# Patient Record
Sex: FEMALE | Race: OTHER | NOT HISPANIC OR LATINO | URBAN - METROPOLITAN AREA
[De-identification: names, ages, dates, MRNs, and addresses within clinical notes are randomized per-mention and may not be internally consistent; named-entity substitution may affect disease eponyms.]

---

## 2017-09-29 ENCOUNTER — OUTPATIENT (OUTPATIENT)
Dept: OUTPATIENT SERVICES | Facility: HOSPITAL | Age: 82
LOS: 1 days | End: 2017-09-29

## 2017-09-29 ENCOUNTER — APPOINTMENT (OUTPATIENT)
Dept: CV DIAGNOSTICS | Facility: HOSPITAL | Age: 82
End: 2017-09-29

## 2017-09-29 DIAGNOSIS — R06.00 DYSPNEA, UNSPECIFIED: ICD-10-CM

## 2018-02-25 ENCOUNTER — EMERGENCY (EMERGENCY)
Facility: HOSPITAL | Age: 83
LOS: 1 days | Discharge: HOME | End: 2018-02-25
Attending: EMERGENCY MEDICINE | Admitting: EMERGENCY MEDICINE
Payer: MEDICARE

## 2018-02-25 VITALS
HEART RATE: 92 BPM | RESPIRATION RATE: 18 BRPM | DIASTOLIC BLOOD PRESSURE: 91 MMHG | SYSTOLIC BLOOD PRESSURE: 194 MMHG | TEMPERATURE: 98 F | OXYGEN SATURATION: 100 %

## 2018-02-25 PROCEDURE — 99285 EMERGENCY DEPT VISIT HI MDM: CPT | Mod: 25,GC

## 2018-02-26 VITALS
RESPIRATION RATE: 16 BRPM | HEART RATE: 80 BPM | SYSTOLIC BLOOD PRESSURE: 170 MMHG | OXYGEN SATURATION: 100 % | DIASTOLIC BLOOD PRESSURE: 81 MMHG | TEMPERATURE: 98 F

## 2018-02-26 LAB
ALBUMIN SERPL ELPH-MCNC: 4.1 G/DL — SIGNIFICANT CHANGE UP (ref 3.3–5)
ALP SERPL-CCNC: 44 U/L — SIGNIFICANT CHANGE UP (ref 40–120)
ALT FLD-CCNC: 17 U/L — SIGNIFICANT CHANGE UP (ref 4–33)
AST SERPL-CCNC: 17 U/L — SIGNIFICANT CHANGE UP (ref 4–32)
BASOPHILS # BLD AUTO: 0.05 K/UL — SIGNIFICANT CHANGE UP (ref 0–0.2)
BASOPHILS NFR BLD AUTO: 0.6 % — SIGNIFICANT CHANGE UP (ref 0–2)
BILIRUB SERPL-MCNC: 0.3 MG/DL — SIGNIFICANT CHANGE UP (ref 0.2–1.2)
BUN SERPL-MCNC: 10 MG/DL — SIGNIFICANT CHANGE UP (ref 7–23)
CALCIUM SERPL-MCNC: 9.7 MG/DL — SIGNIFICANT CHANGE UP (ref 8.4–10.5)
CHLORIDE SERPL-SCNC: 96 MMOL/L — LOW (ref 98–107)
CK MB BLD-MCNC: 2.59 NG/ML — SIGNIFICANT CHANGE UP (ref 1–4.7)
CK MB BLD-MCNC: SIGNIFICANT CHANGE UP (ref 0–2.5)
CK SERPL-CCNC: 54 U/L — SIGNIFICANT CHANGE UP (ref 25–170)
CO2 SERPL-SCNC: 23 MMOL/L — SIGNIFICANT CHANGE UP (ref 22–31)
CREAT SERPL-MCNC: 0.83 MG/DL — SIGNIFICANT CHANGE UP (ref 0.5–1.3)
EOSINOPHIL # BLD AUTO: 0.05 K/UL — SIGNIFICANT CHANGE UP (ref 0–0.5)
EOSINOPHIL NFR BLD AUTO: 0.6 % — SIGNIFICANT CHANGE UP (ref 0–6)
GLUCOSE SERPL-MCNC: 114 MG/DL — HIGH (ref 70–99)
HCT VFR BLD CALC: 37.8 % — SIGNIFICANT CHANGE UP (ref 34.5–45)
HGB BLD-MCNC: 12 G/DL — SIGNIFICANT CHANGE UP (ref 11.5–15.5)
IMM GRANULOCYTES # BLD AUTO: 0.02 # — SIGNIFICANT CHANGE UP
IMM GRANULOCYTES NFR BLD AUTO: 0.2 % — SIGNIFICANT CHANGE UP (ref 0–1.5)
LYMPHOCYTES # BLD AUTO: 2.25 K/UL — SIGNIFICANT CHANGE UP (ref 1–3.3)
LYMPHOCYTES # BLD AUTO: 25.7 % — SIGNIFICANT CHANGE UP (ref 13–44)
MCHC RBC-ENTMCNC: 28.7 PG — SIGNIFICANT CHANGE UP (ref 27–34)
MCHC RBC-ENTMCNC: 31.7 % — LOW (ref 32–36)
MCV RBC AUTO: 90.4 FL — SIGNIFICANT CHANGE UP (ref 80–100)
MONOCYTES # BLD AUTO: 0.58 K/UL — SIGNIFICANT CHANGE UP (ref 0–0.9)
MONOCYTES NFR BLD AUTO: 6.6 % — SIGNIFICANT CHANGE UP (ref 2–14)
NEUTROPHILS # BLD AUTO: 5.81 K/UL — SIGNIFICANT CHANGE UP (ref 1.8–7.4)
NEUTROPHILS NFR BLD AUTO: 66.3 % — SIGNIFICANT CHANGE UP (ref 43–77)
NRBC # FLD: 0 — SIGNIFICANT CHANGE UP
PLATELET # BLD AUTO: 238 K/UL — SIGNIFICANT CHANGE UP (ref 150–400)
PMV BLD: 10.6 FL — SIGNIFICANT CHANGE UP (ref 7–13)
POTASSIUM SERPL-MCNC: 4.3 MMOL/L — SIGNIFICANT CHANGE UP (ref 3.5–5.3)
POTASSIUM SERPL-SCNC: 4.3 MMOL/L — SIGNIFICANT CHANGE UP (ref 3.5–5.3)
PROT SERPL-MCNC: 7.7 G/DL — SIGNIFICANT CHANGE UP (ref 6–8.3)
RBC # BLD: 4.18 M/UL — SIGNIFICANT CHANGE UP (ref 3.8–5.2)
RBC # FLD: 12.4 % — SIGNIFICANT CHANGE UP (ref 10.3–14.5)
SODIUM SERPL-SCNC: 137 MMOL/L — SIGNIFICANT CHANGE UP (ref 135–145)
TROPONIN T SERPL-MCNC: < 0.06 NG/ML — SIGNIFICANT CHANGE UP (ref 0–0.06)
WBC # BLD: 8.76 K/UL — SIGNIFICANT CHANGE UP (ref 3.8–10.5)
WBC # FLD AUTO: 8.76 K/UL — SIGNIFICANT CHANGE UP (ref 3.8–10.5)

## 2018-02-26 NOTE — ED PROVIDER NOTE - PROGRESS NOTE DETAILS
Remains entirely asymptomatic, no event on tele  monitoring.  Wants to go home.  Will DC home. PMD f/u.

## 2018-02-26 NOTE — ED PROVIDER NOTE - NS ED MD DISPO DISCHARGE CCDA
Pt given more ice chips. She is also given a lemon icee. Patient/Caregiver provided printed discharge information.

## 2018-02-26 NOTE — ED ADULT NURSE NOTE - OBJECTIVE STATEMENT
rec'd pt [id number] in room 3 c/o an episode of palpitations at 0530pm tonight, lasted a few seconds and self-resolved, not accompanied by SOB or diaphoresis, pt. presently denies CP/SOB, states she did not take her afternoon BP meds, pt. hypertensive as per family upon EMS arrival, systolically in the 200's, reports mild neck pain.  2 baby aspirins administered by EMS as per family.  Vital signs stable. 22G IV saline lock placed in the L hand, labs drawn and sent as ordered, respiration even and unlabored.

## 2018-02-26 NOTE — ED PROVIDER NOTE - PMH
CAD (coronary artery disease)    DM (diabetes mellitus)    HLD (hyperlipidemia)    HTN (hypertension)    MI (myocardial infarction) CAD (coronary artery disease)    DM (diabetes mellitus)    HLD (hyperlipidemia)    HTN (hypertension)    Hypothyroid    MI (myocardial infarction)

## 2018-02-26 NOTE — ED PROVIDER NOTE - MEDICAL DECISION MAKING DETAILS
Palpitations, no associated sx, short lasting, has no recollection of event, mental status at basline per family.  ECG unchanged from baseline LBBB.  Will send labs, incl TSH, and reassess. Tele monitor.  Unclear etiology. Palpitations, no associated sx, short lasting, has no recollection of event, mental status at baseline per family.  ECG unchanged from baseline LBBB.  Will send labs, incl TSH, and reassess. Tele monitor.  Unclear etiology but does not appear to be hemodynamically significant.

## 2018-02-26 NOTE — ED PROVIDER NOTE - OBJECTIVE STATEMENT
82 yr old F c h/o DM, HTN, HLD, CAD/stents, who p/w feeling of heart racing/palpitations at approx 9pm.  No assoc CP, dizziness, diaphoresis, SOB or other Sx.  Pt now has no recollection of events. Per family, lasted approx 5-10 min.  Family states pt has no cognitive disabilities, and on eval she is alert and oriented x 3, but appears slow to answer questions, states she cannot read.  no h/o CVA.  Otherwise has been in [facility].  No recent febrile illness, normal PO intake, no falls or dizziness, mental status at baseline, per family. 82 yr old F c h/o DM, HTN, hypothyroidism, HLD, CAD/stents, who p/w feeling of heart racing/palpitations at approx 9pm.  No assoc CP, dizziness, diaphoresis, SOB or other Sx.  Pt now has no recollection of events. Per family, lasted approx 5-10 min.  Family states pt has no cognitive disabilities, and on eval she is alert and oriented x 3, but appears slow to answer questions, states she cannot read.  no h/o CVA.  Otherwise has been in [facility].  No recent febrile illness, normal PO intake, no falls or dizziness, mental status at baseline, per family.

## 2018-09-07 ENCOUNTER — EMERGENCY (EMERGENCY)
Facility: HOSPITAL | Age: 83
LOS: 1 days | Discharge: HOME | End: 2018-09-07
Attending: EMERGENCY MEDICINE | Admitting: EMERGENCY MEDICINE
Payer: MEDICARE

## 2018-09-07 VITALS
DIASTOLIC BLOOD PRESSURE: 130 MMHG | TEMPERATURE: 98 F | SYSTOLIC BLOOD PRESSURE: 200 MMHG | OXYGEN SATURATION: 100 % | HEART RATE: 76 BPM | RESPIRATION RATE: 18 BRPM

## 2018-09-07 VITALS
HEART RATE: 70 BPM | OXYGEN SATURATION: 100 % | RESPIRATION RATE: 16 BRPM | SYSTOLIC BLOOD PRESSURE: 189 MMHG | TEMPERATURE: 98 F | DIASTOLIC BLOOD PRESSURE: 69 MMHG

## 2018-09-07 PROBLEM — E03.9 HYPOTHYROIDISM, UNSPECIFIED: Chronic | Status: ACTIVE | Noted: 2018-02-26

## 2018-09-07 LAB
ALBUMIN SERPL ELPH-MCNC: 4.4 G/DL — SIGNIFICANT CHANGE UP (ref 3.3–5)
ALP SERPL-CCNC: 42 U/L — SIGNIFICANT CHANGE UP (ref 40–120)
ALT FLD-CCNC: 14 U/L — SIGNIFICANT CHANGE UP (ref 4–33)
APPEARANCE UR: SIGNIFICANT CHANGE UP
AST SERPL-CCNC: 19 U/L — SIGNIFICANT CHANGE UP (ref 4–32)
BACTERIA # UR AUTO: HIGH
BASE EXCESS BLDV CALC-SCNC: 2.7 MMOL/L — SIGNIFICANT CHANGE UP
BASOPHILS # BLD AUTO: 0.05 K/UL — SIGNIFICANT CHANGE UP (ref 0–0.2)
BASOPHILS NFR BLD AUTO: 0.6 % — SIGNIFICANT CHANGE UP (ref 0–2)
BILIRUB SERPL-MCNC: 0.4 MG/DL — SIGNIFICANT CHANGE UP (ref 0.2–1.2)
BILIRUB UR-MCNC: NEGATIVE — SIGNIFICANT CHANGE UP
BLOOD GAS VENOUS - CREATININE: 0.79 MG/DL — SIGNIFICANT CHANGE UP (ref 0.5–1.3)
BLOOD UR QL VISUAL: NEGATIVE — SIGNIFICANT CHANGE UP
BUN SERPL-MCNC: 13 MG/DL — SIGNIFICANT CHANGE UP (ref 7–23)
CALCIUM SERPL-MCNC: 10.2 MG/DL — SIGNIFICANT CHANGE UP (ref 8.4–10.5)
CHLORIDE BLDV-SCNC: 95 MMOL/L — LOW (ref 96–108)
CHLORIDE SERPL-SCNC: 91 MMOL/L — LOW (ref 98–107)
CO2 SERPL-SCNC: 26 MMOL/L — SIGNIFICANT CHANGE UP (ref 22–31)
COLOR SPEC: COLORLESS — SIGNIFICANT CHANGE UP
CREAT SERPL-MCNC: 1.04 MG/DL — SIGNIFICANT CHANGE UP (ref 0.5–1.3)
EOSINOPHIL # BLD AUTO: 0.04 K/UL — SIGNIFICANT CHANGE UP (ref 0–0.5)
EOSINOPHIL NFR BLD AUTO: 0.5 % — SIGNIFICANT CHANGE UP (ref 0–6)
GAS PNL BLDV: 126 MMOL/L — LOW (ref 136–146)
GLUCOSE BLDV-MCNC: 145 — HIGH (ref 70–99)
GLUCOSE SERPL-MCNC: 143 MG/DL — HIGH (ref 70–99)
GLUCOSE UR-MCNC: NEGATIVE — SIGNIFICANT CHANGE UP
HCO3 BLDV-SCNC: 25 MMOL/L — SIGNIFICANT CHANGE UP (ref 20–27)
HCT VFR BLD CALC: 39 % — SIGNIFICANT CHANGE UP (ref 34.5–45)
HCT VFR BLDV CALC: 42.2 % — SIGNIFICANT CHANGE UP (ref 34.5–45)
HGB BLD-MCNC: 13 G/DL — SIGNIFICANT CHANGE UP (ref 11.5–15.5)
HGB BLDV-MCNC: 13.7 G/DL — SIGNIFICANT CHANGE UP (ref 11.5–15.5)
HYALINE CASTS # UR AUTO: NEGATIVE — SIGNIFICANT CHANGE UP
IMM GRANULOCYTES # BLD AUTO: 0.02 # — SIGNIFICANT CHANGE UP
IMM GRANULOCYTES NFR BLD AUTO: 0.2 % — SIGNIFICANT CHANGE UP (ref 0–1.5)
KETONES UR-MCNC: NEGATIVE — SIGNIFICANT CHANGE UP
LACTATE BLDV-MCNC: 3.9 MMOL/L — HIGH (ref 0.5–2)
LEUKOCYTE ESTERASE UR-ACNC: SIGNIFICANT CHANGE UP
LYMPHOCYTES # BLD AUTO: 2.18 K/UL — SIGNIFICANT CHANGE UP (ref 1–3.3)
LYMPHOCYTES # BLD AUTO: 26.2 % — SIGNIFICANT CHANGE UP (ref 13–44)
MAGNESIUM SERPL-MCNC: 1.3 MG/DL — LOW (ref 1.6–2.6)
MCHC RBC-ENTMCNC: 30.1 PG — SIGNIFICANT CHANGE UP (ref 27–34)
MCHC RBC-ENTMCNC: 33.3 % — SIGNIFICANT CHANGE UP (ref 32–36)
MCV RBC AUTO: 90.3 FL — SIGNIFICANT CHANGE UP (ref 80–100)
MONOCYTES # BLD AUTO: 0.41 K/UL — SIGNIFICANT CHANGE UP (ref 0–0.9)
MONOCYTES NFR BLD AUTO: 4.9 % — SIGNIFICANT CHANGE UP (ref 2–14)
NEUTROPHILS # BLD AUTO: 5.63 K/UL — SIGNIFICANT CHANGE UP (ref 1.8–7.4)
NEUTROPHILS NFR BLD AUTO: 67.6 % — SIGNIFICANT CHANGE UP (ref 43–77)
NITRITE UR-MCNC: NEGATIVE — SIGNIFICANT CHANGE UP
NRBC # FLD: 0 — SIGNIFICANT CHANGE UP
PCO2 BLDV: 51 MMHG — SIGNIFICANT CHANGE UP (ref 41–51)
PH BLDV: 7.35 PH — SIGNIFICANT CHANGE UP (ref 7.32–7.43)
PH UR: 7.5 — SIGNIFICANT CHANGE UP (ref 5–8)
PHOSPHATE SERPL-MCNC: 2.9 MG/DL — SIGNIFICANT CHANGE UP (ref 2.5–4.5)
PLATELET # BLD AUTO: 294 K/UL — SIGNIFICANT CHANGE UP (ref 150–400)
PMV BLD: 10.4 FL — SIGNIFICANT CHANGE UP (ref 7–13)
PO2 BLDV: 26 MMHG — LOW (ref 35–40)
POTASSIUM BLDV-SCNC: 4.5 MMOL/L — SIGNIFICANT CHANGE UP (ref 3.4–4.5)
POTASSIUM SERPL-MCNC: 5 MMOL/L — SIGNIFICANT CHANGE UP (ref 3.5–5.3)
POTASSIUM SERPL-SCNC: 5 MMOL/L — SIGNIFICANT CHANGE UP (ref 3.5–5.3)
PREALB SERPL-MCNC: 26 MG/DL — SIGNIFICANT CHANGE UP (ref 20–40)
PROT SERPL-MCNC: 8.1 G/DL — SIGNIFICANT CHANGE UP (ref 6–8.3)
PROT UR-MCNC: 100 — HIGH
RBC # BLD: 4.32 M/UL — SIGNIFICANT CHANGE UP (ref 3.8–5.2)
RBC # FLD: 12.1 % — SIGNIFICANT CHANGE UP (ref 10.3–14.5)
RBC CASTS # UR COMP ASSIST: SIGNIFICANT CHANGE UP (ref 0–?)
SAO2 % BLDV: 41.5 % — LOW (ref 60–85)
SODIUM SERPL-SCNC: 130 MMOL/L — LOW (ref 135–145)
SP GR SPEC: 1.01 — SIGNIFICANT CHANGE UP (ref 1–1.04)
SQUAMOUS # UR AUTO: SIGNIFICANT CHANGE UP
TROPONIN T, HIGH SENSITIVITY: 37 NG/L — SIGNIFICANT CHANGE UP (ref ?–14)
TSH SERPL-MCNC: 2.77 UIU/ML — SIGNIFICANT CHANGE UP (ref 0.27–4.2)
UROBILINOGEN FLD QL: NORMAL — SIGNIFICANT CHANGE UP
WBC # BLD: 8.33 K/UL — SIGNIFICANT CHANGE UP (ref 3.8–10.5)
WBC # FLD AUTO: 8.33 K/UL — SIGNIFICANT CHANGE UP (ref 3.8–10.5)
WBC UR QL: HIGH (ref 0–?)

## 2018-09-07 PROCEDURE — 93010 ELECTROCARDIOGRAM REPORT: CPT

## 2018-09-07 PROCEDURE — 74177 CT ABD & PELVIS W/CONTRAST: CPT | Mod: 26

## 2018-09-07 PROCEDURE — 71045 X-RAY EXAM CHEST 1 VIEW: CPT | Mod: 26

## 2018-09-07 PROCEDURE — 99284 EMERGENCY DEPT VISIT MOD MDM: CPT | Mod: 25,GC

## 2018-09-07 RX ORDER — SODIUM CHLORIDE 9 MG/ML
1000 INJECTION INTRAMUSCULAR; INTRAVENOUS; SUBCUTANEOUS ONCE
Qty: 0 | Refills: 0 | Status: COMPLETED | OUTPATIENT
Start: 2018-09-07 | End: 2018-09-07

## 2018-09-07 RX ORDER — CEPHALEXIN 500 MG
500 CAPSULE ORAL ONCE
Qty: 0 | Refills: 0 | Status: COMPLETED | OUTPATIENT
Start: 2018-09-07 | End: 2018-09-07

## 2018-09-07 RX ORDER — CEPHALEXIN 500 MG
1 CAPSULE ORAL
Qty: 20 | Refills: 0
Start: 2018-09-07 | End: 2018-09-16

## 2018-09-07 RX ADMIN — SODIUM CHLORIDE 1000 MILLILITER(S): 9 INJECTION INTRAMUSCULAR; INTRAVENOUS; SUBCUTANEOUS at 16:14

## 2018-09-07 RX ADMIN — Medication 500 MILLIGRAM(S): at 17:42

## 2018-09-07 NOTE — ED ADULT NURSE NOTE - OBJECTIVE STATEMENT
pt alert & oriented to person and place brought in from home by family for abdominal pain and increased weakness for past two weeks abdomen soft and non tender denies cp sob fever chills no respiratory distress noted, will monitor, English

## 2018-09-07 NOTE — ED PROVIDER NOTE - OBJECTIVE STATEMENT
Pt is an 82 Y F with hx of DM, HTN, CAD with stents Pt is an 82 Y F with hx of DM, HTN, CAD with stents who per family has been having decreasing PO intake and weakness for 2 weeks and acute onset abdominal pain today that caused them to bring her to the ED. Family sates that pt has been acting normally, no fever or confusion at home but just has been eating less and becoming increasingly weak. Pt admits to increasing urination, weakness, vomiting x 1 and some nausea. The pain that brought them to the ED has now resolved. She denies dysuria, LOC, head injury.

## 2018-09-07 NOTE — ED ADULT TRIAGE NOTE - CHIEF COMPLAINT QUOTE
Patient brought to ER from home by EMS for c/o abdominal pain radiating to chest. that started last night. LLBB noted and has hx of stents.

## 2018-09-07 NOTE — ED PROVIDER NOTE - PROGRESS NOTE DETAILS
Resident: Horacio Lucas – Pt was re-evaluated at bedside, VSS, feeling better overall. Results were discussed with patient as well as return precautions and follow up plan with PCP and/or specialist. Time was taken to answer any questions that the patient had before providing them with discharge paperwork.

## 2018-09-07 NOTE — ED PROVIDER NOTE - PMH
CAD (coronary artery disease)    DM (diabetes mellitus)    HLD (hyperlipidemia)    HTN (hypertension)    Hypothyroid    MI (myocardial infarction)

## 2018-09-08 LAB — SPECIMEN SOURCE: SIGNIFICANT CHANGE UP

## 2018-09-10 LAB
-  AMIKACIN: SIGNIFICANT CHANGE UP
-  AMPICILLIN/SULBACTAM: SIGNIFICANT CHANGE UP
-  AMPICILLIN: SIGNIFICANT CHANGE UP
-  AZTREONAM: SIGNIFICANT CHANGE UP
-  CEFAZOLIN: SIGNIFICANT CHANGE UP
-  CEFEPIME: SIGNIFICANT CHANGE UP
-  CEFOXITIN: SIGNIFICANT CHANGE UP
-  CEFTAZIDIME: SIGNIFICANT CHANGE UP
-  CEFTRIAXONE: SIGNIFICANT CHANGE UP
-  CIPROFLOXACIN: SIGNIFICANT CHANGE UP
-  ERTAPENEM: SIGNIFICANT CHANGE UP
-  GENTAMICIN: SIGNIFICANT CHANGE UP
-  IMIPENEM: SIGNIFICANT CHANGE UP
-  LEVOFLOXACIN: SIGNIFICANT CHANGE UP
-  MEROPENEM: SIGNIFICANT CHANGE UP
-  NITROFURANTOIN: SIGNIFICANT CHANGE UP
-  PIPERACILLIN/TAZOBACTAM: SIGNIFICANT CHANGE UP
-  TIGECYCLINE: SIGNIFICANT CHANGE UP
-  TOBRAMYCIN: SIGNIFICANT CHANGE UP
-  TRIMETHOPRIM/SULFAMETHOXAZOLE: SIGNIFICANT CHANGE UP
BACTERIA UR CULT: SIGNIFICANT CHANGE UP
METHOD TYPE: SIGNIFICANT CHANGE UP
ORGANISM # SPEC MICROSCOPIC CNT: SIGNIFICANT CHANGE UP
ORGANISM # SPEC MICROSCOPIC CNT: SIGNIFICANT CHANGE UP

## 2019-07-31 ENCOUNTER — INPATIENT (INPATIENT)
Facility: HOSPITAL | Age: 84
LOS: 8 days | Discharge: HOME HEALTH | End: 2019-08-09
Attending: INTERNAL MEDICINE | Admitting: INTERNAL MEDICINE
Payer: MEDICARE

## 2019-07-31 VITALS
TEMPERATURE: 98 F | RESPIRATION RATE: 18 BRPM | DIASTOLIC BLOOD PRESSURE: 80 MMHG | HEART RATE: 79 BPM | SYSTOLIC BLOOD PRESSURE: 171 MMHG | OXYGEN SATURATION: 100 %
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
## 2019-07-31 LAB
ALBUMIN SERPL ELPH-MCNC: 4.5 G/DL — SIGNIFICANT CHANGE UP (ref 3.3–5)
ALP SERPL-CCNC: 42 U/L — SIGNIFICANT CHANGE UP (ref 40–120)
ALT FLD-CCNC: 12 U/L — SIGNIFICANT CHANGE UP (ref 4–33)
ANION GAP SERPL CALC-SCNC: 12 MMO/L — SIGNIFICANT CHANGE UP (ref 7–14)
APPEARANCE UR: SIGNIFICANT CHANGE UP
AST SERPL-CCNC: 15 U/L — SIGNIFICANT CHANGE UP (ref 4–32)
BACTERIA # UR AUTO: SIGNIFICANT CHANGE UP
BASOPHILS # BLD AUTO: 0.05 K/UL — SIGNIFICANT CHANGE UP (ref 0–0.2)
BASOPHILS NFR BLD AUTO: 0.6 % — SIGNIFICANT CHANGE UP (ref 0–2)
BILIRUB SERPL-MCNC: 0.2 MG/DL — SIGNIFICANT CHANGE UP (ref 0.2–1.2)
BILIRUB UR-MCNC: NEGATIVE — SIGNIFICANT CHANGE UP
BLOOD UR QL VISUAL: NEGATIVE — SIGNIFICANT CHANGE UP
BUN SERPL-MCNC: 23 MG/DL — SIGNIFICANT CHANGE UP (ref 7–23)
CALCIUM SERPL-MCNC: 10.8 MG/DL — HIGH (ref 8.4–10.5)
CHLORIDE SERPL-SCNC: 94 MMOL/L — LOW (ref 98–107)
CO2 SERPL-SCNC: 29 MMOL/L — SIGNIFICANT CHANGE UP (ref 22–31)
COLOR SPEC: SIGNIFICANT CHANGE UP
CREAT SERPL-MCNC: 1.51 MG/DL — HIGH (ref 0.5–1.3)
EOSINOPHIL # BLD AUTO: 0.04 K/UL — SIGNIFICANT CHANGE UP (ref 0–0.5)
EOSINOPHIL NFR BLD AUTO: 0.5 % — SIGNIFICANT CHANGE UP (ref 0–6)
GLUCOSE SERPL-MCNC: 121 MG/DL — HIGH (ref 70–99)
GLUCOSE UR-MCNC: NEGATIVE — SIGNIFICANT CHANGE UP
HCT VFR BLD CALC: 34.5 % — SIGNIFICANT CHANGE UP (ref 34.5–45)
HGB BLD-MCNC: 11 G/DL — LOW (ref 11.5–15.5)
HYALINE CASTS # UR AUTO: NEGATIVE — SIGNIFICANT CHANGE UP
IMM GRANULOCYTES NFR BLD AUTO: 0.1 % — SIGNIFICANT CHANGE UP (ref 0–1.5)
KETONES UR-MCNC: NEGATIVE — SIGNIFICANT CHANGE UP
LEUKOCYTE ESTERASE UR-ACNC: SIGNIFICANT CHANGE UP
LYMPHOCYTES # BLD AUTO: 2.36 K/UL — SIGNIFICANT CHANGE UP (ref 1–3.3)
LYMPHOCYTES # BLD AUTO: 29.8 % — SIGNIFICANT CHANGE UP (ref 13–44)
MCHC RBC-ENTMCNC: 30.6 PG — SIGNIFICANT CHANGE UP (ref 27–34)
MCHC RBC-ENTMCNC: 31.9 % — LOW (ref 32–36)
MCV RBC AUTO: 95.8 FL — SIGNIFICANT CHANGE UP (ref 80–100)
MONOCYTES # BLD AUTO: 0.48 K/UL — SIGNIFICANT CHANGE UP (ref 0–0.9)
MONOCYTES NFR BLD AUTO: 6.1 % — SIGNIFICANT CHANGE UP (ref 2–14)
NEUTROPHILS # BLD AUTO: 4.98 K/UL — SIGNIFICANT CHANGE UP (ref 1.8–7.4)
NEUTROPHILS NFR BLD AUTO: 62.9 % — SIGNIFICANT CHANGE UP (ref 43–77)
NITRITE UR-MCNC: POSITIVE — HIGH
NRBC # FLD: 0 K/UL — SIGNIFICANT CHANGE UP (ref 0–0)
PH UR: 7 — SIGNIFICANT CHANGE UP (ref 5–8)
PLATELET # BLD AUTO: 284 K/UL — SIGNIFICANT CHANGE UP (ref 150–400)
PMV BLD: 11 FL — SIGNIFICANT CHANGE UP (ref 7–13)
POTASSIUM SERPL-MCNC: 4.6 MMOL/L — SIGNIFICANT CHANGE UP (ref 3.5–5.3)
POTASSIUM SERPL-SCNC: 4.6 MMOL/L — SIGNIFICANT CHANGE UP (ref 3.5–5.3)
PROT SERPL-MCNC: 8.5 G/DL — HIGH (ref 6–8.3)
PROT UR-MCNC: 30 — SIGNIFICANT CHANGE UP
RBC # BLD: 3.6 M/UL — LOW (ref 3.8–5.2)
RBC # FLD: 11.5 % — SIGNIFICANT CHANGE UP (ref 10.3–14.5)
RBC CASTS # UR COMP ASSIST: SIGNIFICANT CHANGE UP (ref 0–?)
SODIUM SERPL-SCNC: 135 MMOL/L — SIGNIFICANT CHANGE UP (ref 135–145)
SP GR SPEC: 1.01 — SIGNIFICANT CHANGE UP (ref 1–1.04)
SQUAMOUS # UR AUTO: SIGNIFICANT CHANGE UP
UROBILINOGEN FLD QL: NORMAL — SIGNIFICANT CHANGE UP
WBC # BLD: 7.92 K/UL — SIGNIFICANT CHANGE UP (ref 3.8–10.5)
WBC # FLD AUTO: 7.92 K/UL — SIGNIFICANT CHANGE UP (ref 3.8–10.5)
WBC UR QL: HIGH (ref 0–?)

## 2019-07-31 PROCEDURE — 72131 CT LUMBAR SPINE W/O DYE: CPT | Mod: 26

## 2019-07-31 PROCEDURE — 71045 X-RAY EXAM CHEST 1 VIEW: CPT | Mod: 26

## 2019-07-31 PROCEDURE — 72128 CT CHEST SPINE W/O DYE: CPT | Mod: 26

## 2019-07-31 PROCEDURE — 70486 CT MAXILLOFACIAL W/O DYE: CPT | Mod: 26

## 2019-07-31 PROCEDURE — 70450 CT HEAD/BRAIN W/O DYE: CPT | Mod: 26

## 2019-07-31 PROCEDURE — 72125 CT NECK SPINE W/O DYE: CPT | Mod: 26

## 2019-07-31 PROCEDURE — 93010 ELECTROCARDIOGRAM REPORT: CPT

## 2019-07-31 PROCEDURE — 72170 X-RAY EXAM OF PELVIS: CPT | Mod: 26

## 2019-07-31 RX ORDER — CEFTRIAXONE 500 MG/1
1000 INJECTION, POWDER, FOR SOLUTION INTRAMUSCULAR; INTRAVENOUS EVERY 24 HOURS
Refills: 0 | Status: COMPLETED | OUTPATIENT
Start: 2019-07-31 | End: 2019-08-03

## 2019-07-31 RX ORDER — HEPARIN SODIUM 5000 [USP'U]/ML
5000 INJECTION INTRAVENOUS; SUBCUTANEOUS EVERY 12 HOURS
Refills: 0 | Status: DISCONTINUED | OUTPATIENT
Start: 2019-07-31 | End: 2019-08-09

## 2019-07-31 RX ORDER — ASPIRIN/CALCIUM CARB/MAGNESIUM 324 MG
81 TABLET ORAL DAILY
Refills: 0 | Status: DISCONTINUED | OUTPATIENT
Start: 2019-07-31 | End: 2019-08-09

## 2019-07-31 RX ORDER — SODIUM CHLORIDE 9 MG/ML
1000 INJECTION INTRAMUSCULAR; INTRAVENOUS; SUBCUTANEOUS ONCE
Refills: 0 | Status: COMPLETED | OUTPATIENT
Start: 2019-07-31 | End: 2019-07-31

## 2019-07-31 RX ORDER — LABETALOL HCL 100 MG
5 TABLET ORAL EVERY 6 HOURS
Refills: 0 | Status: DISCONTINUED | OUTPATIENT
Start: 2019-07-31 | End: 2019-08-01

## 2019-07-31 RX ADMIN — SODIUM CHLORIDE 1000 MILLILITER(S): 9 INJECTION INTRAMUSCULAR; INTRAVENOUS; SUBCUTANEOUS at 21:50

## 2019-07-31 NOTE — ED ADULT NURSE NOTE - INTEGUMENTARY WDL
Ecchymosis to right orbital region, Color consistent with ethnicity/race, warm, dry intact, resilient.

## 2019-07-31 NOTE — H&P ADULT - PROBLEM SELECTOR PLAN 2
unclear, multiple unwitnessed falls increasing in frequency, trauma evaluation in ED with pan CT unremarkable overall aside from marked osteoarthritis  -per history probably related to UTI/weakness, however need to consider ?cardiogenic syncope (has known LBBB and CAD)  -CTH with no bleed, no seizure history per family. Pt unable to give any history and forgets she falls. No chest pain reported  -EKG appears at baseline. Checking trops and repeat echo. monitor on telemetry for now  -treating UTI as above  -PT/OT evaluation

## 2019-07-31 NOTE — H&P ADULT - PROBLEM SELECTOR PLAN 6
probably from poor PO intake, however will need to consider MM given protein/albumin gap, high calcium, anemia. Will hydrate with saline

## 2019-07-31 NOTE — ED ADULT NURSE NOTE - OBJECTIVE STATEMENT
84 Y/O F S/P fall 3 days ago with urinary frequency and increased confusion for the past 2 weeks. PMH is HLD, Hypothyroid, CAD with stents, dementia. Pt lives with family, was walking late at night and fell. Family was asleep at the time but found pt on the fall. Pt has been having urinary frequency for 2 weeks and was diagnosed with a UTI and given macrobid but symptoms persisted. Pt admits to generalized pain worse in R periorbital area, denies loss or change of vision. Pt is on ASA, family states they believe she no longer takes plavix. Pt admits to pain in head, neck, back and abdomen, also reports intermittent dizziness, denies chest pain, SOB, fever, chills. Family at bedside.

## 2019-07-31 NOTE — ED PROVIDER NOTE - OBJECTIVE STATEMENT
82 Y/O F S/P fall 3 days ago with urinary frequency and increased confusion for the past 2 weeks. PMH is HLD Hypothyroid, 82 Y/O F S/P fall 3 days ago with urinary frequency and increased confusion for the past 2 weeks. PMH is HLD, Hypothyroid, CAD with stents. Pt was w 84 Y/O F S/P fall 3 days ago with urinary frequency and increased confusion for the past 2 weeks. PMH is HLD, Hypothyroid, CAD with stents, dementia. Pt lives with family, was walking late at night and fell. Family was asleep at the time but found pt on the fall. Pt has been having urinary frequency for 2 weeks and was diagnosed with a UTI and given macrobid but symptoms persisted. Pt admits to generalized pain worse in R periorbital area, denies loss or change of vision. Pt is on ASA, family states they believe she no longer takes plavix. Pt admits to pain in head, neck and back. Pt denies any other symptoms or complaints.

## 2019-07-31 NOTE — H&P ADULT - NSHPREVIEWOFSYSTEMS_GEN_ALL_CORE
REVIEW OF SYSTEMS:    CONSTITUTIONAL: No weakness, fevers or chills  EYES/ENT: No visual changes;  No dysphagia +bruising around R eye  NECK: No pain or stiffness  RESPIRATORY: No cough, wheezing, hemoptysis; No shortness of breath  CARDIOVASCULAR: No chest pain or palpitations; No lower extremity edema  GASTROINTESTINAL: +mild lower abdominal discomfort. No nausea, vomiting, or hematemesis; No diarrhea or constipation. No melena or hematochezia.  GENITOURINARY: +urinary frequency  NEUROLOGICAL: No new numbness or weakness  SKIN: No itching, burning, rashes, or lesions   All other review of systems is negative unless indicated above.

## 2019-07-31 NOTE — ED ADULT TRIAGE NOTE - CHIEF COMPLAINT QUOTE
Pt brought to ED from PCP office for Generalized weakness multiple falls and facial trauma. Pt has hematoma to right darling- orbital area. Pt states she remember getting dizzy prior to fall 3 days ago, unsure of what struck face on. Family says fall was unwitnessed PT called for help. PMH: CHF, CAD, HTN, HLD, DM, Dementia. Pt on Plavix.  A&Ox2 at this time: Family states that is baseline. PT unable to give HPI, answering questions slowly. DAVID CABALLERO called for Eval: no code stroke called.

## 2019-07-31 NOTE — ED PROVIDER NOTE - ATTENDING CONTRIBUTION TO CARE
Dr Bloch, ATTENDING MD-  I performed a face to face bedside interview with patient regarding history of present illness, review of symptoms and past medical history. I completed an independent physical exam.  I have discussed patient's plan of care with PA.   Documentation as above in the note.  Patient examined, well appearing NAD HEENT  hematoma right periorbital, , Full EOM, neck supple, nontender, FROM without pain, heart sounds nml lungs clear, abd soft, pelvis stable, extrem no edema motor 5/5, sensation intact oriented to person and place.

## 2019-07-31 NOTE — H&P ADULT - PROBLEM SELECTOR PLAN 1
-pt with 2 weeks of urinary frequency, unclear how long took macrobid for  -UA with nitrites, leuks and WBCs. No CVA tenderness  -No white count or fever. Will start ceftriaxone  -f/u urine cultures

## 2019-07-31 NOTE — H&P ADULT - NSHPLABSRESULTS_GEN_ALL_CORE
135  |  94<L>  |  23  ----------------------------<  121<H>  4.6   |  29  |  1.51<H>    Ca    10.8<H>      2019 20:50    TPro  8.5<H>  /  Alb  4.5  /  TBili  0.2  /  DBili  x   /  AST  15  /  ALT  12  /  AlkPhos  42                              11.0   7.92  )-----------( 284      ( 2019 20:50 )             34.5             LIVER FUNCTIONS - ( 2019 20:50 )  Alb: 4.5 g/dL / Pro: 8.5 g/dL / ALK PHOS: 42 u/L / ALT: 12 u/L / AST: 15 u/L / GGT: x                 Urinalysis Basic - ( 2019 22:00 )    Color: LIGHT YELLOW / Appearance: Lt TURBID / S.012 / pH: 7.0  Gluc: NEGATIVE / Ketone: NEGATIVE  / Bili: NEGATIVE / Urobili: NORMAL   Blood: NEGATIVE / Protein: 30 / Nitrite: POSITIVE   Leuk Esterase: MODERATE / RBC: 3-5 / WBC 26-50   Sq Epi: OCC / Non Sq Epi: x / Bacteria: FEW    thoracic spine CT: Lumbar spine:  No acute fracture or traumatic malalignment.    Multilevel degenerative osteoarthritis and degenerative disc disease are   present. Findings include marginal osteophytes, uncovertebral spurring,   loss of normal disc space height and endplate sclerosis, and facet joint   space compartment narrowing with subchondral sclerosis and hypertrophic   osteophytes at multiple levels. Canal contents are suboptimally evaluated   inherent to CT technique.    IMPRESSION:  No acute fracture or traumatic malalignment.    Lumbar Spine CT: Lumbar spine:  No acute fracture or traumatic malalignment.    Multilevel degenerative osteoarthritis and degenerative disc disease are   present. Findings include marginal osteophytes, uncovertebral spurring,   loss of normal disc space height and endplate sclerosis, and facet joint   space compartment narrowing with subchondral sclerosis and hypertrophic   osteophytes at multiple levels. Canal contents are suboptimally evaluated   inherent to CT technique.    IMPRESSION:  No acute fracture or traumatic malalignment.    CTH, facial bones: IMPRESSION:  CT brain: No acute findings.    CT cervical spine: No acute findings.    CT maxillofacial bones: No acute findings.    CXR - clear    EKG: sinus rhythm, LBBB, nonspecific twave changes lateral leads, unchanged from prior ekgs

## 2019-07-31 NOTE — H&P ADULT - NSICDXPASTMEDICALHX_GEN_ALL_CORE_FT
PAST MEDICAL HISTORY:  CAD (coronary artery disease)     DM (diabetes mellitus)     HLD (hyperlipidemia)     HTN (hypertension)     Hypothyroid     MI (myocardial infarction)

## 2019-07-31 NOTE — H&P ADULT - PROBLEM SELECTOR PLAN 5
reportedly had stents in the past and currently on aspirin  -will continue aspirin for now. will start low dose BB if HR tolerates and start statin until home meds are clarified  -monitor on tele as above. EKG with LBBB known from before. Trops pending

## 2019-07-31 NOTE — H&P ADULT - ASSESSMENT
82 y/o female with history of CAD s/p stents 2013, hypothyroidism, HLD, HTN, HLD, dementia (baseline AOx1) but will follow commands presents to the ED via family members for worsening urinary frequency as well as more frequent falls over the last 2 weeks. Found in the ED to have a positive UA, acute kidney injury, mild hypercalcemia to be admitted for further work up

## 2019-07-31 NOTE — ED PROVIDER NOTE - CARE PLAN
Principal Discharge DX:	Fall  Secondary Diagnosis:	Weakness  Secondary Diagnosis:	UTI (urinary tract infection)

## 2019-07-31 NOTE — H&P ADULT - NSHPPHYSICALEXAM_GEN_ALL_CORE
PHYSICAL EXAM:  GENERAL: NAD, cachectic appearing  HEAD:  Atraumatic, Normocephalic  EYES: EOMI, +R eye area ecchymotic, PERRLA  NECK: Supple, No JVD  CHEST/LUNG: Clear to auscultation bilaterally; No wheezes, rales or rhonchi  HEART: Regular rate and rhythm; No murmurs, rubs, or gallops, (+)S1, S2  ABDOMEN: Soft, Nontender, Nondistended; Normal Bowel sounds, no cva tenderness   EXTREMITIES:  2+ Peripheral Pulses, No clubbing, cyanosis, or edema  PSYCH: flat mood and affect  NEUROLOGY: AAOx1, moves all extremities, CN intact, strength 5/5 b/l UE, 3-4/5 b/l LE strength, can move all extremities  SKIN: No rashes or lesions

## 2019-07-31 NOTE — H&P ADULT - PROBLEM SELECTOR PLAN 3
as above, possibly metabolic/infectious related to UTI. Treating as above. Will check TSH and confirm what synthroid dosing is  -pt also losing more weight recently, presenting with GILBERTO, new normocytic anemia, elevated calcium and protein/Albumin gap. Will check UPEP/SPEP to work up multiple myeloma  -PT/OT eval as above

## 2019-07-31 NOTE — ED PROVIDER NOTE - PROGRESS NOTE DETAILS
Katherine CABALLERO: I was asked to evaluate this patient for possible CVA. Patient sent by pmd for evaluation of multiple falls, last fall 3 days ago, currently on treatment for UTI. Patient needs assistance to ambulate at baseline and is very weak per daughter. Patient has dementia, unable to provide a history. Last fall 3 days ago. It is questionable whether she is on aspirin or plavix. Pt has noted hematoma to right periorbital area. She is able to follow commands, smile symmetrically, wiggle toes, bend both knees, raise both arms. Symptoms of weakness not typical of acute CVA. Given falls and age, CT Head, CT C-spine, CT Max face, CXR, pelvis XR ordered. Patient to have these followed up in main ED with comprehensive evaluation. Orders placed to expedite care. Patient's level of weakness and demeanor not atypical per family. No code stroke called.

## 2019-07-31 NOTE — ED PROVIDER NOTE - CLINICAL SUMMARY MEDICAL DECISION MAKING FREE TEXT BOX
84 Y/O F S/P fall 3 days ago with urinary frequency and increased confusion for the past 2 weeks. PMH is HLD, Hypothyroid, CAD with stents, dementia. Plan is Ct scan to R/O acute FX or abnormality, will check urine, if UTI will begin tx likely will require admission.

## 2019-07-31 NOTE — ED PROVIDER NOTE - CRANIAL NERVE AND PUPILLARY EXAM
cranial nerves 2-12 intact/5/5 strength in upper and lower extremity intact sensation to light touch bilaterally

## 2019-08-01 DIAGNOSIS — I25.10 ATHEROSCLEROTIC HEART DISEASE OF NATIVE CORONARY ARTERY WITHOUT ANGINA PECTORIS: ICD-10-CM

## 2019-08-01 DIAGNOSIS — I10 ESSENTIAL (PRIMARY) HYPERTENSION: ICD-10-CM

## 2019-08-01 DIAGNOSIS — N17.9 ACUTE KIDNEY FAILURE, UNSPECIFIED: ICD-10-CM

## 2019-08-01 DIAGNOSIS — R74.8 ABNORMAL LEVELS OF OTHER SERUM ENZYMES: ICD-10-CM

## 2019-08-01 DIAGNOSIS — R29.6 REPEATED FALLS: ICD-10-CM

## 2019-08-01 DIAGNOSIS — R53.1 WEAKNESS: ICD-10-CM

## 2019-08-01 DIAGNOSIS — N30.00 ACUTE CYSTITIS WITHOUT HEMATURIA: ICD-10-CM

## 2019-08-01 LAB
ALBUMIN SERPL ELPH-MCNC: 4.1 G/DL — SIGNIFICANT CHANGE UP (ref 3.3–5)
ALP SERPL-CCNC: 41 U/L — SIGNIFICANT CHANGE UP (ref 40–120)
ALT FLD-CCNC: 9 U/L — SIGNIFICANT CHANGE UP (ref 4–33)
ANION GAP SERPL CALC-SCNC: 14 MMO/L — SIGNIFICANT CHANGE UP (ref 7–14)
AST SERPL-CCNC: 15 U/L — SIGNIFICANT CHANGE UP (ref 4–32)
BASOPHILS # BLD AUTO: 0.06 K/UL — SIGNIFICANT CHANGE UP (ref 0–0.2)
BASOPHILS NFR BLD AUTO: 0.7 % — SIGNIFICANT CHANGE UP (ref 0–2)
BILIRUB DIRECT SERPL-MCNC: < 0.2 MG/DL — SIGNIFICANT CHANGE UP (ref 0.1–0.2)
BILIRUB SERPL-MCNC: 0.3 MG/DL — SIGNIFICANT CHANGE UP (ref 0.2–1.2)
BUN SERPL-MCNC: 17 MG/DL — SIGNIFICANT CHANGE UP (ref 7–23)
CALCIUM SERPL-MCNC: 10.1 MG/DL — SIGNIFICANT CHANGE UP (ref 8.4–10.5)
CHLORIDE SERPL-SCNC: 98 MMOL/L — SIGNIFICANT CHANGE UP (ref 98–107)
CK MB BLD-MCNC: 3.07 NG/ML — SIGNIFICANT CHANGE UP (ref 1–4.7)
CK MB BLD-MCNC: SIGNIFICANT CHANGE UP (ref 0–2.5)
CK SERPL-CCNC: 65 U/L — SIGNIFICANT CHANGE UP (ref 25–170)
CO2 SERPL-SCNC: 25 MMOL/L — SIGNIFICANT CHANGE UP (ref 22–31)
CREAT SERPL-MCNC: 1.29 MG/DL — SIGNIFICANT CHANGE UP (ref 0.5–1.3)
EOSINOPHIL # BLD AUTO: 0.03 K/UL — SIGNIFICANT CHANGE UP (ref 0–0.5)
EOSINOPHIL NFR BLD AUTO: 0.4 % — SIGNIFICANT CHANGE UP (ref 0–6)
GLUCOSE BLDC GLUCOMTR-MCNC: 163 MG/DL — HIGH (ref 70–99)
GLUCOSE BLDC GLUCOMTR-MCNC: 172 MG/DL — HIGH (ref 70–99)
GLUCOSE BLDC GLUCOMTR-MCNC: 180 MG/DL — HIGH (ref 70–99)
GLUCOSE BLDC GLUCOMTR-MCNC: 215 MG/DL — HIGH (ref 70–99)
GLUCOSE SERPL-MCNC: 173 MG/DL — HIGH (ref 70–99)
HBA1C BLD-MCNC: 6.6 % — HIGH (ref 4–5.6)
HCT VFR BLD CALC: 34.6 % — SIGNIFICANT CHANGE UP (ref 34.5–45)
HGB BLD-MCNC: 11.1 G/DL — LOW (ref 11.5–15.5)
IMM GRANULOCYTES NFR BLD AUTO: 0.2 % — SIGNIFICANT CHANGE UP (ref 0–1.5)
INR BLD: 1.14 — SIGNIFICANT CHANGE UP (ref 0.88–1.17)
IRON SATN MFR SERPL: 284 UG/DL — SIGNIFICANT CHANGE UP (ref 140–530)
IRON SATN MFR SERPL: 47 UG/DL — SIGNIFICANT CHANGE UP (ref 30–160)
KAPPA FREE LIGHT CHAINS, SERUM: 5.3 MG/DL — HIGH (ref 0.33–1.94)
LAMBDA FREE LIGHT CHAINS, SERUM: 3.05 MG/DL — HIGH (ref 0.57–2.63)
LYMPHOCYTES # BLD AUTO: 1.58 K/UL — SIGNIFICANT CHANGE UP (ref 1–3.3)
LYMPHOCYTES # BLD AUTO: 19.6 % — SIGNIFICANT CHANGE UP (ref 13–44)
MAGNESIUM SERPL-MCNC: 1.5 MG/DL — LOW (ref 1.6–2.6)
MCHC RBC-ENTMCNC: 30.1 PG — SIGNIFICANT CHANGE UP (ref 27–34)
MCHC RBC-ENTMCNC: 32.1 % — SIGNIFICANT CHANGE UP (ref 32–36)
MCV RBC AUTO: 93.8 FL — SIGNIFICANT CHANGE UP (ref 80–100)
MONOCYTES # BLD AUTO: 0.53 K/UL — SIGNIFICANT CHANGE UP (ref 0–0.9)
MONOCYTES NFR BLD AUTO: 6.6 % — SIGNIFICANT CHANGE UP (ref 2–14)
NEUTROPHILS # BLD AUTO: 5.86 K/UL — SIGNIFICANT CHANGE UP (ref 1.8–7.4)
NEUTROPHILS NFR BLD AUTO: 72.5 % — SIGNIFICANT CHANGE UP (ref 43–77)
NRBC # FLD: 0 K/UL — SIGNIFICANT CHANGE UP (ref 0–0)
PLATELET # BLD AUTO: 294 K/UL — SIGNIFICANT CHANGE UP (ref 150–400)
PMV BLD: 10.6 FL — SIGNIFICANT CHANGE UP (ref 7–13)
POTASSIUM SERPL-MCNC: 4 MMOL/L — SIGNIFICANT CHANGE UP (ref 3.5–5.3)
POTASSIUM SERPL-SCNC: 4 MMOL/L — SIGNIFICANT CHANGE UP (ref 3.5–5.3)
PROT SERPL-MCNC: 7.7 G/DL — SIGNIFICANT CHANGE UP (ref 6–8.3)
PROT SERPL-MCNC: 7.9 G/DL — SIGNIFICANT CHANGE UP (ref 6–8.3)
PROTHROM AB SERPL-ACNC: 13 SEC — SIGNIFICANT CHANGE UP (ref 9.8–13.1)
RBC # BLD: 3.69 M/UL — LOW (ref 3.8–5.2)
RBC # FLD: 11.7 % — SIGNIFICANT CHANGE UP (ref 10.3–14.5)
RETICS #: 31 K/UL — SIGNIFICANT CHANGE UP (ref 25–125)
RETICS/RBC NFR: 0.8 % — SIGNIFICANT CHANGE UP (ref 0.5–2.5)
SODIUM SERPL-SCNC: 137 MMOL/L — SIGNIFICANT CHANGE UP (ref 135–145)
TROPONIN T, HIGH SENSITIVITY: 52 NG/L — HIGH (ref ?–14)
TSH SERPL-MCNC: 0.37 UIU/ML — SIGNIFICANT CHANGE UP (ref 0.27–4.2)
UIBC SERPL-MCNC: 237.3 UG/DL — SIGNIFICANT CHANGE UP (ref 110–370)
WBC # BLD: 8.08 K/UL — SIGNIFICANT CHANGE UP (ref 3.8–10.5)
WBC # FLD AUTO: 8.08 K/UL — SIGNIFICANT CHANGE UP (ref 3.8–10.5)

## 2019-08-01 PROCEDURE — 99223 1ST HOSP IP/OBS HIGH 75: CPT

## 2019-08-01 PROCEDURE — 86334 IMMUNOFIX E-PHORESIS SERUM: CPT | Mod: 26

## 2019-08-01 PROCEDURE — 84165 PROTEIN E-PHORESIS SERUM: CPT | Mod: 26

## 2019-08-01 PROCEDURE — 99222 1ST HOSP IP/OBS MODERATE 55: CPT | Mod: GC

## 2019-08-01 PROCEDURE — 93306 TTE W/DOPPLER COMPLETE: CPT | Mod: 26

## 2019-08-01 RX ORDER — SODIUM CHLORIDE 9 MG/ML
1000 INJECTION, SOLUTION INTRAVENOUS
Refills: 0 | Status: DISCONTINUED | OUTPATIENT
Start: 2019-08-01 | End: 2019-08-09

## 2019-08-01 RX ORDER — DEXTROSE 50 % IN WATER 50 %
12.5 SYRINGE (ML) INTRAVENOUS ONCE
Refills: 0 | Status: DISCONTINUED | OUTPATIENT
Start: 2019-08-01 | End: 2019-08-09

## 2019-08-01 RX ORDER — LABETALOL HCL 100 MG
5 TABLET ORAL EVERY 6 HOURS
Refills: 0 | Status: DISCONTINUED | OUTPATIENT
Start: 2019-08-01 | End: 2019-08-01

## 2019-08-01 RX ORDER — GLUCAGON INJECTION, SOLUTION 0.5 MG/.1ML
1 INJECTION, SOLUTION SUBCUTANEOUS ONCE
Refills: 0 | Status: DISCONTINUED | OUTPATIENT
Start: 2019-08-01 | End: 2019-08-09

## 2019-08-01 RX ORDER — FUROSEMIDE 40 MG
20 TABLET ORAL DAILY
Refills: 0 | Status: DISCONTINUED | OUTPATIENT
Start: 2019-08-01 | End: 2019-08-09

## 2019-08-01 RX ORDER — OXYBUTYNIN CHLORIDE 5 MG
5 TABLET ORAL DAILY
Refills: 0 | Status: DISCONTINUED | OUTPATIENT
Start: 2019-08-01 | End: 2019-08-06

## 2019-08-01 RX ORDER — METOPROLOL TARTRATE 50 MG
50 TABLET ORAL DAILY
Refills: 0 | Status: DISCONTINUED | OUTPATIENT
Start: 2019-08-01 | End: 2019-08-05

## 2019-08-01 RX ORDER — FOLIC ACID 0.8 MG
1 TABLET ORAL DAILY
Refills: 0 | Status: DISCONTINUED | OUTPATIENT
Start: 2019-08-01 | End: 2019-08-09

## 2019-08-01 RX ORDER — HYDRALAZINE HCL 50 MG
10 TABLET ORAL EVERY 6 HOURS
Refills: 0 | Status: DISCONTINUED | OUTPATIENT
Start: 2019-08-01 | End: 2019-08-05

## 2019-08-01 RX ORDER — MAGNESIUM SULFATE 500 MG/ML
2 VIAL (ML) INJECTION ONCE
Refills: 0 | Status: COMPLETED | OUTPATIENT
Start: 2019-08-01 | End: 2019-08-01

## 2019-08-01 RX ORDER — DEXTROSE 50 % IN WATER 50 %
25 SYRINGE (ML) INTRAVENOUS ONCE
Refills: 0 | Status: DISCONTINUED | OUTPATIENT
Start: 2019-08-01 | End: 2019-08-09

## 2019-08-01 RX ORDER — DONEPEZIL HYDROCHLORIDE 10 MG/1
10 TABLET, FILM COATED ORAL AT BEDTIME
Refills: 0 | Status: DISCONTINUED | OUTPATIENT
Start: 2019-08-01 | End: 2019-08-09

## 2019-08-01 RX ORDER — INSULIN LISPRO 100/ML
VIAL (ML) SUBCUTANEOUS
Refills: 0 | Status: DISCONTINUED | OUTPATIENT
Start: 2019-08-01 | End: 2019-08-08

## 2019-08-01 RX ORDER — LEVOTHYROXINE SODIUM 125 MCG
100 TABLET ORAL DAILY
Refills: 0 | Status: DISCONTINUED | OUTPATIENT
Start: 2019-08-01 | End: 2019-08-08

## 2019-08-01 RX ORDER — FENOFIBRATE,MICRONIZED 130 MG
1 CAPSULE ORAL
Qty: 0 | Refills: 0 | DISCHARGE

## 2019-08-01 RX ORDER — ATORVASTATIN CALCIUM 80 MG/1
40 TABLET, FILM COATED ORAL AT BEDTIME
Refills: 0 | Status: DISCONTINUED | OUTPATIENT
Start: 2019-08-01 | End: 2019-08-01

## 2019-08-01 RX ORDER — METOPROLOL TARTRATE 50 MG
12.5 TABLET ORAL EVERY 6 HOURS
Refills: 0 | Status: DISCONTINUED | OUTPATIENT
Start: 2019-08-01 | End: 2019-08-01

## 2019-08-01 RX ORDER — DEXTROSE 50 % IN WATER 50 %
15 SYRINGE (ML) INTRAVENOUS ONCE
Refills: 0 | Status: DISCONTINUED | OUTPATIENT
Start: 2019-08-01 | End: 2019-08-09

## 2019-08-01 RX ORDER — DIGOXIN 250 MCG
0.12 TABLET ORAL DAILY
Refills: 0 | Status: DISCONTINUED | OUTPATIENT
Start: 2019-08-01 | End: 2019-08-09

## 2019-08-01 RX ORDER — SODIUM CHLORIDE 9 MG/ML
1000 INJECTION INTRAMUSCULAR; INTRAVENOUS; SUBCUTANEOUS
Refills: 0 | Status: DISCONTINUED | OUTPATIENT
Start: 2019-08-01 | End: 2019-08-02

## 2019-08-01 RX ORDER — MEMANTINE HYDROCHLORIDE 10 MG/1
5 TABLET ORAL AT BEDTIME
Refills: 0 | Status: DISCONTINUED | OUTPATIENT
Start: 2019-08-01 | End: 2019-08-09

## 2019-08-01 RX ORDER — ATORVASTATIN CALCIUM 80 MG/1
10 TABLET, FILM COATED ORAL AT BEDTIME
Refills: 0 | Status: DISCONTINUED | OUTPATIENT
Start: 2019-08-01 | End: 2019-08-09

## 2019-08-01 RX ADMIN — HEPARIN SODIUM 5000 UNIT(S): 5000 INJECTION INTRAVENOUS; SUBCUTANEOUS at 06:02

## 2019-08-01 RX ADMIN — Medication 12.5 MILLIGRAM(S): at 06:02

## 2019-08-01 RX ADMIN — Medication 81 MILLIGRAM(S): at 13:25

## 2019-08-01 RX ADMIN — Medication 10 MILLIGRAM(S): at 18:06

## 2019-08-01 RX ADMIN — Medication 50 GRAM(S): at 13:26

## 2019-08-01 RX ADMIN — Medication 50 MILLIGRAM(S): at 11:16

## 2019-08-01 RX ADMIN — HEPARIN SODIUM 5000 UNIT(S): 5000 INJECTION INTRAVENOUS; SUBCUTANEOUS at 17:58

## 2019-08-01 RX ADMIN — Medication 5 MILLIGRAM(S): at 13:25

## 2019-08-01 RX ADMIN — Medication 0.12 MILLIGRAM(S): at 17:57

## 2019-08-01 RX ADMIN — Medication 20 MILLIGRAM(S): at 17:57

## 2019-08-01 RX ADMIN — Medication 2: at 13:55

## 2019-08-01 RX ADMIN — CEFTRIAXONE 100 MILLIGRAM(S): 500 INJECTION, POWDER, FOR SOLUTION INTRAMUSCULAR; INTRAVENOUS at 01:13

## 2019-08-01 RX ADMIN — Medication 10 MILLIGRAM(S): at 06:01

## 2019-08-01 RX ADMIN — Medication 5 MILLIGRAM(S): at 01:13

## 2019-08-01 RX ADMIN — Medication 1: at 18:07

## 2019-08-01 RX ADMIN — SODIUM CHLORIDE 75 MILLILITER(S): 9 INJECTION INTRAMUSCULAR; INTRAVENOUS; SUBCUTANEOUS at 17:58

## 2019-08-01 RX ADMIN — Medication 1 MILLIGRAM(S): at 13:25

## 2019-08-01 NOTE — CHART NOTE - NSCHARTNOTEFT_GEN_A_CORE
Approx 2AM pt complained of chest pain, however forgot she had chest pain by the time of evaluation due to dementia. Trop that was added on earlier in the night came back at 59. Pt on the hypertensive side to 180s systolic. Repeat EKG revealed same sinus rhythm with LBBB but with slightly higher ST segment in lead V2. No chest pain on repeat interview of patient. Cardiology called, possibly demand related to elevated blood pressure. Pt to receive labetalol. Spoke with lab, troponin was successfully added on to labs collected on 7/31/19 at 20:50 (not showing up in [facility] currently). Result was 59. Repeat trop at 01:41 is down to 52. Trend is 59 -> 52. Will continue to monitor. Cards recs appreciated    Stephen Bee MD Approx 2AM pt complained of chest pain, however forgot she had chest pain by the time of evaluation due to dementia. Trop that was added on earlier in the night came back at 59 in the setting of GILBERTO. Pt on the hypertensive side to 180s systolic. Repeat EKG revealed same sinus rhythm with known LBBB but with slightly higher ST segment in lead V2. No chest pain on repeat interview of patient. Cardiology called, possibly demand related to elevated blood pressure. Pt to receive labetalol. Spoke with lab, troponin was successfully added on to labs collected on 7/31/19 at 20:50 (not showing up in [facility] currently). Result was 59. Repeat trop at 01:41 is down to 52. Trend is 59 -> 52. Will continue to monitor. Cards recs appreciated    Stephen Bee MD

## 2019-08-01 NOTE — PROVIDER CONTACT NOTE (CRITICAL VALUE NOTIFICATION) - ASSESSMENT
Patient presents new onset of hypoglycemia, responsive to voice. Able to get intake PO. Vital signs stable

## 2019-08-01 NOTE — CONSULT NOTE ADULT - ASSESSMENT
82 y/o female with history of CAD s/p stents 2013, hypothyroidism, HLD, HTN, HLD, DM, dementia (baseline AOx1) presents to the ED for weakness/fall and increased urinary frequency, found to have positive UA in the ED, admitted for IV abx. 84 y/o female with history of CAD s/p stents 2013, hypothyroidism, HLD, HTN, HLD, DM, dementia (baseline AOx1) presents to the ED for weakness/fall and increased urinary frequency, found to have positive UA in the ED, admitted for IV abx.   - UTI refractory to Macrobid poor penetration of Macrobid for lower  infection with GFR < 60.  - c/w CTX x 3 days  - f/u urine cultures

## 2019-08-01 NOTE — PROVIDER CONTACT NOTE (CRITICAL VALUE NOTIFICATION) - RECOMMENDATIONS
2 doses of apple juice given, FS re assessment after 15 minutes. FS improved. Went up to 77 and then  to 110.

## 2019-08-01 NOTE — CONSULT NOTE ADULT - ASSESSMENT
84 y/o female with history of CAD s/p stents 2013, hypothyroidism, HLD, HTN, HLD, dementia (baseline AOx1) but will follow commands presents with UTI and CP. Unlikely ACS, EKG lbbb unhanged from previous. Elevated Hs Trop in the setting of GILBERTO and hypertension.

## 2019-08-01 NOTE — CONSULT NOTE ADULT - SUBJECTIVE AND OBJECTIVE BOX
Patient is a 83y old  Female who presents with a chief complaint of     HPI:  82 y/o female with history of CAD s/p stents 2013, hypothyroidism, HLD, HTN, HLD, dementia (baseline AOx1) but will follow commands presents to the ED via family members for worsening urinary frequency as well as more frequent falls over the last 2 weeks. Pt at baseline is intermittently confused and is completely dependent on ADLs from family. History obtained mostly from family. She is essentially nonambulatory now for a few months and requires assistance with walking to the bathroom. She has been complaining of urinary frequency for 2 weeks, was put on macrobid by her PCP but symptoms have not improved. No fevers, chills reported, but she does have suprapubic pain. Per family she has been more lethargic since these symptoms, however over the course of a few months now she has also been losing weight with poor appetite. She has been falling intermittently while unsupervised as she gets confused and forgets that she has difficulty walking. In the past two weeks she has fallen multiple times all unwitnessed The last episode happened two nights ago when she was found in the bathroom on the floor. She reportedly was awake, did not remember falling, but denied having chest pain, shortness of breath, headache or bowel incontinence. She was noted to have ecchymosis around her eye and so was brought to her PCP today who referred her to ED. In the ED she was pan scanned for trauma eval, found to have UTI on UA and electrolyte abn to be admitted. (2019 23:55)    ID consulted for UTI management.    Per son at bedside, patient started having urinary frequency as well as foul smelling two weeks ago. Took 5 days of Macrobid without symptom improvement. Denies recent hospitalization. No prior h/o UTI or  procedures. UA positive in the ED, currently on CTX.    ROS negative for fever, chill, CP, SOB, n/v/c/d, back pain, or skin rashes. Patient at the time of my examination has no concerns.     PAST MEDICAL & SURGICAL HISTORY:  Hypothyroid  HLD (hyperlipidemia)  HTN (hypertension)  DM (diabetes mellitus)  CAD (coronary artery disease)  MI (myocardial infarction)  Stented coronary artery      Allergies  No Known Allergies    ANTIMICROBIALS:  cefTRIAXone   IVPB 1000 every 24 hours    OTHER MEDS: MEDICATIONS  (STANDING):  aspirin  chewable 81 daily  atorvastatin 40 at bedtime  heparin  Injectable 5000 every 12 hours  hydrALAZINE Injectable 10 every 6 hours PRN  labetalol Injectable 5 every 6 hours PRN  metoprolol tartrate 12.5 every 6 hours    SOCIAL HISTORY: Lives at home with son and daughter in law -> denies toxic habits    FAMILY HISTORY:  No pertinent family history in first degree relatives      REVIEW OF SYSTEMS  [  ] ROS unobtainable because:    [X] All other systems negative except as noted below:	    Vital Signs Last 24 Hrs  T(F): 98.1 (19 @ 05:56), Max: 98.3 (19 @ 17:44)    Vital Signs Last 24 Hrs  HR: 73 (19 @ 05:56) (65 - 79)  BP: 193/73 (19 @ 05:56) (171/80 - 193/73)  RR: 16 (19 @ 05:56)  SpO2: 100% (19 @ 05:56) (100% - 100%)  Wt(kg): --    PHYSICAL EXAM:  General: non-toxic  HEAD/EYES: anicteric, PERRL  ENT:  supple  Cardiovascular:   S1, S2  Respiratory:  clear bilaterally  GI:  soft, mild tenderness to palpation of bilateral suprapubic region, without rebound/guarding, normal bowel sounds  :  no CVA tenderness   Musculoskeletal:  no synovitis  Neurologic:  grossly non-focal  Skin:  no rash  Lymph: no lymphadenopathy  Psychiatric:  appropriate affect  Vascular:  no phlebitis                            11.1   8.08  )-----------( 294      ( 01 Aug 2019 07:15 )             34.6           137  |  98  |  17  ----------------------------<  173<H>  4.0   |  25  |  1.29    Ca    10.1      01 Aug 2019 07:15  Mg     1.5         TPro  7.9  /  Alb  4.1  /  TBili  0.3  /  DBili  < 0.2  /  AST  15  /  ALT  9   /  AlkPhos  41        Urinalysis Basic - ( 2019 22:00 )    Color: LIGHT YELLOW / Appearance: Lt TURBID / S.012 / pH: 7.0  Gluc: NEGATIVE / Ketone: NEGATIVE  / Bili: NEGATIVE / Urobili: NORMAL   Blood: NEGATIVE / Protein: 30 / Nitrite: POSITIVE   Leuk Esterase: MODERATE / RBC: 3-5 / WBC 26-50   Sq Epi: OCC / Non Sq Epi: x / Bacteria: FEW        MICROBIOLOGY:    v      RADIOLOGY:  imaging below personally reviewed    < from: CT Maxillofacial No Cont (19 @ 19:59) >  IMPRESSION:  CT brain: No acute findings.    CT cervical spine: No acute findings.    CT maxillofacial bones: No acute findings.      < end of copied text > Patient is a 83y old  Female who presents with a chief complaint of     HPI:  82 y/o female with history of CAD s/p stents 2013, hypothyroidism, HLD, HTN, HLD, dementia (baseline AOx1) but will follow commands presents to the ED via family members for worsening urinary frequency as well as more frequent falls over the last 2 weeks. Pt at baseline is intermittently confused and is completely dependent on ADLs from family. History obtained mostly from family. She is essentially nonambulatory now for a few months and requires assistance with walking to the bathroom. She has been complaining of urinary frequency for 2 weeks, was put on macrobid by her PCP but symptoms have not improved. No fevers, chills reported, but she does have suprapubic pain. Per family she has been more lethargic since these symptoms, however over the course of a few months now she has also been losing weight with poor appetite. She has been falling intermittently while unsupervised as she gets confused and forgets that she has difficulty walking. In the past two weeks she has fallen multiple times all unwitnessed The last episode happened two nights ago when she was found in the bathroom on the floor. She reportedly was awake, did not remember falling, but denied having chest pain, shortness of breath, headache or bowel incontinence. She was noted to have ecchymosis around her eye and so was brought to her PCP today who referred her to ED. In the ED she was pan scanned for trauma eval, found to have UTI on UA and electrolyte abn to be admitted. (2019 23:55)    ID consulted for UTI management.    Per son at bedside, patient started having urinary frequency as well as foul smelling two weeks ago. Took 5 days of Macrobid without symptom improvement. Denies recent hospitalization. No prior h/o UTI or  procedures.     Prior UA in 2018, with E-coli, pansensitive, except for bactrim.    UA positive in the ED, currently on CTX.    ROS negative for fever, chill, CP, SOB, n/v/c/d, back pain, or skin rashes. Patient at the time of my examination has no concerns.     PAST MEDICAL & SURGICAL HISTORY:  Hypothyroid  HLD (hyperlipidemia)  HTN (hypertension)  DM (diabetes mellitus)  CAD (coronary artery disease)  MI (myocardial infarction)  Stented coronary artery      Allergies  No Known Allergies    ANTIMICROBIALS:  cefTRIAXone   IVPB 1000 every 24 hours    OTHER MEDS: MEDICATIONS  (STANDING):  aspirin  chewable 81 daily  atorvastatin 40 at bedtime  heparin  Injectable 5000 every 12 hours  hydrALAZINE Injectable 10 every 6 hours PRN  labetalol Injectable 5 every 6 hours PRN  metoprolol tartrate 12.5 every 6 hours    SOCIAL HISTORY: Lives at home with son and daughter in law -> denies toxic habits    FAMILY HISTORY:  No pertinent family history in first degree relatives      REVIEW OF SYSTEMS  [  ] ROS unobtainable because:    [X] All other systems negative except as noted below:	    Vital Signs Last 24 Hrs  T(F): 98.1 (19 @ 05:56), Max: 98.3 (19 @ 17:44)    Vital Signs Last 24 Hrs  HR: 73 (19 @ 05:56) (65 - 79)  BP: 193/73 (19 @ 05:56) (171/80 - 193/73)  RR: 16 (19 @ 05:56)  SpO2: 100% (19 @ 05:56) (100% - 100%)  Wt(kg): --    PHYSICAL EXAM:  General: non-toxic  HEAD/EYES: anicteric, PERRL  ENT:  supple  Cardiovascular:   S1, S2  Respiratory:  clear bilaterally  GI:  soft, mild tenderness to palpation of bilateral suprapubic region, without rebound/guarding, normal bowel sounds  :  no CVA tenderness   Musculoskeletal:  no synovitis  Neurologic:  grossly non-focal  Skin:  no rash  Lymph: no lymphadenopathy  Psychiatric:  appropriate affect  Vascular:  no phlebitis                            11.1   8.08  )-----------( 294      ( 01 Aug 2019 07:15 )             34.6           137  |  98  |  17  ----------------------------<  173<H>  4.0   |  25  |  1.29    Ca    10.1      01 Aug 2019 07:15  Mg     1.5         TPro  7.9  /  Alb  4.1  /  TBili  0.3  /  DBili  < 0.2  /  AST  15  /  ALT  9   /  AlkPhos  41        Urinalysis Basic - ( 2019 22:00 )    Color: LIGHT YELLOW / Appearance: Lt TURBID / S.012 / pH: 7.0  Gluc: NEGATIVE / Ketone: NEGATIVE  / Bili: NEGATIVE / Urobili: NORMAL   Blood: NEGATIVE / Protein: 30 / Nitrite: POSITIVE   Leuk Esterase: MODERATE / RBC: 3-5 / WBC 26-50   Sq Epi: OCC / Non Sq Epi: x / Bacteria: FEW        MICROBIOLOGY:    v      RADIOLOGY:  imaging below personally reviewed    < from: CT Maxillofacial No Cont (19 @ 19:59) >  IMPRESSION:  CT brain: No acute findings.    CT cervical spine: No acute findings.    CT maxillofacial bones: No acute findings.      < end of copied text > Patient is a 83y old  Female who presents with a chief complaint of     HPI:  83F with history of CAD s/p stents 2013, hypothyroidism, HLD, HTN, HLD, dementia (baseline AOx1) but will follow commands presents to the ED via family members for worsening urinary frequency as well as more frequent falls over the last 2 weeks. Pt at baseline is intermittently confused and is completely dependent on ADLs from family. History obtained mostly from family. She is essentially nonambulatory now for a few months and requires assistance with walking to the bathroom. She has been complaining of urinary frequency for 2 weeks, was put on macrobid by her PCP but symptoms have not improved. No fevers, chills reported, but she does have suprapubic pain. Per family she has been more lethargic since these symptoms, however over the course of a few months now she has also been losing weight with poor appetite. She has been falling intermittently while unsupervised as she gets confused and forgets that she has difficulty walking. In the past two weeks she has fallen multiple times all unwitnessed The last episode happened two nights ago when she was found in the bathroom on the floor. She reportedly was awake, did not remember falling, but denied having chest pain, shortness of breath, headache or bowel incontinence. She was noted to have ecchymosis around her eye and so was brought to her PCP today who referred her to ED. In the ED she was pan scanned for trauma eval, found to have UTI on UA and electrolyte abn to be admitted. (2019 23:55)    ID consulted for UTI management.    Per son at bedside, patient started having urinary frequency as well as foul smelling two weeks ago. Took 5 days of Macrobid without symptom improvement. Denies recent hospitalization. No prior h/o UTI or  procedures.     Prior UA in 2018, with E-coli, pansensitive, except for bactrim.    UA positive in the ED, currently on CTX.    ROS negative for fever, chill, CP, SOB, n/v/c/d, back pain, or skin rashes. Patient at the time of my examination has no concerns.     PAST MEDICAL & SURGICAL HISTORY:  Hypothyroid  HLD (hyperlipidemia)  HTN (hypertension)  DM (diabetes mellitus)  CAD (coronary artery disease)  MI (myocardial infarction)  Stented coronary artery    Allergies  No Known Allergies    ANTIMICROBIALS:    cefTRIAXone   IVPB 1000 every 24 hours (-)    OTHER MEDS: MEDICATIONS  (STANDING):  aspirin  chewable 81 daily  atorvastatin 40 at bedtime  heparin  Injectable 5000 every 12 hours  hydrALAZINE Injectable 10 every 6 hours PRN  labetalol Injectable 5 every 6 hours PRN  metoprolol tartrate 12.5 every 6 hours    SOCIAL HISTORY: Lives at home with son and daughter in law -> denies toxic habits    FAMILY HISTORY:  No pertinent family history in first degree relatives    REVIEW OF SYSTEMS  [  ] ROS unobtainable because:    [X] All other systems negative except as noted below:	    Vital Signs Last 24 Hrs  T(F): 98.1 (19 @ 05:56), Max: 98.3 (19 @ 17:44)    Vital Signs Last 24 Hrs  HR: 73 (19 @ 05:56) (65 - 79)  BP: 193/73 (19 @ 05:56) (171/80 - 193/73)  RR: 16 (19 @ 05:56)  SpO2: 100% (19 @ 05:56) (100% - 100%)  Wt(kg): --    PHYSICAL EXAM:  General: non-toxic  HEAD/EYES: anicteric  ENT:  supple  Cardiovascular:   S1, S2  Respiratory:  clear bilaterally  GI:  soft, mild tenderness to palpation of bilateral suprapubic region, without rebound/guarding, normal bowel sounds  :  no CVA tenderness   Musculoskeletal:  no synovitis  Neurologic:  grossly non-focal  Skin:  no rash  Psychiatric:  appropriate affect  Vascular:  no phlebitis                        11.1   8.08  )-----------( 294      ( 01 Aug 2019 07:15 )             34.6     137  |  98  |  17  ----------------------------<  173<H>  4.0   |  25  |  1.29    Ca    10.1      01 Aug 2019 07:15  Mg     1.5         TPro  7.9  /  Alb  4.1  /  TBili  0.3  /  DBili  < 0.2  /  AST  15  /  ALT  9   /  AlkPhos  41      Urinalysis Basic - ( 2019 22:00 )  Color: LIGHT YELLOW / Appearance: Lt TURBID / S.012 / pH: 7.0  Gluc: NEGATIVE / Ketone: NEGATIVE  / Bili: NEGATIVE / Urobili: NORMAL   Blood: NEGATIVE / Protein: 30 / Nitrite: POSITIVE   Leuk Esterase: MODERATE / RBC: 3-5 / WBC 26-50   Sq Epi: OCC / Non Sq Epi: x / Bacteria: FEW    MICROBIOLOGY:  UC pending    RADIOLOGY:  imaging below personally reviewed    CT Maxillofacial No Cont (19 @ 19:59) >  IMPRESSION:  CT brain: No acute findings.  CT cervical spine: No acute findings.  CT maxillofacial bones: No acute findings.

## 2019-08-01 NOTE — PROGRESS NOTE ADULT - ASSESSMENT
82 y/o female with history of CAD s/p stents 2013, hypothyroidism, HLD, HTN, HLD, dementia (baseline AOx1) but will follow commands presents to the ED via family members for worsening urinary frequency as well as more frequent falls over the last 2 weeks. Found in the ED to have a positive UA, acute kidney injury,  hypercalcemia       ·  Problem: Acute cystitis without hematuria.  Plan: -pt with 2 weeks of urinary frequency,  was on macrobid ?  id eval      ·  Problem: Frequent falls.  CT unremarkable   cards eval noted  -CTH with no bleed, no seizure history   -EKG appears at baseline  -PT/OT evaluation.       ·  Problem: Weakness.  Plan: as above, possibly metabolic/infectious related to UTI.       ·  Problem: Hypertension, unspecified type.  f/u on meds      ·  Problem: Coronary artery disease involving native heart, angina presence unspecified, unspecified vessel or lesion type.    Plan: reportedly had stents in the past and currently on aspirin  -cards eval    Problem: Acute kidney injury. Plan: probably from poor PO intake,  iv fluids

## 2019-08-01 NOTE — CONSULT NOTE ADULT - SUBJECTIVE AND OBJECTIVE BOX
HISTORY OF PRESENT ILLNESS:  Patient is a 83y old  Female who presents with a chief complaint of   HPI:  84 y/o female with history of CAD s/p stents 2013, hypothyroidism, HLD, HTN, HLD, dementia (baseline AOx1) but will follow commands presents to the ED via family members for worsening urinary frequency as well as more frequent falls over the last 2 weeks. Pt at baseline is intermittently confused and is completely dependent on ADLs from family. History obtained mostly from family. She is essentially nonambulatory now for a few months and requires assistance with walking to the bathroom. She has been complaining of urinary frequency for 2 weeks, was put on macrobid by her PCP but symptoms have not improved. No fevers, chills reported, but she does have suprapubic pain. Per family she has been more lethargic since these symptoms, however over the course of a few months now she has also been losing weight with poor appetite. She has been falling intermittently while unsupervised as she gets confused and forgets that she has difficulty walking. In the past two weeks she has fallen multiple times all unwitnessed The last episode happened two nights ago when she was found in the bathroom on the floor. She reportedly was awake, did not remember falling, but denied having chest pain, shortness of breath, headache or bowel incontinence. She was noted to have ecchymosis around her eye and so was brought to her PCP today who referred her to ED. In the ED she was pan scanned for trauma eval, found to have UTI on UA and electrolyte abn to be admitted. (2019 23:55)    Cardiology called for pt complaint of chest pain and elevated Hs Trop, 59 repeat troponin 52 in the setting of GILBERTO. CK and ckbm wnl ( 65/3.07).  Pt also noted to be hypertensive,180s systolic. Repeat EKG with known LBBB unchanged from previous. Pt currently CP free.        Allergies    No Known Allergies    Intolerances    	    MEDICATIONS:  aspirin  chewable 81 milliGRAM(s) Oral daily  heparin  Injectable 5000 Unit(s) SubCutaneous every 12 hours  hydrALAZINE Injectable 10 milliGRAM(s) IV Push every 6 hours PRN  labetalol Injectable 5 milliGRAM(s) IV Push every 6 hours PRN  metoprolol tartrate 12.5 milliGRAM(s) Oral every 6 hours  cefTRIAXone   IVPB 1000 milliGRAM(s) IV Intermittent every 24 hours  atorvastatin 40 milliGRAM(s) Oral at bedtime  sodium chloride 0.9%. 1000 milliLiter(s) IV Continuous <Continuous>      PAST MEDICAL & SURGICAL HISTORY:  Hypothyroid  HLD (hyperlipidemia)  HTN (hypertension)  DM (diabetes mellitus)  CAD (coronary artery disease)  MI (myocardial infarction)  Stented coronary artery      FAMILY HISTORY:  No pertinent family history in first degree relatives      SOCIAL HISTORY:      Smoker: [ ] Active [x ] never  [ ] previous  Alcohol:  [ ] social [ ] daily [ x] never      REVIEW OF SYSTEMS:  CONSTITUTIONAL: No weakness, fevers or chills  EYES/ENT: No visual changes;  No vertigo or throat pain   NECK: No pain or stiffness  RESPIRATORY: No cough, wheezing, hemoptysis; No shortness of breath  CARDIOVASCULAR: No chest pain or palpitations  GASTROINTESTINAL: No abdominal or epigastric pain. No nausea, vomiting, or hematemesis; No diarrhea or constipation. No melena or hematochezia.  GENITOURINARY: No dysuria, frequency or hematuria  NEUROLOGICAL: No numbness or weakness  SKIN: No itching, burning, rashes, or lesions   All other review of systems is negative unless indicated above.    PHYSICAL EXAM:  T(C): 36.7 (19 @ 00:45), Max: 36.8 (19 @ 17:44)  HR: 65 (19 @ 00:45) (65 - 79)  BP: 182/57 (19 @ 00:45) (171/80 - 187/67)  RR: 16 (19 @ 00:45) (16 - 18)  SpO2: 100% (19 @ 00:45) (100% - 100%)  Wt(kg): --    Appearance: Normal	  HEENT:   Normal oral mucosa, PERRL, EOMI	  Lymphatic: No lymphadenopathy  Cardiovascular: Normal S1 S2, No JVD, No murmurs, No edema  Respiratory: Lungs clear to auscultation	  Psychiatry: A & O x 3, Mood & affect appropriate  Gastrointestinal:  Soft, Non-tender, + BS	  Skin: No rashes, No ecchymoses, No cyanosis	  Neurologic: Non-focal, A&Ox3, nonfocal, NOLAN x 4  Extremities: Normal range of motion, No clubbing, cyanosis or edema  Vascular: Peripheral pulses palpable 2+ bilaterally    I&O's Summary    	 	  LABS:	 	                          11.0   7.92  )-----------( 284      ( 2019 20:50 )             34.5         135  |  94<L>  |  23  ----------------------------<  121<H>  4.6   |  29  |  1.51<H>    Ca    10.8<H>      2019 20:50  TPro  8.5<H>  /  Alb  4.5  /  TBili  0.2  /  DBili  x   /  AST  15  /  ALT  12  /  AlkPhos  42    LIVER FUNCTIONS - ( 2019 20:50 )  Alb: 4.5 g/dL / Pro: 8.5 g/dL / ALK PHOS: 42 u/L / ALT: 12 u/L / AST: 15 u/L / GGT: x         proBNP:   Lipid Profile:   HgA1c:   TSH:   CARDIAC MARKERS:  Creatine Kinase, Serum: 65 u/L ( @ 01:41)  CKMB: 3.07 ng/mL ( @ 01:41)  CKMB Relative Index: Test not performed ( @ 01:41)  CAPILLARY BLOOD GLUCOSE  POCT Blood Glucose.: 132 mg/dL (2019 17:47)  Urinalysis Basic - ( 2019 22:00 )  Color: LIGHT YELLOW / Appearance: Lt TURBID / S.012 / pH: 7.0  Gluc: NEGATIVE / Ketone: NEGATIVE  / Bili: NEGATIVE / Urobili: NORMAL   Blood: NEGATIVE / Protein: 30 / Nitrite: POSITIVE   Leuk Esterase: MODERATE / RBC: 3-5 / WBC 26-50   Sq Epi: OCC / Non Sq Epi: x / Bacteria: FEW        PREVIOUS DIAGNOSTIC TESTING:    [ ] Echocardiogram:< from: Transthoracic Echocardiogram (10.23.13 @ 07:16) >  EjectionFraction: 68 %  ------------------------------------------------------------------------  OBSERVATIONS:  Mitral Valve: Mitral annular calcification, otherwise  normal mitral valve.  Aortic Root: Normal aortic root.  Aortic Valve: Calcified trileafletaortic valve with normal  opening.  Left Atrium: Normal left atrium  Left Ventricle: Normal left ventricular internal dimensions  and wall thicknesses.  Endocardium not well visualized; grossly normal left  ventricular systolic function.  Mild diastolic dysfunction (Stage I).  Right Heart: Normal right atrium.  Normal right ventricular size and function.  Normal tricuspid valve.  Normal pulmonic valve. Minimal pulmonic regurgitation.  Pericardium/PleuraSmall pericardial effusion.    < end of copied text >

## 2019-08-01 NOTE — CONSULT NOTE ADULT - PROBLEM SELECTOR RECOMMENDATION 9
Patient found to have elevated high sensitivity troponin 59 repeat troponin 52 in the setting of GILBERTO and hypertension. CK and ckbm wnl . Low suspicion of ACS given clinical history. EKG showed known lbbb unchanged from previous.   Plan   - No need to treat for ACS unless clinical story changes or patient develops chest pain, ekg changes.   - Please call the team to update us in that scenario  - Serial EKG, PRN chest pain  -labs include serial cardiac enzymes, risk factor profile to include TSH, HGBA1c, Lipid profile, CMP, Mag, Phos, CBC, pBNP  -Echo to evaluate LV function and wall motion abnormality      Please call on call cardiology team at [phone] with any further questions.

## 2019-08-01 NOTE — CONSULT NOTE ADULT - ATTENDING COMMENTS
Mildly elevated hs-troponin in the setting of GFR ~40. CK and MB are wnl. Pt is poor historian, though does report intermittent headache moreso than chest pain. BP is suboptimal. As per son, pt is on metoprolol 50mg daily but unclear what other antihypertensives. ECG not significantly changed.    Not ACS, but with underlying CAD and hypertension.  -optimize BP control. given her hx would recommend carvedilol 12.5mg BID if not able to obtain outpt meds. otherwise f/u with other outpt meds and resume as appropriate  -given her dementia and poor functional status, there is unclear utility in pursuing an ischemic evaluation at this time. Should she continue to experience chest pain we may pursue for risk stratification.  -asa 81mg daily  -hold acei/arb in setting of UTI/recent GILBERTO  -atorvastatin 40mg  -TTE

## 2019-08-02 LAB
ANION GAP SERPL CALC-SCNC: 14 MMO/L — SIGNIFICANT CHANGE UP (ref 7–14)
BUN SERPL-MCNC: 13 MG/DL — SIGNIFICANT CHANGE UP (ref 7–23)
CALCIUM SERPL-MCNC: 10.6 MG/DL — HIGH (ref 8.4–10.5)
CHLORIDE SERPL-SCNC: 99 MMOL/L — SIGNIFICANT CHANGE UP (ref 98–107)
CO2 SERPL-SCNC: 23 MMOL/L — SIGNIFICANT CHANGE UP (ref 22–31)
CREAT SERPL-MCNC: 1.11 MG/DL — SIGNIFICANT CHANGE UP (ref 0.5–1.3)
GLUCOSE BLDC GLUCOMTR-MCNC: 160 MG/DL — HIGH (ref 70–99)
GLUCOSE BLDC GLUCOMTR-MCNC: 199 MG/DL — HIGH (ref 70–99)
GLUCOSE BLDC GLUCOMTR-MCNC: 216 MG/DL — HIGH (ref 70–99)
GLUCOSE BLDC GLUCOMTR-MCNC: 224 MG/DL — HIGH (ref 70–99)
GLUCOSE SERPL-MCNC: 152 MG/DL — HIGH (ref 70–99)
HCT VFR BLD CALC: 37.8 % — SIGNIFICANT CHANGE UP (ref 34.5–45)
HGB BLD-MCNC: 12 G/DL — SIGNIFICANT CHANGE UP (ref 11.5–15.5)
MAGNESIUM SERPL-MCNC: 2 MG/DL — SIGNIFICANT CHANGE UP (ref 1.6–2.6)
MCHC RBC-ENTMCNC: 30 PG — SIGNIFICANT CHANGE UP (ref 27–34)
MCHC RBC-ENTMCNC: 31.7 % — LOW (ref 32–36)
MCV RBC AUTO: 94.5 FL — SIGNIFICANT CHANGE UP (ref 80–100)
NRBC # FLD: 0 K/UL — SIGNIFICANT CHANGE UP (ref 0–0)
PLATELET # BLD AUTO: 260 K/UL — SIGNIFICANT CHANGE UP (ref 150–400)
PMV BLD: 11.8 FL — SIGNIFICANT CHANGE UP (ref 7–13)
POTASSIUM SERPL-MCNC: 4.6 MMOL/L — SIGNIFICANT CHANGE UP (ref 3.5–5.3)
POTASSIUM SERPL-SCNC: 4.6 MMOL/L — SIGNIFICANT CHANGE UP (ref 3.5–5.3)
RBC # BLD: 4 M/UL — SIGNIFICANT CHANGE UP (ref 3.8–5.2)
RBC # FLD: 11.8 % — SIGNIFICANT CHANGE UP (ref 10.3–14.5)
SODIUM SERPL-SCNC: 136 MMOL/L — SIGNIFICANT CHANGE UP (ref 135–145)
SPECIMEN SOURCE: SIGNIFICANT CHANGE UP
WBC # BLD: 7.23 K/UL — SIGNIFICANT CHANGE UP (ref 3.8–10.5)
WBC # FLD AUTO: 7.23 K/UL — SIGNIFICANT CHANGE UP (ref 3.8–10.5)

## 2019-08-02 PROCEDURE — 99232 SBSQ HOSP IP/OBS MODERATE 35: CPT

## 2019-08-02 RX ORDER — LOSARTAN POTASSIUM 100 MG/1
25 TABLET, FILM COATED ORAL DAILY
Refills: 0 | Status: DISCONTINUED | OUTPATIENT
Start: 2019-08-02 | End: 2019-08-03

## 2019-08-02 RX ADMIN — Medication 5 MILLIGRAM(S): at 13:21

## 2019-08-02 RX ADMIN — Medication 1: at 08:48

## 2019-08-02 RX ADMIN — Medication 2: at 13:22

## 2019-08-02 RX ADMIN — ATORVASTATIN CALCIUM 10 MILLIGRAM(S): 80 TABLET, FILM COATED ORAL at 21:56

## 2019-08-02 RX ADMIN — Medication 0.12 MILLIGRAM(S): at 06:25

## 2019-08-02 RX ADMIN — Medication 1 MILLIGRAM(S): at 13:21

## 2019-08-02 RX ADMIN — LOSARTAN POTASSIUM 25 MILLIGRAM(S): 100 TABLET, FILM COATED ORAL at 13:21

## 2019-08-02 RX ADMIN — Medication 50 MILLIGRAM(S): at 06:26

## 2019-08-02 RX ADMIN — MEMANTINE HYDROCHLORIDE 5 MILLIGRAM(S): 10 TABLET ORAL at 21:56

## 2019-08-02 RX ADMIN — Medication 100 MICROGRAM(S): at 06:26

## 2019-08-02 RX ADMIN — HEPARIN SODIUM 5000 UNIT(S): 5000 INJECTION INTRAVENOUS; SUBCUTANEOUS at 17:52

## 2019-08-02 RX ADMIN — MEMANTINE HYDROCHLORIDE 5 MILLIGRAM(S): 10 TABLET ORAL at 00:11

## 2019-08-02 RX ADMIN — DONEPEZIL HYDROCHLORIDE 10 MILLIGRAM(S): 10 TABLET, FILM COATED ORAL at 00:12

## 2019-08-02 RX ADMIN — SODIUM CHLORIDE 75 MILLILITER(S): 9 INJECTION INTRAMUSCULAR; INTRAVENOUS; SUBCUTANEOUS at 06:26

## 2019-08-02 RX ADMIN — ATORVASTATIN CALCIUM 10 MILLIGRAM(S): 80 TABLET, FILM COATED ORAL at 00:11

## 2019-08-02 RX ADMIN — CEFTRIAXONE 100 MILLIGRAM(S): 500 INJECTION, POWDER, FOR SOLUTION INTRAMUSCULAR; INTRAVENOUS at 01:46

## 2019-08-02 RX ADMIN — Medication 20 MILLIGRAM(S): at 06:25

## 2019-08-02 RX ADMIN — DONEPEZIL HYDROCHLORIDE 10 MILLIGRAM(S): 10 TABLET, FILM COATED ORAL at 21:56

## 2019-08-02 RX ADMIN — HEPARIN SODIUM 5000 UNIT(S): 5000 INJECTION INTRAVENOUS; SUBCUTANEOUS at 06:25

## 2019-08-02 RX ADMIN — Medication 2: at 17:48

## 2019-08-02 RX ADMIN — Medication 81 MILLIGRAM(S): at 13:21

## 2019-08-02 NOTE — PROGRESS NOTE ADULT - ASSESSMENT
82 y/o female with history of CAD s/p stents 2013, hypothyroidism, HLD, HTN, HLD, dementia (baseline AOx1) with UTI and CP. mildly elevated Hs Trop in the setting of GILBERTO and hypertension. Reduced LVEF on TTE.      BP remains significantly elevated  appears home medications include GDMT, which may imply that this cardiomyopathy may not necessarily be new.  -cont bblocker, ARB  -would try to obtain collateral re: pt's prior TTE, however given her functional status there is unclear utility to performing an ischemic evaluation. would opt to medically manage at this time unless she continues to have episodes of chest pain.  -cont asa 81mg daily  -optimize BP control

## 2019-08-02 NOTE — PROGRESS NOTE ADULT - ASSESSMENT
82 y/o female with history of CAD s/p stents 2013, hypothyroidism, HLD, HTN, HLD, dementia (baseline AOx1) but will follow commands presents to the ED via family members for worsening urinary frequency as well as more frequent falls over the last 2 weeks. Found in the ED to have a positive UA, acute kidney injury,  hypercalcemia       ·  Problem: Acute cystitis without hematuria.    was on macrobid   id eval noted  c/w iv abs as per id       ·  Problem: Frequent falls.  CT unremarkable   cards eval noted  -CTH with no bleed, no seizure history   -PT/OT       ·  Problem: Weakness.    possibly metabolic/infectious related to UTI.       ·  Problem: Hypertension, unspecified type.  c/w meds       ·  Problem: Coronary artery disease involving native heart, angina presence unspecified, unspecified vessel or lesion type.    Plan: reportedly had stents in the past and currently on aspirin  -cards eval noted     Problem: Acute kidney injury. Plan: probably from poor PO intake,  resolved  d/c fluid

## 2019-08-03 LAB
-  AMIKACIN: SIGNIFICANT CHANGE UP
-  AMPICILLIN/SULBACTAM: SIGNIFICANT CHANGE UP
-  AMPICILLIN: SIGNIFICANT CHANGE UP
-  AZTREONAM: SIGNIFICANT CHANGE UP
-  CEFAZOLIN: SIGNIFICANT CHANGE UP
-  CEFEPIME: SIGNIFICANT CHANGE UP
-  CEFOXITIN: SIGNIFICANT CHANGE UP
-  CEFTAZIDIME: SIGNIFICANT CHANGE UP
-  CEFTRIAXONE: SIGNIFICANT CHANGE UP
-  CIPROFLOXACIN: SIGNIFICANT CHANGE UP
-  ERTAPENEM: SIGNIFICANT CHANGE UP
-  GENTAMICIN: SIGNIFICANT CHANGE UP
-  IMIPENEM: SIGNIFICANT CHANGE UP
-  LEVOFLOXACIN: SIGNIFICANT CHANGE UP
-  MEROPENEM: SIGNIFICANT CHANGE UP
-  NITROFURANTOIN: SIGNIFICANT CHANGE UP
-  PIPERACILLIN/TAZOBACTAM: SIGNIFICANT CHANGE UP
-  TIGECYCLINE: SIGNIFICANT CHANGE UP
-  TOBRAMYCIN: SIGNIFICANT CHANGE UP
-  TRIMETHOPRIM/SULFAMETHOXAZOLE: SIGNIFICANT CHANGE UP
ANION GAP SERPL CALC-SCNC: 12 MMO/L — SIGNIFICANT CHANGE UP (ref 7–14)
BACTERIA UR CULT: SIGNIFICANT CHANGE UP
BUN SERPL-MCNC: 16 MG/DL — SIGNIFICANT CHANGE UP (ref 7–23)
CALCIUM SERPL-MCNC: 10.4 MG/DL — SIGNIFICANT CHANGE UP (ref 8.4–10.5)
CHLORIDE SERPL-SCNC: 100 MMOL/L — SIGNIFICANT CHANGE UP (ref 98–107)
CO2 SERPL-SCNC: 25 MMOL/L — SIGNIFICANT CHANGE UP (ref 22–31)
CREAT SERPL-MCNC: 1.33 MG/DL — HIGH (ref 0.5–1.3)
DIGOXIN SERPL-MCNC: 1.2 NG/ML — SIGNIFICANT CHANGE UP (ref 0.8–2)
GLUCOSE BLDC GLUCOMTR-MCNC: 168 MG/DL — HIGH (ref 70–99)
GLUCOSE BLDC GLUCOMTR-MCNC: 191 MG/DL — HIGH (ref 70–99)
GLUCOSE BLDC GLUCOMTR-MCNC: 198 MG/DL — HIGH (ref 70–99)
GLUCOSE BLDC GLUCOMTR-MCNC: 241 MG/DL — HIGH (ref 70–99)
GLUCOSE SERPL-MCNC: 227 MG/DL — HIGH (ref 70–99)
HCT VFR BLD CALC: 32.6 % — LOW (ref 34.5–45)
HGB BLD-MCNC: 10.3 G/DL — LOW (ref 11.5–15.5)
MAGNESIUM SERPL-MCNC: 1.8 MG/DL — SIGNIFICANT CHANGE UP (ref 1.6–2.6)
MCHC RBC-ENTMCNC: 29.9 PG — SIGNIFICANT CHANGE UP (ref 27–34)
MCHC RBC-ENTMCNC: 31.6 % — LOW (ref 32–36)
MCV RBC AUTO: 94.8 FL — SIGNIFICANT CHANGE UP (ref 80–100)
METHOD TYPE: SIGNIFICANT CHANGE UP
NRBC # FLD: 0 K/UL — SIGNIFICANT CHANGE UP (ref 0–0)
ORGANISM # SPEC MICROSCOPIC CNT: SIGNIFICANT CHANGE UP
ORGANISM # SPEC MICROSCOPIC CNT: SIGNIFICANT CHANGE UP
PLATELET # BLD AUTO: 265 K/UL — SIGNIFICANT CHANGE UP (ref 150–400)
PMV BLD: 10.9 FL — SIGNIFICANT CHANGE UP (ref 7–13)
POTASSIUM SERPL-MCNC: 4.3 MMOL/L — SIGNIFICANT CHANGE UP (ref 3.5–5.3)
POTASSIUM SERPL-SCNC: 4.3 MMOL/L — SIGNIFICANT CHANGE UP (ref 3.5–5.3)
RBC # BLD: 3.44 M/UL — LOW (ref 3.8–5.2)
RBC # FLD: 11.9 % — SIGNIFICANT CHANGE UP (ref 10.3–14.5)
SODIUM SERPL-SCNC: 137 MMOL/L — SIGNIFICANT CHANGE UP (ref 135–145)
WBC # BLD: 5.46 K/UL — SIGNIFICANT CHANGE UP (ref 3.8–10.5)
WBC # FLD AUTO: 5.46 K/UL — SIGNIFICANT CHANGE UP (ref 3.8–10.5)

## 2019-08-03 RX ORDER — LOSARTAN POTASSIUM 100 MG/1
50 TABLET, FILM COATED ORAL DAILY
Refills: 0 | Status: DISCONTINUED | OUTPATIENT
Start: 2019-08-03 | End: 2019-08-04

## 2019-08-03 RX ADMIN — Medication 50 MILLIGRAM(S): at 05:37

## 2019-08-03 RX ADMIN — HEPARIN SODIUM 5000 UNIT(S): 5000 INJECTION INTRAVENOUS; SUBCUTANEOUS at 05:37

## 2019-08-03 RX ADMIN — ATORVASTATIN CALCIUM 10 MILLIGRAM(S): 80 TABLET, FILM COATED ORAL at 22:42

## 2019-08-03 RX ADMIN — LOSARTAN POTASSIUM 50 MILLIGRAM(S): 100 TABLET, FILM COATED ORAL at 12:20

## 2019-08-03 RX ADMIN — Medication 20 MILLIGRAM(S): at 05:37

## 2019-08-03 RX ADMIN — Medication 10 MILLIGRAM(S): at 22:42

## 2019-08-03 RX ADMIN — Medication 0.12 MILLIGRAM(S): at 05:37

## 2019-08-03 RX ADMIN — HEPARIN SODIUM 5000 UNIT(S): 5000 INJECTION INTRAVENOUS; SUBCUTANEOUS at 18:01

## 2019-08-03 RX ADMIN — DONEPEZIL HYDROCHLORIDE 10 MILLIGRAM(S): 10 TABLET, FILM COATED ORAL at 22:42

## 2019-08-03 RX ADMIN — Medication 5 MILLIGRAM(S): at 12:20

## 2019-08-03 RX ADMIN — Medication 100 MICROGRAM(S): at 05:37

## 2019-08-03 RX ADMIN — Medication 1 MILLIGRAM(S): at 12:20

## 2019-08-03 RX ADMIN — Medication 1: at 08:54

## 2019-08-03 RX ADMIN — Medication 1: at 13:00

## 2019-08-03 RX ADMIN — MEMANTINE HYDROCHLORIDE 5 MILLIGRAM(S): 10 TABLET ORAL at 22:42

## 2019-08-03 RX ADMIN — Medication 1: at 18:01

## 2019-08-03 RX ADMIN — Medication 81 MILLIGRAM(S): at 12:20

## 2019-08-03 RX ADMIN — CEFTRIAXONE 100 MILLIGRAM(S): 500 INJECTION, POWDER, FOR SOLUTION INTRAMUSCULAR; INTRAVENOUS at 01:08

## 2019-08-03 RX ADMIN — LOSARTAN POTASSIUM 25 MILLIGRAM(S): 100 TABLET, FILM COATED ORAL at 05:37

## 2019-08-03 NOTE — PROGRESS NOTE ADULT - ASSESSMENT
84 y/o female with history of CAD s/p stents 2013, hypothyroidism, HLD, HTN, HLD, dementia (baseline AOx1) but will follow commands presents to the ED via family members for worsening urinary frequency as well as more frequent falls over the last 2 weeks. Found in the ED to have a positive UA, acute kidney injury,  hypercalcemia       ·  Problem: Acute cystitis without hematuria.    was on macrobid   id eval noted  s/p iv abs   last dose today       ·  Problem: Frequent falls.  CT unremarkable   cards eval noted  -CTH with no bleed, no seizure history   -PT/OT       ·  Problem: Weakness.    possibly metabolic/infectious related to UTI.       ·  Problem: Hypertension, uncontrolled  improving  ing arb  c/w meds       ·  Problem: Coronary artery disease involving native heart, angina presence unspecified, unspecified vessel or lesion type.   hx stents in the past and currently on aspirin  -cards eval noted     Problem: Acute kidney injury.  creat slightly higher   observe

## 2019-08-04 LAB
ANION GAP SERPL CALC-SCNC: 15 MMO/L — HIGH (ref 7–14)
ANION GAP SERPL CALC-SCNC: 15 MMO/L — HIGH (ref 7–14)
BUN SERPL-MCNC: 21 MG/DL — SIGNIFICANT CHANGE UP (ref 7–23)
BUN SERPL-MCNC: 21 MG/DL — SIGNIFICANT CHANGE UP (ref 7–23)
CALCIUM SERPL-MCNC: 10.4 MG/DL — SIGNIFICANT CHANGE UP (ref 8.4–10.5)
CALCIUM SERPL-MCNC: 10.4 MG/DL — SIGNIFICANT CHANGE UP (ref 8.4–10.5)
CHLORIDE SERPL-SCNC: 98 MMOL/L — SIGNIFICANT CHANGE UP (ref 98–107)
CHLORIDE SERPL-SCNC: 98 MMOL/L — SIGNIFICANT CHANGE UP (ref 98–107)
CO2 SERPL-SCNC: 23 MMOL/L — SIGNIFICANT CHANGE UP (ref 22–31)
CO2 SERPL-SCNC: 23 MMOL/L — SIGNIFICANT CHANGE UP (ref 22–31)
CREAT SERPL-MCNC: 1.44 MG/DL — HIGH (ref 0.5–1.3)
CREAT SERPL-MCNC: 1.44 MG/DL — HIGH (ref 0.5–1.3)
GLUCOSE BLDC GLUCOMTR-MCNC: 176 MG/DL — HIGH (ref 70–99)
GLUCOSE BLDC GLUCOMTR-MCNC: 179 MG/DL — HIGH (ref 70–99)
GLUCOSE BLDC GLUCOMTR-MCNC: 200 MG/DL — HIGH (ref 70–99)
GLUCOSE BLDC GLUCOMTR-MCNC: 288 MG/DL — HIGH (ref 70–99)
GLUCOSE SERPL-MCNC: 180 MG/DL — HIGH (ref 70–99)
GLUCOSE SERPL-MCNC: 180 MG/DL — HIGH (ref 70–99)
HCT VFR BLD CALC: 33.9 % — LOW (ref 34.5–45)
HGB BLD-MCNC: 10.8 G/DL — LOW (ref 11.5–15.5)
MAGNESIUM SERPL-MCNC: 1.9 MG/DL — SIGNIFICANT CHANGE UP (ref 1.6–2.6)
MCHC RBC-ENTMCNC: 30 PG — SIGNIFICANT CHANGE UP (ref 27–34)
MCHC RBC-ENTMCNC: 31.9 % — LOW (ref 32–36)
MCV RBC AUTO: 94.2 FL — SIGNIFICANT CHANGE UP (ref 80–100)
NRBC # FLD: 0 K/UL — SIGNIFICANT CHANGE UP (ref 0–0)
PLATELET # BLD AUTO: 195 K/UL — SIGNIFICANT CHANGE UP (ref 150–400)
PMV BLD: 12.4 FL — SIGNIFICANT CHANGE UP (ref 7–13)
POTASSIUM SERPL-MCNC: 4 MMOL/L — SIGNIFICANT CHANGE UP (ref 3.5–5.3)
POTASSIUM SERPL-MCNC: 4 MMOL/L — SIGNIFICANT CHANGE UP (ref 3.5–5.3)
POTASSIUM SERPL-SCNC: 4 MMOL/L — SIGNIFICANT CHANGE UP (ref 3.5–5.3)
POTASSIUM SERPL-SCNC: 4 MMOL/L — SIGNIFICANT CHANGE UP (ref 3.5–5.3)
RBC # BLD: 3.6 M/UL — LOW (ref 3.8–5.2)
RBC # FLD: 11.8 % — SIGNIFICANT CHANGE UP (ref 10.3–14.5)
SODIUM SERPL-SCNC: 136 MMOL/L — SIGNIFICANT CHANGE UP (ref 135–145)
SODIUM SERPL-SCNC: 136 MMOL/L — SIGNIFICANT CHANGE UP (ref 135–145)
WBC # BLD: 5.39 K/UL — SIGNIFICANT CHANGE UP (ref 3.8–10.5)
WBC # FLD AUTO: 5.39 K/UL — SIGNIFICANT CHANGE UP (ref 3.8–10.5)

## 2019-08-04 RX ORDER — LANOLIN ALCOHOL/MO/W.PET/CERES
3 CREAM (GRAM) TOPICAL ONCE
Refills: 0 | Status: COMPLETED | OUTPATIENT
Start: 2019-08-04 | End: 2019-08-04

## 2019-08-04 RX ORDER — LOSARTAN POTASSIUM 100 MG/1
100 TABLET, FILM COATED ORAL DAILY
Refills: 0 | Status: DISCONTINUED | OUTPATIENT
Start: 2019-08-04 | End: 2019-08-06

## 2019-08-04 RX ADMIN — Medication 0.12 MILLIGRAM(S): at 05:37

## 2019-08-04 RX ADMIN — Medication 1 MILLIGRAM(S): at 13:08

## 2019-08-04 RX ADMIN — HEPARIN SODIUM 5000 UNIT(S): 5000 INJECTION INTRAVENOUS; SUBCUTANEOUS at 17:21

## 2019-08-04 RX ADMIN — Medication 1: at 09:17

## 2019-08-04 RX ADMIN — LOSARTAN POTASSIUM 50 MILLIGRAM(S): 100 TABLET, FILM COATED ORAL at 05:37

## 2019-08-04 RX ADMIN — Medication 10 MILLIGRAM(S): at 13:08

## 2019-08-04 RX ADMIN — DONEPEZIL HYDROCHLORIDE 10 MILLIGRAM(S): 10 TABLET, FILM COATED ORAL at 22:11

## 2019-08-04 RX ADMIN — ATORVASTATIN CALCIUM 10 MILLIGRAM(S): 80 TABLET, FILM COATED ORAL at 22:11

## 2019-08-04 RX ADMIN — Medication 1: at 18:26

## 2019-08-04 RX ADMIN — Medication 20 MILLIGRAM(S): at 05:37

## 2019-08-04 RX ADMIN — Medication 100 MICROGRAM(S): at 05:37

## 2019-08-04 RX ADMIN — Medication 5 MILLIGRAM(S): at 13:08

## 2019-08-04 RX ADMIN — Medication 1: at 12:56

## 2019-08-04 RX ADMIN — HEPARIN SODIUM 5000 UNIT(S): 5000 INJECTION INTRAVENOUS; SUBCUTANEOUS at 05:38

## 2019-08-04 RX ADMIN — Medication 50 MILLIGRAM(S): at 05:37

## 2019-08-04 RX ADMIN — Medication 81 MILLIGRAM(S): at 13:08

## 2019-08-04 RX ADMIN — MEMANTINE HYDROCHLORIDE 5 MILLIGRAM(S): 10 TABLET ORAL at 22:11

## 2019-08-04 RX ADMIN — Medication 3 MILLIGRAM(S): at 22:11

## 2019-08-04 NOTE — PROGRESS NOTE ADULT - ASSESSMENT
82 y/o female with history of CAD s/p stents 2013, hypothyroidism, HLD, HTN, HLD, dementia (baseline AOx1) but will follow commands presents to the ED via family members for worsening urinary frequency as well as more frequent falls over the last 2 weeks. Found in the ED to have a positive UA, acute kidney injury,  hypercalcemia       ·  Problem: Acute cystitis without hematuria.    was on macrobid   id eval noted  s/p iv abs         ·  Problem: Frequent falls.  CT unremarkable   cards eval noted  -CTH with no bleed, no seizure history   -PT/OT       ·  Problem: Weakness.    possibly metabolic/infectious related to UTI.       ·  Problem: Hypertension, uncontrolled  inc losartan  supp meds given last pm   s      ·  Problem: Coronary artery disease involving native heart, angina presence unspecified, unspecified vessel or lesion type.   hx stents in the past and currently on aspirin  -cards eval noted     Problem: Acute kidney injury.  creat stable  observe

## 2019-08-04 NOTE — CHART NOTE - NSCHARTNOTEFT_GEN_A_CORE
Notified by Francine CALDERÓN pt's BP was high 182/61 mmHg & pt was asymptomatic.   Patient on Metoprolol ER 50 mg PO qd & Losartan 50 mg PO qd for BP control & Hydralazine 10 mg IVP prn for SBP > 170 mmHg  Patient was given Hydralazine IVP as ordered - repeat BP improved 157/56 mmHg pt remains asymp   pt hemodynamically stable will continue to monitor    Vital Signs Last 24 Hrs  T(C): 37 (03 Aug 2019 22:32), Max: 37 (03 Aug 2019 22:32)  T(F): 98.6 (03 Aug 2019 22:32), Max: 98.6 (03 Aug 2019 22:32)  HR: 72 (03 Aug 2019 23:45) (58 - 82)  BP: 157/56 (03 Aug 2019 23:45) (151/49 - 182/61)  BP(mean): 81 (03 Aug 2019 12:17) (81 - 81)  RR: 18 (03 Aug 2019 23:45) (17 - 18)  SpO2: 98% (03 Aug 2019 23:45) (98% - 100%)    MEDICATIONS  (STANDING):  aspirin  chewable 81 milliGRAM(s) Oral daily  atorvastatin 10 milliGRAM(s) Oral at bedtime  dextrose 5%. 1000 milliLiter(s) (50 mL/Hr) IV Continuous <Continuous>  dextrose 50% Injectable 12.5 Gram(s) IV Push once  dextrose 50% Injectable 25 Gram(s) IV Push once  dextrose 50% Injectable 25 Gram(s) IV Push once  digoxin     Tablet 0.125 milliGRAM(s) Oral daily  donepezil 10 milliGRAM(s) Oral at bedtime  folic acid 1 milliGRAM(s) Oral daily  furosemide    Tablet 20 milliGRAM(s) Oral daily  heparin  Injectable 5000 Unit(s) SubCutaneous every 12 hours  insulin lispro (HumaLOG) corrective regimen sliding scale   SubCutaneous three times a day before meals  levothyroxine 100 MICROGram(s) Oral daily  losartan 50 milliGRAM(s) Oral daily  memantine 5 milliGRAM(s) Oral at bedtime  metoprolol succinate ER 50 milliGRAM(s) Oral daily  oxybutynin 5 milliGRAM(s) Oral daily    MEDICATIONS  (PRN):  dextrose 40% Gel 15 Gram(s) Oral once PRN Blood Glucose LESS THAN 70 milliGRAM(s)/deciliter  glucagon  Injectable 1 milliGRAM(s) IntraMuscular once PRN Glucose LESS THAN 70 milligrams/deciliter  hydrALAZINE Injectable 10 milliGRAM(s) IV Push every 6 hours PRN systolic bp>170

## 2019-08-05 ENCOUNTER — TRANSCRIPTION ENCOUNTER (OUTPATIENT)
Age: 84
End: 2019-08-05

## 2019-08-05 LAB
ANION GAP SERPL CALC-SCNC: 13 MMO/L — SIGNIFICANT CHANGE UP (ref 7–14)
ANION GAP SERPL CALC-SCNC: 13 MMO/L — SIGNIFICANT CHANGE UP (ref 7–14)
BUN SERPL-MCNC: 21 MG/DL — SIGNIFICANT CHANGE UP (ref 7–23)
BUN SERPL-MCNC: 21 MG/DL — SIGNIFICANT CHANGE UP (ref 7–23)
CALCIUM SERPL-MCNC: 9.7 MG/DL — SIGNIFICANT CHANGE UP (ref 8.4–10.5)
CALCIUM SERPL-MCNC: 9.7 MG/DL — SIGNIFICANT CHANGE UP (ref 8.4–10.5)
CHLORIDE SERPL-SCNC: 98 MMOL/L — SIGNIFICANT CHANGE UP (ref 98–107)
CHLORIDE SERPL-SCNC: 98 MMOL/L — SIGNIFICANT CHANGE UP (ref 98–107)
CO2 SERPL-SCNC: 22 MMOL/L — SIGNIFICANT CHANGE UP (ref 22–31)
CO2 SERPL-SCNC: 22 MMOL/L — SIGNIFICANT CHANGE UP (ref 22–31)
CREAT SERPL-MCNC: 1.23 MG/DL — SIGNIFICANT CHANGE UP (ref 0.5–1.3)
CREAT SERPL-MCNC: 1.23 MG/DL — SIGNIFICANT CHANGE UP (ref 0.5–1.3)
GAS PNL BLDMV: SIGNIFICANT CHANGE UP
GLUCOSE BLDC GLUCOMTR-MCNC: 171 MG/DL — HIGH (ref 70–99)
GLUCOSE BLDC GLUCOMTR-MCNC: 197 MG/DL — HIGH (ref 70–99)
GLUCOSE BLDC GLUCOMTR-MCNC: 216 MG/DL — HIGH (ref 70–99)
GLUCOSE BLDC GLUCOMTR-MCNC: 221 MG/DL — HIGH (ref 70–99)
GLUCOSE SERPL-MCNC: 258 MG/DL — HIGH (ref 70–99)
GLUCOSE SERPL-MCNC: 258 MG/DL — HIGH (ref 70–99)
HCT VFR BLD CALC: 35 % — SIGNIFICANT CHANGE UP (ref 34.5–45)
HGB BLD-MCNC: 11.1 G/DL — LOW (ref 11.5–15.5)
MAGNESIUM SERPL-MCNC: 1.9 MG/DL — SIGNIFICANT CHANGE UP (ref 1.6–2.6)
MCHC RBC-ENTMCNC: 30.6 PG — SIGNIFICANT CHANGE UP (ref 27–34)
MCHC RBC-ENTMCNC: 31.7 % — LOW (ref 32–36)
MCV RBC AUTO: 96.4 FL — SIGNIFICANT CHANGE UP (ref 80–100)
NRBC # FLD: 0 K/UL — SIGNIFICANT CHANGE UP (ref 0–0)
PLATELET # BLD AUTO: 231 K/UL — SIGNIFICANT CHANGE UP (ref 150–400)
PMV BLD: 11 FL — SIGNIFICANT CHANGE UP (ref 7–13)
POTASSIUM SERPL-MCNC: 4 MMOL/L — SIGNIFICANT CHANGE UP (ref 3.5–5.3)
POTASSIUM SERPL-MCNC: 4 MMOL/L — SIGNIFICANT CHANGE UP (ref 3.5–5.3)
POTASSIUM SERPL-SCNC: 4 MMOL/L — SIGNIFICANT CHANGE UP (ref 3.5–5.3)
POTASSIUM SERPL-SCNC: 4 MMOL/L — SIGNIFICANT CHANGE UP (ref 3.5–5.3)
RBC # BLD: 3.63 M/UL — LOW (ref 3.8–5.2)
RBC # FLD: 12 % — SIGNIFICANT CHANGE UP (ref 10.3–14.5)
SODIUM SERPL-SCNC: 133 MMOL/L — LOW (ref 135–145)
SODIUM SERPL-SCNC: 133 MMOL/L — LOW (ref 135–145)
WBC # BLD: 6.24 K/UL — SIGNIFICANT CHANGE UP (ref 3.8–10.5)
WBC # FLD AUTO: 6.24 K/UL — SIGNIFICANT CHANGE UP (ref 3.8–10.5)

## 2019-08-05 PROCEDURE — 99232 SBSQ HOSP IP/OBS MODERATE 35: CPT

## 2019-08-05 RX ORDER — LANOLIN ALCOHOL/MO/W.PET/CERES
3 CREAM (GRAM) TOPICAL AT BEDTIME
Refills: 0 | Status: DISCONTINUED | OUTPATIENT
Start: 2019-08-05 | End: 2019-08-09

## 2019-08-05 RX ORDER — METOPROLOL TARTRATE 50 MG
50 TABLET ORAL ONCE
Refills: 0 | Status: COMPLETED | OUTPATIENT
Start: 2019-08-05 | End: 2019-08-05

## 2019-08-05 RX ORDER — INSULIN GLARGINE 100 [IU]/ML
8 INJECTION, SOLUTION SUBCUTANEOUS AT BEDTIME
Refills: 0 | Status: DISCONTINUED | OUTPATIENT
Start: 2019-08-05 | End: 2019-08-06

## 2019-08-05 RX ORDER — METOPROLOL TARTRATE 50 MG
100 TABLET ORAL DAILY
Refills: 0 | Status: DISCONTINUED | OUTPATIENT
Start: 2019-08-06 | End: 2019-08-09

## 2019-08-05 RX ORDER — HYDRALAZINE HCL 50 MG
25 TABLET ORAL THREE TIMES A DAY
Refills: 0 | Status: DISCONTINUED | OUTPATIENT
Start: 2019-08-05 | End: 2019-08-06

## 2019-08-05 RX ADMIN — Medication 1: at 17:41

## 2019-08-05 RX ADMIN — INSULIN GLARGINE 8 UNIT(S): 100 INJECTION, SOLUTION SUBCUTANEOUS at 22:19

## 2019-08-05 RX ADMIN — Medication 2: at 12:54

## 2019-08-05 RX ADMIN — LOSARTAN POTASSIUM 100 MILLIGRAM(S): 100 TABLET, FILM COATED ORAL at 06:17

## 2019-08-05 RX ADMIN — Medication 25 MILLIGRAM(S): at 13:05

## 2019-08-05 RX ADMIN — Medication 25 MILLIGRAM(S): at 22:19

## 2019-08-05 RX ADMIN — Medication 2: at 08:46

## 2019-08-05 RX ADMIN — Medication 50 MILLIGRAM(S): at 15:06

## 2019-08-05 RX ADMIN — Medication 100 MICROGRAM(S): at 06:17

## 2019-08-05 RX ADMIN — HEPARIN SODIUM 5000 UNIT(S): 5000 INJECTION INTRAVENOUS; SUBCUTANEOUS at 06:17

## 2019-08-05 RX ADMIN — Medication 5 MILLIGRAM(S): at 13:02

## 2019-08-05 RX ADMIN — Medication 81 MILLIGRAM(S): at 13:02

## 2019-08-05 RX ADMIN — HEPARIN SODIUM 5000 UNIT(S): 5000 INJECTION INTRAVENOUS; SUBCUTANEOUS at 17:41

## 2019-08-05 RX ADMIN — Medication 0.12 MILLIGRAM(S): at 06:17

## 2019-08-05 RX ADMIN — Medication 1 MILLIGRAM(S): at 13:02

## 2019-08-05 RX ADMIN — DONEPEZIL HYDROCHLORIDE 10 MILLIGRAM(S): 10 TABLET, FILM COATED ORAL at 22:19

## 2019-08-05 RX ADMIN — Medication 3 MILLIGRAM(S): at 22:19

## 2019-08-05 RX ADMIN — ATORVASTATIN CALCIUM 10 MILLIGRAM(S): 80 TABLET, FILM COATED ORAL at 22:19

## 2019-08-05 RX ADMIN — Medication 20 MILLIGRAM(S): at 06:17

## 2019-08-05 RX ADMIN — Medication 50 MILLIGRAM(S): at 06:16

## 2019-08-05 RX ADMIN — MEMANTINE HYDROCHLORIDE 5 MILLIGRAM(S): 10 TABLET ORAL at 22:19

## 2019-08-05 NOTE — PROGRESS NOTE ADULT - ASSESSMENT
82 y/o female with history of CAD s/p stents 2013, hypothyroidism, HLD, HTN, HLD, DM, dementia (baseline AOx1) presents to the ED for weakness/fall and increased urinary frequency, found to have positive UA in the ED, admitted for IV abx.  UTI refractory to Macrobid poor penetration of Macrobid for lower  infection with GFR < 60.  Completed antibiotics for cystitis.  Now seems like she has a palpable bladder    Suggest:  - check PVR  - tele PA paged ([phone])

## 2019-08-05 NOTE — DISCHARGE NOTE PROVIDER - INSTRUCTIONS
low salt  low fat [address] ADA diet Dysphagia 3 Soft-Honey Consistency Fluid  Consistent Carbohydrate (no snacks)

## 2019-08-05 NOTE — DISCHARGE NOTE PROVIDER - CARE PROVIDER_API CALL
Jorge Talbert)  [facility]  [address], [address] [place] [address]  Phone: [phone]  Fax: [phone]  Follow Up Time: Jorge Talbert)  Medicine  [address] [place] [address]  Phone: [phone]  Fax: [phone]  Follow Up Time:     Tommy Paez)  Urology  [address], [address]  [place] [address]  Phone: [phone]  Fax: [phone]  Follow Up Time:     Karel Nichols)  Internal Medicine; Nephrology  [address], [address]  [place] [address]  Phone: [phone]  Fax: [phone]  Follow Up Time:     Maggie Woody)  Infectious Disease; Internal Medicine  [address] [place] [address]  Phone: [phone]  Fax: [phone]  Follow Up Time:     Tomy Smyth)  Internal Medicine  [address] [place] [address]  Phone: [phone]  Fax: [phone]  Follow Up Time: Jorge Talbert)  Medicine  [address] [place] [address]  Phone: [phone]  Fax: [phone]  Follow Up Time:     Tommy Paez)  Urology  [address], [address]  [place] [address]  Phone: [phone]  Fax: [phone]  Follow Up Time:     Karel Nichols)  Internal Medicine; Nephrology  [address], [address]  [place] [address]  Phone: [phone]  Fax: [phone]  Follow Up Time:     Maggie Woody)  Infectious Disease; Internal Medicine  [address] [place] [address]  Phone: [phone]  Fax: [phone]  Follow Up Time:     Tomy Smyth)  Internal Medicine  [address] [place] [address]  Phone: [phone]  Fax: [phone]  Follow Up Time:     Lars Weller)  EndocrinologyMetabDiabetes; Internal Medicine  [address], [address]  [place] [address]  Phone: [phone]  Fax: [phone]  Follow Up Time:

## 2019-08-05 NOTE — DISCHARGE NOTE PROVIDER - NSDCCPCAREPLAN_GEN_ALL_CORE_FT
PRINCIPAL DISCHARGE DIAGNOSIS  Diagnosis: Fall  Assessment and Plan of Treatment:       SECONDARY DISCHARGE DIAGNOSES  Diagnosis: UTI (urinary tract infection)  Assessment and Plan of Treatment:     Diagnosis: Weakness  Assessment and Plan of Treatment: PRINCIPAL DISCHARGE DIAGNOSIS  Diagnosis: Fall  Assessment and Plan of Treatment: Follow with your PMD.      SECONDARY DISCHARGE DIAGNOSES  Diagnosis: UTI (urinary tract infection)  Assessment and Plan of Treatment: Always wipe from front to back after using the bathroom. Never wipe twice with the same tissue. Take showers and avoid prolonged baths. Try to empty the bladder at least every 4 hours during the day while awake, even if the need or urge to void is absent.  Do not try to hold your urine until a more convenient time or place.  Drink plenty of water.      Diagnosis: CKD (chronic kidney disease) stage 3, GFR 30-59 ml/min  Assessment and Plan of Treatment: Follow up with your PMD and nephrologist, Take medications as ordered    Diagnosis: CAD (coronary artery disease)  Assessment and Plan of Treatment: Continue aspirin and Plavix, do not stop unless instructed by your physician.  Continue low salt, fat, cholesterol and carbohydrate diet. Follow up with cardiologist and primary care physician's routine appointment.      Diagnosis: Elevated troponin  Assessment and Plan of Treatment: Monitor with your PMD.    Diagnosis: DM (diabetes mellitus)  Assessment and Plan of Treatment: Monitor finger sticks pre-meal and bedtime, low salt, fat and carbohydrate diet, minimize glucose intake.  Exercise daily for at least 30 minutes and weight loss.  Follow up with primary care physician and endocrinologist for routine Hemoglobin A1C checks and management.  Follow up with your ophthalmologist for routine yearly vision exams.      Diagnosis: Hypertension, unspecified type  Assessment and Plan of Treatment: Low sodium and fat diet, continue anti-hypertensive medications, and follow up with primary care physician.      Diagnosis: Weakness  Assessment and Plan of Treatment: PRINCIPAL DISCHARGE DIAGNOSIS  Diagnosis: Fall  Assessment and Plan of Treatment: Follow with your PMD.      SECONDARY DISCHARGE DIAGNOSES  Diagnosis: Dementia  Assessment and Plan of Treatment: Continue home medications. Follow up with your primary care doctor.    Diagnosis: Hyperlipidemia  Assessment and Plan of Treatment: To maintain normal cholesterol levels to prevent stroke, coronary artery disease, peripheral vascular disease and heart attacks.   Low fat diet, exercise daily and continue current medications. Follow up with primary care physician and cardiologist for management.    Diagnosis: DM (diabetes mellitus)  Assessment and Plan of Treatment: To maintain a normal blood glucose level and HgA1C level < 5.7 and to prevent diabetic complications such as uncontrolled diabetes, diabetic coma, blindness, renal failure, and amputations.   Monitor finger sticks pre-meal and bedtime, low salt, fat and carbohydrate diet, minimize glucose intake.  Exercise daily for at least 30 minutes and weight loss.  Follow up with primary care physician and endocrinologist for routine Hemoglobin A1C checks and management.  Follow up with your ophthalmologist for routine yearly vision exams.    Diagnosis: Hypertension, unspecified type  Assessment and Plan of Treatment: To maintain a normal blood pressure to prevent heart attack, stroke and renal failure.   Low sodium and fat diet, continue anti-hypertensive medications, and follow up with primary care physician.    Diagnosis: CAD (coronary artery disease)  Assessment and Plan of Treatment: To be asymptomatic, to reduce risks factors such as hypertension, diabetes and hyperlipidemia to lower the risk of blood clots formation; and to prevent complications of coronary artery disease such as worsening chest pain, heart attack and death.   Continue aspirin and Plavix, do not stop unless instructed by your physician.  Continue low salt, fat, cholesterol and carbohydrate diet. Follow up with cardiologist and primary care physician's routine appointment.    Diagnosis: CKD (chronic kidney disease) stage 3, GFR 30-59 ml/min  Assessment and Plan of Treatment: To prevent shortness of breath, fluid overload, and electrolytes imbalance, and to slow down worsening kidney disease.   Continue blood pressure, cholesterol and diabetic medications. Goal of hemoglobin A1C (HgbA1C) < 7%.  Avoid nephrotoxic drugs such as nonsteroidal anti-inflammatory agents (NSAIDs).   Please follow up with your nephrologist to monitor your kidney function, continue with low protein and potassium diet.    Diagnosis: Hypothyroid  Assessment and Plan of Treatment: Continue the synthroid as prescribed. Follow up with your Endocrinologist. Monitor TSH levels.    Diagnosis: UTI (urinary tract infection)  Assessment and Plan of Treatment: Always wipe from front to back after using the bathroom. Never wipe twice with the same tissue. Take showers and avoid prolonged baths. Try to empty the bladder at least every 4 hours during the day while awake, even if the need or urge to void is absent.  Do not try to hold your urine until a more convenient time or place.  Drink plenty of water.      Diagnosis: Weakness  Assessment and Plan of Treatment: Will be going home with 24/7 assistance

## 2019-08-05 NOTE — DISCHARGE NOTE PROVIDER - CARE PROVIDERS DIRECT ADDRESSES
,DirectAddress_Unknown ,DirectAddress_Unknown,[email or web address],DirectAddress_Unknown,[email or web address],[email or web address] ,DirectAddress_Unknown,[email or web address],DirectAddress_Unknown,[email or web address],DirectAddress_Unknown,DirectAddress_Unknown

## 2019-08-05 NOTE — DISCHARGE NOTE PROVIDER - NSDCCAREPROVSEEN_GEN_ALL_CORE_FT
Base, Jorge GOLDSMITH Nitish, Jorge Nichols, Karel Smyth, Tomy Woody, Maggie Paez, [facility]

## 2019-08-05 NOTE — DISCHARGE NOTE PROVIDER - PROVIDER TOKENS
PROVIDER:[TOKEN:[[id number]]] PROVIDER:[TOKEN:[[id number]]],PROVIDER:[TOKEN:[[id number]]],PROVIDER:[TOKEN:[[id number]]],PROVIDER:[TOKEN:[[id number]]],PROVIDER:[TOKEN:[[id number]]] PROVIDER:[TOKEN:[[id number]]],PROVIDER:[TOKEN:[[id number]]],PROVIDER:[TOKEN:[[id number]]],PROVIDER:[TOKEN:[[id number]]],PROVIDER:[TOKEN:[[id number]]],PROVIDER:[TOKEN:[[id number]]]

## 2019-08-05 NOTE — DISCHARGE NOTE NURSING/CASE MANAGEMENT/SOCIAL WORK - NSDCDPATPORTLINK_GEN_ALL_CORE
You can access the [facility], offered by [facility], by registering with the following website: [email or web address]

## 2019-08-05 NOTE — PROGRESS NOTE ADULT - ASSESSMENT
84 y/o female with history of CAD s/p stents 2013, hypothyroidism, HLD, HTN, HLD, dementia (baseline AOx1) but will follow commands presents to the ED via family members for worsening urinary frequency as well as more frequent falls over the last 2 weeks. Found in the ED to have a positive UA, acute kidney injury,  hypercalcemia       ·  Problem: Acute cystitis without hematuria.    was on macrobid   id eval noted  s/p iv abs         ·  Problem: Frequent falls.  CT unremarkable   cards eval noted  -CTH with no bleed, no seizure history   -PT/OT       ·  Problem: Weakness.    possibly metabolic/infectious related to UTI.       ·  Problem: Hypertension, uncontrolled  add hydralazine for inc bp  need to clarify outpt meds w/ family   supp meds given last pm   s      ·  Problem: Coronary artery disease involving native heart, angina presence unspecified, unspecified vessel or lesion type.   hx stents in the past and currently on aspirin  -cards eval noted     Problem: Acute kidney injury.  creat stable  observe    dm  will add lantus at bedtime as fsg consistently increased

## 2019-08-06 LAB
ANION GAP SERPL CALC-SCNC: 12 MMO/L — SIGNIFICANT CHANGE UP (ref 7–14)
APPEARANCE UR: CLEAR — SIGNIFICANT CHANGE UP
BILIRUB UR-MCNC: NEGATIVE — SIGNIFICANT CHANGE UP
BLOOD UR QL VISUAL: NEGATIVE — SIGNIFICANT CHANGE UP
BUN SERPL-MCNC: 22 MG/DL — SIGNIFICANT CHANGE UP (ref 7–23)
CALCIUM SERPL-MCNC: 10.2 MG/DL — SIGNIFICANT CHANGE UP (ref 8.4–10.5)
CHLORIDE SERPL-SCNC: 100 MMOL/L — SIGNIFICANT CHANGE UP (ref 98–107)
CO2 SERPL-SCNC: 23 MMOL/L — SIGNIFICANT CHANGE UP (ref 22–31)
COLOR SPEC: SIGNIFICANT CHANGE UP
CREAT SERPL-MCNC: 1.36 MG/DL — HIGH (ref 0.5–1.3)
GLUCOSE BLDC GLUCOMTR-MCNC: 150 MG/DL — HIGH (ref 70–99)
GLUCOSE BLDC GLUCOMTR-MCNC: 191 MG/DL — HIGH (ref 70–99)
GLUCOSE BLDC GLUCOMTR-MCNC: 198 MG/DL — HIGH (ref 70–99)
GLUCOSE BLDC GLUCOMTR-MCNC: 207 MG/DL — HIGH (ref 70–99)
GLUCOSE BLDC GLUCOMTR-MCNC: 267 MG/DL — HIGH (ref 70–99)
GLUCOSE SERPL-MCNC: 162 MG/DL — HIGH (ref 70–99)
GLUCOSE UR-MCNC: NEGATIVE — SIGNIFICANT CHANGE UP
HCT VFR BLD CALC: 35.1 % — SIGNIFICANT CHANGE UP (ref 34.5–45)
HGB BLD-MCNC: 11 G/DL — LOW (ref 11.5–15.5)
KAPPA/LAMBDA FREE LIGHT CHAIN RATIO, SERUM: SIGNIFICANT CHANGE UP
KETONES UR-MCNC: NEGATIVE — SIGNIFICANT CHANGE UP
LEUKOCYTE ESTERASE UR-ACNC: NEGATIVE — SIGNIFICANT CHANGE UP
MAGNESIUM SERPL-MCNC: 1.9 MG/DL — SIGNIFICANT CHANGE UP (ref 1.6–2.6)
MCHC RBC-ENTMCNC: 30.1 PG — SIGNIFICANT CHANGE UP (ref 27–34)
MCHC RBC-ENTMCNC: 31.3 % — LOW (ref 32–36)
MCV RBC AUTO: 96.2 FL — SIGNIFICANT CHANGE UP (ref 80–100)
NITRITE UR-MCNC: NEGATIVE — SIGNIFICANT CHANGE UP
NRBC # FLD: 0 K/UL — SIGNIFICANT CHANGE UP (ref 0–0)
PH UR: 6.5 — SIGNIFICANT CHANGE UP (ref 5–8)
PLATELET # BLD AUTO: 258 K/UL — SIGNIFICANT CHANGE UP (ref 150–400)
PMV BLD: 11 FL — SIGNIFICANT CHANGE UP (ref 7–13)
POTASSIUM SERPL-MCNC: 4.4 MMOL/L — SIGNIFICANT CHANGE UP (ref 3.5–5.3)
POTASSIUM SERPL-SCNC: 4.4 MMOL/L — SIGNIFICANT CHANGE UP (ref 3.5–5.3)
PROT UR-MCNC: 10 — SIGNIFICANT CHANGE UP
RBC # BLD: 3.65 M/UL — LOW (ref 3.8–5.2)
RBC # FLD: 11.9 % — SIGNIFICANT CHANGE UP (ref 10.3–14.5)
SODIUM SERPL-SCNC: 135 MMOL/L — SIGNIFICANT CHANGE UP (ref 135–145)
SP GR SPEC: 1.01 — SIGNIFICANT CHANGE UP (ref 1–1.04)
UROBILINOGEN FLD QL: NORMAL — SIGNIFICANT CHANGE UP
WBC # BLD: 6.99 K/UL — SIGNIFICANT CHANGE UP (ref 3.8–10.5)
WBC # FLD AUTO: 6.99 K/UL — SIGNIFICANT CHANGE UP (ref 3.8–10.5)

## 2019-08-06 PROCEDURE — 76770 US EXAM ABDO BACK WALL COMP: CPT | Mod: 26

## 2019-08-06 RX ORDER — AMLODIPINE BESYLATE 2.5 MG/1
5 TABLET ORAL AT BEDTIME
Refills: 0 | Status: DISCONTINUED | OUTPATIENT
Start: 2019-08-06 | End: 2019-08-09

## 2019-08-06 RX ORDER — VALSARTAN 80 MG/1
320 TABLET ORAL DAILY
Refills: 0 | Status: DISCONTINUED | OUTPATIENT
Start: 2019-08-06 | End: 2019-08-09

## 2019-08-06 RX ORDER — ACETAMINOPHEN 500 MG
650 TABLET ORAL ONCE
Refills: 0 | Status: COMPLETED | OUTPATIENT
Start: 2019-08-06 | End: 2019-08-06

## 2019-08-06 RX ORDER — INSULIN GLARGINE 100 [IU]/ML
10 INJECTION, SOLUTION SUBCUTANEOUS AT BEDTIME
Refills: 0 | Status: DISCONTINUED | OUTPATIENT
Start: 2019-08-06 | End: 2019-08-07

## 2019-08-06 RX ORDER — HYDRALAZINE HCL 50 MG
50 TABLET ORAL THREE TIMES A DAY
Refills: 0 | Status: DISCONTINUED | OUTPATIENT
Start: 2019-08-06 | End: 2019-08-09

## 2019-08-06 RX ADMIN — ATORVASTATIN CALCIUM 10 MILLIGRAM(S): 80 TABLET, FILM COATED ORAL at 21:12

## 2019-08-06 RX ADMIN — Medication 650 MILLIGRAM(S): at 22:00

## 2019-08-06 RX ADMIN — Medication 100 MILLIGRAM(S): at 06:07

## 2019-08-06 RX ADMIN — Medication 50 MILLIGRAM(S): at 13:08

## 2019-08-06 RX ADMIN — Medication 25 MILLIGRAM(S): at 06:02

## 2019-08-06 RX ADMIN — AMLODIPINE BESYLATE 5 MILLIGRAM(S): 2.5 TABLET ORAL at 22:02

## 2019-08-06 RX ADMIN — Medication 1 MILLIGRAM(S): at 13:08

## 2019-08-06 RX ADMIN — Medication 81 MILLIGRAM(S): at 13:08

## 2019-08-06 RX ADMIN — INSULIN GLARGINE 10 UNIT(S): 100 INJECTION, SOLUTION SUBCUTANEOUS at 21:23

## 2019-08-06 RX ADMIN — Medication 0.12 MILLIGRAM(S): at 06:03

## 2019-08-06 RX ADMIN — MEMANTINE HYDROCHLORIDE 5 MILLIGRAM(S): 10 TABLET ORAL at 21:12

## 2019-08-06 RX ADMIN — Medication 650 MILLIGRAM(S): at 21:12

## 2019-08-06 RX ADMIN — Medication 1: at 17:33

## 2019-08-06 RX ADMIN — Medication 3 MILLIGRAM(S): at 21:11

## 2019-08-06 RX ADMIN — Medication 100 MICROGRAM(S): at 06:02

## 2019-08-06 RX ADMIN — Medication 1: at 09:02

## 2019-08-06 RX ADMIN — HEPARIN SODIUM 5000 UNIT(S): 5000 INJECTION INTRAVENOUS; SUBCUTANEOUS at 17:33

## 2019-08-06 RX ADMIN — LOSARTAN POTASSIUM 100 MILLIGRAM(S): 100 TABLET, FILM COATED ORAL at 06:03

## 2019-08-06 RX ADMIN — DONEPEZIL HYDROCHLORIDE 10 MILLIGRAM(S): 10 TABLET, FILM COATED ORAL at 21:12

## 2019-08-06 RX ADMIN — HEPARIN SODIUM 5000 UNIT(S): 5000 INJECTION INTRAVENOUS; SUBCUTANEOUS at 06:02

## 2019-08-06 RX ADMIN — Medication 50 MILLIGRAM(S): at 21:11

## 2019-08-06 RX ADMIN — Medication 20 MILLIGRAM(S): at 06:03

## 2019-08-06 NOTE — PROGRESS NOTE ADULT - ASSESSMENT
82 y/o female with history of CAD s/p stents 2013, hypothyroidism, HLD, HTN, HLD, dementia (baseline AOx1) but will follow commands presents to the ED via family members for worsening urinary frequency as well as more frequent falls over the last 2 weeks. Found in the ED to have a positive UA, acute kidney injury,  hypercalcemia       ·  Problem: Acute cystitis without hematuria.    was on macrobid   id eval noted  s/p iv abs     urinary retention  uro eval  straight cath for now         ·  Problem: Frequent falls.  CT unremarkable   cards eval noted  -CTH with no bleed, no seizure history   -PT/OT       ·  Problem: Weakness.    possibly metabolic/infectious related to UTI.       ·  Problem: Hypertension, uncontrolled  on mult meds  will have renal eval           ·  Problem: Coronary artery disease involving native heart, angina presence unspecified, unspecified vessel or lesion type.   hx stents in the past and currently on aspirin  -cards eval noted     Problem: Acute kidney injury.  creat stable  observe    dm  c/w meds   fsg riss

## 2019-08-06 NOTE — CONSULT NOTE ADULT - ASSESSMENT
84 y/o female with history of CAD s/p stents 2013, hypothyroidism, HLD, HTN, HLD, dementia (baseline AOx1) but will follow commands presents to the ED via family members for worsening urinary frequency as well as more frequent falls over the last 2 weeks.    CKD stage 3  Failure to thrive and hypercalcemia in admission  New CHF  Hypertensive urgency       1 CVS- Goal BP for her is 150mmHg(we do NOT need tight control in her.  Will change Cozaar to Diovan which is a more potent ARB then the former with respect to BP.  Will also start Norvasc 5mg po qd at night and then assess response in am   2 Renal -Bladder mass is to be eval as outpt.  Bladder scan per protocol;  Trying not to add HCTZ given the hypercalcemia  3 Neuro -Cont Memantine

## 2019-08-06 NOTE — CONSULT NOTE ADULT - SUBJECTIVE AND OBJECTIVE BOX
HPI    83 year old female with history of CAD s/p stents 2013, hypothyroidism, HLD, HTN, HLD, dementia (baseline oriented to self) who presented on 7/31 with urinary frequency. Found to have E coli UTI, ?refractory to course of macrobid outpatient. S/p short course of ceftriaxone per ID, finished 8/3. Yesterday evening patient could not urinate despite urge. Was complaining of suprapubic pain, straight cath for 1300cc. Per nursing was catheterized again this morning for over 1L, although not recorded. Patient is a limited historian but denies urologic history, hematuria, smoking history.    PAST MEDICAL & SURGICAL HISTORY:  Hypothyroid  HLD (hyperlipidemia)  HTN (hypertension)  DM (diabetes mellitus)  CAD (coronary artery disease)  MI (myocardial infarction)  Stented coronary artery      MEDICATIONS  (STANDING):  aspirin  chewable 81 milliGRAM(s) Oral daily  atorvastatin 10 milliGRAM(s) Oral at bedtime  dextrose 5%. 1000 milliLiter(s) (50 mL/Hr) IV Continuous <Continuous>  dextrose 50% Injectable 12.5 Gram(s) IV Push once  dextrose 50% Injectable 25 Gram(s) IV Push once  dextrose 50% Injectable 25 Gram(s) IV Push once  digoxin     Tablet 0.125 milliGRAM(s) Oral daily  donepezil 10 milliGRAM(s) Oral at bedtime  folic acid 1 milliGRAM(s) Oral daily  furosemide    Tablet 20 milliGRAM(s) Oral daily  heparin  Injectable 5000 Unit(s) SubCutaneous every 12 hours  hydrALAZINE 50 milliGRAM(s) Oral three times a day  insulin glargine Injectable (LANTUS) 10 Unit(s) SubCutaneous at bedtime  insulin lispro (HumaLOG) corrective regimen sliding scale   SubCutaneous three times a day before meals  levothyroxine 100 MICROGram(s) Oral daily  losartan 100 milliGRAM(s) Oral daily  melatonin 3 milliGRAM(s) Oral at bedtime  memantine 5 milliGRAM(s) Oral at bedtime  metoprolol succinate  milliGRAM(s) Oral daily    MEDICATIONS  (PRN):  dextrose 40% Gel 15 Gram(s) Oral once PRN Blood Glucose LESS THAN 70 milliGRAM(s)/deciliter  glucagon  Injectable 1 milliGRAM(s) IntraMuscular once PRN Glucose LESS THAN 70 milligrams/deciliter      FAMILY HISTORY:  Denies      Allergies    No Known Allergies    Intolerances        SOCIAL HISTORY:    REVIEW OF SYSTEMS: Otherwise negative as stated in HPI    Physical Exam  Vital signs  T(C): 36.7 (08-06-19 @ 13:05), Max: 36.7 (08-05-19 @ 15:05)  HR: 58 (08-06-19 @ 13:05)  BP: 181/58 (08-06-19 @ 13:05)  SpO2: 99% (08-06-19 @ 13:05)  Wt(kg): --    Output    OUT:    Voided: 1400 mL  Total OUT: 1400 mL    Total NET: -1400 mL      OUT:    Voided: 1300 mL  Total OUT: 1300 mL    Total NET: -1300 mL          Gen:  NAD    Pulm:  No respiratory distress  	  CV:  RRR    GI:  S/ND/NT    :  bladder not palpable  no CVAT bilaterally      MSK:      LABS:      08-06 @ 06:50    WBC 6.99  / Hct 35.1  / SCr 1.36     08-05 @ 09:30    WBC 6.24  / Hct 35.0  / SCr 1.23     08-06    135  |  100  |  22  ----------------------------<  162<H>  4.4   |  23  |  1.36<H>    Ca    10.2      06 Aug 2019 06:50  Mg     1.9     08-06            Urine Cx:  Culture - Urine (07.31.19 @ 22:41)    -  Amikacin: S <=8 TIM    -  Ampicillin: R >16 TIM    -  Ampicillin/Sulbactam: R >16/8 TIM    -  Aztreonam: S <=4 TIM    -  Cefazolin: S <=16 TIM    -  Cefepime: S <=2 TIM    -  Cefoxitin: S <=4 TIM    -  Ceftazidime: S <=1 TIM    -  Ceftriaxone: S <=1 TIM    -  Ciprofloxacin: R >2 TIM    -  Ertapenem: S <=0.5 TIM    -  Gentamicin: S <=1 TIM    -  Imipenem: S <=1 TIM    -  Levofloxacin: R >4 ITM    -  Meropenem: S <=1 TIM    -  Nitrofurantoin: S <=32 TIM    -  Piperacillin/Tazobactam: S <=8 TIM    -  Tigecycline: S <=1 TIM    -  Tobramycin: S <=2 TIM    -  Trimethoprim/Sulfamethoxazole: R >2/38 TIM    Culture - Urine:   COLONY COUNT: > = 100,000 CFU/ML    Specimen Source: URINE MIDSTREAM    Organism Identification: Escherichia coli    Organism: Escherichia coli    Method Type: NEGATIVE TIM 43      RADIOLOGY:  < from: US Kidney and Bladder (08.06.19 @ 11:37) >  EXAM:  US KIDNEYS AND BLADDER        PROCEDURE DATE:  Aug  6 2019         INTERPRETATION:  CLINICAL INFORMATION: Urinary retention.    COMPARISON: None available.    TECHNIQUE: Sonography of the kidneys and bladder.     FINDINGS:    Right kidney:  9.7 x 4.6 x 4.4 cm. No renal mass, hydronephrosis or   calculi.    Left kidney:  8.2 x 3.6 x 3.8 cm. No renal mass, hydronephrosis or   calculi.    Urinary bladder: Polypoid mass along the anterior (nondependent) aspect   of the urinary bladder, measuring 2.4 x 1.4 x 3.0 cm.    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 Consider further evaluation with urine   cytology/cystoscopy.    < end of copied text >

## 2019-08-06 NOTE — CONSULT NOTE ADULT - SUBJECTIVE AND OBJECTIVE BOX
NEPHROLOGY - NSN    Patient seen and examined.    HPI:  82 y/o female with history of CAD s/p stents 2013, hypothyroidism, HLD, HTN, HLD, dementia (baseline AOx1) but will follow commands presents to the ED via family members for worsening urinary frequency as well as more frequent falls over the last 2 weeks. Pt at baseline is intermittently confused and is completely dependent on ADLs from family. History obtained mostly from family. She is essentially nonambulatory now for a few months and requires assistance with walking to the bathroom. She has been complaining of urinary frequency for 2 weeks, was put on macrobid by her PCP but symptoms have not improved. No fevers, chills reported, but she does have suprapubic pain. Per family she has been more lethargic since these symptoms, however over the course of a few months now she has also been losing weight with poor appetite. She has been falling intermittently while unsupervised as she gets confused and forgets that she has difficulty walking. In the past two weeks she has fallen multiple times all unwitnessed The last episode happened two nights ago when she was found in the bathroom on the floor. She reportedly was awake, did not remember falling, but denied having chest pain, shortness of breath, headache or bowel incontinence. She was noted to have ecchymosis around her eye and so was brought to her PCP today who referred her to ED. In the ED she was pan scanned for trauma eval, found to have UTI on UA and electrolyte abn to be admitted.    Pt was in retention and did have > 1 liter in the bladder sp straight cath.  Over the last few months has lost wt and really does not eat much.   Cognitively has been declining as well.  She has no known issues with the kidney function.  There is no hematuria or bubbles in the urine.  No history of NSAIDS or nephrolithisis.  The patient urinates once or twice in the night and there is no incontinence.  No family hx or renal disease or back pain.    No recent abx use.  No alleviating or aggravating factors with respect to the kidneys.  She was found to have a bladder mass and  already has been called.  On admission also had hypercalcemia but SPEP failed to reveal immunoglubulin spike   Pt is pleasantly demented and the serum creatinine went up today.    Renal eval for HTN management   Pt EF has also declined but no cath is planned        PAST MEDICAL & SURGICAL HISTORY:  Hypothyroid  HLD (hyperlipidemia)  HTN (hypertension)  DM (diabetes mellitus)  CAD (coronary artery disease)  MI (myocardial infarction)  Stented coronary artery      MEDICATIONS  (STANDING):  aspirin  chewable 81 milliGRAM(s) Oral daily  atorvastatin 10 milliGRAM(s) Oral at bedtime  dextrose 5%. 1000 milliLiter(s) (50 mL/Hr) IV Continuous <Continuous>  dextrose 50% Injectable 12.5 Gram(s) IV Push once  dextrose 50% Injectable 25 Gram(s) IV Push once  dextrose 50% Injectable 25 Gram(s) IV Push once  digoxin     Tablet 0.125 milliGRAM(s) Oral daily  donepezil 10 milliGRAM(s) Oral at bedtime  folic acid 1 milliGRAM(s) Oral daily  furosemide    Tablet 20 milliGRAM(s) Oral daily  heparin  Injectable 5000 Unit(s) SubCutaneous every 12 hours  hydrALAZINE 50 milliGRAM(s) Oral three times a day  insulin glargine Injectable (LANTUS) 10 Unit(s) SubCutaneous at bedtime  insulin lispro (HumaLOG) corrective regimen sliding scale   SubCutaneous three times a day before meals  levothyroxine 100 MICROGram(s) Oral daily  losartan 100 milliGRAM(s) Oral daily  melatonin 3 milliGRAM(s) Oral at bedtime  memantine 5 milliGRAM(s) Oral at bedtime  metoprolol succinate  milliGRAM(s) Oral daily      Allergies    No Known Allergies    Intolerances        SOCIAL HISTORY:  Denies alcohol abuse, drug abuse or tobacco usage.     FAMILY HISTORY:  No pertinent family history in first degree relatives      VITALS:  T(C): 36.7 (08-06-19 @ 13:05), Max: 36.7 (08-05-19 @ 17:39)  HR: 58 (08-06-19 @ 13:05) (52 - 73)  BP: 181/58 (08-06-19 @ 13:05) (119/50 - 199/72)  RR: 16 (08-06-19 @ 13:05) (16 - 17)  SpO2: 99% (08-06-19 @ 13:05) (99% - 100%)    REVIEW OF SYSTEMS:  Denies any nausea, vomiting, diarrhea, fever or chills.  All other pertinent systems are reviewed and are negative.    PHYSICAL EXAM:  Constitutional: NAD  HEENT: EOMI  Neck:  No JVD, supple   Respiratory: CTA B/L  Cardiovascular: S1 and S2, RRR  Gastrointestinal: + BS, soft, NT, ND  Extremities: No peripheral edema, + peripheral pulses  Neurological: A/O x 3, CN2-12 intact  Psychiatric: Normal mood, normal affect  : No Naik  Skin: No rashes, C/D/I  Access: Not applicable    I and O's:    08-04 @ 07:01  -  08-05 @ 07:00  --------------------------------------------------------  IN: 200 mL / OUT: 200 mL / NET: 0 mL    08-05 @ 07:01  -  08-06 @ 07:00  --------------------------------------------------------  IN: 400 mL / OUT: 1400 mL / NET: -1000 mL    08-06 @ 07:01  -  08-06 @ 15:32  --------------------------------------------------------  IN: 0 mL / OUT: 1300 mL / NET: -1300 mL          LABS:                        11.0   6.99  )-----------( 258      ( 06 Aug 2019 06:50 )             35.1     08-06    135  |  100  |  22  ----------------------------<  162<H>  4.4   |  23  |  1.36<H>    Ca    10.2      06 Aug 2019 06:50  Mg     1.9     08-06       < from: US Kidney and Bladder (08.06.19 @ 11:37) >    EXAM:  US KIDNEYS AND BLADDER        PROCEDURE DATE:  Aug  6 2019         INTERPRETATION:  CLINICAL INFORMATION: Urinary retention.    COMPARISON: None available.    TECHNIQUE: Sonography of the kidneys and bladder.     FINDINGS:    Right kidney:  9.7 x 4.6 x 4.4 cm. No renal mass, hydronephrosis or   calculi.    Left kidney:  8.2 x 3.6 x 3.8 cm. No renal mass, hydronephrosis or   calculi.    Urinary bladder: Polypoid mass along the anterior (nondependent) aspect   of the urinary bladder, measuring 2.4 x 1.4 x 3.0 cm.    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 Consider further evaluation with urine   cytology/cystoscopy.                      ANTONIO CHIN M.D., ATTENDING RADIOLOGIST  This document has been electronically signed. Aug  6 2019 12:51PM                  < end of copied text >

## 2019-08-06 NOTE — CONSULT NOTE ADULT - ASSESSMENT
83 year old female with acute urinary retention with recent UTI and an incidentally discovered bladder mass.    - Continue to hold oxybutynin  - Repeat UA/Urine culture  - May attempt another trial of void but patient should be out of bed on either toilet or bedside commode. Would be ideal if family were present to help orient  - If patient fails 2 more trials of void, place a coelho catheter that she may be discharged with  - No further workup for the bladder mass at this time. Should follow up in office with family present to discuss risks and benefits of the workup and treatment of bladder cancer  - Follow up in office after discharge with Dr Paez. [phone].    Case discussed with Dr Paez    Urology  [id number]

## 2019-08-07 DIAGNOSIS — N18.3 CHRONIC KIDNEY DISEASE, STAGE 3 (MODERATE): ICD-10-CM

## 2019-08-07 DIAGNOSIS — I10 ESSENTIAL (PRIMARY) HYPERTENSION: ICD-10-CM

## 2019-08-07 DIAGNOSIS — E11.9 TYPE 2 DIABETES MELLITUS WITHOUT COMPLICATIONS: ICD-10-CM

## 2019-08-07 DIAGNOSIS — E03.9 HYPOTHYROIDISM, UNSPECIFIED: ICD-10-CM

## 2019-08-07 DIAGNOSIS — I25.10 ATHEROSCLEROTIC HEART DISEASE OF NATIVE CORONARY ARTERY WITHOUT ANGINA PECTORIS: ICD-10-CM

## 2019-08-07 LAB
ANION GAP SERPL CALC-SCNC: 16 MMO/L — HIGH (ref 7–14)
BUN SERPL-MCNC: 21 MG/DL — SIGNIFICANT CHANGE UP (ref 7–23)
CALCIUM SERPL-MCNC: 9.9 MG/DL — SIGNIFICANT CHANGE UP (ref 8.4–10.5)
CHLORIDE SERPL-SCNC: 98 MMOL/L — SIGNIFICANT CHANGE UP (ref 98–107)
CO2 SERPL-SCNC: 21 MMOL/L — LOW (ref 22–31)
CREAT SERPL-MCNC: 1.08 MG/DL — SIGNIFICANT CHANGE UP (ref 0.5–1.3)
GAS PNL BLDMV: SIGNIFICANT CHANGE UP
GLUCOSE BLDC GLUCOMTR-MCNC: 161 MG/DL — HIGH (ref 70–99)
GLUCOSE BLDC GLUCOMTR-MCNC: 174 MG/DL — HIGH (ref 70–99)
GLUCOSE BLDC GLUCOMTR-MCNC: 210 MG/DL — HIGH (ref 70–99)
GLUCOSE BLDC GLUCOMTR-MCNC: 250 MG/DL — HIGH (ref 70–99)
GLUCOSE SERPL-MCNC: 200 MG/DL — HIGH (ref 70–99)
MAGNESIUM SERPL-MCNC: 1.9 MG/DL — SIGNIFICANT CHANGE UP (ref 1.6–2.6)
POTASSIUM SERPL-MCNC: 4.2 MMOL/L — SIGNIFICANT CHANGE UP (ref 3.5–5.3)
POTASSIUM SERPL-SCNC: 4.2 MMOL/L — SIGNIFICANT CHANGE UP (ref 3.5–5.3)
SODIUM SERPL-SCNC: 135 MMOL/L — SIGNIFICANT CHANGE UP (ref 135–145)
SPECIMEN SOURCE: SIGNIFICANT CHANGE UP

## 2019-08-07 RX ORDER — SODIUM CHLORIDE 0.65 %
1 AEROSOL, SPRAY (ML) NASAL
Refills: 0 | Status: DISCONTINUED | OUTPATIENT
Start: 2019-08-07 | End: 2019-08-09

## 2019-08-07 RX ORDER — VALSARTAN 80 MG/1
1 TABLET ORAL
Qty: 0 | Refills: 0 | DISCHARGE
Start: 2019-08-07

## 2019-08-07 RX ORDER — AMLODIPINE BESYLATE 2.5 MG/1
1 TABLET ORAL
Qty: 0 | Refills: 0 | DISCHARGE
Start: 2019-08-07

## 2019-08-07 RX ORDER — METOPROLOL TARTRATE 50 MG
1 TABLET ORAL
Qty: 0 | Refills: 0 | DISCHARGE
Start: 2019-08-07

## 2019-08-07 RX ORDER — INSULIN LISPRO 100/ML
3 VIAL (ML) SUBCUTANEOUS
Refills: 0 | Status: DISCONTINUED | OUTPATIENT
Start: 2019-08-07 | End: 2019-08-09

## 2019-08-07 RX ORDER — HYDRALAZINE HCL 50 MG
1 TABLET ORAL
Qty: 0 | Refills: 0 | DISCHARGE
Start: 2019-08-07

## 2019-08-07 RX ORDER — INSULIN GLARGINE 100 [IU]/ML
12 INJECTION, SOLUTION SUBCUTANEOUS AT BEDTIME
Refills: 0 | Status: DISCONTINUED | OUTPATIENT
Start: 2019-08-07 | End: 2019-08-09

## 2019-08-07 RX ORDER — METOPROLOL TARTRATE 50 MG
1 TABLET ORAL
Qty: 0 | Refills: 0 | DISCHARGE

## 2019-08-07 RX ADMIN — HEPARIN SODIUM 5000 UNIT(S): 5000 INJECTION INTRAVENOUS; SUBCUTANEOUS at 17:39

## 2019-08-07 RX ADMIN — Medication 3 MILLIGRAM(S): at 22:05

## 2019-08-07 RX ADMIN — MEMANTINE HYDROCHLORIDE 5 MILLIGRAM(S): 10 TABLET ORAL at 22:05

## 2019-08-07 RX ADMIN — Medication 100 MICROGRAM(S): at 06:02

## 2019-08-07 RX ADMIN — Medication 0.12 MILLIGRAM(S): at 06:01

## 2019-08-07 RX ADMIN — Medication 2: at 13:01

## 2019-08-07 RX ADMIN — INSULIN GLARGINE 12 UNIT(S): 100 INJECTION, SOLUTION SUBCUTANEOUS at 22:05

## 2019-08-07 RX ADMIN — Medication 2: at 08:44

## 2019-08-07 RX ADMIN — Medication 20 MILLIGRAM(S): at 06:02

## 2019-08-07 RX ADMIN — Medication 1 MILLIGRAM(S): at 13:00

## 2019-08-07 RX ADMIN — ATORVASTATIN CALCIUM 10 MILLIGRAM(S): 80 TABLET, FILM COATED ORAL at 22:04

## 2019-08-07 RX ADMIN — HEPARIN SODIUM 5000 UNIT(S): 5000 INJECTION INTRAVENOUS; SUBCUTANEOUS at 06:02

## 2019-08-07 RX ADMIN — Medication 50 MILLIGRAM(S): at 06:03

## 2019-08-07 RX ADMIN — Medication 50 MILLIGRAM(S): at 13:00

## 2019-08-07 RX ADMIN — VALSARTAN 320 MILLIGRAM(S): 80 TABLET ORAL at 06:03

## 2019-08-07 RX ADMIN — Medication 100 MILLIGRAM(S): at 06:02

## 2019-08-07 RX ADMIN — Medication 1 SPRAY(S): at 16:14

## 2019-08-07 RX ADMIN — DONEPEZIL HYDROCHLORIDE 10 MILLIGRAM(S): 10 TABLET, FILM COATED ORAL at 22:04

## 2019-08-07 RX ADMIN — AMLODIPINE BESYLATE 5 MILLIGRAM(S): 2.5 TABLET ORAL at 22:04

## 2019-08-07 RX ADMIN — Medication 3 UNIT(S): at 17:39

## 2019-08-07 RX ADMIN — Medication 1: at 17:39

## 2019-08-07 RX ADMIN — Medication 81 MILLIGRAM(S): at 13:00

## 2019-08-07 RX ADMIN — Medication 50 MILLIGRAM(S): at 22:04

## 2019-08-07 NOTE — PROGRESS NOTE ADULT - ASSESSMENT
84 y/o female with history of CAD s/p stents 2013, hypothyroidism, HLD, HTN, HLD, dementia (baseline AOx1) but will follow commands presents to the ED via family members for worsening urinary frequency as well as more frequent falls over the last 2 weeks.    CKD stage 3  Failure to thrive and hypercalcemia in admission  New CHF  SP Hypertensive urgency       1 CVS- Goal BP for her is 150mmHg(we do NOT need tight control in her.)  BP is about 20mmHg lower then yesterday.  Cont  Diovan/Norvasc combination.  No additional Bp adjustment today    2 Renal -Bladder mass is to be eval as outpt.  Bladder scan per protocol;  Trying not to add HCTZ given the hypercalcemia  3 Neuro -Cont Memantine

## 2019-08-07 NOTE — PROGRESS NOTE ADULT - ASSESSMENT
Assessment  DMT2: 83y Female with DM T2 with hyperglycemia on insulin, blood sugars running high, had no hypoglycemic episode,  eating meals,    On lantu 10 and SSI coverage  CAD: On medications, stable, monitored.  Hypothyroidism:  TSH : 0.37 On synthroid 100  mcg po daily, asymptomatic.  HTN: Controlled, On med.  HLD; on statin  CKD: stage 3 Monitor labs/BMP,         Plan:     DMT2: .  Change Lantus to  12  units qhs, Start  Humalog  3   units before each meal in addition to correction scale coverage, monitor FS will FU  Patient counseled for compliance with consistent low carb diet  CAD: Continue treatment, monitoring FU by medical team  Hypothyroidism: Reduce  synthroid 88 mcg po daily,   HTN: Continue treatment, monitoring, Primary team FU  HLD; continue statin  CKD: Continue monitoring labs, renal FU  Thank you for consult  Case discussed with CATHERINE Weller MD  [phone]

## 2019-08-07 NOTE — PROGRESS NOTE ADULT - PROBLEM SELECTOR PLAN 1
Lantus to  12  units qhs, Start  Humalog  3   units before each meal in addition to correction scale coverage, monitor FS will FU  Patient counseled for compliance with consistent low carb diet

## 2019-08-07 NOTE — PROGRESS NOTE ADULT - ASSESSMENT
82 y/o female with history of CAD s/p stents 2013, hypothyroidism, HLD, HTN, HLD, dementia (baseline AOx1) but will follow commands presents to the ED via family members for worsening urinary frequency as well as more frequent falls over the last 2 weeks. Found in the ED to have a positive UA, acute kidney injury,  hypercalcemia       ·  Problem: Acute cystitis without hematuria.    was on macrobid   id eval noted  s/p iv abs     urinary retention  uro eval noted  failed tov  likely will need coelho as outpt    bladder mass  urology aware and state outpt f/u        ·  Problem: Frequent falls.  CT unremarkable   cards eval noted  -CTH with no bleed, no seizure history   -PT/OT       ·  Problem: Weakness.    possibly metabolic/infectious related to UTI.       ·  Problem: Hypertension,improved today   on mult meds  renal eval noted          ·  Problem: Coronary artery disease involving native heart,  hx stents in the past and currently on aspirin  -cards eval noted     Problem: Acute kidney injury.  creat improved      dm  c/w meds   fsg riss    discussed w/ family at bedside  d/c planning

## 2019-08-08 LAB
GLUCOSE BLDC GLUCOMTR-MCNC: 158 MG/DL — HIGH (ref 70–99)
GLUCOSE BLDC GLUCOMTR-MCNC: 199 MG/DL — HIGH (ref 70–99)
GLUCOSE BLDC GLUCOMTR-MCNC: 254 MG/DL — HIGH (ref 70–99)
GLUCOSE BLDC GLUCOMTR-MCNC: 256 MG/DL — HIGH (ref 70–99)

## 2019-08-08 RX ORDER — INSULIN LISPRO 100/ML
VIAL (ML) SUBCUTANEOUS
Refills: 0 | Status: DISCONTINUED | OUTPATIENT
Start: 2019-08-08 | End: 2019-08-09

## 2019-08-08 RX ORDER — INSULIN LISPRO 100/ML
VIAL (ML) SUBCUTANEOUS AT BEDTIME
Refills: 0 | Status: DISCONTINUED | OUTPATIENT
Start: 2019-08-08 | End: 2019-08-09

## 2019-08-08 RX ORDER — SODIUM CHLORIDE 9 MG/ML
3 INJECTION INTRAMUSCULAR; INTRAVENOUS; SUBCUTANEOUS EVERY 8 HOURS
Refills: 0 | Status: DISCONTINUED | OUTPATIENT
Start: 2019-08-08 | End: 2019-08-09

## 2019-08-08 RX ORDER — LEVOTHYROXINE SODIUM 125 MCG
88 TABLET ORAL DAILY
Refills: 0 | Status: DISCONTINUED | OUTPATIENT
Start: 2019-08-08 | End: 2019-08-09

## 2019-08-08 RX ADMIN — Medication 50 MILLIGRAM(S): at 05:54

## 2019-08-08 RX ADMIN — Medication 3 UNIT(S): at 09:03

## 2019-08-08 RX ADMIN — Medication 100 MILLIGRAM(S): at 05:54

## 2019-08-08 RX ADMIN — Medication 1 MILLIGRAM(S): at 12:41

## 2019-08-08 RX ADMIN — Medication 100 MICROGRAM(S): at 05:54

## 2019-08-08 RX ADMIN — Medication 81 MILLIGRAM(S): at 12:42

## 2019-08-08 RX ADMIN — SODIUM CHLORIDE 3 MILLILITER(S): 9 INJECTION INTRAMUSCULAR; INTRAVENOUS; SUBCUTANEOUS at 21:20

## 2019-08-08 RX ADMIN — SODIUM CHLORIDE 3 MILLILITER(S): 9 INJECTION INTRAMUSCULAR; INTRAVENOUS; SUBCUTANEOUS at 08:53

## 2019-08-08 RX ADMIN — INSULIN GLARGINE 12 UNIT(S): 100 INJECTION, SOLUTION SUBCUTANEOUS at 21:18

## 2019-08-08 RX ADMIN — Medication 3 UNIT(S): at 17:38

## 2019-08-08 RX ADMIN — Medication 50 MILLIGRAM(S): at 21:17

## 2019-08-08 RX ADMIN — Medication 1: at 12:43

## 2019-08-08 RX ADMIN — MEMANTINE HYDROCHLORIDE 5 MILLIGRAM(S): 10 TABLET ORAL at 21:17

## 2019-08-08 RX ADMIN — HEPARIN SODIUM 5000 UNIT(S): 5000 INJECTION INTRAVENOUS; SUBCUTANEOUS at 17:29

## 2019-08-08 RX ADMIN — Medication 20 MILLIGRAM(S): at 05:54

## 2019-08-08 RX ADMIN — Medication 3 UNIT(S): at 12:43

## 2019-08-08 RX ADMIN — ATORVASTATIN CALCIUM 10 MILLIGRAM(S): 80 TABLET, FILM COATED ORAL at 21:17

## 2019-08-08 RX ADMIN — Medication 1: at 09:03

## 2019-08-08 RX ADMIN — Medication 3 MILLIGRAM(S): at 22:18

## 2019-08-08 RX ADMIN — AMLODIPINE BESYLATE 5 MILLIGRAM(S): 2.5 TABLET ORAL at 21:17

## 2019-08-08 RX ADMIN — Medication 3: at 17:39

## 2019-08-08 RX ADMIN — Medication 2: at 21:25

## 2019-08-08 RX ADMIN — Medication 0.12 MILLIGRAM(S): at 05:54

## 2019-08-08 RX ADMIN — Medication 88 MICROGRAM(S): at 12:42

## 2019-08-08 RX ADMIN — HEPARIN SODIUM 5000 UNIT(S): 5000 INJECTION INTRAVENOUS; SUBCUTANEOUS at 05:55

## 2019-08-08 RX ADMIN — Medication 50 MILLIGRAM(S): at 13:12

## 2019-08-08 RX ADMIN — VALSARTAN 320 MILLIGRAM(S): 80 TABLET ORAL at 05:54

## 2019-08-08 RX ADMIN — SODIUM CHLORIDE 3 MILLILITER(S): 9 INJECTION INTRAMUSCULAR; INTRAVENOUS; SUBCUTANEOUS at 13:13

## 2019-08-08 RX ADMIN — DONEPEZIL HYDROCHLORIDE 10 MILLIGRAM(S): 10 TABLET, FILM COATED ORAL at 21:18

## 2019-08-08 NOTE — PROGRESS NOTE ADULT - ASSESSMENT
Assessment  DMT2: 83y Female with DM T2 with hyperglycemia on insulin, blood sugars running high, had no hypoglycemic episode,  eating meals,    -199 ntoday  CAD: On medications, stable, monitored.  Hypothyroidism:  TSH : 0.37 On synthroid 100  mcg po daily, asymptomatic.  HTN: Controlled, On med.  HLD; on statin  CKD: stage 3 Monitor labs/BMP,         Plan:     DMT2: .  Change Lantus to  12  units qhs, Start  Humalog  3   units before each meal in addition to correction scale coverage, monitor FS will FU  Patient counseled for compliance with consistent low carb diet  CAD: Continue treatment, monitoring FU by medical team  Hypothyroidism: Reduce  synthroid 88 mcg po daily,   HTN: Continue treatment, monitoring, Primary team FU  HLD; continue statin  CKD: Continue monitoring labs, renal FU  Thank you for consult  Case discussed with CATHERINE Weller MD  [phone]

## 2019-08-08 NOTE — PROGRESS NOTE ADULT - PROBLEM SELECTOR PLAN 1
Lantus to  15  units qhs, Start  Humalog  4   units before each meal in addition to correction scale coverage, monitor FS will FU  Patient counseled for compliance with consistent low carb diet

## 2019-08-08 NOTE — PROGRESS NOTE ADULT - ASSESSMENT
84 y/o female with history of CAD s/p stents 2013, hypothyroidism, HLD, HTN, HLD, dementia (baseline AOx1) but will follow commands presents to the ED via family members for worsening urinary frequency as well as more frequent falls over the last 2 weeks. Found in the ED to have a positive UA, acute kidney injury,  hypercalcemia       ·  Problem: Acute cystitis without hematuria.    was on macrobid   id eval noted  s/p iv abs     urinary retention  uro eval noted  failed tov  likely will need coelho as outpt    bladder mass  urology aware and state outpt f/u        ·  Problem: Frequent falls.  CT unremarkable   cards eval noted  -CTH with no bleed, no seizure history   -PT/OT       ·  Problem: Weakness.    possibly metabolic/infectious related to UTI.       ·  Problem: Hypertension,improved overall  on mult meds  renal eval noted          ·  Problem: Coronary artery disease involving native heart,  hx stents in the past and currently on aspirin  -cards eval noted     Problem: Acute kidney injury.  creat improved      dm  c/w meds   fsg riss/LANTUS/HUMALOG  endo eval noted    discussed w/ family at bedside  d/c planning /w services

## 2019-08-08 NOTE — PROGRESS NOTE ADULT - ATTENDING COMMENTS
Renal attd    Goal BP is 130-150.  If there are any other low readings will reduce the Hydralazine to25mg po tid

## 2019-08-09 VITALS
RESPIRATION RATE: 18 BRPM | DIASTOLIC BLOOD PRESSURE: 65 MMHG | OXYGEN SATURATION: 100 % | SYSTOLIC BLOOD PRESSURE: 137 MMHG | TEMPERATURE: 98 F | HEART RATE: 84 BPM

## 2019-08-09 LAB
ANION GAP SERPL CALC-SCNC: 13 MMO/L — SIGNIFICANT CHANGE UP (ref 7–14)
BUN SERPL-MCNC: 34 MG/DL — HIGH (ref 7–23)
CALCIUM SERPL-MCNC: 10.6 MG/DL — HIGH (ref 8.4–10.5)
CHLORIDE SERPL-SCNC: 96 MMOL/L — LOW (ref 98–107)
CO2 SERPL-SCNC: 24 MMOL/L — SIGNIFICANT CHANGE UP (ref 22–31)
CREAT SERPL-MCNC: 1.51 MG/DL — HIGH (ref 0.5–1.3)
GLUCOSE BLDC GLUCOMTR-MCNC: 121 MG/DL — HIGH (ref 70–99)
GLUCOSE BLDC GLUCOMTR-MCNC: 159 MG/DL — HIGH (ref 70–99)
GLUCOSE SERPL-MCNC: 198 MG/DL — HIGH (ref 70–99)
HCT VFR BLD CALC: 34.4 % — LOW (ref 34.5–45)
HGB BLD-MCNC: 11.3 G/DL — LOW (ref 11.5–15.5)
MCHC RBC-ENTMCNC: 30.9 PG — SIGNIFICANT CHANGE UP (ref 27–34)
MCHC RBC-ENTMCNC: 32.8 % — SIGNIFICANT CHANGE UP (ref 32–36)
MCV RBC AUTO: 94 FL — SIGNIFICANT CHANGE UP (ref 80–100)
NRBC # FLD: 0 K/UL — SIGNIFICANT CHANGE UP (ref 0–0)
PLATELET # BLD AUTO: 211 K/UL — SIGNIFICANT CHANGE UP (ref 150–400)
PMV BLD: SIGNIFICANT CHANGE UP FL (ref 7–13)
POTASSIUM SERPL-MCNC: 4.5 MMOL/L — SIGNIFICANT CHANGE UP (ref 3.5–5.3)
POTASSIUM SERPL-SCNC: 4.5 MMOL/L — SIGNIFICANT CHANGE UP (ref 3.5–5.3)
RBC # BLD: 3.66 M/UL — LOW (ref 3.8–5.2)
RBC # FLD: 11.8 % — SIGNIFICANT CHANGE UP (ref 10.3–14.5)
SODIUM SERPL-SCNC: 133 MMOL/L — LOW (ref 135–145)
WBC # BLD: 7.38 K/UL — SIGNIFICANT CHANGE UP (ref 3.8–10.5)
WBC # FLD AUTO: 7.38 K/UL — SIGNIFICANT CHANGE UP (ref 3.8–10.5)

## 2019-08-09 RX ORDER — DONEPEZIL HYDROCHLORIDE 10 MG/1
1 TABLET, FILM COATED ORAL
Qty: 30 | Refills: 0
Start: 2019-08-09 | End: 2019-09-07

## 2019-08-09 RX ORDER — OXYBUTYNIN CHLORIDE 5 MG
1 TABLET ORAL
Qty: 0 | Refills: 0 | DISCHARGE

## 2019-08-09 RX ORDER — FUROSEMIDE 40 MG
1 TABLET ORAL
Qty: 30 | Refills: 0
Start: 2019-08-09 | End: 2019-09-07

## 2019-08-09 RX ORDER — DIGOXIN 250 MCG
1 TABLET ORAL
Qty: 30 | Refills: 0
Start: 2019-08-09 | End: 2019-09-07

## 2019-08-09 RX ORDER — REPAGLINIDE 1 MG/1
1 TABLET ORAL
Qty: 90 | Refills: 0
Start: 2019-08-09 | End: 2019-09-07

## 2019-08-09 RX ORDER — ATORVASTATIN CALCIUM 80 MG/1
1 TABLET, FILM COATED ORAL
Qty: 0 | Refills: 0 | DISCHARGE

## 2019-08-09 RX ORDER — SITAGLIPTIN AND METFORMIN HYDROCHLORIDE 500; 50 MG/1; MG/1
1 TABLET, FILM COATED ORAL
Qty: 0 | Refills: 0 | DISCHARGE

## 2019-08-09 RX ORDER — MEMANTINE HYDROCHLORIDE 10 MG/1
1 TABLET ORAL
Qty: 30 | Refills: 0
Start: 2019-08-09 | End: 2019-09-07

## 2019-08-09 RX ORDER — ASPIRIN/CALCIUM CARB/MAGNESIUM 324 MG
1 TABLET ORAL
Qty: 0 | Refills: 0 | DISCHARGE

## 2019-08-09 RX ORDER — ERGOCALCIFEROL 1.25 MG/1
1 CAPSULE ORAL
Qty: 0 | Refills: 0 | DISCHARGE

## 2019-08-09 RX ORDER — ASPIRIN/CALCIUM CARB/MAGNESIUM 324 MG
1 TABLET ORAL
Qty: 30 | Refills: 0
Start: 2019-08-09 | End: 2019-09-07

## 2019-08-09 RX ORDER — HYDRALAZINE HCL 50 MG
1 TABLET ORAL
Qty: 90 | Refills: 0
Start: 2019-08-09 | End: 2019-09-07

## 2019-08-09 RX ORDER — AMLODIPINE BESYLATE 2.5 MG/1
1 TABLET ORAL
Qty: 30 | Refills: 0
Start: 2019-08-09 | End: 2019-09-07

## 2019-08-09 RX ORDER — FOLIC ACID 0.8 MG
1 TABLET ORAL
Qty: 30 | Refills: 0
Start: 2019-08-09 | End: 2019-09-07

## 2019-08-09 RX ORDER — DONEPEZIL HYDROCHLORIDE 10 MG/1
1 TABLET, FILM COATED ORAL
Qty: 0 | Refills: 0 | DISCHARGE

## 2019-08-09 RX ORDER — FOLIC ACID 0.8 MG
1 TABLET ORAL
Qty: 0 | Refills: 0 | DISCHARGE

## 2019-08-09 RX ORDER — LEVOTHYROXINE SODIUM 125 MCG
1 TABLET ORAL
Qty: 0 | Refills: 0 | DISCHARGE
Start: 2019-08-09

## 2019-08-09 RX ORDER — METOPROLOL TARTRATE 50 MG
1 TABLET ORAL
Qty: 30 | Refills: 0
Start: 2019-08-09 | End: 2019-09-07

## 2019-08-09 RX ORDER — ATORVASTATIN CALCIUM 80 MG/1
1 TABLET, FILM COATED ORAL
Qty: 30 | Refills: 0
Start: 2019-08-09 | End: 2019-09-07

## 2019-08-09 RX ORDER — MEMANTINE HYDROCHLORIDE 10 MG/1
1 TABLET ORAL
Qty: 0 | Refills: 0 | DISCHARGE

## 2019-08-09 RX ORDER — LEVOTHYROXINE SODIUM 125 MCG
1 TABLET ORAL
Qty: 30 | Refills: 0
Start: 2019-08-09 | End: 2019-09-07

## 2019-08-09 RX ORDER — ERGOCALCIFEROL 1.25 MG/1
1 CAPSULE ORAL
Qty: 4 | Refills: 0
Start: 2019-08-09 | End: 2019-09-07

## 2019-08-09 RX ORDER — DIGOXIN 250 MCG
1 TABLET ORAL
Qty: 0 | Refills: 0 | DISCHARGE

## 2019-08-09 RX ORDER — INSULIN GLARGINE 100 [IU]/ML
15 INJECTION, SOLUTION SUBCUTANEOUS
Qty: 1 | Refills: 0
Start: 2019-08-09 | End: 2019-09-07

## 2019-08-09 RX ORDER — FENOFIBRATE,MICRONIZED 130 MG
1 CAPSULE ORAL
Qty: 0 | Refills: 0 | DISCHARGE

## 2019-08-09 RX ORDER — FUROSEMIDE 40 MG
1 TABLET ORAL
Qty: 0 | Refills: 0 | DISCHARGE

## 2019-08-09 RX ORDER — REPAGLINIDE 1 MG/1
1 TABLET ORAL
Qty: 0 | Refills: 0 | DISCHARGE
Start: 2019-08-09 | End: 2019-09-07

## 2019-08-09 RX ORDER — REPAGLINIDE 1 MG/1
1 TABLET ORAL
Qty: 0 | Refills: 0 | DISCHARGE

## 2019-08-09 RX ORDER — VALSARTAN 80 MG/1
1 TABLET ORAL
Qty: 30 | Refills: 0
Start: 2019-08-09 | End: 2019-09-07

## 2019-08-09 RX ORDER — INSULIN GLARGINE 100 [IU]/ML
15 INJECTION, SOLUTION SUBCUTANEOUS
Qty: 0 | Refills: 0 | DISCHARGE
Start: 2019-08-09

## 2019-08-09 RX ORDER — LEVOTHYROXINE SODIUM 125 MCG
1 TABLET ORAL
Qty: 0 | Refills: 0 | DISCHARGE

## 2019-08-09 RX ADMIN — Medication 50 MILLIGRAM(S): at 05:13

## 2019-08-09 RX ADMIN — Medication 0.12 MILLIGRAM(S): at 05:12

## 2019-08-09 RX ADMIN — Medication 100 MILLIGRAM(S): at 05:13

## 2019-08-09 RX ADMIN — Medication 88 MICROGRAM(S): at 05:22

## 2019-08-09 RX ADMIN — VALSARTAN 320 MILLIGRAM(S): 80 TABLET ORAL at 05:12

## 2019-08-09 RX ADMIN — Medication 20 MILLIGRAM(S): at 05:12

## 2019-08-09 RX ADMIN — Medication 1 MILLIGRAM(S): at 12:57

## 2019-08-09 RX ADMIN — Medication 3 UNIT(S): at 12:56

## 2019-08-09 RX ADMIN — HEPARIN SODIUM 5000 UNIT(S): 5000 INJECTION INTRAVENOUS; SUBCUTANEOUS at 05:12

## 2019-08-09 RX ADMIN — SODIUM CHLORIDE 3 MILLILITER(S): 9 INJECTION INTRAMUSCULAR; INTRAVENOUS; SUBCUTANEOUS at 12:59

## 2019-08-09 RX ADMIN — Medication 3 UNIT(S): at 08:58

## 2019-08-09 RX ADMIN — Medication 50 MILLIGRAM(S): at 13:00

## 2019-08-09 RX ADMIN — Medication 2: at 08:58

## 2019-08-09 RX ADMIN — SODIUM CHLORIDE 3 MILLILITER(S): 9 INJECTION INTRAMUSCULAR; INTRAVENOUS; SUBCUTANEOUS at 05:16

## 2019-08-09 RX ADMIN — Medication 81 MILLIGRAM(S): at 12:56

## 2019-08-09 NOTE — PROGRESS NOTE ADULT - ASSESSMENT
Assessment  DMT2: 83y Female with DM T2 with hyperglycemia on insulin, blood sugars running high, had no hypoglycemic episode,  eating meals,     ntoday  CAD: On medications, stable, monitored.  Hypothyroidism:   now On synthroid 88  mcg po daily, asymptomatic.  HTN: Controlled, On med.  HLD; on statin  CKD: stage 3 Monitor labs/BMP,         Plan:     DMT2: .  Change Lantus to  18  units qhs, and   Humalog  7   units before each meal in addition to correction scale coverage, monitor FS will FU  Patient counseled for compliance with consistent low carb diet  CAD: Continue treatment, monitoring FU by medical team  Hypothyroidism: Reduce  synthroid 88 mcg po daily,   HTN: Continue treatment, monitoring, Primary team FU  HLD; continue statin  CKD: Continue monitoring labs, renal FU  Thank you for consult  Case discussed with CATHERINE Weller MD  [phone] Assessment  DMT2: 83y Female with DM T2 with hyperglycemia on insulin, blood sugars running high, had no hypoglycemic episode,  eating meals,     ntoday  CAD: On medications, stable, monitored.  Hypothyroidism:   now On synthroid 88  mcg po daily, asymptomatic.  HTN: Controlled, On med.  HLD; on statin  CKD: stage 3 Monitor labs/BMP,         Plan:     DMT2: .  Change Lantus to  18  units qhs, and   Humalog  7   units before each meal in addition to correction scale coverage, monitor FS will FU  Patient counseled for compliance with consistent low carb diet  Plan for D/C : Lantus 15 unit Qhs, and Prandin 1 mg po pre-each meal  CAD: Continue treatment, monitoring FU by medical team  Hypothyroidism: Reduce  synthroid 88 mcg po daily,   HTN: Continue treatment, monitoring, Primary team FU  HLD; continue statin  CKD: Continue monitoring labs, renal FU  Thank you for consult  Case discussed with CATHERINE Weller MD  [phone]

## 2019-08-09 NOTE — DIETITIAN INITIAL EVALUATION ADULT. - PERTINENT LABORATORY DATA
08-09 Na133 mmol/L<L> Glu 198 mg/dL<H> K+ 4.5 mmol/L Cr  1.51 mg/dL<H> BUN 34 mg/dL<H> 08-01 [id number] 6.6 %<H>

## 2019-08-09 NOTE — DIETITIAN INITIAL EVALUATION ADULT. - OTHER INFO
Patient is a 83 year old female with a past medical history inclusive of CAD s/p stents 2013, hypothyroidism, HLD, HTN, dementia admitted with UTI, acute kidney injury,  and hypercalcemia. No reports of nausea/vomiting/diarrhea/constipation. Pt is on a Dysphagia 3, Soft, Honey Consistency diet per speech and swallow evaluation 8/7.  Per son at bedside, patient had been with poor PO intake for the past several months, which has worsened since her admission to the hospital. He is not able to state her usual wt, but suspects weight loss and states that she looks thinner. Conflicting admission weights noted  48.1kgs vs 51.1kgs. Pt followed by endocrine 2/2 uncontrolled DM, HbA1c noted to be 6.6%, patient was on metformin prior to admission.  Diet education is not feasible 2/2 cognition, RDN spoke with son and provided diet education regarding recommended diets ( dysphagia and Consistent Carbohydrate.)  Honey thickener provided as patient with plans to be d/c'ed home today.  Glucerna Shakes also recommended ( thickened to Honey Consistency)

## 2019-08-09 NOTE — DIETITIAN INITIAL EVALUATION ADULT. - PHYSICAL APPEARANCE
other (specify) Nutrition focused physical exam conducted - found signs of malnutrition [ ]absent [ ]present   Subcutaneous fat loss: [Severe ] Orbital fat pads region, [ Severe ]Buccal fat region, [ ]Triceps region,  [Severe ]Ribs region      Muscle wasting: [Severe  ]Temples region, [Severe  ]Clavicle region, [ Severe ]Shoulder region, [Severe] Scapula region

## 2019-08-09 NOTE — DIETITIAN INITIAL EVALUATION ADULT. - PERTINENT MEDS FT
MEDICATIONS  (STANDING):  amLODIPine   Tablet 5 milliGRAM(s) Oral at bedtime  aspirin  chewable 81 milliGRAM(s) Oral daily  atorvastatin 10 milliGRAM(s) Oral at bedtime  dextrose 5%. 1000 milliLiter(s) (50 mL/Hr) IV Continuous <Continuous>  dextrose 50% Injectable 12.5 Gram(s) IV Push once  dextrose 50% Injectable 25 Gram(s) IV Push once  dextrose 50% Injectable 25 Gram(s) IV Push once  digoxin     Tablet 0.125 milliGRAM(s) Oral daily  donepezil 10 milliGRAM(s) Oral at bedtime  folic acid 1 milliGRAM(s) Oral daily  furosemide    Tablet 20 milliGRAM(s) Oral daily  heparin  Injectable 5000 Unit(s) SubCutaneous every 12 hours  hydrALAZINE 50 milliGRAM(s) Oral three times a day  insulin glargine Injectable (LANTUS) 12 Unit(s) SubCutaneous at bedtime  insulin lispro (HumaLOG) corrective regimen sliding scale   SubCutaneous three times a day before meals  insulin lispro (HumaLOG) corrective regimen sliding scale   SubCutaneous at bedtime  insulin lispro Injectable (HumaLOG) 3 Unit(s) SubCutaneous three times a day before meals  levothyroxine 88 MICROGram(s) Oral daily  melatonin 3 milliGRAM(s) Oral at bedtime  memantine 5 milliGRAM(s) Oral at bedtime  metoprolol succinate  milliGRAM(s) Oral daily  sodium chloride 0.9% lock flush 3 milliLiter(s) IV Push every 8 hours  valsartan 320 milliGRAM(s) Oral daily

## 2019-08-09 NOTE — PROGRESS NOTE ADULT - SUBJECTIVE AND OBJECTIVE BOX
CHIEF COMPLAINT:Patient is a 83y old  Female who presents with a chief complaint of FTT (08 Aug 2019 14:21)    	        PAST MEDICAL & SURGICAL HISTORY:  Hypothyroid  HLD (hyperlipidemia)  HTN (hypertension)  DM (diabetes mellitus)  CAD (coronary artery disease)  MI (myocardial infarction)  Stented coronary artery          REVIEW OF SYSTEMS:  CONSTITUTIONAL: feels better overall  EYES: No eye pain, visual disturbances, or discharge  NECK: No pain or stiffness  RESPIRATORY: No cough, wheezing, chills or hemoptysis; No Shortness of Breath  CARDIOVASCULAR: No chest pain, palpitations, passing out, dizziness,   GASTROINTESTINAL: No abdominal or epigastric pain. No nausea, vomiting, or hematemesis; No diarrhea or constipation. No melena or hematochezia.  GENITOURINARYfoley in place for retention  NEUROLOGICAL: No headaches,    Medications:  MEDICATIONS  (STANDING):  amLODIPine   Tablet 5 milliGRAM(s) Oral at bedtime  aspirin  chewable 81 milliGRAM(s) Oral daily  atorvastatin 10 milliGRAM(s) Oral at bedtime  dextrose 5%. 1000 milliLiter(s) (50 mL/Hr) IV Continuous <Continuous>  dextrose 50% Injectable 12.5 Gram(s) IV Push once  dextrose 50% Injectable 25 Gram(s) IV Push once  dextrose 50% Injectable 25 Gram(s) IV Push once  digoxin     Tablet 0.125 milliGRAM(s) Oral daily  donepezil 10 milliGRAM(s) Oral at bedtime  folic acid 1 milliGRAM(s) Oral daily  furosemide    Tablet 20 milliGRAM(s) Oral daily  heparin  Injectable 5000 Unit(s) SubCutaneous every 12 hours  hydrALAZINE 50 milliGRAM(s) Oral three times a day  insulin glargine Injectable (LANTUS) 12 Unit(s) SubCutaneous at bedtime  insulin lispro (HumaLOG) corrective regimen sliding scale   SubCutaneous three times a day before meals  insulin lispro (HumaLOG) corrective regimen sliding scale   SubCutaneous at bedtime  insulin lispro Injectable (HumaLOG) 3 Unit(s) SubCutaneous three times a day before meals  levothyroxine 88 MICROGram(s) Oral daily  melatonin 3 milliGRAM(s) Oral at bedtime  memantine 5 milliGRAM(s) Oral at bedtime  metoprolol succinate  milliGRAM(s) Oral daily  sodium chloride 0.9% lock flush 3 milliLiter(s) IV Push every 8 hours  valsartan 320 milliGRAM(s) Oral daily    MEDICATIONS  (PRN):  dextrose 40% Gel 15 Gram(s) Oral once PRN Blood Glucose LESS THAN 70 milliGRAM(s)/deciliter  glucagon  Injectable 1 milliGRAM(s) IntraMuscular once PRN Glucose LESS THAN 70 milligrams/deciliter  sodium chloride 0.65% Nasal 1 Spray(s) Both Nostrils four times a day PRN Nasal Congestion    	    PHYSICAL EXAM:  T(C): 36.5 (08-09-19 @ 04:50), Max: 36.9 (08-08-19 @ 20:59)  HR: 72 (08-09-19 @ 04:50) (60 - 81)  BP: 161/59 (08-09-19 @ 04:50) (125/51 - 161/59)  RR: 20 (08-09-19 @ 04:50) (16 - 20)  SpO2: 100% (08-09-19 @ 04:50) (100% - 100%)  Wt(kg): --  I&O's Summary    08 Aug 2019 07:01  -  09 Aug 2019 07:00  --------------------------------------------------------  IN: 700 mL / OUT: 900 mL / NET: -200 mL        Appearance: Normal	  HEENT:   Normal oral mucosa, PERRL, EOMI	  Lymphatic: No lymphadenopathy  Cardiovascular: Normal S1 S2, No JVD,   Respiratory: Lungs clear to auscultation	  Gastrointestinal:  Soft, Non-tender, + BS	  Skin: No rashes, No ecchymoses, No cyanosis	  Neurologic: Non-focal  Extremities: dec rom    TELEMETRY: 	    ECG:  	  RADIOLOGY:  OTHER: 	  	  LABS:	 	    CARDIAC MARKERS:                                11.3   7.38  )-----------( 211      ( 09 Aug 2019 07:52 )             34.4     08-09    133<L>  |  96<L>  |  34<H>  ----------------------------<  198<H>  4.5   |  24  |  1.51<H>    Ca    10.6<H>      09 Aug 2019 07:52      proBNP:   Lipid Profile:   HgA1c:   TSH:
CHIEF COMPLAINT:Patient is a 83y old  Female who presents with a chief complaint of chest pain (02 Aug 2019 13:39)    	        PAST MEDICAL & SURGICAL HISTORY:  Hypothyroid  HLD (hyperlipidemia)  HTN (hypertension)  DM (diabetes mellitus)  CAD (coronary artery disease)  MI (myocardial infarction)  Stented coronary artery          REVIEW OF SYSTEMS:  pt comfortable   no cp or sob  no vomiting  no abd pain   no chills     Medications:  MEDICATIONS  (STANDING):  aspirin  chewable 81 milliGRAM(s) Oral daily  atorvastatin 10 milliGRAM(s) Oral at bedtime  dextrose 5%. 1000 milliLiter(s) (50 mL/Hr) IV Continuous <Continuous>  dextrose 50% Injectable 12.5 Gram(s) IV Push once  dextrose 50% Injectable 25 Gram(s) IV Push once  dextrose 50% Injectable 25 Gram(s) IV Push once  digoxin     Tablet 0.125 milliGRAM(s) Oral daily  donepezil 10 milliGRAM(s) Oral at bedtime  folic acid 1 milliGRAM(s) Oral daily  furosemide    Tablet 20 milliGRAM(s) Oral daily  heparin  Injectable 5000 Unit(s) SubCutaneous every 12 hours  insulin lispro (HumaLOG) corrective regimen sliding scale   SubCutaneous three times a day before meals  levothyroxine 100 MICROGram(s) Oral daily  losartan 50 milliGRAM(s) Oral daily  memantine 5 milliGRAM(s) Oral at bedtime  metoprolol succinate ER 50 milliGRAM(s) Oral daily  oxybutynin 5 milliGRAM(s) Oral daily    MEDICATIONS  (PRN):  dextrose 40% Gel 15 Gram(s) Oral once PRN Blood Glucose LESS THAN 70 milliGRAM(s)/deciliter  glucagon  Injectable 1 milliGRAM(s) IntraMuscular once PRN Glucose LESS THAN 70 milligrams/deciliter  hydrALAZINE Injectable 10 milliGRAM(s) IV Push every 6 hours PRN systolic bp>170    	    PHYSICAL EXAM:  T(C): 36.7 (08-03-19 @ 05:32), Max: 36.8 (08-02-19 @ 13:15)  HR: 82 (08-03-19 @ 05:32) (58 - 82)  BP: 175/90 (08-03-19 @ 05:32) (150/78 - 186/83)  RR: 17 (08-03-19 @ 05:32) (16 - 17)  SpO2: 100% (08-03-19 @ 05:32) (100% - 100%)  Wt(kg): --  I&O's Summary      Appearance: Normal	  HEENT:   Normal oral mucosa, 	  Lymphatic: No lymphadenopathy  Cardiovascular: Normal S1 S2, No JVD,   Respiratory: Lungs clear to auscultation	  Gastrointestinal:  Soft, Non-tender, + BS	  Skin: No rashes, No ecchymoses, No cyanosis	  Neurologic: Non-focal  Extremities: Normal range of motion, No clubbing, cyanosis  Vascular: Peripheral pulses palpable 2+ bilaterally    TELEMETRY: 	    ECG:  	  RADIOLOGY:  OTHER: 	  	  LABS:	 	    CARDIAC MARKERS:                                10.3   5.46  )-----------( 265      ( 03 Aug 2019 08:09 )             32.6     08-03    137  |  100  |  16  ----------------------------<  227<H>  4.3   |  25  |  1.33<H>    Ca    10.4      03 Aug 2019 08:09  Mg     1.8     08-03      proBNP:   Lipid Profile:   HgA1c:   TSH:
CHIEF COMPLAINT:Patient is a 83y old  Female who presents with a chief complaint of uti (05 Aug 2019 11:27)    	        PAST MEDICAL & SURGICAL HISTORY:  Hypothyroid  HLD (hyperlipidemia)  HTN (hypertension)  DM (diabetes mellitus)  CAD (coronary artery disease)  MI (myocardial infarction)  Stented coronary artery          REVIEW OF SYSTEMS:  CONSTITUTIONAL: No fever, weight loss, or fatigue  EYES: No eye pain, visual disturbances, or discharge  NECK: No pain or stiffness  RESPIRATORY: No cough, wheezing, chills or hemoptysis; No Shortness of Breath  CARDIOVASCULAR: No chest pain, palpitations, passing out, dizziness, or leg swelling  GASTROINTESTINAL: No abdominal or epigastric pain. No nausea, vomiting, or hematemesis; No diarrhea or constipation. No melena or hematochezia.  GENITOURINARY: urinary retention    NEUROLOGICAL: No headaches,     Medications:  MEDICATIONS  (STANDING):  aspirin  chewable 81 milliGRAM(s) Oral daily  atorvastatin 10 milliGRAM(s) Oral at bedtime  dextrose 5%. 1000 milliLiter(s) (50 mL/Hr) IV Continuous <Continuous>  dextrose 50% Injectable 12.5 Gram(s) IV Push once  dextrose 50% Injectable 25 Gram(s) IV Push once  dextrose 50% Injectable 25 Gram(s) IV Push once  digoxin     Tablet 0.125 milliGRAM(s) Oral daily  donepezil 10 milliGRAM(s) Oral at bedtime  folic acid 1 milliGRAM(s) Oral daily  furosemide    Tablet 20 milliGRAM(s) Oral daily  heparin  Injectable 5000 Unit(s) SubCutaneous every 12 hours  hydrALAZINE 50 milliGRAM(s) Oral three times a day  insulin glargine Injectable (LANTUS) 8 Unit(s) SubCutaneous at bedtime  insulin lispro (HumaLOG) corrective regimen sliding scale   SubCutaneous three times a day before meals  levothyroxine 100 MICROGram(s) Oral daily  losartan 100 milliGRAM(s) Oral daily  melatonin 3 milliGRAM(s) Oral at bedtime  memantine 5 milliGRAM(s) Oral at bedtime  metoprolol succinate  milliGRAM(s) Oral daily    MEDICATIONS  (PRN):  dextrose 40% Gel 15 Gram(s) Oral once PRN Blood Glucose LESS THAN 70 milliGRAM(s)/deciliter  glucagon  Injectable 1 milliGRAM(s) IntraMuscular once PRN Glucose LESS THAN 70 milligrams/deciliter    	    PHYSICAL EXAM:  T(C): 36.7 (08-06-19 @ 09:25), Max: 36.7 (08-05-19 @ 15:05)  HR: 64 (08-06-19 @ 09:25) (52 - 80)  BP: 180/65 (08-06-19 @ 09:25) (119/50 - 199/72)  RR: 16 (08-06-19 @ 09:25) (16 - 17)  SpO2: 100% (08-06-19 @ 09:25) (99% - 100%)  Wt(kg): --  I&O's Summary    05 Aug 2019 07:01  -  06 Aug 2019 07:00  --------------------------------------------------------  IN: 400 mL / OUT: 1400 mL / NET: -1000 mL    06 Aug 2019 07:01  -  06 Aug 2019 11:09  --------------------------------------------------------  IN: 0 mL / OUT: 1300 mL / NET: -1300 mL        Appearance: Normal	  HEENT:   Normal oral mucosa, PERRL, EOMI	  Lymphatic: No lymphadenopathy  Cardiovascular: Normal S1 S2, No JVD,  Respiratory: Lungs clear to auscultation	    Gastrointestinal:  Soft, Non-tender, + BS	  Skin: No rashes, No ecchymoses, No cyanosis	  Neurologic: Non-focal  Extremities: dec rom  Vascular: Peripheral pulses palpable 2+ bilaterally    TELEMETRY: 	    ECG:  	  RADIOLOGY:  OTHER: 	  	  LABS:	 	    CARDIAC MARKERS:                                11.0   6.99  )-----------( 258      ( 06 Aug 2019 06:50 )             35.1     08-06    135  |  100  |  22  ----------------------------<  162<H>  4.4   |  23  |  1.36<H>    Ca    10.2      06 Aug 2019 06:50  Mg     1.9     08-06      proBNP:   Lipid Profile:   HgA1c:   TSH:
Patient is a 83y old  Female who presents with a chief complaint of fall and dysuria    Interval History/ROS:  no fever.  off antibiotics.  external urine catheter.  some abdominal discomfort.  no dysuria.  son at bedside.  Remainder of ROS otherwise negative.    PAST MEDICAL & SURGICAL HISTORY:  Hypothyroid  HLD (hyperlipidemia)  HTN (hypertension)  DM (diabetes mellitus)  CAD (coronary artery disease)  MI (myocardial infarction)  Stented coronary artery    Allergies  No Known Allergies    ANTIMICROBIALS:  cefTRIAXone   IVPB (-3)    MEDICATIONS  (STANDING):  aspirin  chewable 81 daily  atorvastatin 10 at bedtime  digoxin     Tablet 0.125 daily  donepezil 10 at bedtime  furosemide    Tablet 20 daily  heparin  Injectable 5000 every 12 hours  hydrALAZINE 25 three times a day  insulin glargine Injectable (LANTUS) 8 at bedtime  insulin lispro (HumaLOG) corrective regimen sliding scale  three times a day before meals  levothyroxine 100 daily  losartan 100 daily  memantine 5 at bedtime  metoprolol succinate ER 50 daily  oxybutynin 5 daily  PRN  dextrose 40% Gel 15 Gram(s) Oral once PRN  glucagon  Injectable 1 milliGRAM(s) IntraMuscular once PRN    Vital Signs Last 24 Hrs  T(F): 98.4 (19 @ 06:20), Max: 98.4 (19 @ 06:20)  HR: 78 (19 @ 06:20)  BP: 151/61 (19 @ 06:20)  RR: 17 (19 @ 06:20)  SpO2: 98% (19 @ 06:20) (98% - 100%)    PHYSICAL EXAM:  General: non-toxic  HEAD/EYES: anicteric  ENT:  supple  Cardiovascular:   S1, S2  Respiratory:  clear bilaterally  GI:  soft, palpable bladder noted on exam  : external urinary device with dark but clear urine  Musculoskeletal:  no synovitis  Neurologic:  grossly non-focal  Skin:  no rash  Psychiatric:  appropriate affect  Vascular:  no phlebitis                                 11.1   6.24  )-----------( 231      ( 05 Aug 2019 09:30 )             35.0     133  |  98  |  21  ----------------------------<  258  4.0   |  22  |  1.23  Ca    9.7      05 Aug 2019 09:30Mg     1.9     08-05    Urinalysis Basic - ( 2019 22:00 )  Color: LIGHT YELLOW / Appearance: Lt TURBID / S.012 / pH: 7.0  Gluc: NEGATIVE / Ketone: NEGATIVE  / Bili: NEGATIVE / Urobili: NORMAL   Blood: NEGATIVE / Protein: 30 / Nitrite: POSITIVE   Leuk Esterase: MODERATE / RBC: 3-5 / WBC 26-50   Sq Epi: OCC / Non Sq Epi: x / Bacteria: FEW    MICROBIOLOGY:  Culture - Urine (19 @ 22:41)    -  Amikacin: S <=8 TIM    -  Ampicillin: R >16 TIM    -  Ampicillin/Sulbactam: R >16/8 TIM    -  Aztreonam: S <=4 TIM    -  Cefazolin: S <=16 TIM    -  Cefepime: S <=2 TIM    -  Cefoxitin: S <=4 TIM    -  Ceftazidime: S <=1 TIM    -  Ceftriaxone: S <=1 TIM    -  Ciprofloxacin: R >2 TIM    -  Ertapenem: S <=0.5 TIM    -  Gentamicin: S <=1 TIM    -  Imipenem: S <=1 TIM    -  Levofloxacin: R >4 TIM    -  Meropenem: S <=1 TIM    -  Piperacillin/Tazobactam: S <=8 TIM    -  Tigecycline: S <=1 TIM    -  Nitrofurantoin: S <=32 TIM    -  Tobramycin: S <=2 TIM    -  Trimethoprim/Sulfamethoxazole: R >2/38 TIM    Culture - Urine:   COLONY COUNT: > = 100,000 CFU/ML    Specimen Source: URINE MIDSTREAM    Organism Identification: Escherichia coli    RADIOLOGY:  imaging below personally reviewed    CT Maxillofacial No Cont (19 @ 19:59) >  IMPRESSION:  CT brain: No acute findings.  CT cervical spine: No acute findings.  CT maxillofacial bones: No acute findings.
CHIEF COMPLAINT:Patient is a 83y old  Female who presents with a chief complaint of Failure to thrive (06 Aug 2019 15:31)    	        PAST MEDICAL & SURGICAL HISTORY:  Hypothyroid  HLD (hyperlipidemia)  HTN (hypertension)  DM (diabetes mellitus)  CAD (coronary artery disease)  MI (myocardial infarction)  Stented coronary artery          REVIEW OF SYSTEMS:  CONSTITUTIONAL: weak   EYES: No eye pain, visual disturbances, or discharge  NECK: No pain or stiffness  RESPIRATORY: No cough, wheezing, chills or hemoptysis; No Shortness of Breath  CARDIOVASCULAR: No chest pain, palpitations, passing out, dizziness, or leg swelling  GASTROINTESTINAL: No abdominal or epigastric pain. No nausea, vomiting, or hematemesis; No diarrhea or constipation. No melena or hematochezia.  GENITOURINARY: coelho in place  NEUROLOGICAL: No headaches,     Medications:  MEDICATIONS  (STANDING):  amLODIPine   Tablet 5 milliGRAM(s) Oral at bedtime  aspirin  chewable 81 milliGRAM(s) Oral daily  atorvastatin 10 milliGRAM(s) Oral at bedtime  dextrose 5%. 1000 milliLiter(s) (50 mL/Hr) IV Continuous <Continuous>  dextrose 50% Injectable 12.5 Gram(s) IV Push once  dextrose 50% Injectable 25 Gram(s) IV Push once  dextrose 50% Injectable 25 Gram(s) IV Push once  digoxin     Tablet 0.125 milliGRAM(s) Oral daily  donepezil 10 milliGRAM(s) Oral at bedtime  folic acid 1 milliGRAM(s) Oral daily  furosemide    Tablet 20 milliGRAM(s) Oral daily  heparin  Injectable 5000 Unit(s) SubCutaneous every 12 hours  hydrALAZINE 50 milliGRAM(s) Oral three times a day  insulin glargine Injectable (LANTUS) 10 Unit(s) SubCutaneous at bedtime  insulin lispro (HumaLOG) corrective regimen sliding scale   SubCutaneous three times a day before meals  levothyroxine 100 MICROGram(s) Oral daily  melatonin 3 milliGRAM(s) Oral at bedtime  memantine 5 milliGRAM(s) Oral at bedtime  metoprolol succinate  milliGRAM(s) Oral daily  valsartan 320 milliGRAM(s) Oral daily    MEDICATIONS  (PRN):  dextrose 40% Gel 15 Gram(s) Oral once PRN Blood Glucose LESS THAN 70 milliGRAM(s)/deciliter  glucagon  Injectable 1 milliGRAM(s) IntraMuscular once PRN Glucose LESS THAN 70 milligrams/deciliter    	    PHYSICAL EXAM:  T(C): 36.6 (08-07-19 @ 08:40), Max: 36.9 (08-07-19 @ 03:25)  HR: 67 (08-07-19 @ 08:40) (58 - 78)  BP: 163/74 (08-07-19 @ 08:40) (148/69 - 181/58)  RR: 15 (08-07-19 @ 08:40) (15 - 18)  SpO2: 100% (08-07-19 @ 08:40) (98% - 100%)  Wt(kg): --  I&O's Summary    06 Aug 2019 07:01  -  07 Aug 2019 07:00  --------------------------------------------------------  IN: 200 mL / OUT: 2150 mL / NET: -1950 mL        Appearance: Normal	  HEENT:   Normal oral mucosa, PERRL, EOMI	  Lymphatic: No lymphadenopathy  Cardiovascular: Normal S1 S2, No JVD, No murmurs, No edema  Respiratory: Lungs clear to auscultation	  Psychiatry: A & O x 3, Mood & affect appropriate  Gastrointestinal:  Soft, Non-tender, + BS	  Skin: No rashes, No ecchymoses, No cyanosis	  Neurologic: Non-focal  Extremities: Normal range of motion, No clubbing, cyanosis or edema  Vascular: Peripheral pulses palpable 2+ bilaterally    TELEMETRY: 	    ECG:  	  RADIOLOGY:  OTHER: 	  	  LABS:	 	    CARDIAC MARKERS:                                11.0   6.99  )-----------( 258      ( 06 Aug 2019 06:50 )             35.1     08-07    135  |  98  |  21  ----------------------------<  200<H>  4.2   |  21<L>  |  1.08    Ca    9.9      07 Aug 2019 07:55  Mg     1.9     08-07      proBNP:   Lipid Profile:   HgA1c:   TSH:
CHIEF COMPLAINT:Patient is a 83y old  Female who presents with a chief complaint of Fall/UTI (01 Aug 2019 09:49)    	        PAST MEDICAL & SURGICAL HISTORY:  Hypothyroid  HLD (hyperlipidemia)  HTN (hypertension)  DM (diabetes mellitus)  CAD (coronary artery disease)  MI (myocardial infarction)  Stented coronary artery          REVIEW OF SYSTEMS    awake  alert  no cp or sob  mental status at baseline  discussed w/ family member at bedside       Medications:  MEDICATIONS  (STANDING):  aspirin  chewable 81 milliGRAM(s) Oral daily  atorvastatin 10 milliGRAM(s) Oral at bedtime  cefTRIAXone   IVPB 1000 milliGRAM(s) IV Intermittent every 24 hours  dextrose 5%. 1000 milliLiter(s) (50 mL/Hr) IV Continuous <Continuous>  dextrose 50% Injectable 12.5 Gram(s) IV Push once  dextrose 50% Injectable 25 Gram(s) IV Push once  dextrose 50% Injectable 25 Gram(s) IV Push once  digoxin     Tablet 0.125 milliGRAM(s) Oral daily  donepezil 10 milliGRAM(s) Oral at bedtime  folic acid 1 milliGRAM(s) Oral daily  furosemide    Tablet 20 milliGRAM(s) Oral daily  heparin  Injectable 5000 Unit(s) SubCutaneous every 12 hours  insulin lispro (HumaLOG) corrective regimen sliding scale   SubCutaneous three times a day before meals  levothyroxine 100 MICROGram(s) Oral daily  memantine 5 milliGRAM(s) Oral at bedtime  metoprolol succinate ER 50 milliGRAM(s) Oral daily  oxybutynin 5 milliGRAM(s) Oral daily    MEDICATIONS  (PRN):  dextrose 40% Gel 15 Gram(s) Oral once PRN Blood Glucose LESS THAN 70 milliGRAM(s)/deciliter  glucagon  Injectable 1 milliGRAM(s) IntraMuscular once PRN Glucose LESS THAN 70 milligrams/deciliter  hydrALAZINE Injectable 10 milliGRAM(s) IV Push every 6 hours PRN systolic bp>170    	    PHYSICAL EXAM:  T(C): 36.9 (08-02-19 @ 06:20), Max: 36.9 (08-02-19 @ 06:20)  HR: 78 (08-02-19 @ 06:20) (71 - 90)  BP: 151/61 (08-02-19 @ 06:20) (151/61 - 194/75)  RR: 17 (08-02-19 @ 06:20) (16 - 18)  SpO2: 98% (08-02-19 @ 06:20) (98% - 100%)  Wt(kg): --  I&O's Summary    01 Aug 2019 07:01  -  02 Aug 2019 07:00  --------------------------------------------------------  IN: 500 mL / OUT: 1350 mL / NET: -850 mL        Appearance: Normal	  HEENT:   Normal oral mucosa, PERRL, EOMI	  Lymphatic: No lymphadenopathy  Cardiovascular: Normal S1 S2, No JVD,   Respiratory: Lungs clear to auscultation	  Gastrointestinal:  Soft, Non-tender, + BS	  Skin: No rashes, No ecchymoses, No cyanosis	  Neurologic: Non-focal  Extremities: Normal range of motion, No clubbing, cyanosis or edema  Vascular: Peripheral pulses palpable 2+ bilaterally    TELEMETRY: 	    ECG:  	  RADIOLOGY:  OTHER: 	  	  LABS:	 	    CARDIAC MARKERS:  CARDIAC MARKERS ( 01 Aug 2019 01:41 )  x     / x     / 65 u/L / 3.07 ng/mL / x                                    12.0   7.23  )-----------( 260      ( 02 Aug 2019 05:20 )             37.8     08-02    136  |  99  |  13  ----------------------------<  152<H>  4.6   |  23  |  1.11    Ca    10.6<H>      02 Aug 2019 05:20  Mg     2.0     08-02    TPro  7.9  /  Alb  4.1  /  TBili  0.3  /  DBili  < 0.2  /  AST  15  /  ALT  9   /  AlkPhos  41  08-01    proBNP:   Lipid Profile:   HgA1c:   TSH:
CHIEF COMPLAINT:Patient is a 83y old  Female who presents with a chief complaint of Fall/UTI (01 Aug 2019 09:49)    	        PAST MEDICAL & SURGICAL HISTORY:  Hypothyroid  HLD (hyperlipidemia)  HTN (hypertension)  DM (diabetes mellitus)  CAD (coronary artery disease)  MI (myocardial infarction)  Stented coronary artery          REVIEW OF SYSTEMS:  CONSTITUTIONAL: weak  EYES: No eye pain, visual disturbances, or discharge  NECK: No pain or stiffness  RESPIRATORY: No cough, wheezing, chills or hemoptysis; No Shortness of Breath  CARDIOVASCULAR: No chest pain, palpitations  GASTROINTESTINAL: No abdominal or epigastric pain. No nausea, vomiting, or hematemesis;   GENITOURINARY: No dysuria, frequency, hematuria, or incontinence  NEUROLOGICAL: No headaches,     Medications:  MEDICATIONS  (STANDING):  aspirin  chewable 81 milliGRAM(s) Oral daily  atorvastatin 40 milliGRAM(s) Oral at bedtime  cefTRIAXone   IVPB 1000 milliGRAM(s) IV Intermittent every 24 hours  heparin  Injectable 5000 Unit(s) SubCutaneous every 12 hours  metoprolol tartrate 12.5 milliGRAM(s) Oral every 6 hours  sodium chloride 0.9%. 1000 milliLiter(s) (75 mL/Hr) IV Continuous <Continuous>    MEDICATIONS  (PRN):  hydrALAZINE Injectable 10 milliGRAM(s) IV Push every 6 hours PRN systolic bp>170  labetalol Injectable 5 milliGRAM(s) IV Push every 6 hours PRN Systolic blood pressure > 170    	    PHYSICAL EXAM:  T(C): 36.7 (08-01-19 @ 05:56), Max: 36.8 (07-31-19 @ 17:44)  HR: 73 (08-01-19 @ 05:56) (65 - 79)  BP: 193/73 (08-01-19 @ 05:56) (171/80 - 193/73)  RR: 16 (08-01-19 @ 05:56) (16 - 18)  SpO2: 100% (08-01-19 @ 05:56) (100% - 100%)  Wt(kg): --  I&O's Summary    31 Jul 2019 07:01  -  01 Aug 2019 07:00  --------------------------------------------------------  IN: 80 mL / OUT: 550 mL / NET: -470 mL    01 Aug 2019 07:01  -  01 Aug 2019 10:34  --------------------------------------------------------  IN: 0 mL / OUT: 200 mL / NET: -200 mL        Appearance: Normal	  HEENT:   Normal oral mucosa, PERRL, EOMI	  Lymphatic: No lymphadenopathy  Cardiovascular: Normal S1 S2,   Respiratory: dec bs 	  Gastrointestinal:  Soft, Non-tender, + BS	  Skin: No rashes, No ecchymoses, No cyanosis	  Neurologic: Non-focal  Extremities: dec rom  Vascular: Peripheral pulses palpable     TELEMETRY: 	    ECG:  	  RADIOLOGY:  OTHER: 	  	  LABS:	 	    CARDIAC MARKERS:  CARDIAC MARKERS ( 01 Aug 2019 01:41 )  x     / x     / 65 u/L / 3.07 ng/mL / x                                    11.1   8.08  )-----------( 294      ( 01 Aug 2019 07:15 )             34.6     08-01    137  |  98  |  17  ----------------------------<  173<H>  4.0   |  25  |  1.29    Ca    10.1      01 Aug 2019 07:15  Mg     1.5     08-01    TPro  7.9  /  Alb  4.1  /  TBili  0.3  /  DBili  < 0.2  /  AST  15  /  ALT  9   /  AlkPhos  41  08-01    proBNP:   Lipid Profile:   HgA1c: Hemoglobin A1C, Whole Blood: 6.6 % (08-01 @ 07:15)    TSH: Thyroid Stimulating Hormone, Serum: 0.37 uIU/mL (08-01 @ 07:15)
CHIEF COMPLAINT:Patient is a 83y old  Female who presents with a chief complaint of chest pain (02 Aug 2019 13:39)    	        PAST MEDICAL & SURGICAL HISTORY:  Hypothyroid  HLD (hyperlipidemia)  HTN (hypertension)  DM (diabetes mellitus)  CAD (coronary artery disease)  MI (myocardial infarction)  Stented coronary artery          REVIEW OF SYSTEMS:  CONSTITUTIONAL: no new complaints   EYES: No eye pain, visual disturbances, or discharge  NECK: No pain or stiffness  RESPIRATORY: No cough, wheezing, chills or hemoptysis; No Shortness of Breath  CARDIOVASCULAR: No chest pain, palpitations, passing out, dizziness, or leg swelling  GASTROINTESTINAL: No abdominal or epigastric pain. No nausea, vomiting, or hematemesis; No diarrhea or constipation. No melena or hematochezia.  GENITOURINARY: No dysuria, frequency, hematuria, or incontinence  NEUROLOGICAL: No headaches,   agitated at night at times  Medications:  MEDICATIONS  (STANDING):  aspirin  chewable 81 milliGRAM(s) Oral daily  atorvastatin 10 milliGRAM(s) Oral at bedtime  dextrose 5%. 1000 milliLiter(s) (50 mL/Hr) IV Continuous <Continuous>  dextrose 50% Injectable 12.5 Gram(s) IV Push once  dextrose 50% Injectable 25 Gram(s) IV Push once  dextrose 50% Injectable 25 Gram(s) IV Push once  digoxin     Tablet 0.125 milliGRAM(s) Oral daily  donepezil 10 milliGRAM(s) Oral at bedtime  folic acid 1 milliGRAM(s) Oral daily  furosemide    Tablet 20 milliGRAM(s) Oral daily  heparin  Injectable 5000 Unit(s) SubCutaneous every 12 hours  hydrALAZINE 25 milliGRAM(s) Oral three times a day  insulin glargine Injectable (LANTUS) 8 Unit(s) SubCutaneous at bedtime  insulin lispro (HumaLOG) corrective regimen sliding scale   SubCutaneous three times a day before meals  levothyroxine 100 MICROGram(s) Oral daily  losartan 100 milliGRAM(s) Oral daily  memantine 5 milliGRAM(s) Oral at bedtime  metoprolol succinate ER 50 milliGRAM(s) Oral daily  oxybutynin 5 milliGRAM(s) Oral daily    MEDICATIONS  (PRN):  dextrose 40% Gel 15 Gram(s) Oral once PRN Blood Glucose LESS THAN 70 milliGRAM(s)/deciliter  glucagon  Injectable 1 milliGRAM(s) IntraMuscular once PRN Glucose LESS THAN 70 milligrams/deciliter    	    PHYSICAL EXAM:  T(C): 36.7 (08-05-19 @ 06:14), Max: 36.9 (08-04-19 @ 12:54)  HR: 74 (08-05-19 @ 06:14) (71 - 85)  BP: 184/80 (08-05-19 @ 06:15) (133/57 - 188/66)  RR: 16 (08-05-19 @ 06:14) (16 - 16)  SpO2: 99% (08-05-19 @ 06:14) (99% - 100%)  Wt(kg): --  I&O's Summary    04 Aug 2019 07:01  -  05 Aug 2019 07:00  --------------------------------------------------------  IN: 200 mL / OUT: 200 mL / NET: 0 mL        Appearance: Normal	  HEENT:   Normal oral mucosa, PERRL, EOMI	  Lymphatic: No lymphadenopathy  Cardiovascular: Normal S1 S2, No JVD,  Respiratory: Lungs clear to auscultation	  Gastrointestinal:  Soft, Non-tender, + BS	  Skin: No rashes, No ecchymoses, No cyanosis	  Neurologic: Non-focal  Extremities: Normal range of motion, No clubbing, cyanosis or edema  Vascular: Peripheral pulses palpable     TELEMETRY: 	    ECG:  	  RADIOLOGY:  OTHER: 	  	  LABS:	 	    CARDIAC MARKERS:                                10.8   5.39  )-----------( 195      ( 04 Aug 2019 06:20 )             33.9     08-04    136  |  98  |  21  ----------------------------<  180<H>  4.0   |  23  |  1.44<H>    Ca    10.4      04 Aug 2019 06:20  Mg     1.9     08-04      proBNP:   Lipid Profile:   HgA1c:   TSH:
CHIEF COMPLAINT:Patient is a 83y old  Female who presents with a chief complaint of chest pain (02 Aug 2019 13:39)    	        PAST MEDICAL & SURGICAL HISTORY:  Hypothyroid  HLD (hyperlipidemia)  HTN (hypertension)  DM (diabetes mellitus)  CAD (coronary artery disease)  MI (myocardial infarction)  Stented coronary artery          REVIEW OF SYSTEMS:  events noted  inc bp  some agitation last pm   no cp or sob      Medications:  MEDICATIONS  (STANDING):  aspirin  chewable 81 milliGRAM(s) Oral daily  atorvastatin 10 milliGRAM(s) Oral at bedtime  dextrose 5%. 1000 milliLiter(s) (50 mL/Hr) IV Continuous <Continuous>  dextrose 50% Injectable 12.5 Gram(s) IV Push once  dextrose 50% Injectable 25 Gram(s) IV Push once  dextrose 50% Injectable 25 Gram(s) IV Push once  digoxin     Tablet 0.125 milliGRAM(s) Oral daily  donepezil 10 milliGRAM(s) Oral at bedtime  folic acid 1 milliGRAM(s) Oral daily  furosemide    Tablet 20 milliGRAM(s) Oral daily  heparin  Injectable 5000 Unit(s) SubCutaneous every 12 hours  insulin lispro (HumaLOG) corrective regimen sliding scale   SubCutaneous three times a day before meals  levothyroxine 100 MICROGram(s) Oral daily  losartan 100 milliGRAM(s) Oral daily  memantine 5 milliGRAM(s) Oral at bedtime  metoprolol succinate ER 50 milliGRAM(s) Oral daily  oxybutynin 5 milliGRAM(s) Oral daily    MEDICATIONS  (PRN):  dextrose 40% Gel 15 Gram(s) Oral once PRN Blood Glucose LESS THAN 70 milliGRAM(s)/deciliter  glucagon  Injectable 1 milliGRAM(s) IntraMuscular once PRN Glucose LESS THAN 70 milligrams/deciliter  hydrALAZINE Injectable 10 milliGRAM(s) IV Push every 6 hours PRN systolic bp>170    	    PHYSICAL EXAM:  T(C): 36.6 (08-04-19 @ 05:31), Max: 37 (08-03-19 @ 22:32)  HR: 68 (08-04-19 @ 05:31) (67 - 72)  BP: 168/57 (08-04-19 @ 05:31) (157/56 - 182/61)  RR: 18 (08-04-19 @ 05:31) (18 - 18)  SpO2: 99% (08-04-19 @ 05:31) (98% - 100%)  Wt(kg): --  I&O's Summary    03 Aug 2019 07:01  -  04 Aug 2019 07:00  --------------------------------------------------------  IN: 0 mL / OUT: 500 mL / NET: -500 mL        Appearance: Normal	  HEENT:   Normal oral mucosa, PERRL, EOMI	  Lymphatic: No lymphadenopathy  Cardiovascular: Normal S1 S2, No JVD,  Respiratory: Lungs clear to auscultation	  Gastrointestinal:  Soft, Non-tender, + BS	  Skin: No rashes, No ecchymoses, No cyanosis	  Neurologic: Non-focal  Extremities: Normal range of motion, No clubbing, cyanosis or edema  Vascular: Peripheral pulses palpable 2+ bilaterally    TELEMETRY: 	    ECG:  	  RADIOLOGY:  OTHER: 	  	  LABS:	 	    CARDIAC MARKERS:                                10.8   5.39  )-----------( 195      ( 04 Aug 2019 06:20 )             33.9     08-04    136  |  98  |  21  ----------------------------<  180<H>  4.0   |  23  |  1.44<H>    Ca    10.4      04 Aug 2019 06:20  Mg     1.9     08-04      proBNP:   Lipid Profile:   HgA1c:   TSH:
Endocrinology Attending Covering For Dr. Rocha    HPI: PTT seen and examined  History taken from sun and EMR  82 y/o female with history of CAD s/p stents 2013, hypothyroidism, HLD, HTN, HLD, dementia (baseline AOx1) but will follow commands presents to the ED via family members for worsening urinary frequency as well as more frequent falls over the last 2 weeks. Pt at baseline is intermittently confused and is completely dependent on ADLs from family. History obtained mostly from family. She is essentially nonambulatory now for a few months and requires assistance with walking to the bathroom. She has been complaining of urinary frequency for 2 weeks, was put on macrobid by her PCP but symptoms have not improved. No fevers, chills reported, but she does have suprapubic pain. Per family she has been more lethargic since these symptoms, however over the course of a few months now she has also been losing weight with poor appetite. She has been falling intermittently while unsupervised as she gets confused and forgets that she has difficulty walking. In the past two weeks she has fallen multiple times all unwitnessed The last episode happened two nights ago when she was found in the bathroom on the floor. She reportedly was awake, did not remember falling, but denied having chest pain, shortness of breath, headache or bowel incontinence. She was noted to have ecchymosis around her eye and so was brought to her PCP today who referred her to ED. In the ED she was pan scanned for trauma eval, found to have UTI on UA and electrolyte abn to be admitted. (31 Jul 2019 23:55)  Patient has history of diabetes 20 years and hypothyroidism 10 years, on oral meds at home, metformin 1000 mg daily Patient follows up with PCP for diabetes management. has h/O glucoma    PAST MEDICAL & SURGICAL HISTORY:  Hypothyroid  HLD (hyperlipidemia)  HTN (hypertension)  DM (diabetes mellitus)  CAD (coronary artery disease)  MI (myocardial infarction)  Stented coronary artery      FAMILY HISTORY:  No pertinent family history in first degree relatives      Social History:    Outpatient Medications:    MEDICATIONS  (STANDING):  amLODIPine   Tablet 5 milliGRAM(s) Oral at bedtime  aspirin  chewable 81 milliGRAM(s) Oral daily  atorvastatin 10 milliGRAM(s) Oral at bedtime  dextrose 5%. 1000 milliLiter(s) (50 mL/Hr) IV Continuous <Continuous>  dextrose 50% Injectable 12.5 Gram(s) IV Push once  dextrose 50% Injectable 25 Gram(s) IV Push once  dextrose 50% Injectable 25 Gram(s) IV Push once  digoxin     Tablet 0.125 milliGRAM(s) Oral daily  donepezil 10 milliGRAM(s) Oral at bedtime  folic acid 1 milliGRAM(s) Oral daily  furosemide    Tablet 20 milliGRAM(s) Oral daily  heparin  Injectable 5000 Unit(s) SubCutaneous every 12 hours  hydrALAZINE 50 milliGRAM(s) Oral three times a day  insulin glargine Injectable (LANTUS) 10 Unit(s) SubCutaneous at bedtime  insulin lispro (HumaLOG) corrective regimen sliding scale   SubCutaneous three times a day before meals  levothyroxine 100 MICROGram(s) Oral daily  melatonin 3 milliGRAM(s) Oral at bedtime  memantine 5 milliGRAM(s) Oral at bedtime  metoprolol succinate  milliGRAM(s) Oral daily  valsartan 320 milliGRAM(s) Oral daily    MEDICATIONS  (PRN):  dextrose 40% Gel 15 Gram(s) Oral once PRN Blood Glucose LESS THAN 70 milliGRAM(s)/deciliter  glucagon  Injectable 1 milliGRAM(s) IntraMuscular once PRN Glucose LESS THAN 70 milligrams/deciliter      Allergies    No Known Allergies    Intolerances      Review of Systems:  Constitutional: No fever, no chills  Eyes: No blurry vision  Neuro: No tremors  HEENT: No pain, no neck swelling  Cardiovascular: No chest pain, no palpitations  Respiratory: Has SOB, no cough  GI: No nausea, vomiting, abdominal pain  : No dysuria  Skin: no rash  MSK: Has leg swelling, no foot ulcers.  Psych: no depression  Endocrine: no polyuria, polydipsia    ALL OTHER SYSTEMS REVIEWED AND NEGATIVE    UNABLE TO OBTAIN    PHYSICAL EXAM: Look undernurished  VITALS: T(C): 36.7 (08-07-19 @ 12:59)  T(F): 98 (08-07-19 @ 12:59), Max: 98.4 (08-07-19 @ 03:25)  HR: 64 (08-07-19 @ 12:59) (62 - 78)  BP: 173/77 (08-07-19 @ 12:59) (148/69 - 173/77)  RR:  (15 - 18)  SpO2:  (98% - 100%)  Wt(kg): --  GENERAL: NAD, well-groomed, well-developed  EYES: No proptosis, no lid lag  HEENT:  Atraumatic, Normocephalic  THYROID: Normal size, no palpable nodules  RESPIRATORY: Clear to auscultation bilaterally; No rales, rhonchi, wheezing  CARDIOVASCULAR: Si S2, No murmurs;  GI: Soft, non distended, normal bowel sounds  SKIN: Dry, intact, No rashes or lesions  MUSCULOSKELETAL: Has BL lower extremity edema.  NEURO:  no tremor, sensation decreased in feet BL,    POCT Blood Glucose.: 250 mg/dL (08-07-19 @ 12:52)  POCT Blood Glucose.: 210 mg/dL (08-07-19 @ 08:39)  POCT Blood Glucose.: 267 mg/dL (08-06-19 @ 22:10)  POCT Blood Glucose.: 207 mg/dL (08-06-19 @ 21:22)  POCT Blood Glucose.: 198 mg/dL (08-06-19 @ 17:29)  POCT Blood Glucose.: 150 mg/dL (08-06-19 @ 12:51)  POCT Blood Glucose.: 191 mg/dL (08-06-19 @ 08:31)  POCT Blood Glucose.: 197 mg/dL (08-05-19 @ 21:47)  POCT Blood Glucose.: 171 mg/dL (08-05-19 @ 17:36)  POCT Blood Glucose.: 216 mg/dL (08-05-19 @ 12:44)  POCT Blood Glucose.: 221 mg/dL (08-05-19 @ 08:38)  POCT Blood Glucose.: 288 mg/dL (08-04-19 @ 22:39)  POCT Blood Glucose.: 179 mg/dL (08-04-19 @ 17:30)                            11.0   6.99  )-----------( 258      ( 06 Aug 2019 06:50 )             35.1       08-07    135  |  98  |  21  ----------------------------<  200<H>  4.2   |  21<L>  |  1.08    EGFR if : 55  EGFR if non : 47    Ca    9.9      08-07  Mg     1.9     08-07        Thyroid Function Tests:  08-01 @ 07:15 TSH 0.37 FreeT4 -- T3 -- Anti TPO -- Anti Thyroglobulin Ab -- TSI --      Hemoglobin A1C, Whole Blood: 6.6 % <H> [4.0 - 5.6] (08-01-19 @ 07:15)          Radiology:
Endocrinology Attending Covering for Dr. Rocha      Chief complaint  Patient is a 83y old  Female who presents with a chief complaint of FTT (08 Aug 2019 14:21)   Review of systems  Patient in bed, looks comfortable, no fever,  had no hypoglycemia.    Labs and Fingersticks  CAPILLARY BLOOD GLUCOSE      POCT Blood Glucose.: 159 mg/dL (09 Aug 2019 08:42)  POCT Blood Glucose.: 254 mg/dL (08 Aug 2019 21:03)  POCT Blood Glucose.: 256 mg/dL (08 Aug 2019 17:33)  POCT Blood Glucose.: 199 mg/dL (08 Aug 2019 12:18)      Anion Gap, Serum: 13 (08-09 @ 07:52)      Calcium, Total Serum: 10.6 <H> (08-09 @ 07:52)          08-09    133<L>  |  96<L>  |  34<H>  ----------------------------<  198<H>  4.5   |  24  |  1.51<H>    Ca    10.6<H>      09 Aug 2019 07:52                          11.3   7.38  )-----------( 211      ( 09 Aug 2019 07:52 )             34.4     Medications  MEDICATIONS  (STANDING):  amLODIPine   Tablet 5 milliGRAM(s) Oral at bedtime  aspirin  chewable 81 milliGRAM(s) Oral daily  atorvastatin 10 milliGRAM(s) Oral at bedtime  dextrose 5%. 1000 milliLiter(s) (50 mL/Hr) IV Continuous <Continuous>  dextrose 50% Injectable 12.5 Gram(s) IV Push once  dextrose 50% Injectable 25 Gram(s) IV Push once  dextrose 50% Injectable 25 Gram(s) IV Push once  digoxin     Tablet 0.125 milliGRAM(s) Oral daily  donepezil 10 milliGRAM(s) Oral at bedtime  folic acid 1 milliGRAM(s) Oral daily  furosemide    Tablet 20 milliGRAM(s) Oral daily  heparin  Injectable 5000 Unit(s) SubCutaneous every 12 hours  hydrALAZINE 50 milliGRAM(s) Oral three times a day  insulin glargine Injectable (LANTUS) 12 Unit(s) SubCutaneous at bedtime  insulin lispro (HumaLOG) corrective regimen sliding scale   SubCutaneous three times a day before meals  insulin lispro (HumaLOG) corrective regimen sliding scale   SubCutaneous at bedtime  insulin lispro Injectable (HumaLOG) 3 Unit(s) SubCutaneous three times a day before meals  levothyroxine 88 MICROGram(s) Oral daily  melatonin 3 milliGRAM(s) Oral at bedtime  memantine 5 milliGRAM(s) Oral at bedtime  metoprolol succinate  milliGRAM(s) Oral daily  sodium chloride 0.9% lock flush 3 milliLiter(s) IV Push every 8 hours  valsartan 320 milliGRAM(s) Oral daily      Physical Exam  General: Patient comfortable in bed  Vital Signs Last 12 Hrs  T(F): 97.7 (08-09-19 @ 04:50), Max: 98.3 (08-09-19 @ 00:50)  HR: 72 (08-09-19 @ 04:50) (72 - 81)  BP: 161/59 (08-09-19 @ 04:50) (157/72 - 161/59)  BP(mean): --  RR: 20 (08-09-19 @ 04:50) (19 - 20)  SpO2: 100% (08-09-19 @ 04:50) (100% - 100%)  Neck: No palpable thyroid nodules.  CVS: S1S2, No murmurs  Respiratory: No wheezing, no crepitations  GI: Abdomen soft, bowel sounds positive  Musculoskeletal:  edema lower extremities.   Skin: No skin rashes, no ecchymosis    Diagnostics
Endocrinology Attending Covering for Dr. Rocha      Chief complaint  Patient is a 83y old  Female who presents with a chief complaint of Weekness (07 Aug 2019 13:11)   Review of systems  Patient in bed, looks comfortable, no fever,  had no hypoglycemia.    Labs and Fingersticks  CAPILLARY BLOOD GLUCOSE      POCT Blood Glucose.: 199 mg/dL (08 Aug 2019 12:18)  POCT Blood Glucose.: 158 mg/dL (08 Aug 2019 08:49)  POCT Blood Glucose.: 161 mg/dL (07 Aug 2019 22:01)  POCT Blood Glucose.: 174 mg/dL (07 Aug 2019 17:24)      Anion Gap, Serum: 16 <H> (08-07 @ 07:55)      Calcium, Total Serum: 9.9 (08-07 @ 07:55)          08-07    135  |  98  |  21  ----------------------------<  200<H>  4.2   |  21<L>  |  1.08    Ca    9.9      07 Aug 2019 07:55  Mg     1.9     08-07      Medications  MEDICATIONS  (STANDING):  amLODIPine   Tablet 5 milliGRAM(s) Oral at bedtime  aspirin  chewable 81 milliGRAM(s) Oral daily  atorvastatin 10 milliGRAM(s) Oral at bedtime  dextrose 5%. 1000 milliLiter(s) (50 mL/Hr) IV Continuous <Continuous>  dextrose 50% Injectable 12.5 Gram(s) IV Push once  dextrose 50% Injectable 25 Gram(s) IV Push once  dextrose 50% Injectable 25 Gram(s) IV Push once  digoxin     Tablet 0.125 milliGRAM(s) Oral daily  donepezil 10 milliGRAM(s) Oral at bedtime  folic acid 1 milliGRAM(s) Oral daily  furosemide    Tablet 20 milliGRAM(s) Oral daily  heparin  Injectable 5000 Unit(s) SubCutaneous every 12 hours  hydrALAZINE 50 milliGRAM(s) Oral three times a day  insulin glargine Injectable (LANTUS) 12 Unit(s) SubCutaneous at bedtime  insulin lispro (HumaLOG) corrective regimen sliding scale   SubCutaneous three times a day before meals  insulin lispro Injectable (HumaLOG) 3 Unit(s) SubCutaneous three times a day before meals  levothyroxine 88 MICROGram(s) Oral daily  melatonin 3 milliGRAM(s) Oral at bedtime  memantine 5 milliGRAM(s) Oral at bedtime  metoprolol succinate  milliGRAM(s) Oral daily  sodium chloride 0.9% lock flush 3 milliLiter(s) IV Push every 8 hours  valsartan 320 milliGRAM(s) Oral daily      Physical Exam  General: Patient comfortable in bed  Vital Signs Last 12 Hrs  T(F): 98 (08-08-19 @ 12:42), Max: 98.4 (08-08-19 @ 05:51)  HR: 60 (08-08-19 @ 12:42) (60 - 70)  BP: 125/51 (08-08-19 @ 12:42) (125/51 - 159/57)  BP(mean): --  RR: 16 (08-08-19 @ 12:42) (16 - 17)  SpO2: 100% (08-08-19 @ 12:42) (100% - 100%)  Neck: No palpable thyroid nodules.  CVS: S1S2, No murmurs  Respiratory: No wheezing, no crepitations  GI: Abdomen soft, bowel sounds positive  Musculoskeletal:  edema lower extremities.   Skin: No skin rashes, no ecchymosis    Diagnostics
[facility] [phone]        Patient seen and examined         MEDICATIONS  (STANDING):  amLODIPine   Tablet 5 milliGRAM(s) Oral at bedtime  aspirin  chewable 81 milliGRAM(s) Oral daily  atorvastatin 10 milliGRAM(s) Oral at bedtime  dextrose 5%. 1000 milliLiter(s) (50 mL/Hr) IV Continuous <Continuous>  dextrose 50% Injectable 12.5 Gram(s) IV Push once  dextrose 50% Injectable 25 Gram(s) IV Push once  dextrose 50% Injectable 25 Gram(s) IV Push once  digoxin     Tablet 0.125 milliGRAM(s) Oral daily  donepezil 10 milliGRAM(s) Oral at bedtime  folic acid 1 milliGRAM(s) Oral daily  furosemide    Tablet 20 milliGRAM(s) Oral daily  heparin  Injectable 5000 Unit(s) SubCutaneous every 12 hours  hydrALAZINE 50 milliGRAM(s) Oral three times a day  insulin glargine Injectable (LANTUS) 10 Unit(s) SubCutaneous at bedtime  insulin lispro (HumaLOG) corrective regimen sliding scale   SubCutaneous three times a day before meals  levothyroxine 100 MICROGram(s) Oral daily  melatonin 3 milliGRAM(s) Oral at bedtime  memantine 5 milliGRAM(s) Oral at bedtime  metoprolol succinate  milliGRAM(s) Oral daily  valsartan 320 milliGRAM(s) Oral daily      VITAL:  T(C): , Max: 36.9 (19 @ 03:25)  T(F): , Max: 98.4 (19 @ 03:25)  HR: 67 (19 @ 08:40)  BP: 163/74 (19 @ 08:40)  BP(mean): --  RR: 15 (19 @ 08:40)  SpO2: 100% (19 @ 08:40)  Wt(kg): --    I and O's:     @ 07:01  -   @ 07:00  --------------------------------------------------------  IN: 200 mL / OUT: 2150 mL / NET: -1950 mL          PHYSICAL EXAM:    Constitutional: NAD  Neck:  No JVD  Respiratory: CTAB/L  Cardiovascular: S1 and S2  Gastrointestinal: BS+, soft, NT/ND  Extremities: No peripheral edema  Neurological: A/O x 3, no focal deficits  Psychiatric: Normal mood, normal affect  : No Naik  Skin: No rashes  Access: Not applicable    LABS:                        11.0   6.99  )-----------( 258      ( 06 Aug 2019 06:50 )             35.1     08-07    135  |  98  |  21  ----------------------------<  200<H>  4.2   |  21<L>  |  1.08    Ca    9.9      07 Aug 2019 07:55  Mg     1.9     08-07            Urine Studies:  Urinalysis Basic - ( 06 Aug 2019 18:08 )    Color: LIGHT YELLOW / Appearance: CLEAR / S.010 / pH: 6.5  Gluc: NEGATIVE / Ketone: NEGATIVE  / Bili: NEGATIVE / Urobili: NORMAL   Blood: NEGATIVE / Protein: 10 / Nitrite: NEGATIVE   Leuk Esterase: NEGATIVE / RBC: x / WBC x   Sq Epi: x / Non Sq Epi: x / Bacteria: x            RADIOLOGY & ADDITIONAL STUDIES:
Patient is a 83y old  Female who presents with a chief complaint of fall and dysuria    Interval History/ROS:  no fever.  no n/v/d.  no abdominal pain.  no dysuria.  Remainder of ROS otherwise negative.    PAST MEDICAL & SURGICAL HISTORY:  Hypothyroid  HLD (hyperlipidemia)  HTN (hypertension)  DM (diabetes mellitus)  CAD (coronary artery disease)  MI (myocardial infarction)  Stented coronary artery    Allergies  No Known Allergies    MEDICATIONS  (STANDING):  aspirin  chewable 81 daily  atorvastatin 10 at bedtime  digoxin     Tablet 0.125 daily  donepezil 10 at bedtime  furosemide    Tablet 20 daily  heparin  Injectable 5000 every 12 hours  insulin lispro (HumaLOG) corrective regimen sliding scale  three times a day before meals  levothyroxine 100 daily  losartan 25 daily  memantine 5 at bedtime  metoprolol succinate ER 50 daily  oxybutynin 5 daily    Vital Signs Last 24 Hrs  T(F): 98.4 (19 @ 06:20), Max: 98.4 (19 @ 06:20)  HR: 78 (19 @ 06:20)  BP: 151/61 (19 @ 06:20)  RR: 17 (19 @ 06:20)  SpO2: 98% (19 @ 06:20) (98% - 100%)    PHYSICAL EXAM:  General: non-toxic  HEAD/EYES: anicteric  ENT:  supple  Cardiovascular:   S1, S2  Respiratory:  clear bilaterally  GI:  soft, non-tender suprapubic area  : external urinary device with dark but clear urine  Musculoskeletal:  no synovitis  Neurologic:  grossly non-focal  Skin:  no rash  Psychiatric:  appropriate affect  Vascular:  no phlebitis                        12.0   7.23  )-----------( 260      ( 02 Aug 2019 05:20 )             37.8 -    136  |  99  |  13  ----------------------------<  152  4.6   |  23  |  1.11  Ca    10.6      02 Aug 2019 05:20Mg     2.0     -  TPro  7.9  /  Alb  4.1  /  TBili  0.3  /  DBili  < 0.2  /  AST  15  /  ALT  9   /  AlkPhos  41      Urinalysis Basic - ( 2019 22:00 )  Color: LIGHT YELLOW / Appearance: Lt TURBID / S.012 / pH: 7.0  Gluc: NEGATIVE / Ketone: NEGATIVE  / Bili: NEGATIVE / Urobili: NORMAL   Blood: NEGATIVE / Protein: 30 / Nitrite: POSITIVE   Leuk Esterase: MODERATE / RBC: 3-5 / WBC 26-50   Sq Epi: OCC / Non Sq Epi: x / Bacteria: FEW    MICROBIOLOGY:  Culture - Urine (19 @ 22:41)    Culture - Urine:   GNRID^Gram Neg Abe To Be Identified  COLONY COUNT: > = 100,000 CFU/ML    Specimen Source: URINE MIDSTREAM    RADIOLOGY:  imaging below personally reviewed    CT Maxillofacial No Cont (19 @ 19:59) >  IMPRESSION:  CT brain: No acute findings.  CT cervical spine: No acute findings.  CT maxillofacial bones: No acute findings.
Patient seen and examined in bed. No pain, no SOB.      MEDICATIONS  (STANDING):  amLODIPine   Tablet 5 milliGRAM(s) Oral at bedtime  aspirin  chewable 81 milliGRAM(s) Oral daily  atorvastatin 10 milliGRAM(s) Oral at bedtime  dextrose 5%. 1000 milliLiter(s) (50 mL/Hr) IV Continuous <Continuous>  dextrose 50% Injectable 12.5 Gram(s) IV Push once  dextrose 50% Injectable 25 Gram(s) IV Push once  dextrose 50% Injectable 25 Gram(s) IV Push once  digoxin     Tablet 0.125 milliGRAM(s) Oral daily  donepezil 10 milliGRAM(s) Oral at bedtime  folic acid 1 milliGRAM(s) Oral daily  furosemide    Tablet 20 milliGRAM(s) Oral daily  heparin  Injectable 5000 Unit(s) SubCutaneous every 12 hours  hydrALAZINE 50 milliGRAM(s) Oral three times a day  insulin glargine Injectable (LANTUS) 12 Unit(s) SubCutaneous at bedtime  insulin lispro (HumaLOG) corrective regimen sliding scale   SubCutaneous three times a day before meals  insulin lispro Injectable (HumaLOG) 3 Unit(s) SubCutaneous three times a day before meals  levothyroxine 88 MICROGram(s) Oral daily  melatonin 3 milliGRAM(s) Oral at bedtime  memantine 5 milliGRAM(s) Oral at bedtime  metoprolol succinate  milliGRAM(s) Oral daily  sodium chloride 0.9% lock flush 3 milliLiter(s) IV Push every 8 hours  valsartan 320 milliGRAM(s) Oral daily      VITAL:  T(C): , Max: 36.9 (19 @ 05:51)  T(F): , Max: 98.4 (19 @ 05:51)  HR: 60 (19 @ 12:42)  BP: 125/51 (19 @ 12:42)  RR: 16 (19 @ 12:42)  SpO2: 100% (19 @ 12:42)    I and O's:     @ 07:01  -   @ 07:00  --------------------------------------------------------  IN: 916 mL / OUT: 1400 mL / NET: -484 mL          PHYSICAL EXAM:    Constitutional: NAD  Neck:  No JVD  Respiratory: CTAB/L  Cardiovascular: S1 and S2  Gastrointestinal: BS+, soft, NT/ND  Extremities: No peripheral edema  : + Naik  Skin: No rashes    LABS:        135  |  98  |  21  ----------------------------<  200<H>  4.2   |  21<L>  |  1.08    Ca    9.9      07 Aug 2019 07:55  Mg     1.9             Urine Studies:  Urinalysis Basic - ( 06 Aug 2019 18:08 )    Color: LIGHT YELLOW / Appearance: CLEAR / S.010 / pH: 6.5  Gluc: NEGATIVE / Ketone: NEGATIVE  / Bili: NEGATIVE / Urobili: NORMAL   Blood: NEGATIVE / Protein: 10 / Nitrite: NEGATIVE   Leuk Esterase: NEGATIVE / RBC: x / WBC x   Sq Epi: x / Non Sq Epi: x / Bacteria: x      ASSESSMENT/PLAN:  82 y/o female with history of CAD s/p stents , hypothyroidism, HLD, HTN, HLD, dementia (baseline AOx1) but will follow commands presents to the ED via family members for worsening urinary frequency as well as more frequent falls over the last 2 weeks.    CKD stage 3  Failure to thrive and hypercalcemia in admission  New CHF  SP Hypertensive urgency     *No labs today    1. CVS - Goal BP for her is 150mmHg (we do NOT need tight control)  2. Renal - Bladder mass is to be eval as outpt.  Bladder scan per protocol;  Trying not to add HCTZ given the hypercalcemia  3. Neuro - Cont Memantine         TOSHA Dias  [facility]  [phone]
Spoke with endocrinologist dr Weller regarding diabetic meds for discharge. Plan to discharge on metformin 1000mg bid and Lantus 10 units daily.  dr Weller will see patient in am prior to discharge to review med affect and finalize plan for medications
CHIEF COMPLAINT:Patient is a 83y old  Female who presents with a chief complaint of Weekness (07 Aug 2019 13:11)    	        PAST MEDICAL & SURGICAL HISTORY:  Hypothyroid  HLD (hyperlipidemia)  HTN (hypertension)  DM (diabetes mellitus)  CAD (coronary artery disease)  MI (myocardial infarction)  Stented coronary artery          REVIEW OF SYSTEMS:  pt without cp or sob  no n/v  no chills\    Medications:  MEDICATIONS  (STANDING):  amLODIPine   Tablet 5 milliGRAM(s) Oral at bedtime  aspirin  chewable 81 milliGRAM(s) Oral daily  atorvastatin 10 milliGRAM(s) Oral at bedtime  dextrose 5%. 1000 milliLiter(s) (50 mL/Hr) IV Continuous <Continuous>  dextrose 50% Injectable 12.5 Gram(s) IV Push once  dextrose 50% Injectable 25 Gram(s) IV Push once  dextrose 50% Injectable 25 Gram(s) IV Push once  digoxin     Tablet 0.125 milliGRAM(s) Oral daily  donepezil 10 milliGRAM(s) Oral at bedtime  folic acid 1 milliGRAM(s) Oral daily  furosemide    Tablet 20 milliGRAM(s) Oral daily  heparin  Injectable 5000 Unit(s) SubCutaneous every 12 hours  hydrALAZINE 50 milliGRAM(s) Oral three times a day  insulin glargine Injectable (LANTUS) 12 Unit(s) SubCutaneous at bedtime  insulin lispro (HumaLOG) corrective regimen sliding scale   SubCutaneous three times a day before meals  insulin lispro Injectable (HumaLOG) 3 Unit(s) SubCutaneous three times a day before meals  levothyroxine 88 MICROGram(s) Oral daily  melatonin 3 milliGRAM(s) Oral at bedtime  memantine 5 milliGRAM(s) Oral at bedtime  metoprolol succinate  milliGRAM(s) Oral daily  sodium chloride 0.9% lock flush 3 milliLiter(s) IV Push every 8 hours  valsartan 320 milliGRAM(s) Oral daily    MEDICATIONS  (PRN):  dextrose 40% Gel 15 Gram(s) Oral once PRN Blood Glucose LESS THAN 70 milliGRAM(s)/deciliter  glucagon  Injectable 1 milliGRAM(s) IntraMuscular once PRN Glucose LESS THAN 70 milligrams/deciliter  sodium chloride 0.65% Nasal 1 Spray(s) Both Nostrils four times a day PRN Nasal Congestion    	    PHYSICAL EXAM:  T(C): 36.9 (08-08-19 @ 05:51), Max: 36.9 (08-08-19 @ 05:51)  HR: 70 (08-08-19 @ 05:51) (63 - 76)  BP: 159/57 (08-08-19 @ 05:51) (158/63 - 173/77)  RR: 17 (08-08-19 @ 05:51) (15 - 17)  SpO2: 100% (08-08-19 @ 05:51) (99% - 100%)  Wt(kg): --  I&O's Summary    07 Aug 2019 07:01  -  08 Aug 2019 07:00  --------------------------------------------------------  IN: 916 mL / OUT: 1400 mL / NET: -484 mL        Appearance: Normal	  HEENT:   Normal oral mucosa, PERRL, EOMI	  Lymphatic: No lymphadenopathy  Cardiovascular: Normal S1 S2, No JVD  Respiratory: Lungs clear to auscultation	  Gastrointestinal:  Soft, Non-tender, + BS	  Skin: No rashes, No ecchymoses, No cyanosis	  Neurologic: Non-focal  coelho in place  Extremities: dec rom   Vascular: Peripheral pulses palpable     TELEMETRY: 	    ECG:  	  RADIOLOGY:  OTHER: 	  	  LABS:	 	    CARDIAC MARKERS:            08-07    135  |  98  |  21  ----------------------------<  200<H>  4.2   |  21<L>  |  1.08    Ca    9.9      07 Aug 2019 07:55  Mg     1.9     08-07      proBNP:   Lipid Profile:   HgA1c:   TSH:
Cardiology Progress Note    Interval Events:  TTE performed showing depressed EF.  Pt poor historian.      MEDICATIONS:  aspirin  chewable 81 milliGRAM(s) Oral daily  digoxin     Tablet 0.125 milliGRAM(s) Oral daily  furosemide    Tablet 20 milliGRAM(s) Oral daily  heparin  Injectable 5000 Unit(s) SubCutaneous every 12 hours  hydrALAZINE Injectable 10 milliGRAM(s) IV Push every 6 hours PRN  losartan 25 milliGRAM(s) Oral daily  metoprolol succinate ER 50 milliGRAM(s) Oral daily  cefTRIAXone   IVPB 1000 milliGRAM(s) IV Intermittent every 24 hours  donepezil 10 milliGRAM(s) Oral at bedtime  memantine 5 milliGRAM(s) Oral at bedtime  atorvastatin 10 milliGRAM(s) Oral at bedtime  insulin lispro (HumaLOG) corrective regimen sliding scale   SubCutaneous three times a day before meals  levothyroxine 100 MICROGram(s) Oral daily  dextrose 5%. 1000 milliLiter(s) IV Continuous <Continuous>  folic acid 1 milliGRAM(s) Oral daily  oxybutynin 5 milliGRAM(s) Oral daily    Review of Systems:  Constitutional: [ ] Fever [ ] Chills [ ] Fatigue [ ] Weight change   HEENT: [ ] Blurred vision [ ] Eye pain [ ] Headache [ ] Runny nose [ ] Sore throat   Respiratory: [ ] Cough [ ] Wheezing [ ] Shortness of breath  Cardiovascular: [ ] Chest Pain [ ] Palpitations [ ] REYES [ ] PND [ ] Orthopnea  Gastrointestinal: [ ] Abdominal Pain [ ] Diarrhea [ ] Constipation [ ] Hemorrhoids [ ] Nausea [ ] Vomiting  Genitourinary: [ ] Nocturia [ ] Dysuria [ ] Incontinence  Extremities: [ ] Swelling [ ] Joint Pain  Neurologic: [ ] Focal deficit [ ] Paresthesias [ ] Syncope  Skin: [ ] Rash [ ] Ecchymoses [ ] Wounds [ ] Lesions  Psychiatry: [ ] Depression [ ] Suicidal/Homicidal ideation [ ] Anxiety [ ] Sleep disturbances  [x] 10 point review of systems is otherwise negative except as mentioned above            [ ]Unable to obtain    PHYSICAL EXAM:  T(C): 36.8 (08-02-19 @ 13:15), Max: 36.9 (08-02-19 @ 06:20)  HR: 79 (08-02-19 @ 13:15) (71 - 90)  BP: 186/83 (08-02-19 @ 13:15) (151/61 - 194/75)  RR: 16 (08-02-19 @ 13:15) (16 - 18)  SpO2: 100% (08-02-19 @ 13:15) (98% - 100%)  Wt(kg): --  I&O's Summary    01 Aug 2019 07:01  -  02 Aug 2019 07:00  --------------------------------------------------------  IN: 500 mL / OUT: 1350 mL / NET: -850 mL      Appearance: No acute distress  HEENT:   Normal oral mucosa  Cardiovascular: Normal S1 S2, no elevated JVP  Respiratory: Lungs clear to auscultation	, good air movement  Psychiatry: Mood & affect appropriate  Gastrointestinal:  soft nt nd	  Skin: no cyanosis	  Extremities: no cyanosis  Vascular: Peripheral pulses palpable bilaterally    LABS:	 	  CBC Full  -  ( 02 Aug 2019 05:20 )  WBC Count : 7.23 K/uL  Hemoglobin : 12.0 g/dL  Hematocrit : 37.8 %  Platelet Count - Automated : 260 K/uL    08-02  136  |  99  |  13  ----------------------------<  152<H>  4.6   |  23  |  1.11    08-01  137  |  98  |  17  ----------------------------<  173<H>  4.0   |  25  |  1.29    Ca    10.6<H>      02 Aug 2019 05:20  Ca    10.1      01 Aug 2019 07:15  Mg     2.0     08-02  Mg     1.5     08-01    TPro  7.9  /  Alb  4.1  /  TBili  0.3  /  DBili  < 0.2  /  AST  15  /  ALT  9   /  AlkPhos  41  08-01  TPro  8.5<H>  /  Alb  4.5  /  TBili  0.2  /  DBili  x   /  AST  15  /  ALT  12  /  AlkPhos  42  07-31    proBNP:   Lipid Profile:   HgA1c: Hemoglobin A1C, Whole Blood: 6.6 % (08-01 @ 07:15)    TSH: Thyroid Stimulating Hormone, Serum: 0.37 uIU/mL (08-01 @ 07:15)    CARDIAC MARKERS:    Troponin T, High Sensitivity: 52 ng/L (08-01-19 @ 01:41)  CKMB: 3.07 ng/mL (08-01 @ 01:41)    CKMB Relative Index: Test not performed (08-01 @ 01:41)    PREVIOUS DIAGNOSTIC TESTING:    [x] Echocardiogram: < from: Transthoracic Echocardiogram (08.01.19 @ 16:48) >  CONCLUSIONS:  1. Mitral annular calcification, otherwise normal mitral  valve. Mild-moderate mitral regurgitation.  2. Calcified trileaflet aortic valve with normal opening.  Mild aortic regurgitation.  3. Normal left ventricular internal dimensions and wall  thicknesses.  4. Severe global left ventricular systolic dysfunction.  5. Normal right ventricular size and function.  6. Estimated right ventricular systolic pressure equals 41  mm Hg, assuming right atrial pressure equals 10 mm Hg,  consistent with mild pulmonary hypertension.  *** Compared with echocardiogram of 10/23/2013, left  ventricular systolic function has deteriorated  significantly.      [ ] Catheterization:   [ ] Stress Test:

## 2019-08-09 NOTE — PROGRESS NOTE ADULT - ASSESSMENT
82 y/o female with history of CAD s/p stents 2013, hypothyroidism, HLD, HTN, HLD, dementia (baseline AOx1) but will follow commands presents to the ED via family members for worsening urinary frequency as well as more frequent falls over the last 2 weeks. Found in the ED to have a positive UA, acute kidney injury,  hypercalcemia       ·  Problem: Acute cystitis without hematuria.    was on macrobid   id eval noted  s/p iv abs     urinary retention  uro eval noted  failed tov  likely will need coelho as outpt    bladder mass  urology aware and state outpt f/u  discussed situation with 3 family members at bedside and need for outpt f/u w/ bx in urology office   all understand  that need to take pt to urologist to eval bladder mass  all risks discussed        ·  Problem: Frequent falls.  CT unremarkable   cards eval noted  -CTH with no bleed, no seizure history   -PT/OT       ·  Problem: Weakness.    possibly metabolic/infectious related to UTI.       ·  Problem: Hypertension,improved overall  on mult meds  renal eval noted          ·  Problem: Coronary artery disease involving native heart,  hx stents in the past and currently on aspirin  -cards eval noted     Problem: Acute kidney injury.  creat improved      dm  c/w meds   fsg riss/LANTUS/HUMALOG  endo eval noted    discussed w/ family at bedside  d/c planning /w services

## 2019-08-09 NOTE — DIETITIAN INITIAL EVALUATION ADULT. - DIET TYPE
dysphagia 3, soft, honey consistency fluid/Add Glucerna Therapeutic Nutrition Shake 240mls 2x daily (440kcals, 20g protein)

## 2019-08-09 NOTE — DIETITIAN INITIAL EVALUATION ADULT. - REASON INDICATOR FOR ASSESSMENT
Initial Dietitian Evaluation 2/2 to extended length of stay. Nutrition consult entered 8/8 re: education.

## 2019-08-10 LAB
-  AMPICILLIN: SIGNIFICANT CHANGE UP
-  CIPROFLOXACIN: SIGNIFICANT CHANGE UP
-  LINEZOLID: SIGNIFICANT CHANGE UP
-  NITROFURANTOIN: SIGNIFICANT CHANGE UP
-  TETRACYCLINE: SIGNIFICANT CHANGE UP
-  VANCOMYCIN: SIGNIFICANT CHANGE UP
BACTERIA UR CULT: SIGNIFICANT CHANGE UP
METHOD TYPE: SIGNIFICANT CHANGE UP
ORGANISM # SPEC MICROSCOPIC CNT: SIGNIFICANT CHANGE UP

## 2019-08-14 ENCOUNTER — EMERGENCY (EMERGENCY)
Facility: HOSPITAL | Age: 84
LOS: 1 days | Discharge: HOME | End: 2019-08-14
Attending: EMERGENCY MEDICINE | Admitting: EMERGENCY MEDICINE
Payer: MEDICARE

## 2019-08-14 VITALS
HEART RATE: 58 BPM | TEMPERATURE: 97 F | OXYGEN SATURATION: 100 % | RESPIRATION RATE: 16 BRPM | SYSTOLIC BLOOD PRESSURE: 113 MMHG | DIASTOLIC BLOOD PRESSURE: 53 MMHG

## 2019-08-14 PROCEDURE — 99283 EMERGENCY DEPT VISIT LOW MDM: CPT | Mod: GC

## 2019-08-15 ENCOUNTER — APPOINTMENT (OUTPATIENT)
Dept: UROLOGY | Facility: CLINIC | Age: 84
End: 2019-08-15
Payer: MEDICARE

## 2019-08-15 VITALS
OXYGEN SATURATION: 100 % | SYSTOLIC BLOOD PRESSURE: 141 MMHG | TEMPERATURE: 98 F | HEART RATE: 66 BPM | RESPIRATION RATE: 16 BRPM | DIASTOLIC BLOOD PRESSURE: 44 MMHG

## 2019-08-15 VITALS
SYSTOLIC BLOOD PRESSURE: 126 MMHG | HEIGHT: 61 IN | HEART RATE: 72 BPM | BODY MASS INDEX: 22.66 KG/M2 | WEIGHT: 120 LBS | DIASTOLIC BLOOD PRESSURE: 64 MMHG

## 2019-08-15 DIAGNOSIS — N32.89 OTHER SPECIFIED DISORDERS OF BLADDER: ICD-10-CM

## 2019-08-15 LAB
ANION GAP SERPL CALC-SCNC: 12 MMO/L — SIGNIFICANT CHANGE UP (ref 7–14)
APPEARANCE UR: CLEAR — SIGNIFICANT CHANGE UP
BACTERIA # UR AUTO: NEGATIVE — SIGNIFICANT CHANGE UP
BILIRUB UR-MCNC: NEGATIVE — SIGNIFICANT CHANGE UP
BLOOD UR QL VISUAL: NEGATIVE — SIGNIFICANT CHANGE UP
BUN SERPL-MCNC: 45 MG/DL — HIGH (ref 7–23)
CALCIUM SERPL-MCNC: 9.9 MG/DL — SIGNIFICANT CHANGE UP (ref 8.4–10.5)
CHLORIDE SERPL-SCNC: 98 MMOL/L — SIGNIFICANT CHANGE UP (ref 98–107)
CO2 SERPL-SCNC: 25 MMOL/L — SIGNIFICANT CHANGE UP (ref 22–31)
COLOR SPEC: SIGNIFICANT CHANGE UP
CREAT SERPL-MCNC: 1.86 MG/DL — HIGH (ref 0.5–1.3)
GLUCOSE SERPL-MCNC: 83 MG/DL — SIGNIFICANT CHANGE UP (ref 70–99)
GLUCOSE UR-MCNC: NEGATIVE — SIGNIFICANT CHANGE UP
HYALINE CASTS # UR AUTO: NEGATIVE — SIGNIFICANT CHANGE UP
KETONES UR-MCNC: NEGATIVE — SIGNIFICANT CHANGE UP
LEUKOCYTE ESTERASE UR-ACNC: SIGNIFICANT CHANGE UP
MAGNESIUM SERPL-MCNC: 2.6 MG/DL — SIGNIFICANT CHANGE UP (ref 1.6–2.6)
NITRITE UR-MCNC: NEGATIVE — SIGNIFICANT CHANGE UP
PH UR: 6 — SIGNIFICANT CHANGE UP (ref 5–8)
PHOSPHATE SERPL-MCNC: 4.9 MG/DL — HIGH (ref 2.5–4.5)
POTASSIUM SERPL-MCNC: 4.2 MMOL/L — SIGNIFICANT CHANGE UP (ref 3.5–5.3)
POTASSIUM SERPL-SCNC: 4.2 MMOL/L — SIGNIFICANT CHANGE UP (ref 3.5–5.3)
PROT UR-MCNC: NEGATIVE — SIGNIFICANT CHANGE UP
RBC CASTS # UR COMP ASSIST: SIGNIFICANT CHANGE UP (ref 0–?)
SODIUM SERPL-SCNC: 135 MMOL/L — SIGNIFICANT CHANGE UP (ref 135–145)
SP GR SPEC: 1.01 — SIGNIFICANT CHANGE UP (ref 1–1.04)
SQUAMOUS # UR AUTO: SIGNIFICANT CHANGE UP
UROBILINOGEN FLD QL: NORMAL — SIGNIFICANT CHANGE UP
WBC UR QL: HIGH (ref 0–?)

## 2019-08-15 PROCEDURE — 99203 OFFICE O/P NEW LOW 30 MIN: CPT

## 2019-08-15 RX ORDER — CEPHALEXIN 500 MG
1 CAPSULE ORAL
Qty: 14 | Refills: 0
Start: 2019-08-15 | End: 2019-08-21

## 2019-08-15 RX ORDER — CEPHALEXIN 500 MG
500 CAPSULE ORAL ONCE
Refills: 0 | Status: COMPLETED | OUTPATIENT
Start: 2019-08-15 | End: 2019-08-15

## 2019-08-15 RX ADMIN — Medication 500 MILLIGRAM(S): at 04:43

## 2019-08-15 NOTE — ED ADULT NURSE NOTE - INTERVENTIONS DEFINITIONS
Reinforce activity limits and safety measures with patient and family/Instruct patient to call for assistance/Non-slip footwear when patient is off stretcher/Physically safe environment: no spills, clutter or unnecessary equipment/Stretcher in lowest position, wheels locked, appropriate side rails in place/Monitor gait and stability

## 2019-08-15 NOTE — ED PROVIDER NOTE - NSFOLLOWUPCLINICS_GEN_ALL_ED_FT
[facility] - Urology  Urology  [address], 3rd & 4th [place] [address]  Phone: [phone]  Fax:   Follow Up Time:     [facility]  Urology  [address] [place] [address] - 3rd [place] [address]  Phone: [phone]  Fax:   Follow Up Time:

## 2019-08-15 NOTE — ED ADULT NURSE NOTE - OBJECTIVE STATEMENT
Sally RN - pt rcvd to rm 1 - brought in by family from home for c/o urinary retention, s/p self-pulling out indwelling urinary catheter (from 8/8) placed during recent hospital admission. Pt was diagnosed during last admission w/ UTI, bladder mass, and urinary retention, dc'd to home w/ catheter awaiting urology follow up (has appt later today 8/15).  Catheter removed around 4pm yesterday, since then only small amts of urine voided, pt w/ increasing restlessness, discomfort per family at bedside.  PMHx Dementia, per family is at her normal mental baseline, no documented fevers at home and finished antibiotics last week.  MD Motley at bedside confirmed large amount of urine in bladder, 12 [place] urinary catheter placed under sterile technique, pt tolerated well, draining clear yellow urine ~200cc immediately to drainage bag at bedside.  Pt vitals stable, reports improvement to lower abdominal pain.  [place] for labs as ordered by MD.  Family @ bedside, fall precautions in place, pending further eval and lab results.  Handoff to primary RN Stephania.

## 2019-08-15 NOTE — ED PROVIDER NOTE - ATTENDING CONTRIBUTION TO CARE
82 y/o F with h/o HTN, hyperlipidemia, DM, CAD, recent hospitalization s/p fall, found to have a bladder mass at the time and discharged with indwelling coelho catheter here with urinary retention.  Per family, pt pulled out her coelho on Tuesday.  Initially she was able to urinate a little bit, so they did not seek medical care, but today she has not urinated much.  Tonight pt c/o lower abd pressure and feeling like she needs to urinate, but unable to.  No fever, chills, vomiting, back pain.  Chronically ill appearing, elderly female, lying comfortably in stretcher, awake and alert, nontoxic.  VSS.  Lungs cta bl.  Cards nl S1/S2, RRR, no MRG.  Abd soft ntnd.  No cva tenderness.  Coelho placed with clear UOP appprox 1L.  Noted LE given recent instrumentation will start keflex, pt has outpatient  follow-up today.

## 2019-08-15 NOTE — ED PROVIDER NOTE - PHYSICAL EXAMINATION
Gen: NAD  Head: NCAT  HEENT: PERRL, MMM, normal conjunctiva, anicteric, neck supple  Lung: CTAB, no adventitious sounds  CV: RRR, no murmurs, rubs or gallops  Abd: distended lower abdomen, no rebound or guarding, no CVAT  MSK: No edema, no visible deformities  Neuro: No focal neurologic deficits. CN II-XII grossly intact. 5/5 strength and normal sensation in all extremities.  Skin: Warm and dry, no evidence of rash  Psych: normal mood and affect

## 2019-08-15 NOTE — ED ADULT NURSE NOTE - CHIEF COMPLAINT QUOTE
urinary retention since this evening s/p pulling out her coelho catheter at home yesterday. No bleeding as per family. Patient at her baseline as per family.  currently in NAD. Patient discharged from hospital x 6 days prior. Hx Dementia, CKD, DM, HTN

## 2019-08-15 NOTE — REVIEW OF SYSTEMS
[see HPI] : see HPI [Urine retention] : urine retention [Wait a long time to urinate] : waits a long time to urinate [Negative] : Heme/Lymph

## 2019-08-15 NOTE — ED PROVIDER NOTE - PROGRESS NOTE DETAILS
bedside ultrasound showing distended bladder. coelho placed, draining 200cc right away. will continue to monitor. Pt feels better. US drained 1L for the last few hours, bladder now emptied. Will DC with f/u urology today 8/15

## 2019-08-15 NOTE — ED PROVIDER NOTE - NSFOLLOWUPINSTRUCTIONS_ED_ALL_ED_FT
You were seen in the ER for urinary retention.  New coelho was placed.  You were given keflex.  Please follow up with your urologist.  Return to ER for life threatening emergencies.

## 2019-08-15 NOTE — ED PROVIDER NOTE - OBJECTIVE STATEMENT
84yo F with hx of bladder mass (requiring coelho since 8/8) presents with urinary retention. Had coelho placed after admission for falls 8/1 - 8/9. Pulled out coelho 8/13 4pm, has been dribbling until 9 hours before presentation. Patient has lower abdominal pain. Not taking antibiotics currently.

## 2019-08-16 LAB — SPECIMEN SOURCE: SIGNIFICANT CHANGE UP

## 2019-08-17 LAB
-  AMPICILLIN: SIGNIFICANT CHANGE UP
-  CIPROFLOXACIN: SIGNIFICANT CHANGE UP
-  NITROFURANTOIN: SIGNIFICANT CHANGE UP
-  TETRACYCLINE: SIGNIFICANT CHANGE UP
-  VANCOMYCIN: SIGNIFICANT CHANGE UP
BACTERIA UR CULT: SIGNIFICANT CHANGE UP
METHOD TYPE: SIGNIFICANT CHANGE UP
ORGANISM # SPEC MICROSCOPIC CNT: SIGNIFICANT CHANGE UP
ORGANISM # SPEC MICROSCOPIC CNT: SIGNIFICANT CHANGE UP

## 2019-08-18 NOTE — ED POST DISCHARGE NOTE - RESULT SUMMARY
UCX : Enterococcus faecalis > 100,000 Patient discharged home with a prescription for Keflex which is resistant. Patient contact # [phone] S/W patient's daughter in law discussed with dennis in law UCX results and need to switch Abx to Augmentin BID X 7 days and that patient needs to follow up with MD for repeat UA/UCX. Discussed with patient need to return to ED if symptoms don't continue to improve or recur or develops any new or worsening symptoms that are of concern.

## 2019-08-21 ENCOUNTER — APPOINTMENT (OUTPATIENT)
Dept: UROLOGY | Facility: CLINIC | Age: 84
End: 2019-08-21
Payer: MEDICARE

## 2019-08-21 VITALS
HEIGHT: 61 IN | HEART RATE: 61 BPM | DIASTOLIC BLOOD PRESSURE: 66 MMHG | RESPIRATION RATE: 14 BRPM | WEIGHT: 120 LBS | BODY MASS INDEX: 22.66 KG/M2 | SYSTOLIC BLOOD PRESSURE: 137 MMHG | TEMPERATURE: 97.3 F

## 2019-08-21 DIAGNOSIS — N32.9 BLADDER DISORDER, UNSPECIFIED: ICD-10-CM

## 2019-08-21 PROCEDURE — 52000 CYSTOURETHROSCOPY: CPT

## 2019-09-02 PROBLEM — N32.89 BLADDER MASS: Status: ACTIVE | Noted: 2019-09-02

## 2019-09-02 NOTE — HISTORY OF PRESENT ILLNESS
[FreeTextEntry1] : cc urinary retention \par 84 yo fem recent admission after fall\par had retention coelho pleced \par failed voiding trial \par pt with dementia \par sono showed possible bladder mass\par

## 2019-09-25 ENCOUNTER — APPOINTMENT (OUTPATIENT)
Dept: UROLOGY | Facility: CLINIC | Age: 84
End: 2019-09-25
Payer: MEDICARE

## 2019-09-25 PROCEDURE — 99213 OFFICE O/P EST LOW 20 MIN: CPT | Mod: 25

## 2019-09-25 PROCEDURE — 51700 IRRIGATION OF BLADDER: CPT

## 2019-09-25 NOTE — HISTORY OF PRESENT ILLNESS
[FreeTextEntry1] : cc urinary retention \par 84 yo fem recent admission after fall\par had retention coelho pleced \par failed voiding trial \par pt with dementia \par sono showed possible bladder mass cysto no mass\par

## 2019-09-25 NOTE — PHYSICAL EXAM
[General Appearance - In No Acute Distress] : no acute distress [Abdomen Soft] : soft [Abdomen Tenderness] : non-tender [Abdomen Mass (___ Cm)] : no abdominal mass palpated [Urethral Meatus] : normal urethra [Urinary Bladder Findings] : the bladder was normal on palpation [FreeTextEntry1] : coelho clear urine

## 2019-09-25 NOTE — ASSESSMENT
[FreeTextEntry1] : badder fileld via coelho with water \par coelho remove and pt voided pve minimal \par will observe \par f/u check pvr few weeks

## 2019-10-10 ENCOUNTER — APPOINTMENT (OUTPATIENT)
Dept: UROLOGY | Facility: CLINIC | Age: 84
End: 2019-10-10
Payer: MEDICARE

## 2019-10-10 VITALS
HEIGHT: 61 IN | BODY MASS INDEX: 22.66 KG/M2 | SYSTOLIC BLOOD PRESSURE: 154 MMHG | DIASTOLIC BLOOD PRESSURE: 69 MMHG | HEART RATE: 79 BPM | WEIGHT: 120 LBS

## 2019-10-10 DIAGNOSIS — R33.9 RETENTION OF URINE, UNSPECIFIED: ICD-10-CM

## 2019-10-10 PROCEDURE — 99213 OFFICE O/P EST LOW 20 MIN: CPT

## 2019-10-10 PROCEDURE — 51798 US URINE CAPACITY MEASURE: CPT

## 2019-10-10 NOTE — HISTORY OF PRESENT ILLNESS
[FreeTextEntry1] : cc urinary retention \par 84 yo fem recent admission after fall\par had retention coelho pleced \par failed voiding trial \par pt with dementia \par sono showed possible bladder mass cysto no mass\par last visit passed voidng trial \par doing well\par no voiding compalints\par

## 2019-10-10 NOTE — PHYSICAL EXAM
[General Appearance - In No Acute Distress] : no acute distress [Abdomen Soft] : soft [Abdomen Tenderness] : non-tender [Costovertebral Angle Tenderness] : no ~M costovertebral angle tenderness [Urinary Bladder Findings] : the bladder was normal on palpation

## 2020-01-17 NOTE — PHYSICAL THERAPY INITIAL EVALUATION ADULT - ADDITIONAL COMMENTS
2020  EMPLOYEE INFORMATION: EMPLOYER INFORMATION:   NAME: Yanelis Young  POST OFFICE ( ALL SITES)   : 1968 [phone]   DATE OF INJURY/EVENT: 2019          Location: [facility]   Treating Provider: LITTLE Carlson  Time In:  1:18 PM Time Out:  2:00 PM      DIAGNOSIS: No diagnosis found.  STATUS: This injury is determined to be WORK RELATED.   RETURN TO WORK:   Employee may return to work without restrictions.  Employee is discharged from care   Return Date: 2020       Reached maximum medical improvement as of 2020    RESTRICTIONS:   No Restrictions  Restrictions are to be followed at work and at home  Restrictions are in effect until next follow-up visit  Diagnostic Testing:   None  ANTICIPATED MAXIMUM MEDICAL IMPROVEMENT:  discharged  TREATMENT PLAN:  Call with any worsening symptoms.   FOLLOW UP:  No follow-ups on file.    Thank you for the privilege of providing medical care for this injury/condition.  If there are any questions, please call the clinic at Dept: [phone].    Electronically signed on 2020 by:   LITTLE Carlson  [facility]  [address] [place] [id number]  Dept: [phone]  Dept Fax: [phone]      The physician below agrees with the plan and restrictions placed on the patient by the provider above.  Kamron Mccann MD       Patient lives in a home with son; with 8 hours of home health aide. Patient has assistance at home at all times

## 2020-01-31 ENCOUNTER — INPATIENT (INPATIENT)
Facility: HOSPITAL | Age: 85
LOS: 5 days | Discharge: HOME | DRG: 271 | End: 2020-02-06
Attending: INTERNAL MEDICINE | Admitting: INTERNAL MEDICINE
Payer: MEDICARE

## 2020-01-31 VITALS
SYSTOLIC BLOOD PRESSURE: 171 MMHG | WEIGHT: 139.99 LBS | DIASTOLIC BLOOD PRESSURE: 75 MMHG | RESPIRATION RATE: 16 BRPM | TEMPERATURE: 99 F | OXYGEN SATURATION: 100 % | HEART RATE: 72 BPM | HEIGHT: 63 IN

## 2020-01-31 DIAGNOSIS — I44.7 LEFT BUNDLE-BRANCH BLOCK, UNSPECIFIED: ICD-10-CM

## 2020-01-31 DIAGNOSIS — I42.9 CARDIOMYOPATHY, UNSPECIFIED: ICD-10-CM

## 2020-01-31 DIAGNOSIS — I50.1 LEFT VENTRICULAR FAILURE, UNSPECIFIED: ICD-10-CM

## 2020-01-31 LAB
ALBUMIN SERPL ELPH-MCNC: 3.5 G/DL — SIGNIFICANT CHANGE UP (ref 3.3–5)
ALBUMIN SERPL ELPH-MCNC: 3.6 G/DL — SIGNIFICANT CHANGE UP (ref 3.3–5)
ALBUMIN SERPL ELPH-MCNC: 4 G/DL — SIGNIFICANT CHANGE UP (ref 3.3–5)
ALP SERPL-CCNC: 75 U/L — SIGNIFICANT CHANGE UP (ref 40–120)
ALP SERPL-CCNC: 76 U/L — SIGNIFICANT CHANGE UP (ref 40–120)
ALP SERPL-CCNC: 83 U/L — SIGNIFICANT CHANGE UP (ref 40–120)
ALT FLD-CCNC: 13 U/L — SIGNIFICANT CHANGE UP (ref 10–45)
ALT FLD-CCNC: 18 U/L — SIGNIFICANT CHANGE UP (ref 10–45)
ALT FLD-CCNC: 33 U/L — SIGNIFICANT CHANGE UP (ref 10–45)
ANION GAP SERPL CALC-SCNC: 13 MMOL/L — SIGNIFICANT CHANGE UP (ref 5–17)
ANION GAP SERPL CALC-SCNC: 14 MMOL/L — SIGNIFICANT CHANGE UP (ref 5–17)
ANION GAP SERPL CALC-SCNC: 23 MMOL/L — HIGH (ref 5–17)
APTT BLD: 26.6 SEC — LOW (ref 27.5–36.3)
AST SERPL-CCNC: 13 U/L — SIGNIFICANT CHANGE UP (ref 10–40)
AST SERPL-CCNC: 200 U/L — HIGH (ref 10–40)
AST SERPL-CCNC: 24 U/L — SIGNIFICANT CHANGE UP (ref 10–40)
BILIRUB SERPL-MCNC: 0.2 MG/DL — SIGNIFICANT CHANGE UP (ref 0.2–1.2)
BILIRUB SERPL-MCNC: 0.3 MG/DL — SIGNIFICANT CHANGE UP (ref 0.2–1.2)
BILIRUB SERPL-MCNC: 0.4 MG/DL — SIGNIFICANT CHANGE UP (ref 0.2–1.2)
BLD GP AB SCN SERPL QL: NEGATIVE — SIGNIFICANT CHANGE UP
BUN SERPL-MCNC: 36 MG/DL — HIGH (ref 7–23)
BUN SERPL-MCNC: 38 MG/DL — HIGH (ref 7–23)
BUN SERPL-MCNC: 41 MG/DL — HIGH (ref 7–23)
CALCIUM SERPL-MCNC: 9 MG/DL — SIGNIFICANT CHANGE UP (ref 8.4–10.5)
CALCIUM SERPL-MCNC: 9.3 MG/DL — SIGNIFICANT CHANGE UP (ref 8.4–10.5)
CALCIUM SERPL-MCNC: 9.8 MG/DL — SIGNIFICANT CHANGE UP (ref 8.4–10.5)
CHLORIDE SERPL-SCNC: 97 MMOL/L — SIGNIFICANT CHANGE UP (ref 96–108)
CHLORIDE SERPL-SCNC: 98 MMOL/L — SIGNIFICANT CHANGE UP (ref 96–108)
CHLORIDE SERPL-SCNC: 99 MMOL/L — SIGNIFICANT CHANGE UP (ref 96–108)
CHOLEST SERPL-MCNC: 104 MG/DL — SIGNIFICANT CHANGE UP (ref 10–199)
CK MB BLD-MCNC: 2.6 % — SIGNIFICANT CHANGE UP (ref 0–3.5)
CK MB BLD-MCNC: 5.7 % — HIGH (ref 0–3.5)
CK MB CFR SERPL CALC: 147.2 NG/ML — HIGH (ref 0–3.8)
CK MB CFR SERPL CALC: 5.2 NG/ML — HIGH (ref 0–3.8)
CK SERPL-CCNC: 203 U/L — HIGH (ref 25–170)
CK SERPL-CCNC: 2574 U/L — HIGH (ref 25–170)
CO2 SERPL-SCNC: 12 MMOL/L — LOW (ref 22–31)
CO2 SERPL-SCNC: 21 MMOL/L — LOW (ref 22–31)
CO2 SERPL-SCNC: 26 MMOL/L — SIGNIFICANT CHANGE UP (ref 22–31)
CREAT SERPL-MCNC: 1.72 MG/DL — HIGH (ref 0.5–1.3)
CREAT SERPL-MCNC: 1.82 MG/DL — HIGH (ref 0.5–1.3)
CREAT SERPL-MCNC: 2.11 MG/DL — HIGH (ref 0.5–1.3)
GLUCOSE BLDC GLUCOMTR-MCNC: 163 MG/DL — HIGH (ref 70–99)
GLUCOSE BLDC GLUCOMTR-MCNC: 206 MG/DL — HIGH (ref 70–99)
GLUCOSE BLDC GLUCOMTR-MCNC: 277 MG/DL — HIGH (ref 70–99)
GLUCOSE SERPL-MCNC: 165 MG/DL — HIGH (ref 70–99)
GLUCOSE SERPL-MCNC: 199 MG/DL — HIGH (ref 70–99)
GLUCOSE SERPL-MCNC: 254 MG/DL — HIGH (ref 70–99)
HBA1C BLD-MCNC: 8.4 % — HIGH (ref 4–5.6)
HCT VFR BLD CALC: 29.8 % — LOW (ref 34.5–45)
HCT VFR BLD CALC: 31.3 % — LOW (ref 34.5–45)
HCT VFR BLD CALC: 33.5 % — LOW (ref 34.5–45)
HDLC SERPL-MCNC: 28 MG/DL — LOW
HGB BLD-MCNC: 10.4 G/DL — LOW (ref 11.5–15.5)
HGB BLD-MCNC: 9.2 G/DL — LOW (ref 11.5–15.5)
HGB BLD-MCNC: 9.8 G/DL — LOW (ref 11.5–15.5)
INR BLD: 1.09 RATIO — SIGNIFICANT CHANGE UP (ref 0.88–1.16)
LACTATE SERPL-SCNC: 2.4 MMOL/L — HIGH (ref 0.7–2)
LIPID PNL WITH DIRECT LDL SERPL: 27 MG/DL — SIGNIFICANT CHANGE UP
MAGNESIUM SERPL-MCNC: 2 MG/DL — SIGNIFICANT CHANGE UP (ref 1.6–2.6)
MAGNESIUM SERPL-MCNC: 2 MG/DL — SIGNIFICANT CHANGE UP (ref 1.6–2.6)
MCHC RBC-ENTMCNC: 27.1 PG — SIGNIFICANT CHANGE UP (ref 27–34)
MCHC RBC-ENTMCNC: 27.7 PG — SIGNIFICANT CHANGE UP (ref 27–34)
MCHC RBC-ENTMCNC: 27.8 PG — SIGNIFICANT CHANGE UP (ref 27–34)
MCHC RBC-ENTMCNC: 30.9 GM/DL — LOW (ref 32–36)
MCHC RBC-ENTMCNC: 31 GM/DL — LOW (ref 32–36)
MCHC RBC-ENTMCNC: 31.3 GM/DL — LOW (ref 32–36)
MCV RBC AUTO: 87.6 FL — SIGNIFICANT CHANGE UP (ref 80–100)
MCV RBC AUTO: 88.9 FL — SIGNIFICANT CHANGE UP (ref 80–100)
MCV RBC AUTO: 89.3 FL — SIGNIFICANT CHANGE UP (ref 80–100)
NRBC # BLD: 0 /100 WBCS — SIGNIFICANT CHANGE UP (ref 0–0)
PHOSPHATE SERPL-MCNC: 3.8 MG/DL — SIGNIFICANT CHANGE UP (ref 2.5–4.5)
PHOSPHATE SERPL-MCNC: 4.7 MG/DL — HIGH (ref 2.5–4.5)
PLATELET # BLD AUTO: 192 K/UL — SIGNIFICANT CHANGE UP (ref 150–400)
PLATELET # BLD AUTO: 220 K/UL — SIGNIFICANT CHANGE UP (ref 150–400)
PLATELET # BLD AUTO: 225 K/UL — SIGNIFICANT CHANGE UP (ref 150–400)
POTASSIUM SERPL-MCNC: 4.6 MMOL/L — SIGNIFICANT CHANGE UP (ref 3.5–5.3)
POTASSIUM SERPL-MCNC: 4.8 MMOL/L — SIGNIFICANT CHANGE UP (ref 3.5–5.3)
POTASSIUM SERPL-MCNC: 4.8 MMOL/L — SIGNIFICANT CHANGE UP (ref 3.5–5.3)
POTASSIUM SERPL-SCNC: 4.6 MMOL/L — SIGNIFICANT CHANGE UP (ref 3.5–5.3)
POTASSIUM SERPL-SCNC: 4.8 MMOL/L — SIGNIFICANT CHANGE UP (ref 3.5–5.3)
POTASSIUM SERPL-SCNC: 4.8 MMOL/L — SIGNIFICANT CHANGE UP (ref 3.5–5.3)
PROT SERPL-MCNC: 7.6 G/DL — SIGNIFICANT CHANGE UP (ref 6–8.3)
PROT SERPL-MCNC: 7.8 G/DL — SIGNIFICANT CHANGE UP (ref 6–8.3)
PROT SERPL-MCNC: 8.3 G/DL — SIGNIFICANT CHANGE UP (ref 6–8.3)
PROTHROM AB SERPL-ACNC: 12.5 SEC — SIGNIFICANT CHANGE UP (ref 10–12.9)
RBC # BLD: 3.4 M/UL — LOW (ref 3.8–5.2)
RBC # BLD: 3.52 M/UL — LOW (ref 3.8–5.2)
RBC # BLD: 3.75 M/UL — LOW (ref 3.8–5.2)
RBC # FLD: 14 % — SIGNIFICANT CHANGE UP (ref 10.3–14.5)
RBC # FLD: 14.1 % — SIGNIFICANT CHANGE UP (ref 10.3–14.5)
RBC # FLD: 14.2 % — SIGNIFICANT CHANGE UP (ref 10.3–14.5)
RH IG SCN BLD-IMP: POSITIVE — SIGNIFICANT CHANGE UP
SODIUM SERPL-SCNC: 132 MMOL/L — LOW (ref 135–145)
SODIUM SERPL-SCNC: 133 MMOL/L — LOW (ref 135–145)
SODIUM SERPL-SCNC: 138 MMOL/L — SIGNIFICANT CHANGE UP (ref 135–145)
TOTAL CHOLESTEROL/HDL RATIO MEASUREMENT: 3.7 RATIO — SIGNIFICANT CHANGE UP (ref 3.3–7.1)
TRIGL SERPL-MCNC: 244 MG/DL — HIGH (ref 10–149)
TROPONIN T, HIGH SENSITIVITY RESULT: 3258 NG/L — HIGH (ref 0–51)
TROPONIN T, HIGH SENSITIVITY RESULT: 61 NG/L — HIGH (ref 0–51)
TSH SERPL-MCNC: 2.66 UIU/ML — SIGNIFICANT CHANGE UP (ref 0.27–4.2)
WBC # BLD: 10.36 K/UL — SIGNIFICANT CHANGE UP (ref 3.8–10.5)
WBC # BLD: 11.61 K/UL — HIGH (ref 3.8–10.5)
WBC # BLD: 17.65 K/UL — HIGH (ref 3.8–10.5)
WBC # FLD AUTO: 10.36 K/UL — SIGNIFICANT CHANGE UP (ref 3.8–10.5)
WBC # FLD AUTO: 11.61 K/UL — HIGH (ref 3.8–10.5)
WBC # FLD AUTO: 17.65 K/UL — HIGH (ref 3.8–10.5)

## 2020-01-31 PROCEDURE — 33967 INSERT I-AORT PERCUT DEVICE: CPT

## 2020-01-31 PROCEDURE — 71045 X-RAY EXAM CHEST 1 VIEW: CPT | Mod: 26

## 2020-01-31 PROCEDURE — 93308 TTE F-UP OR LMTD: CPT | Mod: 26

## 2020-01-31 PROCEDURE — 93454 CORONARY ARTERY ANGIO S&I: CPT | Mod: 26,59

## 2020-01-31 PROCEDURE — 92933 PRQ TRLML C ATHRC ST ANGIOP1: CPT | Mod: LD

## 2020-01-31 PROCEDURE — 92921: CPT | Mod: LD

## 2020-01-31 PROCEDURE — 93010 ELECTROCARDIOGRAM REPORT: CPT

## 2020-01-31 PROCEDURE — 93321 DOPPLER ECHO F-UP/LMTD STD: CPT | Mod: 26

## 2020-01-31 PROCEDURE — 99152 MOD SED SAME PHYS/QHP 5/>YRS: CPT

## 2020-01-31 RX ORDER — DIGOXIN 250 MCG
0.12 TABLET ORAL DAILY
Refills: 0 | Status: DISCONTINUED | OUTPATIENT
Start: 2020-01-31 | End: 2020-02-06

## 2020-01-31 RX ORDER — SODIUM CHLORIDE 9 MG/ML
1000 INJECTION INTRAMUSCULAR; INTRAVENOUS; SUBCUTANEOUS
Refills: 0 | Status: DISCONTINUED | OUTPATIENT
Start: 2020-01-31 | End: 2020-02-03

## 2020-01-31 RX ORDER — GLUCAGON INJECTION, SOLUTION 0.5 MG/.1ML
1 INJECTION, SOLUTION SUBCUTANEOUS ONCE
Refills: 0 | Status: DISCONTINUED | OUTPATIENT
Start: 2020-01-31 | End: 2020-02-06

## 2020-01-31 RX ORDER — FUROSEMIDE 40 MG
20 TABLET ORAL DAILY
Refills: 0 | Status: DISCONTINUED | OUTPATIENT
Start: 2020-01-31 | End: 2020-02-02

## 2020-01-31 RX ORDER — SODIUM CHLORIDE 9 MG/ML
1000 INJECTION, SOLUTION INTRAVENOUS
Refills: 0 | Status: DISCONTINUED | OUTPATIENT
Start: 2020-01-31 | End: 2020-02-06

## 2020-01-31 RX ORDER — AMLODIPINE BESYLATE 2.5 MG/1
5 TABLET ORAL AT BEDTIME
Refills: 0 | Status: DISCONTINUED | OUTPATIENT
Start: 2020-01-31 | End: 2020-02-02

## 2020-01-31 RX ORDER — HYDRALAZINE HCL 50 MG
50 TABLET ORAL THREE TIMES A DAY
Refills: 0 | Status: DISCONTINUED | OUTPATIENT
Start: 2020-01-31 | End: 2020-02-01

## 2020-01-31 RX ORDER — DEXTROSE 50 % IN WATER 50 %
25 SYRINGE (ML) INTRAVENOUS ONCE
Refills: 0 | Status: DISCONTINUED | OUTPATIENT
Start: 2020-01-31 | End: 2020-02-06

## 2020-01-31 RX ORDER — DONEPEZIL HYDROCHLORIDE 10 MG/1
10 TABLET, FILM COATED ORAL AT BEDTIME
Refills: 0 | Status: DISCONTINUED | OUTPATIENT
Start: 2020-01-31 | End: 2020-02-06

## 2020-01-31 RX ORDER — MEMANTINE HYDROCHLORIDE 10 MG/1
5 TABLET ORAL AT BEDTIME
Refills: 0 | Status: DISCONTINUED | OUTPATIENT
Start: 2020-01-31 | End: 2020-02-06

## 2020-01-31 RX ORDER — VALSARTAN 80 MG/1
320 TABLET ORAL DAILY
Refills: 0 | Status: DISCONTINUED | OUTPATIENT
Start: 2020-01-31 | End: 2020-01-31

## 2020-01-31 RX ORDER — INSULIN LISPRO 100/ML
3 VIAL (ML) SUBCUTANEOUS ONCE
Refills: 0 | Status: COMPLETED | OUTPATIENT
Start: 2020-01-31 | End: 2020-01-31

## 2020-01-31 RX ORDER — ATORVASTATIN CALCIUM 80 MG/1
40 TABLET, FILM COATED ORAL AT BEDTIME
Refills: 0 | Status: DISCONTINUED | OUTPATIENT
Start: 2020-01-31 | End: 2020-02-06

## 2020-01-31 RX ORDER — METOPROLOL TARTRATE 50 MG
50 TABLET ORAL DAILY
Refills: 0 | Status: DISCONTINUED | OUTPATIENT
Start: 2020-01-31 | End: 2020-01-31

## 2020-01-31 RX ORDER — LEVOTHYROXINE SODIUM 125 MCG
88 TABLET ORAL DAILY
Refills: 0 | Status: DISCONTINUED | OUTPATIENT
Start: 2020-01-31 | End: 2020-02-06

## 2020-01-31 RX ORDER — INSULIN LISPRO 100/ML
VIAL (ML) SUBCUTANEOUS AT BEDTIME
Refills: 0 | Status: DISCONTINUED | OUTPATIENT
Start: 2020-01-31 | End: 2020-02-02

## 2020-01-31 RX ORDER — ACETAMINOPHEN 500 MG
650 TABLET ORAL ONCE
Refills: 0 | Status: COMPLETED | OUTPATIENT
Start: 2020-01-31 | End: 2020-01-31

## 2020-01-31 RX ORDER — INSULIN LISPRO 100/ML
VIAL (ML) SUBCUTANEOUS
Refills: 0 | Status: DISCONTINUED | OUTPATIENT
Start: 2020-01-31 | End: 2020-02-02

## 2020-01-31 RX ORDER — DEXTROSE 50 % IN WATER 50 %
12.5 SYRINGE (ML) INTRAVENOUS ONCE
Refills: 0 | Status: DISCONTINUED | OUTPATIENT
Start: 2020-01-31 | End: 2020-02-06

## 2020-01-31 RX ORDER — ASPIRIN/CALCIUM CARB/MAGNESIUM 324 MG
81 TABLET ORAL DAILY
Refills: 0 | Status: DISCONTINUED | OUTPATIENT
Start: 2020-01-31 | End: 2020-02-06

## 2020-01-31 RX ORDER — CHLORHEXIDINE GLUCONATE 213 G/1000ML
1 SOLUTION TOPICAL
Refills: 0 | Status: DISCONTINUED | OUTPATIENT
Start: 2020-01-31 | End: 2020-01-31

## 2020-01-31 RX ORDER — INSULIN LISPRO 100/ML
VIAL (ML) SUBCUTANEOUS
Refills: 0 | Status: DISCONTINUED | OUTPATIENT
Start: 2020-01-31 | End: 2020-01-31

## 2020-01-31 RX ORDER — INSULIN GLARGINE 100 [IU]/ML
15 INJECTION, SOLUTION SUBCUTANEOUS AT BEDTIME
Refills: 0 | Status: DISCONTINUED | OUTPATIENT
Start: 2020-01-31 | End: 2020-02-02

## 2020-01-31 RX ORDER — CHLORHEXIDINE GLUCONATE 213 G/1000ML
1 SOLUTION TOPICAL
Refills: 0 | Status: DISCONTINUED | OUTPATIENT
Start: 2020-01-31 | End: 2020-02-06

## 2020-01-31 RX ORDER — DIGOXIN 250 MCG
0.12 TABLET ORAL DAILY
Refills: 0 | Status: DISCONTINUED | OUTPATIENT
Start: 2020-01-31 | End: 2020-01-31

## 2020-01-31 RX ORDER — FOLIC ACID 0.8 MG
1 TABLET ORAL DAILY
Refills: 0 | Status: DISCONTINUED | OUTPATIENT
Start: 2020-01-31 | End: 2020-02-06

## 2020-01-31 RX ORDER — DEXTROSE 50 % IN WATER 50 %
15 SYRINGE (ML) INTRAVENOUS ONCE
Refills: 0 | Status: DISCONTINUED | OUTPATIENT
Start: 2020-01-31 | End: 2020-02-06

## 2020-01-31 RX ORDER — INFLUENZA VIRUS VACCINE 15; 15; 15; 15 UG/.5ML; UG/.5ML; UG/.5ML; UG/.5ML
0.5 SUSPENSION INTRAMUSCULAR ONCE
Refills: 0 | Status: DISCONTINUED | OUTPATIENT
Start: 2020-01-31 | End: 2020-02-06

## 2020-01-31 RX ORDER — ERGOCALCIFEROL 1.25 MG/1
50000 CAPSULE ORAL
Refills: 0 | Status: DISCONTINUED | OUTPATIENT
Start: 2020-01-31 | End: 2020-02-06

## 2020-01-31 RX ORDER — SODIUM CHLORIDE 9 MG/ML
3 INJECTION INTRAMUSCULAR; INTRAVENOUS; SUBCUTANEOUS EVERY 8 HOURS
Refills: 0 | Status: DISCONTINUED | OUTPATIENT
Start: 2020-01-31 | End: 2020-02-06

## 2020-01-31 RX ADMIN — SODIUM CHLORIDE 3 MILLILITER(S): 9 INJECTION INTRAMUSCULAR; INTRAVENOUS; SUBCUTANEOUS at 21:59

## 2020-01-31 RX ADMIN — Medication 2: at 14:41

## 2020-01-31 RX ADMIN — INSULIN GLARGINE 15 UNIT(S): 100 INJECTION, SOLUTION SUBCUTANEOUS at 22:05

## 2020-01-31 RX ADMIN — Medication 650 MILLIGRAM(S): at 19:35

## 2020-01-31 RX ADMIN — Medication 50 MILLIGRAM(S): at 15:21

## 2020-01-31 RX ADMIN — Medication 3 UNIT(S): at 18:01

## 2020-01-31 RX ADMIN — Medication 50 MILLIGRAM(S): at 21:50

## 2020-01-31 RX ADMIN — SODIUM CHLORIDE 60 MILLILITER(S): 9 INJECTION INTRAMUSCULAR; INTRAVENOUS; SUBCUTANEOUS at 14:00

## 2020-01-31 RX ADMIN — SODIUM CHLORIDE 3 MILLILITER(S): 9 INJECTION INTRAMUSCULAR; INTRAVENOUS; SUBCUTANEOUS at 15:17

## 2020-01-31 RX ADMIN — ATORVASTATIN CALCIUM 40 MILLIGRAM(S): 80 TABLET, FILM COATED ORAL at 21:49

## 2020-01-31 RX ADMIN — DONEPEZIL HYDROCHLORIDE 10 MILLIGRAM(S): 10 TABLET, FILM COATED ORAL at 21:50

## 2020-01-31 RX ADMIN — CHLORHEXIDINE GLUCONATE 1 APPLICATION(S): 213 SOLUTION TOPICAL at 22:05

## 2020-01-31 RX ADMIN — AMLODIPINE BESYLATE 5 MILLIGRAM(S): 2.5 TABLET ORAL at 21:49

## 2020-01-31 RX ADMIN — Medication 650 MILLIGRAM(S): at 20:05

## 2020-01-31 RX ADMIN — MEMANTINE HYDROCHLORIDE 5 MILLIGRAM(S): 10 TABLET ORAL at 21:50

## 2020-01-31 NOTE — H&P CARDIOLOGY - PMH
CAD (coronary artery disease)    Dementia    DM (diabetes mellitus)    HLD (hyperlipidemia)    HTN (hypertension)    Hypothyroid    MI (myocardial infarction)

## 2020-01-31 NOTE — PROGRESS NOTE ADULT - SUBJECTIVE AND OBJECTIVE BOX
====================  CCU MIDNIGHT ROUNDS  ====================    DAPHNE PARNELL  [id number]  Patient is a 84y old  Female who presents with a chief complaint of     ====================  SUMMARY:  ====================        ====================  NEW EVENTS:  ====================        ====================  VITALS (Last 12 hrs):  ====================    T(C): 37.3 (01-31-20 @ 19:00), Max: 37.3 (01-31-20 @ 19:00)  T(F): 99.1 (01-31-20 @ 19:00), Max: 99.1 (01-31-20 @ 19:00)  HR: 84 (01-31-20 @ 20:05) (72 - 92)  BP: 165/97 (01-31-20 @ 19:25) (132/61 - 171/75)  BP(mean): 132 (01-31-20 @ 19:25) (93 - 132)  ABP: --  ABP(mean): --  RR: 16 (01-31-20 @ 20:05) (12 - 18)  SpO2: 100% (01-31-20 @ 20:05) (97% - 100%)  Wt(kg): --  CVP(mm Hg): --  CVP(cm H2O): --  CO: --  CI: --  PA: --  PA(mean): --  PCWP: --  SVR: --  PVR: --    I&O's Summary    31 Jan 2020 07:01  -  31 Jan 2020 20:33  --------------------------------------------------------  IN: 600 mL / OUT: 650 mL / NET: -50 mL            ====================  NEW LABS:  ====================                          10.4   17.65 )-----------( 220      ( 31 Jan 2020 14:22 )             33.5     01-31    133<L>  |  98  |  36<H>  ----------------------------<  254<H>  4.8   |  12<L>  |  1.72<H>    Ca    9.0      31 Jan 2020 14:22  Phos  4.7     01-31  Mg     2.0     01-31    TPro  7.8  /  Alb  3.6  /  TBili  0.3  /  DBili  x   /  AST  24  /  ALT  18  /  AlkPhos  83  01-31      Creatine Kinase, Serum: 203 U/L <H> (01-31-20 @ 14:22)    CKMB Units: 5.2 ng/mL (01-31 @ 14:22)          ====================  PLAN:  ====================  #neuro  #card  #GI  #  #ID  #discharge planning ====================  CCU MIDNIGHT ROUNDS  ====================  DAPHNE PARNELL  [id number]  Patient is a 84y old  Female who presents with a chief complaint of   ====================  SUMMARY:HPI:  84 yr old Citizen of [place] female with PMH of dementia (daughter will consent for pt), MI (remote), CAD with prior stent, HTN, HLD, HFrEF, suspected A fib (on digoxin/ASA), no implantable cardiac monitoring device, CKD- followed by PCP Dr Nicole Staples at [facility], DMT2- well controlled, complicated by CKD, followed by PCP, hypothyroidism presents today for cardiac catheterization. Patient's daughter reports REYES for past month. Evaluated by cardiologist Dr Sanchez where NST was done revealing abnormality. Referred for cath.  ====================  NEW EVENTS: s/p LHC DILSHAD x2 to mid LAD and Distal LAD, R femoral IABP 1:1   ====================  ====================  VITALS (Last 12 hrs):  ====================  T(C): 37.3 (01-31-20 @ 19:00), Max: 37.3 (01-31-20 @ 19:00)  T(F): 99.1 (01-31-20 @ 19:00), Max: 99.1 (01-31-20 @ 19:00)  HR: 84 (01-31-20 @ 20:05) (72 - 92)  BP: 165/97 (01-31-20 @ 19:25) (132/61 - 171/75)  BP(mean): 132 (01-31-20 @ 19:25) (93 - 132)  RR: 16 (01-31-20 @ 20:05) (12 - 18)  SpO2: 100% (01-31-20 @ 20:05) (97% - 100%)  I&O's Summary    31 Jan 2020 07:01  -  31 Jan 2020 20:33  --------------------------------------------------------  IN: 600 mL / OUT: 650 mL / NET: -50 mL  ====================  NEW LABS:  ====================                          10.4   17.65 )-----------( 220      ( 31 Jan 2020 14:22 )             33.5     01-31    133<L>  |  98  |  36<H>  ----------------------------<  254<H>  4.8   |  12<L>  |  1.72<H>    Ca    9.0      31 Jan 2020 14:22  Phos  4.7     01-31  Mg     2.0     01-31    TPro  7.8  /  Alb  3.6  /  TBili  0.3  /  DBili  x   /  AST  24  /  ALT  18  /  AlkPhos  83  01-31      Creatine Kinase, Serum: 203 U/L <H> (01-31-20 @ 14:22)    CKMB Units: 5.2 ng/mL (01-31 @ 14:22)  ====================  PLAN:  ====================  #neuro  - history of Dementia, baseline appears to be A&Ox1-2  - c/w Donepezil 10mg, memantine 5mg daily    #CV  CAD  Prior history of MI with stent (unclear where it was placed, around 10 years ago).   - right femoral IABP 1:1 in place  - TTE 1/31: Mild to moderate segmental left ventricular systolic dysfunction.  Hypokinesis of the mid and apical anteroseptum and apex.  The anterior wall suggests hypokinesis. Trace free effusion with thrombus within the pericardial space.  - No evidence of cardiac tamponade.  - c/w aspirin, lipitor  - Hold ACE    HTN  - c/w Norvasc 5mg daily, hydralazine 50mg TID with hold parameters  - c/w home Lasix 20mg maintenance dose     AFIB   - currently NSR 80's   - c/w digoxin 125 mcg    #Endo  T2DM  - f/u A1C   - c/w Lantus 15u   - c/w ISS    Hypothyroidism   - c/w Levothyroxine 88mcg daily,  - f/uTSH    #Renal  BUN 41/SCr 2.11, unclear baseline but documented CKD. Can be in the setting of recent contrast from LHC  - Hold home Valsartan 320mg  - Trend BMP daily, avoid nephrotoxins    #ID  - wbc of 17, afebrile, likely 2/2 stress  - trend wbc for now    #DVT  - Will start heparin gtt once PTT is therapeutic given IABP    #Diet- DASH/CC

## 2020-01-31 NOTE — H&P CARDIOLOGY - FAMILY HISTORY
Mother  Still living? Unknown  Family history of heart disease, Age at diagnosis: Age Unknown     Sibling  Still living? No  Family history of heart disease, Age at diagnosis: Age Unknown

## 2020-01-31 NOTE — CHART NOTE - NSCHARTNOTEFT_GEN_A_CORE
CCU Accept Note    DAPHNE PARNELL  84y  Patient is a 84y old  Female who presents with a chief complaint of     ====================  HPI & Hospital Course:     84 yr old [place] female with PMH of dementia (unclear baseline), MI (remote), CAD with prior stent, HTN, HLD, HFrEF, suspected A fib (on digoxin/ASA), no implantable cardiac monitoring device, CKD- followed by PCP Dr Nicole Staples at [facility], DMT2- well controlled, complicated by CKD, followed by PCP, hypothyroidism presented today for cardiac catheterization. Patient's daughter reports REYES for past month. Evaluated by cardiologist Dr Sanchez where NST was done revealing abnormality. Referred for cath. Cath c/b LAD perforation now transferred to the CCU with IABP. DILSHAD x2 to mLAD and dLAD. Was given asa 81mg and plavix load as well as 250cc NS bolus.      Past Medical History:   CAD (coronary artery disease)    Dementia    DM (diabetes mellitus)    HLD (hyperlipidemia)    HTN (hypertension)    Hypothyroid    MI (myocardial infarction).    Past Surgical History:   Stented coronary artery.    Home Medications:  atorvastatin 40 mg oral tablet: 1 tab(s) orally once a day (31 Jan 2020 13:01)  Metoprolol Succinate ER 50 mg oral tablet, extended release: 1 tab(s) orally once a day (31 Jan 2020 13:01)  Prandin 1 mg oral tablet: 1 tab(s) orally once a day (31 Jan 2020 13:01)      Current Medications:   MEDICATIONS  (STANDING):  chlorhexidine 4% Liquid 1 Application(s) Topical <User Schedule>  sodium chloride 0.9% lock flush 3 milliLiter(s) IV Push every 8 hours  sodium chloride 0.9%. 1000 milliLiter(s) (60 mL/Hr) IV Continuous <Continuous>    MEDICATIONS  (PRN):      Allergies:     NKDA    Family History:   Mother  Still living? Unknown  Family history of heart disease, Age at diagnosis: Age Unknown     Sibling  Still living? No  Family history of heart disease, Age at diagnosis: Age Unknown.      Social History:     Lives with children, . No reported history of alcohol or cigarette use.    ====================  Vital Signs Last 24 Hrs  T(C): 37.2 (31 Jan 2020 09:36), Max: 37.2 (31 Jan 2020 09:36)  T(F): 98.9 (31 Jan 2020 09:36), Max: 98.9 (31 Jan 2020 09:36)  HR: 82 (31 Jan 2020 13:30) (72 - 82)  BP: 171/75 (31 Jan 2020 09:36) (171/75 - 171/75)  BP(mean): 107 (31 Jan 2020 09:36) (107 - 107)  RR: 16 (31 Jan 2020 09:36) (16 - 16)  SpO2: 97% (31 Jan 2020 13:30) (97% - 100%)    Adult Advanced Hemodynamics Last 24 Hrs  CVP(mm Hg): --  CVP(cm H2O): --  CO: --  CI: --  PA: --  PA(mean): --  PCWP: --  SVR: --  SVRI: --  PVR: --  PVRI: --    Physical Exam:   General: NAD  HEENT: PERRL, EOMI, normal sclera and conjunctiva, no oral lesions  Neck: Supple, no JVD  Lungs: CTA bilaterally  Heart: RRR, normal S1S2, no murmurs/rubs/gallops  Abdomen: Soft, ND/NT  Extremities: 2+ peripheral pulses, no cyanosis/clubbing/edema, full ROM  Skin: Warm, well-perfused  Neuro: A&O x3, no focal deficits      ====================  Labs & Imaging:   CBC Full  -  ( 31 Jan 2020 09:44 )  WBC Count : 10.36 K/uL  RBC Count : 3.52 M/uL  Hemoglobin : 9.8 g/dL  Hematocrit : 31.3 %  Platelet Count - Automated : 225 K/uL  Mean Cell Volume : 88.9 fl  Mean Cell Hemoglobin : 27.8 pg  Mean Cell Hemoglobin Concentration : 31.3 gm/dL  Auto Neutrophil # : x  Auto Lymphocyte # : x  Auto Monocyte # : x  Auto Eosinophil # : x  Auto Basophil # : x  Auto Neutrophil % : x  Auto Lymphocyte % : x  Auto Monocyte % : x  Auto Eosinophil % : x  Auto Basophil % : x    01-31    138  |  99  |  41<H>  ----------------------------<  165<H>  4.6   |  26  |  2.11<H>    Ca    9.8      31 Jan 2020 09:44    TPro  8.3  /  Alb  4.0  /  TBili  0.4  /  DBili  x   /  AST  13  /  ALT  13  /  AlkPhos  75  01-31        ====================  Assessment & Plan:     84 F PMHx of dementia, CAD, T2DM, HLD, HTN, Hypothyroid, prior MI presented to [facility] for scheduled LHC given abnormanl nuclear stress test and course c/b LAD perforation now with IABP    #Neuro  - Dementia, baseline appears to be A&Ox1-2  - DEYA    #CV    #Respiratory  - DEYA, not on supplemental oxygen    #GI  - DEYA    #Endo  - Hx of T2DM on oral medications  - Can give ISS for now    #ID  - DEYA    #DVT  - Will start heparin gtt once PTT is therapeutic    Parish Mejía MD PGY-2  Department of Internal Medicine  Pager: [phone] (NS) /[phone] ([facility]ELA) CCU Accept Note    DAPHNE PARNELL  84y  Patient is a 84y old  Female who presents with a chief complaint of     ====================  HPI & Hospital Course:     84 yr old Trin[place] female with PMH of dementia (unclear baseline), MI (remote), CAD with prior stent, HTN, HLD, HFrEF, suspected A fib (on digoxin/ASA), no implantable cardiac monitoring device, CKD- followed by PCP Dr Nicole Staples at [facility], DMT2- well controlled, complicated by CKD, followed by PCP, hypothyroidism presented today for cardiac catheterization. Patient's daughter reports REYES for past month. Evaluated by cardiologist Dr Sanchez where NST was done revealing abnormality. Referred for cath. Cath c/b LAD perforation now transferred to the CCU with IABP. DILSHAD x2 to mLAD and dLAD. Was given asa 81mg and plavix load as well as 250cc NS bolus.      Past Medical History:   CAD (coronary artery disease)    Dementia    DM (diabetes mellitus)    HLD (hyperlipidemia)    HTN (hypertension)    Hypothyroid    MI (myocardial infarction).    Past Surgical History:   Stented coronary artery.    Home Medications:  atorvastatin 40 mg oral tablet: 1 tab(s) orally once a day (31 Jan 2020 13:01)  Metoprolol Succinate ER 50 mg oral tablet, extended release: 1 tab(s) orally once a day (31 Jan 2020 13:01)  Prandin 1 mg oral tablet: 1 tab(s) orally once a day (31 Jan 2020 13:01)      Current Medications:   MEDICATIONS  (STANDING):  chlorhexidine 4% Liquid 1 Application(s) Topical <User Schedule>  sodium chloride 0.9% lock flush 3 milliLiter(s) IV Push every 8 hours  sodium chloride 0.9%. 1000 milliLiter(s) (60 mL/Hr) IV Continuous <Continuous>    MEDICATIONS  (PRN):      Allergies:     NKDA    Family History:   Mother  Still living? Unknown  Family history of heart disease, Age at diagnosis: Age Unknown     Sibling  Still living? No  Family history of heart disease, Age at diagnosis: Age Unknown.      Social History:     Lives with children, . No reported history of alcohol or cigarette use.    ====================  Vital Signs Last 24 Hrs  T(C): 37.2 (31 Jan 2020 09:36), Max: 37.2 (31 Jan 2020 09:36)  T(F): 98.9 (31 Jan 2020 09:36), Max: 98.9 (31 Jan 2020 09:36)  HR: 82 (31 Jan 2020 13:30) (72 - 82)  BP: 171/75 (31 Jan 2020 09:36) (171/75 - 171/75)  BP(mean): 107 (31 Jan 2020 09:36) (107 - 107)  RR: 16 (31 Jan 2020 09:36) (16 - 16)  SpO2: 97% (31 Jan 2020 13:30) (97% - 100%)    Adult Advanced Hemodynamics Last 24 Hrs  CVP(mm Hg): --  CVP(cm H2O): --  CO: --  CI: --  PA: --  PA(mean): --  PCWP: --  SVR: --  SVRI: --  PVR: --  PVRI: --    Physical Exam:   General: NAD  HEENT: PERRL, EOMI, normal sclera and conjunctiva, no oral lesions  Neck: Supple, no JVD  Lungs: CTA bilaterally  Heart: RRR, normal S1S2, no murmurs/rubs/gallops  Abdomen: Soft, ND/NT  Extremities: 2+ peripheral pulses, no cyanosis/clubbing/edema, full ROM  Skin: Warm, well-perfused  Neuro: A&O x3, no focal deficits      ====================  Labs & Imaging:   CBC Full  -  ( 31 Jan 2020 09:44 )  WBC Count : 10.36 K/uL  RBC Count : 3.52 M/uL  Hemoglobin : 9.8 g/dL  Hematocrit : 31.3 %  Platelet Count - Automated : 225 K/uL  Mean Cell Volume : 88.9 fl  Mean Cell Hemoglobin : 27.8 pg  Mean Cell Hemoglobin Concentration : 31.3 gm/dL  Auto Neutrophil # : x  Auto Lymphocyte # : x  Auto Monocyte # : x  Auto Eosinophil # : x  Auto Basophil # : x  Auto Neutrophil % : x  Auto Lymphocyte % : x  Auto Monocyte % : x  Auto Eosinophil % : x  Auto Basophil % : x    01-31    138  |  99  |  41<H>  ----------------------------<  165<H>  4.6   |  26  |  2.11<H>    Ca    9.8      31 Jan 2020 09:44    TPro  8.3  /  Alb  4.0  /  TBili  0.4  /  DBili  x   /  AST  13  /  ALT  13  /  AlkPhos  75  01-31        ====================  Assessment & Plan:     84 F PMHx of dementia (A&Ox1-2 at baseline), CAD, T2DM on insulin, HLD, HTN, Hypothyroid, atrial fibrillation?, prior MI presented to [facility] for scheduled LHC given abnormal nuclear stress test and course c/b LAD perforation now with IABP    #Neuro  - Dementia, baseline appears to be A&Ox1-2  - c/w Donepezil 10mg, memantine 5mg daily    #CV  CAD  Prior history of MI with stent (unclear where it was placed, around 10 years ago).   - c/w aspirin, lipitor 40mg qHS, toprol 50mg daily  - Takes valsartan 320 mg at home   HTN  - c/w Norvasc 5mg daily, hydralazine 50mg TID with hold parameters  - Home Lasix 20mg maintenance dose   Afib  - On digoxin 125 mcg at home  - Monitor on telemetry  - CHADSVASC score of 6, not on anticoagulation    #Respiratory  - DEYA, not on supplemental oxygen    #Heme/onc  - DEYA  - c/w Folic acid supplement    #GI  - DEYA    #Endo  - Hx of T2DM   - Obtain A1c  - Lantus 15u qHS, hold home oral medications, ISS  - c/w vit D 50K u weekly  - Levothyroxine 88mcg daily, obtain TSH    #ID  - DEYA    #DVT  - Will start heparin gtt once PTT is therapeutic given IABP    Parish Mejía MD PGY-2  Department of Internal Medicine  Pager: [phone] (NS) /[phone] (GABRIELA) CCU Accept Note    DAPHNE PARNELL  84y  Patient is a 84y old  Female who presents with a chief complaint of     ====================  HPI & Hospital Course:     84 yr old [place]ian female with PMH of dementia (unclear baseline), MI (remote), CAD with prior stent, HTN, HLD, HFrEF, suspected A fib (on digoxin/ASA), no implantable cardiac monitoring device, CKD- followed by PCP Dr Nicole Staples at [facility], DMT2- well controlled, complicated by CKD, followed by PCP, hypothyroidism presented today for cardiac catheterization. Patient's daughter reports REYES for past month. Evaluated by cardiologist Dr Sanchez where NST was done revealing abnormality. Referred for cath. Cath c/b LAD perforation now transferred to the CCU with IABP. DILSHAD x2 to mLAD and dLAD. Was given asa 81mg and plavix load as well as 250cc NS bolus.      Past Medical History:   CAD (coronary artery disease)    Dementia    DM (diabetes mellitus)    HLD (hyperlipidemia)    HTN (hypertension)    Hypothyroid    MI (myocardial infarction).    Past Surgical History:   Stented coronary artery.      Current Medications:   MEDICATIONS  (STANDING):  chlorhexidine 4% Liquid 1 Application(s) Topical <User Schedule>  sodium chloride 0.9% lock flush 3 milliLiter(s) IV Push every 8 hours  sodium chloride 0.9%. 1000 milliLiter(s) (60 mL/Hr) IV Continuous <Continuous>    MEDICATIONS  (PRN):      Allergies:     NKDA    Family History:   Mother  Still living? Unknown  Family history of heart disease, Age at diagnosis: Age Unknown     Sibling  Still living? No  Family history of heart disease, Age at diagnosis: Age Unknown.      Social History:     Lives with children, . No reported history of alcohol or cigarette use.    ====================  Vital Signs Last 24 Hrs  T(C): 37.2 (31 Jan 2020 09:36), Max: 37.2 (31 Jan 2020 09:36)  T(F): 98.9 (31 Jan 2020 09:36), Max: 98.9 (31 Jan 2020 09:36)  HR: 82 (31 Jan 2020 13:30) (72 - 82)  BP: 171/75 (31 Jan 2020 09:36) (171/75 - 171/75)  BP(mean): 107 (31 Jan 2020 09:36) (107 - 107)  RR: 16 (31 Jan 2020 09:36) (16 - 16)  SpO2: 97% (31 Jan 2020 13:30) (97% - 100%)    Adult Advanced Hemodynamics Last 24 Hrs  CVP(mm Hg): --  CVP(cm H2O): --  CO: --  CI: --  PA: --  PA(mean): --  PCWP: --  SVR: --  SVRI: --  PVR: --  PVRI: --    Physical Exam:   General: NAD  HEENT: PERRL, EOMI, normal sclera and conjunctiva, no oral lesions  Neck: Supple, no JVD  Lungs: CTA bilaterally  Heart: RRR, normal S1S2, no murmurs/rubs/gallops  Abdomen: Soft, ND/NT  Extremities: 2+ peripheral pulses, no cyanosis/clubbing/edema, full ROM  Skin: Warm, well-perfused  Neuro: A&O x3, no focal deficits      ====================  Labs & Imaging:   CBC Full  -  ( 31 Jan 2020 09:44 )  WBC Count : 10.36 K/uL  RBC Count : 3.52 M/uL  Hemoglobin : 9.8 g/dL  Hematocrit : 31.3 %  Platelet Count - Automated : 225 K/uL  Mean Cell Volume : 88.9 fl  Mean Cell Hemoglobin : 27.8 pg  Mean Cell Hemoglobin Concentration : 31.3 gm/dL  Auto Neutrophil # : x  Auto Lymphocyte # : x  Auto Monocyte # : x  Auto Eosinophil # : x  Auto Basophil # : x  Auto Neutrophil % : x  Auto Lymphocyte % : x  Auto Monocyte % : x  Auto Eosinophil % : x  Auto Basophil % : x    01-31    138  |  99  |  41<H>  ----------------------------<  165<H>  4.6   |  26  |  2.11<H>    Ca    9.8      31 Jan 2020 09:44    TPro  8.3  /  Alb  4.0  /  TBili  0.4  /  DBili  x   /  AST  13  /  ALT  13  /  AlkPhos  75  01-31        ====================  Assessment & Plan:     84 F PMHx of dementia (A&Ox1-2 at baseline), CAD, T2DM on insulin, HLD, HTN, Hypothyroid, atrial fibrillation?, prior MI presented to [facility] for scheduled LHC given abnormal nuclear stress test and course c/b LAD perforation now with IABP    #Neuro  - Dementia, baseline appears to be A&Ox1-2  - c/w Donepezil 10mg, memantine 5mg daily    #CV  CAD  Prior history of MI with stent (unclear where it was placed, around 10 years ago).   - c/w aspirin, lipitor 40mg qHS, toprol 50mg daily  - valsartan 320 mg   HTN  - c/w Norvasc 5mg daily, hydralazine 50mg TID with hold parameters  - Home Lasix 20mg maintenance dose   Afib  - On digoxin 125 mcg at home  - Monitor on telemetry  - CHADSVASC score of 6, not on anticoagulation?    #Respiratory  - DEYA, not on supplemental oxygen    #Heme/onc  - DEYA  - c/w Folic acid supplement    #GI  - DEYA    #Endo  - Hx of T2DM   - Obtain A1c  - Lantus 15u qHS, hold home oral medications, ISS  - c/w vit D 50K u weekly  - Levothyroxine 88mcg daily, obtain TSH    #ID  - wbc of 17, afebrile, can be in the setting of stress  - trend wbc for now    #DVT  - Will start heparin gtt once PTT is therapeutic given IABP    #Diet- DASH/CC    Parish Mejía MD PGY-2  Department of Internal Medicine  Pager: [phone] (NS) /[phone] (GABRIELA) CCU Accept Note    DAPHNE PARNELL  84y  Patient is a 84y old  Female who presents with a chief complaint of     ====================  HPI & Hospital Course:     84 yr old Beninese female with PMH of dementia (unclear baseline), MI (remote), CAD with prior stent, HTN, HLD, HFrEF, suspected A fib (on digoxin/ASA), no implantable cardiac monitoring device, CKD- followed by PCP Dr Nicole Staples at [facility], DMT2- well controlled, complicated by CKD, followed by PCP, hypothyroidism presented today for cardiac catheterization. Patient's daughter reports REYES for past month. Evaluated by cardiologist Dr Sanchez where NST was done revealing abnormality. Referred for cath. Cath c/b LAD perforation now transferred to the CCU with IABP. DILSHAD x2 to mLAD and dLAD. Was given asa 81mg and plavix load as well as 250cc NS bolus.      Past Medical History:   CAD (coronary artery disease)    Dementia    DM (diabetes mellitus)    HLD (hyperlipidemia)    HTN (hypertension)    Hypothyroid    MI (myocardial infarction).    Past Surgical History:   Stented coronary artery.      Current Medications:   MEDICATIONS  (STANDING):  chlorhexidine 4% Liquid 1 Application(s) Topical <User Schedule>  sodium chloride 0.9% lock flush 3 milliLiter(s) IV Push every 8 hours  sodium chloride 0.9%. 1000 milliLiter(s) (60 mL/Hr) IV Continuous <Continuous>    MEDICATIONS  (PRN):      Allergies:     NKDA    Family History:   Mother  Still living? Unknown  Family history of heart disease, Age at diagnosis: Age Unknown     Sibling  Still living? No  Family history of heart disease, Age at diagnosis: Age Unknown.      Social History:     Lives with children, . No reported history of alcohol or cigarette use.    ====================  Vital Signs Last 24 Hrs  T(C): 37.2 (31 Jan 2020 09:36), Max: 37.2 (31 Jan 2020 09:36)  T(F): 98.9 (31 Jan 2020 09:36), Max: 98.9 (31 Jan 2020 09:36)  HR: 82 (31 Jan 2020 13:30) (72 - 82)  BP: 171/75 (31 Jan 2020 09:36) (171/75 - 171/75)  BP(mean): 107 (31 Jan 2020 09:36) (107 - 107)  RR: 16 (31 Jan 2020 09:36) (16 - 16)  SpO2: 97% (31 Jan 2020 13:30) (97% - 100%)    Adult Advanced Hemodynamics Last 24 Hrs  CVP(mm Hg): --  CVP(cm H2O): --  CO: --  CI: --  PA: --  PA(mean): --  PCWP: --  SVR: --  SVRI: --  PVR: --  PVRI: --    Physical Exam:   General: NAD  HEENT: PERRL, EOMI, normal sclera and conjunctiva, no oral lesions  Neck: Supple, no JVD  Lungs: CTA bilaterally  Heart: RRR, normal S1S2, no murmurs/rubs/gallops  Abdomen: Soft, ND/NT  Extremities: 2+ peripheral pulses, no cyanosis/clubbing/edema, full ROM  Skin: Warm, well-perfused  Neuro: A&O x3, no focal deficits      ====================  Labs & Imaging:   CBC Full  -  ( 31 Jan 2020 09:44 )  WBC Count : 10.36 K/uL  RBC Count : 3.52 M/uL  Hemoglobin : 9.8 g/dL  Hematocrit : 31.3 %  Platelet Count - Automated : 225 K/uL  Mean Cell Volume : 88.9 fl  Mean Cell Hemoglobin : 27.8 pg  Mean Cell Hemoglobin Concentration : 31.3 gm/dL  Auto Neutrophil # : x  Auto Lymphocyte # : x  Auto Monocyte # : x  Auto Eosinophil # : x  Auto Basophil # : x  Auto Neutrophil % : x  Auto Lymphocyte % : x  Auto Monocyte % : x  Auto Eosinophil % : x  Auto Basophil % : x    01-31    138  |  99  |  41<H>  ----------------------------<  165<H>  4.6   |  26  |  2.11<H>    Ca    9.8      31 Jan 2020 09:44    TPro  8.3  /  Alb  4.0  /  TBili  0.4  /  DBili  x   /  AST  13  /  ALT  13  /  AlkPhos  75  01-31        ====================  Assessment & Plan:     84 F PMHx of dementia (A&Ox1-2 at baseline), CAD, T2DM on insulin, HLD, HTN, Hypothyroid, atrial fibrillation?, prior MI presented to [facility] for scheduled LHC given abnormal nuclear stress test and course c/b LAD perforation now with IABP    #Neuro  - Dementia, baseline appears to be A&Ox1-2  - c/w Donepezil 10mg, memantine 5mg daily    #CV  CAD  Prior history of MI with stent (unclear where it was placed, around 10 years ago).   - Maintain on IABP  - Repeat TTE  - NS @60cc/hr x4 hours  - c/w aspirin, lipitor 40mg qHS, toprol 50mg qD  - c/w valsartan 320 mg   HTN  - c/w Norvasc 5mg daily, hydralazine 50mg TID with hold parameters  - c/w home Lasix 20mg maintenance dose   Afib  - Reported history of atrial fibrillation although NSR on telemetry thus far  - c/w digoxin 125 mcg, Monitor on telemetry  - CHADSVASC score of 6, not on anticoagulation? unclear why but likely related to fall risk    #Respiratory  - DEYA, not on supplemental oxygen    #Heme/onc  - DEYA  - c/w Folic acid supplement    #GI  - DEYA    #Endo  - Hx of T2DM, obtain A1c, reportedly well controlled.  - Lantus 15u qHS, hold home oral medications, ISS  - c/w vit D 50K u weekly  - C/w Levothyroxine 88mcg daily,  - Obtain TSH    #ID  - wbc of 17, afebrile, likely 2/2 stress  - trend wbc for now    #DVT  - Will start heparin gtt once PTT is therapeutic given IABP    #Diet- DASH/CC    Parish Mejía MD PGY-2  Department of Internal Medicine  Pager: [phone] (NS) /[phone] ([facility]) CCU Accept Note    DAPHNE PARNELL  84y  Patient is a 84y old  Female who presents with a chief complaint of     ====================  HPI & Hospital Course:     84 yr old Bahamian female with PMH of dementia (unclear baseline), MI (remote), CAD with prior stent, HTN, HLD, HFrEF, suspected A fib (on digoxin/ASA), no implantable cardiac monitoring device, CKD- followed by PCP Dr Nicole Staples at [facility], DMT2- well controlled, complicated by CKD, followed by PCP, hypothyroidism presented today for cardiac catheterization. Patient's daughter reports REYES for past month. Evaluated by cardiologist Dr Sanchez where NST was done revealing abnormality. Referred for cath. Cath c/b LAD perforation now transferred to the CCU with IABP. DILSHAD x2 to mLAD and dLAD. Was given asa 81mg and plavix load as well as 250cc NS bolus.      Past Medical History:   CAD (coronary artery disease)    Dementia    DM (diabetes mellitus)    HLD (hyperlipidemia)    HTN (hypertension)    Hypothyroid    MI (myocardial infarction).    Past Surgical History:   Stented coronary artery.      Current Medications:   MEDICATIONS  (STANDING):  chlorhexidine 4% Liquid 1 Application(s) Topical <User Schedule>  sodium chloride 0.9% lock flush 3 milliLiter(s) IV Push every 8 hours  sodium chloride 0.9%. 1000 milliLiter(s) (60 mL/Hr) IV Continuous <Continuous>    MEDICATIONS  (PRN):      Allergies:     NKDA    Family History:   Mother  Still living? Unknown  Family history of heart disease, Age at diagnosis: Age Unknown     Sibling  Still living? No  Family history of heart disease, Age at diagnosis: Age Unknown.      Social History:     Lives with children, . No reported history of alcohol or cigarette use.    ====================  Vital Signs Last 24 Hrs  T(C): 37.2 (31 Jan 2020 09:36), Max: 37.2 (31 Jan 2020 09:36)  T(F): 98.9 (31 Jan 2020 09:36), Max: 98.9 (31 Jan 2020 09:36)  HR: 82 (31 Jan 2020 13:30) (72 - 82)  BP: 171/75 (31 Jan 2020 09:36) (171/75 - 171/75)  BP(mean): 107 (31 Jan 2020 09:36) (107 - 107)  RR: 16 (31 Jan 2020 09:36) (16 - 16)  SpO2: 97% (31 Jan 2020 13:30) (97% - 100%)    Adult Advanced Hemodynamics Last 24 Hrs  CVP(mm Hg): --  CVP(cm H2O): --  CO: --  CI: --  PA: --  PA(mean): --  PCWP: --  SVR: --  SVRI: --  PVR: --  PVRI: --    Physical Exam:   General: NAD  HEENT: PERRL, EOMI, normal sclera and conjunctiva, no oral lesions  Neck: Supple, no JVD  Lungs: CTA bilaterally  Heart: RRR, normal S1S2, no murmurs/rubs/gallops  Abdomen: Soft, ND/NT  Extremities: 2+ peripheral pulses, no cyanosis/clubbing/edema, full ROM  Skin: Warm, well-perfused  Neuro: A&O x3, no focal deficits      ====================  Labs & Imaging:   CBC Full  -  ( 31 Jan 2020 09:44 )  WBC Count : 10.36 K/uL  RBC Count : 3.52 M/uL  Hemoglobin : 9.8 g/dL  Hematocrit : 31.3 %  Platelet Count - Automated : 225 K/uL  Mean Cell Volume : 88.9 fl  Mean Cell Hemoglobin : 27.8 pg  Mean Cell Hemoglobin Concentration : 31.3 gm/dL  Auto Neutrophil # : x  Auto Lymphocyte # : x  Auto Monocyte # : x  Auto Eosinophil # : x  Auto Basophil # : x  Auto Neutrophil % : x  Auto Lymphocyte % : x  Auto Monocyte % : x  Auto Eosinophil % : x  Auto Basophil % : x    01-31    138  |  99  |  41<H>  ----------------------------<  165<H>  4.6   |  26  |  2.11<H>    Ca    9.8      31 Jan 2020 09:44    TPro  8.3  /  Alb  4.0  /  TBili  0.4  /  DBili  x   /  AST  13  /  ALT  13  /  AlkPhos  75  01-31        ====================  Assessment & Plan:     84 F PMHx of dementia (A&Ox1-2 at baseline), CAD, T2DM on insulin, HLD, HTN, Hypothyroid, atrial fibrillation?, prior MI presented to [facility] for scheduled LHC given abnormal nuclear stress test and course c/b LAD perforation now with IABP    #Neuro  - Dementia, baseline appears to be A&Ox1-2  - c/w Donepezil 10mg, memantine 5mg daily    #CV  CAD  Prior history of MI with stent (unclear where it was placed, around 10 years ago).   - Maintain on IABP  - Repeat TTE  - NS @60cc/hr x4 hours  - c/w aspirin, lipitor 40mg qHS, toprol 50mg qD  - c/w valsartan 320 mg   HTN  - c/w Norvasc 5mg daily, hydralazine 50mg TID with hold parameters  - c/w home Lasix 20mg maintenance dose   Afib  - Reported history of atrial fibrillation although NSR on telemetry thus far  - c/w digoxin 125 mcg, Monitor on telemetry  - CHADSVASC score of 6, not on anticoagulation? unclear why but likely related to fall risk    #Respiratory  - DEYA, not on supplemental oxygen    #Heme/onc  - DEYA  - c/w Folic acid supplement    #GI  - DEYA    #Endo  - Hx of T2DM, obtain A1c, reportedly well controlled.  - Lantus 15u qHS, hold home oral medications, ISS  - c/w vit D 50K u weekly  - C/w Levothyroxine 88mcg daily,  - Obtain TSH    #Renal  BUN 41/SCr 2.11, unclear baseline but documented CKD  - Hold ARB/ACEi  - Trend BMP daily, avoid nephrotoxins    #ID  - wbc of 17, afebrile, likely 2/2 stress  - trend wbc for now    #DVT  - Will start heparin gtt once PTT is therapeutic given IABP    #Diet- DASH/CC    Parish Mejía MD PGY-2  Department of Internal Medicine  Pager: [phone] (NS) /[phone] (GABRIELA) CCU Accept Note    DAPHNE PARNELL  84y  Patient is a 84y old  Female who presents with a chief complaint of     ====================  HPI & Hospital Course:     84 yr old English female with PMH of dementia (unclear baseline), MI (remote), CAD with prior stent, HTN, HLD, HFrEF, suspected A fib (on digoxin/ASA), no implantable cardiac monitoring device, CKD- followed by PCP Dr Nicole Staples at [facility], DMT2- well controlled, complicated by CKD, followed by PCP, hypothyroidism presented today for cardiac catheterization. Patient's daughter reports REYES for past month. Evaluated by cardiologist Dr Sanchez where NST was done revealing abnormality. Referred for cath. Cath c/b LAD perforation now transferred to the CCU with IABP. DILSHAD x2 to mLAD and dLAD. Was given asa 81mg and plavix load as well as 250cc NS bolus.      Past Medical History:   CAD (coronary artery disease)    Dementia    DM (diabetes mellitus)    HLD (hyperlipidemia)    HTN (hypertension)    Hypothyroid    MI (myocardial infarction).    Past Surgical History:   Stented coronary artery.      Current Medications:   MEDICATIONS  (STANDING):  chlorhexidine 4% Liquid 1 Application(s) Topical <User Schedule>  sodium chloride 0.9% lock flush 3 milliLiter(s) IV Push every 8 hours  sodium chloride 0.9%. 1000 milliLiter(s) (60 mL/Hr) IV Continuous <Continuous>    MEDICATIONS  (PRN):      Allergies:     NKDA    Family History:   Mother  Still living? Unknown  Family history of heart disease, Age at diagnosis: Age Unknown     Sibling  Still living? No  Family history of heart disease, Age at diagnosis: Age Unknown.      Social History:     Lives with children, . No reported history of alcohol or cigarette use.    ====================  Vital Signs Last 24 Hrs  T(C): 37.2 (31 Jan 2020 09:36), Max: 37.2 (31 Jan 2020 09:36)  T(F): 98.9 (31 Jan 2020 09:36), Max: 98.9 (31 Jan 2020 09:36)  HR: 82 (31 Jan 2020 13:30) (72 - 82)  BP: 171/75 (31 Jan 2020 09:36) (171/75 - 171/75)  BP(mean): 107 (31 Jan 2020 09:36) (107 - 107)  RR: 16 (31 Jan 2020 09:36) (16 - 16)  SpO2: 97% (31 Jan 2020 13:30) (97% - 100%)    Adult Advanced Hemodynamics Last 24 Hrs  CVP(mm Hg): --  CVP(cm H2O): --  CO: --  CI: --  PA: --  PA(mean): --  PCWP: --  SVR: --  SVRI: --  PVR: --  PVRI: --    Physical Exam:   General: NAD  HEENT: PERRL, EOMI, normal sclera and conjunctiva, no oral lesions  Neck: Supple, no JVD  Lungs: CTA bilaterally  Heart: RRR, normal S1S2, no murmurs/rubs/gallops  Abdomen: Soft, ND/NT  Extremities: 2+ peripheral pulses, no cyanosis/clubbing/edema, full ROM  Skin: Warm, well-perfused  Neuro: A&O x3, no focal deficits      ====================  Labs & Imaging:   CBC Full  -  ( 31 Jan 2020 09:44 )  WBC Count : 10.36 K/uL  RBC Count : 3.52 M/uL  Hemoglobin : 9.8 g/dL  Hematocrit : 31.3 %  Platelet Count - Automated : 225 K/uL  Mean Cell Volume : 88.9 fl  Mean Cell Hemoglobin : 27.8 pg  Mean Cell Hemoglobin Concentration : 31.3 gm/dL  Auto Neutrophil # : x  Auto Lymphocyte # : x  Auto Monocyte # : x  Auto Eosinophil # : x  Auto Basophil # : x  Auto Neutrophil % : x  Auto Lymphocyte % : x  Auto Monocyte % : x  Auto Eosinophil % : x  Auto Basophil % : x    01-31    138  |  99  |  41<H>  ----------------------------<  165<H>  4.6   |  26  |  2.11<H>    Ca    9.8      31 Jan 2020 09:44    TPro  8.3  /  Alb  4.0  /  TBili  0.4  /  DBili  x   /  AST  13  /  ALT  13  /  AlkPhos  75  01-31        ====================  Assessment & Plan:     84 F PMHx of dementia (A&Ox1-2 at baseline), CAD, T2DM on insulin, HLD, HTN, Hypothyroid, atrial fibrillation?, prior MI presented to [facility] for scheduled LHC given abnormal nuclear stress test and course c/b LAD perforation now with IABP    #Neuro  - Dementia, baseline appears to be A&Ox1-2  - c/w Donepezil 10mg, memantine 5mg daily    #CV  CAD  Prior history of MI with stent (unclear where it was placed, around 10 years ago).   - Maintain on IABP  - Repeat TTE  - NS @60cc/hr x4 hours  - c/w aspirin, lipitor 40mg qHS, toprol 50mg qD  HTN  - c/w Norvasc 5mg daily, hydralazine 50mg TID with hold parameters  - c/w home Lasix 20mg maintenance dose   Afib  - Reported history of atrial fibrillation although NSR on telemetry thus far  - c/w digoxin 125 mcg, Monitor on telemetry  - CHADSVASC score of 6, not on anticoagulation? unclear why but likely related to fall risk    #Respiratory  - DEYA, not on supplemental oxygen    #Heme/onc  - DEYA  - c/w Folic acid supplement    #GI  - DEYA    #Endo  - Hx of T2DM, obtain A1c, reportedly well controlled.  - Lantus 15u qHS, hold home oral medications, ISS  - c/w vit D 50K u weekly  - C/w Levothyroxine 88mcg daily,  - Obtain TSH    #Renal  BUN 41/SCr 2.11, unclear baseline but documented CKD. Can be in the setting of recent contrast from C  - Hold home Valsartan 320mg  - Trend BMP daily, avoid nephrotoxins    #ID  - wbc of 17, afebrile, likely 2/2 stress  - trend wbc for now    #DVT  - Will start heparin gtt once PTT is therapeutic given IABP    #Diet- DASH/CC    Parish Mejía MD PGY-2  Department of Internal Medicine  Pager: [phone] (NS) /[phone] ([facility]) CCU Accept Note    DAPHNE PARNELL  84y  Patient is a 84y old  Female who presents with a chief complaint of     ====================  HPI & Hospital Course:     84 yr old Cypriot female with PMH of dementia (unclear baseline), MI (remote), CAD with prior stent, HTN, HLD, HFrEF, suspected A fib (on digoxin/ASA), no implantable cardiac monitoring device, CKD- followed by PCP Dr Nicole Staples at [facility], DMT2- well controlled, complicated by CKD, followed by PCP, hypothyroidism presented today for cardiac catheterization. Patient's daughter reports REYES for past month. Evaluated by cardiologist Dr Sanchez where NST was done revealing abnormality. Referred for cath. Cath c/b LAD perforation now transferred to the CCU with IABP. DILSHAD x2 to mLAD and dLAD. Was given asa 81mg and plavix load as well as 250cc NS bolus.      Past Medical History:   CAD (coronary artery disease)    Dementia    DM (diabetes mellitus)    HLD (hyperlipidemia)    HTN (hypertension)    Hypothyroid    MI (myocardial infarction).    Past Surgical History:   Stented coronary artery.      Current Medications:   MEDICATIONS  (STANDING):  chlorhexidine 4% Liquid 1 Application(s) Topical <User Schedule>  sodium chloride 0.9% lock flush 3 milliLiter(s) IV Push every 8 hours  sodium chloride 0.9%. 1000 milliLiter(s) (60 mL/Hr) IV Continuous <Continuous>    MEDICATIONS  (PRN):      Allergies:     NKDA    Family History:   Mother  Still living? Unknown  Family history of heart disease, Age at diagnosis: Age Unknown     Sibling  Still living? No  Family history of heart disease, Age at diagnosis: Age Unknown.      Social History:     Lives with children, . No reported history of alcohol or cigarette use.    ====================  Vital Signs Last 24 Hrs  T(C): 37.2 (31 Jan 2020 09:36), Max: 37.2 (31 Jan 2020 09:36)  T(F): 98.9 (31 Jan 2020 09:36), Max: 98.9 (31 Jan 2020 09:36)  HR: 82 (31 Jan 2020 13:30) (72 - 82)  BP: 171/75 (31 Jan 2020 09:36) (171/75 - 171/75)  BP(mean): 107 (31 Jan 2020 09:36) (107 - 107)  RR: 16 (31 Jan 2020 09:36) (16 - 16)  SpO2: 97% (31 Jan 2020 13:30) (97% - 100%)    Adult Advanced Hemodynamics Last 24 Hrs  CVP(mm Hg): --  CVP(cm H2O): --  CO: --  CI: --  PA: --  PA(mean): --  PCWP: --  SVR: --  SVRI: --  PVR: --  PVRI: --    Physical Exam:   General: NAD  HEENT: PERRL, EOMI, normal sclera and conjunctiva, no oral lesions  Neck: Supple, no JVD  Lungs: CTA bilaterally  Heart: RRR, normal S1S2, no murmurs/rubs/gallops  Abdomen: Soft, ND/NT  Extremities: 2+ peripheral pulses, no cyanosis/clubbing/edema, full ROM  Skin: Warm, well-perfused  Neuro: A&O x3, no focal deficits      ====================  Labs & Imaging:   CBC Full  -  ( 31 Jan 2020 09:44 )  WBC Count : 10.36 K/uL  RBC Count : 3.52 M/uL  Hemoglobin : 9.8 g/dL  Hematocrit : 31.3 %  Platelet Count - Automated : 225 K/uL  Mean Cell Volume : 88.9 fl  Mean Cell Hemoglobin : 27.8 pg  Mean Cell Hemoglobin Concentration : 31.3 gm/dL  Auto Neutrophil # : x  Auto Lymphocyte # : x  Auto Monocyte # : x  Auto Eosinophil # : x  Auto Basophil # : x  Auto Neutrophil % : x  Auto Lymphocyte % : x  Auto Monocyte % : x  Auto Eosinophil % : x  Auto Basophil % : x    01-31    138  |  99  |  41<H>  ----------------------------<  165<H>  4.6   |  26  |  2.11<H>    Ca    9.8      31 Jan 2020 09:44    TPro  8.3  /  Alb  4.0  /  TBili  0.4  /  DBili  x   /  AST  13  /  ALT  13  /  AlkPhos  75  01-31        ====================  Assessment & Plan:     84 F PMHx of dementia (A&Ox1-2 at baseline), CAD, T2DM on insulin, HLD, HTN, Hypothyroid, atrial fibrillation?, prior MI presented to [facility] for scheduled LHC given abnormal nuclear stress test and course c/b LAD perforation now with IABP    #Neuro  - Dementia, baseline appears to be A&Ox1-2  - c/w Donepezil 10mg, memantine 5mg daily    #CV  CAD  Prior history of MI with stent (unclear where it was placed, around 10 years ago).   - Maintain on IABP  - Repeat TTE  - NS @60cc/hr x4 hours  - c/w aspirin, lipitor 40mg qHS, toprol 50mg qD  HTN  - c/w Norvasc 5mg daily, hydralazine 50mg TID with hold parameters  - c/w home Lasix 20mg maintenance dose   Suspected Afib?  - Reported history of atrial fibrillation although NSR on AM EKG on NSR on telemetry thus far  - c/w digoxin 125 mcg, monitor on telemetry    #Respiratory  - DEYA, not on supplemental oxygen    #Heme/onc  - DEYA  - c/w Folic acid supplement    #GI  - DEYA    #Endo  - Hx of T2DM, obtain A1c, reportedly well controlled.  - Lantus 15u qHS, hold home oral medications, ISS  - c/w vit D 50K u weekly  - C/w Levothyroxine 88mcg daily,  - Obtain TSH    #Renal  BUN 41/SCr 2.11, unclear baseline but documented CKD. Can be in the setting of recent contrast from LHC  - Hold home Valsartan 320mg  - Trend BMP daily, avoid nephrotoxins    #ID  - wbc of 17, afebrile, likely 2/2 stress  - trend wbc for now    #DVT  - Will start heparin gtt once PTT is therapeutic given IABP    #Diet- DASH/CC    Parish Mejía MD PGY-2  Department of Internal Medicine  Pager: [phone] (NS) /[phone] ([facility]) CCU Accept Note    DAPHNE PARNELL  84y  Patient is a 84y old  Female who presents with a chief complaint of     ====================  HPI & Hospital Course:     84 yr old [place] female with PMH of dementia (unclear baseline), MI (remote), CAD with prior stent, HTN, HLD, HFrEF, suspected A fib (on digoxin/ASA), no implantable cardiac monitoring device, CKD- followed by PCP Dr Nicole Staples at [facility], DMT2- well controlled, complicated by CKD, followed by PCP, hypothyroidism presented today for cardiac catheterization. Patient's daughter reports REYES for past month. Evaluated by cardiologist Dr Sanchez where NST was done revealing abnormality. Referred for cath. Cath c/b LAD perforation now transferred to the CCU with IABP. DILSHAD x2 to mLAD and dLAD. Was given asa 81mg and plavix load as well as 250cc NS bolus.      Past Medical History:   CAD (coronary artery disease)    Dementia    DM (diabetes mellitus)    HLD (hyperlipidemia)    HTN (hypertension)    Hypothyroid    MI (myocardial infarction).    Past Surgical History:   Stented coronary artery.      Current Medications:   MEDICATIONS  (STANDING):  chlorhexidine 4% Liquid 1 Application(s) Topical <User Schedule>  sodium chloride 0.9% lock flush 3 milliLiter(s) IV Push every 8 hours  sodium chloride 0.9%. 1000 milliLiter(s) (60 mL/Hr) IV Continuous <Continuous>    MEDICATIONS  (PRN):      Allergies:     NKDA    Family History:   Mother  Still living? Unknown  Family history of heart disease, Age at diagnosis: Age Unknown     Sibling  Still living? No  Family history of heart disease, Age at diagnosis: Age Unknown.      Social History:     Lives with children, . No reported history of alcohol or cigarette use.    ====================  Vital Signs Last 24 Hrs  T(C): 37.2 (31 Jan 2020 09:36), Max: 37.2 (31 Jan 2020 09:36)  T(F): 98.9 (31 Jan 2020 09:36), Max: 98.9 (31 Jan 2020 09:36)  HR: 82 (31 Jan 2020 13:30) (72 - 82)  BP: 171/75 (31 Jan 2020 09:36) (171/75 - 171/75)  BP(mean): 107 (31 Jan 2020 09:36) (107 - 107)  RR: 16 (31 Jan 2020 09:36) (16 - 16)  SpO2: 97% (31 Jan 2020 13:30) (97% - 100%)    Adult Advanced Hemodynamics Last 24 Hrs  CVP(mm Hg): --  CVP(cm H2O): --  CO: --  CI: --  PA: --  PA(mean): --  PCWP: --  SVR: --  SVRI: --  PVR: --  PVRI: --    Physical Exam:   General: NAD  HEENT: PERRL, EOMI, normal sclera and conjunctiva, no oral lesions  Neck: Supple, no JVD  Lungs: CTA bilaterally  Heart: RRR, normal S1S2, no murmurs/rubs/gallops  Abdomen: Soft, ND/NT  Extremities: 2+ peripheral pulses, no cyanosis/clubbing/edema, full ROM  Skin: Warm, well-perfused  Neuro: A&O x3, no focal deficits      ====================  Labs & Imaging:   CBC Full  -  ( 31 Jan 2020 09:44 )  WBC Count : 10.36 K/uL  RBC Count : 3.52 M/uL  Hemoglobin : 9.8 g/dL  Hematocrit : 31.3 %  Platelet Count - Automated : 225 K/uL  Mean Cell Volume : 88.9 fl  Mean Cell Hemoglobin : 27.8 pg  Mean Cell Hemoglobin Concentration : 31.3 gm/dL  Auto Neutrophil # : x  Auto Lymphocyte # : x  Auto Monocyte # : x  Auto Eosinophil # : x  Auto Basophil # : x  Auto Neutrophil % : x  Auto Lymphocyte % : x  Auto Monocyte % : x  Auto Eosinophil % : x  Auto Basophil % : x    01-31    138  |  99  |  41<H>  ----------------------------<  165<H>  4.6   |  26  |  2.11<H>    Ca    9.8      31 Jan 2020 09:44    TPro  8.3  /  Alb  4.0  /  TBili  0.4  /  DBili  x   /  AST  13  /  ALT  13  /  AlkPhos  75  01-31        ====================  Assessment & Plan:     84 F PMHx of dementia (A&Ox1-2 at baseline), CAD, T2DM on insulin, HLD, HTN, Hypothyroid, atrial fibrillation?, prior MI presented to [facility] for scheduled LHC given abnormal nuclear stress test and course c/b LAD perforation now with IABP    #Neuro  - Dementia, baseline appears to be A&Ox1-2  - c/w Donepezil 10mg, memantine 5mg daily    #CV  CAD  Prior history of MI with stent (unclear where it was placed, around 10 years ago).   - Maintain on IABP  - TTE 1/31: Mild to moderate segmental left ventricular systolic dysfunction.  Hypokinesis of the mid and apical anteroseptum and apex.  The anterior wall suggests hypokinesis. Trace free effusion with thrombus within the pericardial space.  No evidence of cardiac tamponade.  - NS @60cc/hr x4 hours  - c/w aspirin, lipitor 40mg qHS,   - Hold home Toprol 50mg qD  HTN  - c/w Norvasc 5mg daily, hydralazine 50mg TID with hold parameters  - c/w home Lasix 20mg maintenance dose   Suspected Afib?  - Reported history of atrial fibrillation although NSR on AM EKG on NSR on telemetry thus far  - c/w digoxin 125 mcg, monitor on telemetry    #Respiratory  - DEYA, not on supplemental oxygen    #Heme/onc  - DEYA  - c/w Folic acid supplement    #GI  - DEYA    #Endo  - Hx of T2DM, obtain A1c, reportedly well controlled.  - Lantus 15u qHS, hold home oral medications, ISS  - c/w vit D 50K u weekly  - C/w Levothyroxine 88mcg daily,  - Obtain TSH    #Renal  BUN 41/SCr 2.11, unclear baseline but documented CKD. Can be in the setting of recent contrast from LHC  - Hold home Valsartan 320mg  - Trend BMP daily, avoid nephrotoxins    #ID  - wbc of 17, afebrile, likely 2/2 stress  - trend wbc for now    #DVT  - Will start heparin gtt once PTT is therapeutic given IABP    #Diet- DASH/CC    Parish Mejía MD PGY-2  Department of Internal Medicine  Pager: [phone] (NS) /[phone] ([facility])

## 2020-01-31 NOTE — H&P CARDIOLOGY - HISTORY OF PRESENT ILLNESS
84 yr old Citizen of [place] female with PMH of dementia, MI (remote), CAD with prior stent, HTN, HLD, HFrEF, suspected A fib (on digosin/ASA), no implantable cardiac monitoring device, CKD- followed by PCP Dr Nicole Staples at [facility], DMT2- well controlled, complicated by CKD, followed by PCP, hypothyroidism presents today for cardiac catheterization. Patient's daughter reports REYES for past month. Evaluated by cardiologist Dr Sanchez where NST was done 84 yr old [place] female with PMH of dementia (daughter perez consent for pt), MI (remote), CAD with prior stent, HTN, HLD, HFrEF, suspected A fib (on digoxin/ASA), no implantable cardiac monitoring device, CKD- followed by PCP Dr Nicole Staples at [facility], DMT2- well controlled, complicated by CKD, followed by PCP, hypothyroidism presents today for cardiac catheterization. Patient's daughter reports REYES for past month. Evaluated by cardiologist Dr Sanchez where NST was done  revealing abnormality. Referred for cath.  Narrative:     Symptoms:        Angina (Class):        Ischemic Symptoms:     Heart Failure:        Systolic/Diastolic/Combined:        NYHA Class (within 2 weeks):     Assessment of LVEF:       EF:        Assessed by:        Date:     Prior Cardiac Interventions:       PCI's:        CABG:     Noninvasive Testing:   Stress Test: Date:        Protocol:        Duration of Exercise:        Symptoms:        EKG Changes:        DTS:        Myocardial Imaging:        Risk Assessment:     Echo:     Antianginal Therapies:        Beta Blockers:  yes       Calcium Channel Blockers: yes       Long Acting Nitrates: no       Ranexa: no    Associated Risk Factors:        Cerebrovascular Disease: N/A       Chronic Lung Disease: N/A       Peripheral Arterial Disease: N/A       Chronic Kidney Disease (if yes, what is GFR): yes       Uncontrolled Diabetes (if yes, what is HgbA1C or FBS): N/A       Poorly Controlled Hypertension (if yes, what is SBP): N/A       Morbid Obesity (if yes, what is BMI): N/A       History of Recent Ventricular Arrhythmia: N/A       Inability to Ambulate Safely: 1-2  person assistance       Need for Therapeutic Anticoagulation: on ASA       Antiplatelet or Contrast Allergy: N/A 84 yr old [place]i female with PMH of dementia (daughter will consent for pt), MI (remote), CAD with prior stent, HTN, HLD, HFrEF, suspected A fib (on digoxin/ASA), no implantable cardiac monitoring device, CKD- followed by PCP Dr Nicole Staples at [facility], DMT2- well controlled, complicated by CKD, followed by PCP, hypothyroidism presents today for cardiac catheterization. Patient's daughter reports REYES for past month. Evaluated by cardiologist Dr Sanchez where NST was done  revealing abnormality. Referred for cath.  Narrative:     Symptoms:        Angina (Class): 11       Ischemic Symptoms: REYES    Heart Failure:        Systolic/Diastolic/Combined:        NYHA Class (within 2 weeks):     Assessment of LVEF: no report on [employer]       EF:        Assessed by:        Date:     Prior Cardiac Interventions: details not documented on office chart       PCI's: yes       CABG:     Noninvasive Testing:   Stress Test: Date: no report on [employer]       Protocol:        Duration of Exercise:        Symptoms:        EKG Changes:        DTS:        Myocardial Imaging:        Risk Assessment:     Echo:     Antianginal Therapies:        Beta Blockers:  yes       Calcium Channel Blockers: yes       Long Acting Nitrates: no       Ranexa: no    Associated Risk Factors:        Cerebrovascular Disease: N/A       Chronic Lung Disease: N/A       Peripheral Arterial Disease: N/A       Chronic Kidney Disease (if yes, what is GFR): yes       Uncontrolled Diabetes (if yes, what is HgbA1C or FBS): N/A       Poorly Controlled Hypertension (if yes, what is SBP): N/A       Morbid Obesity (if yes, what is BMI): N/A       History of Recent Ventricular Arrhythmia: N/A       Inability to Ambulate Safely: 1-2  person assistance       Need for Therapeutic Anticoagulation: on ASA       Antiplatelet or Contrast Allergy: N/A

## 2020-02-01 LAB
ALBUMIN SERPL ELPH-MCNC: 3.6 G/DL — SIGNIFICANT CHANGE UP (ref 3.3–5)
ALBUMIN SERPL ELPH-MCNC: 3.9 G/DL — SIGNIFICANT CHANGE UP (ref 3.3–5)
ALP SERPL-CCNC: 76 U/L — SIGNIFICANT CHANGE UP (ref 40–120)
ALP SERPL-CCNC: 80 U/L — SIGNIFICANT CHANGE UP (ref 40–120)
ALT FLD-CCNC: 33 U/L — SIGNIFICANT CHANGE UP (ref 10–45)
ALT FLD-CCNC: 40 U/L — SIGNIFICANT CHANGE UP (ref 10–45)
ANION GAP SERPL CALC-SCNC: 13 MMOL/L — SIGNIFICANT CHANGE UP (ref 5–17)
ANION GAP SERPL CALC-SCNC: 16 MMOL/L — SIGNIFICANT CHANGE UP (ref 5–17)
APTT BLD: 27.3 SEC — LOW (ref 27.5–36.3)
AST SERPL-CCNC: 129 U/L — HIGH (ref 10–40)
AST SERPL-CCNC: 209 U/L — HIGH (ref 10–40)
BILIRUB SERPL-MCNC: 0.4 MG/DL — SIGNIFICANT CHANGE UP (ref 0.2–1.2)
BILIRUB SERPL-MCNC: 0.4 MG/DL — SIGNIFICANT CHANGE UP (ref 0.2–1.2)
BUN SERPL-MCNC: 29 MG/DL — HIGH (ref 7–23)
BUN SERPL-MCNC: 30 MG/DL — HIGH (ref 7–23)
CALCIUM SERPL-MCNC: 9.6 MG/DL — SIGNIFICANT CHANGE UP (ref 8.4–10.5)
CALCIUM SERPL-MCNC: 9.6 MG/DL — SIGNIFICANT CHANGE UP (ref 8.4–10.5)
CHLORIDE SERPL-SCNC: 96 MMOL/L — SIGNIFICANT CHANGE UP (ref 96–108)
CHLORIDE SERPL-SCNC: 97 MMOL/L — SIGNIFICANT CHANGE UP (ref 96–108)
CK MB BLD-MCNC: 4.2 % — HIGH (ref 0–3.5)
CK MB BLD-MCNC: 5.1 % — HIGH (ref 0–3.5)
CK MB CFR SERPL CALC: 130 NG/ML — HIGH (ref 0–3.8)
CK MB CFR SERPL CALC: 54.3 NG/ML — HIGH (ref 0–3.8)
CK SERPL-CCNC: 1298 U/L — HIGH (ref 25–170)
CK SERPL-CCNC: 2541 U/L — HIGH (ref 25–170)
CO2 SERPL-SCNC: 21 MMOL/L — LOW (ref 22–31)
CO2 SERPL-SCNC: 22 MMOL/L — SIGNIFICANT CHANGE UP (ref 22–31)
CREAT SERPL-MCNC: 1.75 MG/DL — HIGH (ref 0.5–1.3)
CREAT SERPL-MCNC: 1.83 MG/DL — HIGH (ref 0.5–1.3)
GLUCOSE BLDC GLUCOMTR-MCNC: 220 MG/DL — HIGH (ref 70–99)
GLUCOSE BLDC GLUCOMTR-MCNC: 236 MG/DL — HIGH (ref 70–99)
GLUCOSE BLDC GLUCOMTR-MCNC: 271 MG/DL — HIGH (ref 70–99)
GLUCOSE BLDC GLUCOMTR-MCNC: 273 MG/DL — HIGH (ref 70–99)
GLUCOSE SERPL-MCNC: 222 MG/DL — HIGH (ref 70–99)
GLUCOSE SERPL-MCNC: 282 MG/DL — HIGH (ref 70–99)
HCT VFR BLD CALC: 31 % — LOW (ref 34.5–45)
HCT VFR BLD CALC: 31.5 % — LOW (ref 34.5–45)
HGB BLD-MCNC: 9.7 G/DL — LOW (ref 11.5–15.5)
HGB BLD-MCNC: 9.8 G/DL — LOW (ref 11.5–15.5)
INR BLD: 1.08 RATIO — SIGNIFICANT CHANGE UP (ref 0.88–1.16)
LACTATE SERPL-SCNC: 2.2 MMOL/L — HIGH (ref 0.7–2)
LACTATE SERPL-SCNC: 2.5 MMOL/L — HIGH (ref 0.7–2)
MAGNESIUM SERPL-MCNC: 2.2 MG/DL — SIGNIFICANT CHANGE UP (ref 1.6–2.6)
MAGNESIUM SERPL-MCNC: 2.3 MG/DL — SIGNIFICANT CHANGE UP (ref 1.6–2.6)
MCHC RBC-ENTMCNC: 27.3 PG — SIGNIFICANT CHANGE UP (ref 27–34)
MCHC RBC-ENTMCNC: 27.5 PG — SIGNIFICANT CHANGE UP (ref 27–34)
MCHC RBC-ENTMCNC: 31.1 GM/DL — LOW (ref 32–36)
MCHC RBC-ENTMCNC: 31.3 GM/DL — LOW (ref 32–36)
MCV RBC AUTO: 87.3 FL — SIGNIFICANT CHANGE UP (ref 80–100)
MCV RBC AUTO: 88.2 FL — SIGNIFICANT CHANGE UP (ref 80–100)
NRBC # BLD: 0 /100 WBCS — SIGNIFICANT CHANGE UP (ref 0–0)
NRBC # BLD: 0 /100 WBCS — SIGNIFICANT CHANGE UP (ref 0–0)
PHOSPHATE SERPL-MCNC: 3.3 MG/DL — SIGNIFICANT CHANGE UP (ref 2.5–4.5)
PHOSPHATE SERPL-MCNC: 3.8 MG/DL — SIGNIFICANT CHANGE UP (ref 2.5–4.5)
PLATELET # BLD AUTO: 149 K/UL — LOW (ref 150–400)
PLATELET # BLD AUTO: 183 K/UL — SIGNIFICANT CHANGE UP (ref 150–400)
POTASSIUM SERPL-MCNC: 4.6 MMOL/L — SIGNIFICANT CHANGE UP (ref 3.5–5.3)
POTASSIUM SERPL-MCNC: 4.6 MMOL/L — SIGNIFICANT CHANGE UP (ref 3.5–5.3)
POTASSIUM SERPL-SCNC: 4.6 MMOL/L — SIGNIFICANT CHANGE UP (ref 3.5–5.3)
POTASSIUM SERPL-SCNC: 4.6 MMOL/L — SIGNIFICANT CHANGE UP (ref 3.5–5.3)
PROT SERPL-MCNC: 7.7 G/DL — SIGNIFICANT CHANGE UP (ref 6–8.3)
PROT SERPL-MCNC: 7.9 G/DL — SIGNIFICANT CHANGE UP (ref 6–8.3)
PROTHROM AB SERPL-ACNC: 12.3 SEC — SIGNIFICANT CHANGE UP (ref 10–12.9)
RBC # BLD: 3.55 M/UL — LOW (ref 3.8–5.2)
RBC # BLD: 3.57 M/UL — LOW (ref 3.8–5.2)
RBC # FLD: 14.2 % — SIGNIFICANT CHANGE UP (ref 10.3–14.5)
RBC # FLD: 14.2 % — SIGNIFICANT CHANGE UP (ref 10.3–14.5)
SODIUM SERPL-SCNC: 131 MMOL/L — LOW (ref 135–145)
SODIUM SERPL-SCNC: 134 MMOL/L — LOW (ref 135–145)
TROPONIN T, HIGH SENSITIVITY RESULT: 3608 NG/L — HIGH (ref 0–51)
TROPONIN T, HIGH SENSITIVITY RESULT: 5193 NG/L — HIGH (ref 0–51)
WBC # BLD: 10.48 K/UL — SIGNIFICANT CHANGE UP (ref 3.8–10.5)
WBC # BLD: 13.53 K/UL — HIGH (ref 3.8–10.5)
WBC # FLD AUTO: 10.48 K/UL — SIGNIFICANT CHANGE UP (ref 3.8–10.5)
WBC # FLD AUTO: 13.53 K/UL — HIGH (ref 3.8–10.5)

## 2020-02-01 PROCEDURE — 99292 CRITICAL CARE ADDL 30 MIN: CPT

## 2020-02-01 PROCEDURE — 99291 CRITICAL CARE FIRST HOUR: CPT

## 2020-02-01 PROCEDURE — 71045 X-RAY EXAM CHEST 1 VIEW: CPT | Mod: 26

## 2020-02-01 RX ORDER — SODIUM CHLORIDE 9 MG/ML
250 INJECTION, SOLUTION INTRAVENOUS ONCE
Refills: 0 | Status: COMPLETED | OUTPATIENT
Start: 2020-02-01 | End: 2020-02-01

## 2020-02-01 RX ORDER — HYDRALAZINE HCL 50 MG
75 TABLET ORAL THREE TIMES A DAY
Refills: 0 | Status: DISCONTINUED | OUTPATIENT
Start: 2020-02-01 | End: 2020-02-02

## 2020-02-01 RX ORDER — CARVEDILOL PHOSPHATE 80 MG/1
3.12 CAPSULE, EXTENDED RELEASE ORAL EVERY 12 HOURS
Refills: 0 | Status: DISCONTINUED | OUTPATIENT
Start: 2020-02-01 | End: 2020-02-03

## 2020-02-01 RX ORDER — CLOPIDOGREL BISULFATE 75 MG/1
75 TABLET, FILM COATED ORAL DAILY
Refills: 0 | Status: DISCONTINUED | OUTPATIENT
Start: 2020-02-01 | End: 2020-02-06

## 2020-02-01 RX ADMIN — Medication 30 MILLILITER(S): at 03:45

## 2020-02-01 RX ADMIN — CARVEDILOL PHOSPHATE 3.12 MILLIGRAM(S): 80 CAPSULE, EXTENDED RELEASE ORAL at 11:01

## 2020-02-01 RX ADMIN — Medication 81 MILLIGRAM(S): at 12:46

## 2020-02-01 RX ADMIN — Medication 3: at 17:08

## 2020-02-01 RX ADMIN — ERGOCALCIFEROL 50000 UNIT(S): 1.25 CAPSULE ORAL at 12:46

## 2020-02-01 RX ADMIN — Medication 0.12 MILLIGRAM(S): at 05:11

## 2020-02-01 RX ADMIN — Medication 20 MILLIGRAM(S): at 05:12

## 2020-02-01 RX ADMIN — SODIUM CHLORIDE 3 MILLILITER(S): 9 INJECTION INTRAMUSCULAR; INTRAVENOUS; SUBCUTANEOUS at 05:12

## 2020-02-01 RX ADMIN — Medication 2: at 08:25

## 2020-02-01 RX ADMIN — Medication 88 MICROGRAM(S): at 05:12

## 2020-02-01 RX ADMIN — SODIUM CHLORIDE 3 MILLILITER(S): 9 INJECTION INTRAMUSCULAR; INTRAVENOUS; SUBCUTANEOUS at 21:55

## 2020-02-01 RX ADMIN — CARVEDILOL PHOSPHATE 3.12 MILLIGRAM(S): 80 CAPSULE, EXTENDED RELEASE ORAL at 19:06

## 2020-02-01 RX ADMIN — AMLODIPINE BESYLATE 5 MILLIGRAM(S): 2.5 TABLET ORAL at 21:59

## 2020-02-01 RX ADMIN — Medication 3: at 12:46

## 2020-02-01 RX ADMIN — DONEPEZIL HYDROCHLORIDE 10 MILLIGRAM(S): 10 TABLET, FILM COATED ORAL at 21:55

## 2020-02-01 RX ADMIN — SODIUM CHLORIDE 3 MILLILITER(S): 9 INJECTION INTRAMUSCULAR; INTRAVENOUS; SUBCUTANEOUS at 13:20

## 2020-02-01 RX ADMIN — ATORVASTATIN CALCIUM 40 MILLIGRAM(S): 80 TABLET, FILM COATED ORAL at 21:48

## 2020-02-01 RX ADMIN — Medication 75 MILLIGRAM(S): at 21:46

## 2020-02-01 RX ADMIN — SODIUM CHLORIDE 250 MILLILITER(S): 9 INJECTION, SOLUTION INTRAVENOUS at 06:00

## 2020-02-01 RX ADMIN — Medication 75 MILLIGRAM(S): at 05:12

## 2020-02-01 RX ADMIN — CHLORHEXIDINE GLUCONATE 1 APPLICATION(S): 213 SOLUTION TOPICAL at 05:11

## 2020-02-01 RX ADMIN — Medication 75 MILLIGRAM(S): at 15:29

## 2020-02-01 RX ADMIN — INSULIN GLARGINE 15 UNIT(S): 100 INJECTION, SOLUTION SUBCUTANEOUS at 21:46

## 2020-02-01 RX ADMIN — MEMANTINE HYDROCHLORIDE 5 MILLIGRAM(S): 10 TABLET ORAL at 21:55

## 2020-02-01 RX ADMIN — Medication 1 MILLIGRAM(S): at 12:46

## 2020-02-01 RX ADMIN — CLOPIDOGREL BISULFATE 75 MILLIGRAM(S): 75 TABLET, FILM COATED ORAL at 12:46

## 2020-02-01 NOTE — PROGRESS NOTE ADULT - ASSESSMENT
84 year old F w/ pmh of dementia, CAD, T2DM, HLD, HTN, hypothryoid, AF who presented for cath 2/2 abnormal stress test w/ LAD perf s/p stents w/ IABP    Resp  - Oxygenating well on room air, chest xr clear  - States she feels shallow breathing likely 2/2 brilinta.  - I educated her on brilinta side effects    CAD w/ DILSHAD and IABP  - ECHO revealed no effusion  - Plan for IABP removal today  - Continue ASA, brilinta, lipitor  - Hydralazine uptitrated for Aug D in the 160s  - Continue home digoxin    T2DM  - Continue ISS    GERD   - Continue PPI    CKD  - Cr 1.78 from 1.8, appears to be at baseline    Lesa Rob, [facility]  [id number]

## 2020-02-01 NOTE — PROGRESS NOTE ADULT - SUBJECTIVE AND OBJECTIVE BOX
====================  CCU MIDNIGHT ROUNDS  ====================  DAPHNE PARNELL  [id number]  Patient is a 84y old  Female who presents with a chief complaint of   ====================  SUMMARY:HPI:  84 yr old [place] female with PMH of dementia (daughter will consent for pt), MI (remote), CAD with prior stent, HTN, HLD, HFrEF, suspected A fib (on digoxin/ASA), no implantable cardiac monitoring device, CKD- followed by PCP Dr Nicole Staples at [facility], DMT2- well controlled, complicated by CKD, followed by PCP, hypothyroidism presents today for cardiac catheterization. Patient's daughter reports REYES for past month. Evaluated by cardiologist Dr Sanchez where NST was donerevealing abnormality. Referred for cath.  ====================  NEW EVENTS:  ====================  ====================  VITALS (Last 12 hrs):  ====================  T(C): 36.9 (02-01-20 @ 12:00), Max: 36.9 (02-01-20 @ 08:00)  T(F): 98.4 (02-01-20 @ 12:00), Max: 98.5 (02-01-20 @ 08:00)  HR: 80 (02-01-20 @ 18:00) (80 - 96)  BP: 118/52 (02-01-20 @ 18:00) (118/52 - 149/68)  BP(mean): 73 (02-01-20 @ 18:00) (73 - 112)  RR: 12 (02-01-20 @ 18:00) (12 - 17)  SpO2: 100% (02-01-20 @ 18:00) (99% - 100%)  I&O's Summary    31 Jan 2020 07:01  -  01 Feb 2020 07:00  --------------------------------------------------------  IN: 1570 mL / OUT: 2250 mL / NET: -680 mL    01 Feb 2020 07:01  -  01 Feb 2020 19:23  --------------------------------------------------------  IN: 420 mL / OUT: 1000 mL / NET: -580 mL  ====================  NEW LABS:  ====================                          9.8    10.48 )-----------( 149      ( 01 Feb 2020 15:54 )             31.5     02-01    131<L>  |  96  |  29<H>  ----------------------------<  282<H>  4.6   |  22  |  1.83<H>    Ca    9.6      01 Feb 2020 15:54  Phos  3.3     02-01  Mg     2.3     02-01    TPro  7.9  /  Alb  3.6  /  TBili  0.4  /  DBili  x   /  AST  129<H>  /  ALT  33  /  AlkPhos  76  02-01    PT/INR - ( 01 Feb 2020 05:19 )   PT: 12.3 sec;   INR: 1.08 ratio         PTT - ( 01 Feb 2020 05:19 )  PTT:27.3 sec  Creatine Kinase, Serum: 1298 U/L <H> (02-01-20 @ 15:54)  Creatine Kinase, Serum: 2541 U/L <H> (02-01-20 @ 05:19)  Creatine Kinase, Serum: 2574 U/L <H> (01-31-20 @ 22:05)    CKMB Units: 54.3 ng/mL (02-01 @ 15:54)  CKMB Units: 130.0 ng/mL (02-01 @ 05:19)  CKMB Units: 147.2 ng/mL (01-31 @ 22:05)  ====================  PLAN:  ====================  #neuro  #card  #GI  #  #ID  #discharge planning ====================  CCU MIDNIGHT ROUNDS  ====================  DAPHNE PARNELL  [id number]  Patient is a 84y old  Female who presents with a chief complaint of   ====================  SUMMARY:HPI:  84 yr old [place] female with PMH of dementia (daughter will consent for pt), MI (remote), CAD with prior stent, HTN, HLD, HFrEF, suspected A fib (on digoxin/ASA), no implantable cardiac monitoring device, CKD- followed by PCP Dr Nicole Staples at [facility], DMT2- well controlled, complicated by CKD, followed by PCP, hypothyroidism presents today for cardiac catheterization. Patient's daughter reports REYES for past month. Evaluated by cardiologist Dr Sanchez where NST was donerevealing abnormality. Referred for cath.  ====================  NEW EVENTS:s/p R femoral IABP removal, started on coreg   ====================  ====================  VITALS (Last 12 hrs):  ====================  T(C): 36.9 (02-01-20 @ 12:00), Max: 36.9 (02-01-20 @ 08:00)  T(F): 98.4 (02-01-20 @ 12:00), Max: 98.5 (02-01-20 @ 08:00)  HR: 80 (02-01-20 @ 18:00) (80 - 96)  BP: 118/52 (02-01-20 @ 18:00) (118/52 - 149/68)  BP(mean): 73 (02-01-20 @ 18:00) (73 - 112)  RR: 12 (02-01-20 @ 18:00) (12 - 17)  SpO2: 100% (02-01-20 @ 18:00) (99% - 100%)  I&O's Summary    31 Jan 2020 07:01  -  01 Feb 2020 07:00  --------------------------------------------------------  IN: 1570 mL / OUT: 2250 mL / NET: -680 mL    01 Feb 2020 07:01  -  01 Feb 2020 19:23  --------------------------------------------------------  IN: 420 mL / OUT: 1000 mL / NET: -580 mL  ====================  NEW LABS:  ====================                          9.8    10.48 )-----------( 149      ( 01 Feb 2020 15:54 )             31.5     02-01    131<L>  |  96  |  29<H>  ----------------------------<  282<H>  4.6   |  22  |  1.83<H>    Ca    9.6      01 Feb 2020 15:54  Phos  3.3     02-01  Mg     2.3     02-01    TPro  7.9  /  Alb  3.6  /  TBili  0.4  /  DBili  x   /  AST  129<H>  /  ALT  33  /  AlkPhos  76  02-01    PT/INR - ( 01 Feb 2020 05:19 )   PT: 12.3 sec;   INR: 1.08 ratio         PTT - ( 01 Feb 2020 05:19 )  PTT:27.3 sec  Creatine Kinase, Serum: 1298 U/L <H> (02-01-20 @ 15:54)  Creatine Kinase, Serum: 2541 U/L <H> (02-01-20 @ 05:19)  Creatine Kinase, Serum: 2574 U/L <H> (01-31-20 @ 22:05)    CKMB Units: 54.3 ng/mL (02-01 @ 15:54)  CKMB Units: 130.0 ng/mL (02-01 @ 05:19)  CKMB Units: 147.2 ng/mL (01-31 @ 22:05)  ====================  PLAN:  ====================  #neuro  Dementia  -  A&Ox1-2, pleasant   - c/w Donepezil 10mg  - c/w memantine 5mg daily    #CV  CAD  - hx of MI 10 years ago   - s/p right femoral IABP removal 2/1   - TTE 1/31: No evidence of cardiac tamponade.  - repeat TTE in AM   - c/w aspirin, lipitor    HTN  - c/w Norvasc 5mg daily, hydralazine 50mg TID with hold parameters  - c/w home Lasix 20mg maintenance dose     AFIB   - currently NSR 80's   - c/w digoxin 125 mcg  - mechanical VTE in place  - will restart heparin pending TTE     #Endo  T2DM  - f/u A1C   - c/w Lantus 15u   - c/w ISS    Hypothyroidism   - c/w Levothyroxine 88mcg daily,    #Renal  CKD   - downtrending creat now 1.83 <-- 2.11   - trend CMP   - avoid nephrotoxins     #ID  - WBC now normalizing 10.48 <--- 17, likely secondary to stress   - remained afebrile   - cont to monitor temp curve     #DVT  - SCD B/L in place   - plan to start AC after echo

## 2020-02-01 NOTE — PROGRESS NOTE ADULT - SUBJECTIVE AND OBJECTIVE BOX
Events:    Review Of Systems:  Constitutional: denies fever, chills, Fatigue   HEENT: denies Blurred vision, Eye Pain, Headache   Respiratory: denies Cough, Wheezing , Shortness of breath  Cardiovascular: denies Chest Pain, Palpitations,  REYES   Gastrointestinal: denies Abdominal Pain, Diarrhea, Constipation   Genitourinary: denies Nocturia, Dysuria, Incontinence  Extremities: denies Swelling, Joint Pain  Neurologic: denies Focal deficit, Paresthesias, Syncope  Lymphatic: denies Swelling, Lymphadenopathy   Skin: denies Rash, Ecchymoses, Wounds   Psychiatry: denies Depression, Suicidal/Homicidal Ideation, anxiety  [X ] 10 point review of systems is otherwise negative except as mentioned above         Medications:  amLODIPine   Tablet 5 milliGRAM(s) Oral at bedtime  aspirin enteric coated 81 milliGRAM(s) Oral daily  atorvastatin 40 milliGRAM(s) Oral at bedtime  chlorhexidine 2% Cloths 1 Application(s) Topical <User Schedule>  dextrose 40% Gel 15 Gram(s) Oral once PRN  dextrose 5%. 1000 milliLiter(s) IV Continuous <Continuous>  dextrose 50% Injectable 12.5 Gram(s) IV Push once  dextrose 50% Injectable 25 Gram(s) IV Push once  dextrose 50% Injectable 25 Gram(s) IV Push once  digoxin     Tablet 0.125 milliGRAM(s) Oral daily  donepezil 10 milliGRAM(s) Oral at bedtime  ergocalciferol 18655 Unit(s) Oral every week  folic acid 1 milliGRAM(s) Oral daily  furosemide    Tablet 20 milliGRAM(s) Oral daily  glucagon  Injectable 1 milliGRAM(s) IntraMuscular once PRN  hydrALAZINE 75 milliGRAM(s) Oral three times a day  influenza   Vaccine 0.5 milliLiter(s) IntraMuscular once  insulin glargine Injectable (LANTUS) 15 Unit(s) SubCutaneous at bedtime  insulin lispro (HumaLOG) corrective regimen sliding scale   SubCutaneous three times a day before meals  insulin lispro (HumaLOG) corrective regimen sliding scale   SubCutaneous at bedtime  levothyroxine 88 MICROGram(s) Oral daily  memantine 5 milliGRAM(s) Oral at bedtime  sodium chloride 0.9% lock flush 3 milliLiter(s) IV Push every 8 hours  sodium chloride 0.9%. 1000 milliLiter(s) IV Continuous <Continuous>    PMH/PSH/FH/SH: [ ] Unchanged  Vitals:  T(C): 37.1 (20 @ 03:00), Max: 37.3 (20 @ 19:00)  HR: 88 (20 @ 06:00) (72 - 94)  BP: 165/97 (20 @ 19:25) (132/61 - 171/75)  BP(mean): 132 (20 @ 19:25) (93 - 132)  RR: 18 (20 @ 06:00) (12 - 18)  SpO2: 100% (20 @ 06:00) (97% - 100%)  Wt(kg): --  Daily Height in cm: 160.02 (2020 13:30)    Daily Weight in k.1 (2020 05:00)  I&O's Summary    2020 07:01  -  2020 07:00  --------------------------------------------------------  IN: 1570 mL / OUT: 2250 mL / NET: -680 mL        Physical Exam:  Appearance: [ ] Normal [ ] NAD  Eyes: [ ] PERRL [ ] EOMI  HENT: [ ] Normal oral muscosa [ ]NC/AT  Cardiovascular: [ ] S1 [ ] S2 [ ] RRR [ ] No m/r/g [ ]No edema [ ] JVP  Procedural Access Site: [ ] No hematoma [ ] Non-tender to palpation [ ] 2+ pulse [ ] No bruit [ ] No Ecchymosis  Respiratory: [ ] Clear to auscultation bilaterally  Gastrointestinal: [ ] Soft [ ] Non-tender [ ] Non-distended [ ] BS+  Musculoskeletal: [ ] No clubbing [ ] No joint deformity   Neurologic: [ ] Non-focal  Lymphatic: [ ] No lymphadenopathy  Psychiatry: [ ] AAOx3 [ ] Mood & affect appropriate  Skin: [ ] No rashes [ ] No ecchymoses [ ] No cyanosis        134<L>  |  97  |  30<H>  ----------------------------<  222<H>  4.6   |  21<L>  |  1.75<H>    Ca    9.6      2020 05:19  Phos  3.8     02-  Mg     2.2     -    TPro  7.7  /  Alb  3.9  /  TBili  0.4  /  DBili  x   /  AST  209<H>  /  ALT  40  /  AlkPhos  80  02-    PT/INR - ( 2020 05:19 )   PT: 12.3 sec;   INR: 1.08 ratio         PTT - ( 2020 05:19 )  PTT:27.3 sec  CARDIAC MARKERS ( 2020 05:19 )  x     / x     / 2541 U/L / x     / 130.0 ng/mL  CARDIAC MARKERS ( 2020 22:05 )  x     / x     / 2574 U/L / x     / 147.2 ng/mL  CARDIAC MARKERS ( 2020 14:22 )  x     / x     / 203 U/L / x     / 5.2 ng/mL        Total Cholesterol: 104  LDL: 27  HDL: 28  T    Hemoglobin A1C, Whole Blood: 8.4 % ( @ 18:26)      ECG:    Echo:    Stress Testing:     Cath:    Imaging:    Interpretation of Telemetry: HPI:  84 yr old [place] F w/ PMH of dementia (daughter will consent for pt), MI (remote), CAD w/ prior stent, HTN, HLD, HFrEF, suspected A fib (on digoxin/ASA), no implantable cardiac monitoring device, CKD- followed by PCP Dr Nicole Staples at [facility], DMT2- well controlled, complicated by CKD, followed by PCP, hypothyroidism presents today for cardiac catheterization. Patient's daughter reports REYES for past month. Evaluated by cardiologist Dr Sanchez where NST was done revealing abnormality. Pt underwent cardiac cath which was complicated by perforation of the LAD where two covered stents were placed. An IABP was placed for hypotension. Pt was transferred to CCU 1 off heparin gtt. A repeat ECHO was done which only showed a trace effusion.     Events: No acute events overnight. Hydralazine was uptitrated for Aug D in the 160s.     Review Of Systems:  Constitutional: denies fever, chills, Fatigue   HEENT: denies Blurred vision, Eye Pain, Headache   Respiratory: complaining of shallow breathing  Cardiovascular: denies Chest Pain, Palpitations,  REYES   Gastrointestinal: denies Abdominal Pain, Diarrhea, Constipation   Genitourinary: denies Nocturia, Dysuria, Incontinence  Extremities: denies Swelling, Joint Pain  Neurologic: denies Focal deficit, Paresthesias, Syncope  Lymphatic: denies Swelling, Lymphadenopathy   Skin: denies Rash, Ecchymoses, Wounds   Psychiatry: denies Depression, Suicidal/Homicidal Ideation, anxiety  [X ] 10 point review of systems is otherwise negative except as mentioned above         Medications:  amLODIPine   Tablet 5 milliGRAM(s) Oral at bedtime  aspirin enteric coated 81 milliGRAM(s) Oral daily  atorvastatin 40 milliGRAM(s) Oral at bedtime  chlorhexidine 2% Cloths 1 Application(s) Topical <User Schedule>  dextrose 40% Gel 15 Gram(s) Oral once PRN  dextrose 5%. 1000 milliLiter(s) IV Continuous <Continuous>  dextrose 50% Injectable 12.5 Gram(s) IV Push once  dextrose 50% Injectable 25 Gram(s) IV Push once  dextrose 50% Injectable 25 Gram(s) IV Push once  digoxin     Tablet 0.125 milliGRAM(s) Oral daily  donepezil 10 milliGRAM(s) Oral at bedtime  ergocalciferol 94131 Unit(s) Oral every week  folic acid 1 milliGRAM(s) Oral daily  furosemide    Tablet 20 milliGRAM(s) Oral daily  glucagon  Injectable 1 milliGRAM(s) IntraMuscular once PRN  hydrALAZINE 75 milliGRAM(s) Oral three times a day  influenza   Vaccine 0.5 milliLiter(s) IntraMuscular once  insulin glargine Injectable (LANTUS) 15 Unit(s) SubCutaneous at bedtime  insulin lispro (HumaLOG) corrective regimen sliding scale   SubCutaneous three times a day before meals  insulin lispro (HumaLOG) corrective regimen sliding scale   SubCutaneous at bedtime  levothyroxine 88 MICROGram(s) Oral daily  memantine 5 milliGRAM(s) Oral at bedtime  sodium chloride 0.9% lock flush 3 milliLiter(s) IV Push every 8 hours  sodium chloride 0.9%. 1000 milliLiter(s) IV Continuous <Continuous>    Vitals:  T(C): 37.1 (20 @ 03:00), Max: 37.3 (20 @ 19:00)  HR: 88 (20 @ 06:00) (72 - 94)  BP: 165/97 (20 @ 19:25) (132/61 - 171/75)  BP(mean): 132 (20 @ 19:25) (93 - 132)  RR: 18 (20 @ 06:00) (12 - 18)  SpO2: 100% (20 @ 06:00) (97% - 100%)    Daily Height in cm: 160.02 (2020 13:30)    Daily Weight in k.1 (2020 05:00)  I&O's Summary    2020 07:01  -  2020 07:00  --------------------------------------------------------  IN: 1570 mL / OUT: 2250 mL / NET: -680 mL    Physical Exam:  Appearance: [ X] Normal [X ] NAD  Eyes: [ X] PERRL [X ] EOMI  HENT: [X] Normal oral muscosa [ X]NC/AT  Cardiovascular: [X ] S1 [X ] S2 [X ] RRR. R groin C/D/I without bleeding, induration.   Respiratory: [X ] Clear to auscultation bilaterally  Gastrointestinal: [X ] Soft [X ] Non-tender [X ] Non-distended   Musculoskeletal: [ x] No clubbing [x ] No joint deformity   Neurologic: [x ] Non-focal  Lymphatic: [ x] No lymphadenopathy  Psychiatry: [x ] AAOx3 [x ] Mood & affect appropriate  Skin: [ x] No rashes [x No ecchymoses [ x] No cyanosis        134<L>  |  97  |  30<H>  ----------------------------<  222<H>  4.6   |  21<L>  |  1.75<H>    Ca    9.6      2020 05:19  Phos  3.8     -  Mg     2.2     -    TPro  7.7  /  Alb  3.9  /  TBili  0.4  /  DBili  x   /  AST  209<H>  /  ALT  40  /  AlkPhos  80  02-    PT/INR - ( 2020 05:19 )   PT: 12.3 sec;   INR: 1.08 ratio         PTT - ( 2020 05:19 )  PTT:27.3 sec  CARDIAC MARKERS ( 2020 05:19 )  x     / x     / 2541 U/L / x     / 130.0 ng/mL  CARDIAC MARKERS ( 2020 22:05 )  x     / x     / 2574 U/L / x     / 147.2 ng/mL  CARDIAC MARKERS ( 2020 14:22 )  x     / x     / 203 U/L / x     / 5.2 ng/mL    Total Cholesterol: 104  LDL: 27  HDL: 28  T    Hemoglobin A1C, Whole Blood: 8.4 % ( @ 18:26)    ECG: < from: 12 Lead ECG (20 @ 09:30) >  NORMAL SINUS RHYTHM  LEFT AXIS DEVIATION  NON-SPECIFIC INTRA-VENTRICULAR CONDUCTION BLOCK  CANNOT RULE OUT ANTEROSEPTAL INFARCT , AGE UNDETERMINED  T WAVE ABNORMALITY, CONSIDER LATERAL ISCHEMIA  ABNORMAL ECG    Echo: < from: Limited Transthoracic Echo (20 @ 16:18) >  1. Normal left ventricular internal dimensions and wall  thicknesses.  2. Mild to moderate  segmental left ventricular systolic  dysfunction.  Hypokinesis of the mid and apical  anteroseptum and apex.  The anterior wall is suboptimal  viewed but limited imaging suggests hypokinesis.  3. Trace free effusion with thrombus within the pericardial  space.  No echocardiiograhic evidence of cardiac tamponade.    Cath: Covered stents to pLAD, mLAD , report pending    Imaging: < from: Xray Chest 1 View- PORTABLE-Urgent (20 @ 18:17) >  The heart is slightly enlarged. The lungs are clear. Intra-aortic balloon pump is 6 cm below the top of the aortic knob. No pneumothorax.    Interpretation of Telemetry: NSR 81

## 2020-02-02 ENCOUNTER — TRANSCRIPTION ENCOUNTER (OUTPATIENT)
Age: 85
End: 2020-02-02

## 2020-02-02 LAB
ALBUMIN SERPL ELPH-MCNC: 3 G/DL — LOW (ref 3.3–5)
ALBUMIN SERPL ELPH-MCNC: 3.1 G/DL — LOW (ref 3.3–5)
ALBUMIN SERPL ELPH-MCNC: 3.3 G/DL — SIGNIFICANT CHANGE UP (ref 3.3–5)
ALP SERPL-CCNC: 59 U/L — SIGNIFICANT CHANGE UP (ref 40–120)
ALP SERPL-CCNC: 61 U/L — SIGNIFICANT CHANGE UP (ref 40–120)
ALP SERPL-CCNC: 65 U/L — SIGNIFICANT CHANGE UP (ref 40–120)
ALT FLD-CCNC: 22 U/L — SIGNIFICANT CHANGE UP (ref 10–45)
ALT FLD-CCNC: 25 U/L — SIGNIFICANT CHANGE UP (ref 10–45)
ALT FLD-CCNC: 26 U/L — SIGNIFICANT CHANGE UP (ref 10–45)
ANION GAP SERPL CALC-SCNC: 10 MMOL/L — SIGNIFICANT CHANGE UP (ref 5–17)
ANION GAP SERPL CALC-SCNC: 12 MMOL/L — SIGNIFICANT CHANGE UP (ref 5–17)
ANION GAP SERPL CALC-SCNC: 14 MMOL/L — SIGNIFICANT CHANGE UP (ref 5–17)
APTT BLD: 27.2 SEC — LOW (ref 27.5–36.3)
AST SERPL-CCNC: 55 U/L — HIGH (ref 10–40)
AST SERPL-CCNC: 62 U/L — HIGH (ref 10–40)
AST SERPL-CCNC: 67 U/L — HIGH (ref 10–40)
BILIRUB SERPL-MCNC: 0.3 MG/DL — SIGNIFICANT CHANGE UP (ref 0.2–1.2)
BILIRUB SERPL-MCNC: 0.3 MG/DL — SIGNIFICANT CHANGE UP (ref 0.2–1.2)
BILIRUB SERPL-MCNC: 0.4 MG/DL — SIGNIFICANT CHANGE UP (ref 0.2–1.2)
BUN SERPL-MCNC: 35 MG/DL — HIGH (ref 7–23)
BUN SERPL-MCNC: 36 MG/DL — HIGH (ref 7–23)
BUN SERPL-MCNC: 41 MG/DL — HIGH (ref 7–23)
CALCIUM SERPL-MCNC: 8.6 MG/DL — SIGNIFICANT CHANGE UP (ref 8.4–10.5)
CALCIUM SERPL-MCNC: 9.1 MG/DL — SIGNIFICANT CHANGE UP (ref 8.4–10.5)
CALCIUM SERPL-MCNC: 9.1 MG/DL — SIGNIFICANT CHANGE UP (ref 8.4–10.5)
CHLORIDE SERPL-SCNC: 93 MMOL/L — LOW (ref 96–108)
CHLORIDE SERPL-SCNC: 97 MMOL/L — SIGNIFICANT CHANGE UP (ref 96–108)
CHLORIDE SERPL-SCNC: 98 MMOL/L — SIGNIFICANT CHANGE UP (ref 96–108)
CHOLEST SERPL-MCNC: 91 MG/DL — SIGNIFICANT CHANGE UP (ref 10–199)
CK MB BLD-MCNC: 3.1 % — SIGNIFICANT CHANGE UP (ref 0–3.5)
CK MB CFR SERPL CALC: 17.8 NG/ML — HIGH (ref 0–3.8)
CK SERPL-CCNC: 578 U/L — HIGH (ref 25–170)
CO2 SERPL-SCNC: 21 MMOL/L — LOW (ref 22–31)
CO2 SERPL-SCNC: 22 MMOL/L — SIGNIFICANT CHANGE UP (ref 22–31)
CO2 SERPL-SCNC: 24 MMOL/L — SIGNIFICANT CHANGE UP (ref 22–31)
CREAT SERPL-MCNC: 1.45 MG/DL — HIGH (ref 0.5–1.3)
CREAT SERPL-MCNC: 2.11 MG/DL — HIGH (ref 0.5–1.3)
CREAT SERPL-MCNC: 2.48 MG/DL — HIGH (ref 0.5–1.3)
GLUCOSE BLDC GLUCOMTR-MCNC: 246 MG/DL — HIGH (ref 70–99)
GLUCOSE BLDC GLUCOMTR-MCNC: 287 MG/DL — HIGH (ref 70–99)
GLUCOSE BLDC GLUCOMTR-MCNC: 337 MG/DL — HIGH (ref 70–99)
GLUCOSE SERPL-MCNC: 182 MG/DL — HIGH (ref 70–99)
GLUCOSE SERPL-MCNC: 207 MG/DL — HIGH (ref 70–99)
GLUCOSE SERPL-MCNC: 253 MG/DL — HIGH (ref 70–99)
HBA1C BLD-MCNC: 8.5 % — HIGH (ref 4–5.6)
HCT VFR BLD CALC: 24.6 % — LOW (ref 34.5–45)
HCT VFR BLD CALC: 29.3 % — LOW (ref 34.5–45)
HCT VFR BLD CALC: 30.1 % — LOW (ref 34.5–45)
HDLC SERPL-MCNC: 23 MG/DL — LOW
HGB BLD-MCNC: 7.5 G/DL — LOW (ref 11.5–15.5)
HGB BLD-MCNC: 8.8 G/DL — LOW (ref 11.5–15.5)
HGB BLD-MCNC: 9.2 G/DL — LOW (ref 11.5–15.5)
INR BLD: 1.07 RATIO — SIGNIFICANT CHANGE UP (ref 0.88–1.16)
LACTATE SERPL-SCNC: 1.4 MMOL/L — SIGNIFICANT CHANGE UP (ref 0.7–2)
LACTATE SERPL-SCNC: 1.6 MMOL/L — SIGNIFICANT CHANGE UP (ref 0.7–2)
LIPID PNL WITH DIRECT LDL SERPL: 25 MG/DL — SIGNIFICANT CHANGE UP
MAGNESIUM SERPL-MCNC: 2.3 MG/DL — SIGNIFICANT CHANGE UP (ref 1.6–2.6)
MAGNESIUM SERPL-MCNC: 2.5 MG/DL — SIGNIFICANT CHANGE UP (ref 1.6–2.6)
MAGNESIUM SERPL-MCNC: 2.5 MG/DL — SIGNIFICANT CHANGE UP (ref 1.6–2.6)
MCHC RBC-ENTMCNC: 27 PG — SIGNIFICANT CHANGE UP (ref 27–34)
MCHC RBC-ENTMCNC: 27.3 PG — SIGNIFICANT CHANGE UP (ref 27–34)
MCHC RBC-ENTMCNC: 27.4 PG — SIGNIFICANT CHANGE UP (ref 27–34)
MCHC RBC-ENTMCNC: 30 GM/DL — LOW (ref 32–36)
MCHC RBC-ENTMCNC: 30.5 GM/DL — LOW (ref 32–36)
MCHC RBC-ENTMCNC: 30.6 GM/DL — LOW (ref 32–36)
MCV RBC AUTO: 89.5 FL — SIGNIFICANT CHANGE UP (ref 80–100)
MCV RBC AUTO: 89.6 FL — SIGNIFICANT CHANGE UP (ref 80–100)
MCV RBC AUTO: 89.9 FL — SIGNIFICANT CHANGE UP (ref 80–100)
NRBC # BLD: 0 /100 WBCS — SIGNIFICANT CHANGE UP (ref 0–0)
PHOSPHATE SERPL-MCNC: 3.5 MG/DL — SIGNIFICANT CHANGE UP (ref 2.5–4.5)
PHOSPHATE SERPL-MCNC: 3.9 MG/DL — SIGNIFICANT CHANGE UP (ref 2.5–4.5)
PHOSPHATE SERPL-MCNC: 4.1 MG/DL — SIGNIFICANT CHANGE UP (ref 2.5–4.5)
PLATELET # BLD AUTO: 146 K/UL — LOW (ref 150–400)
PLATELET # BLD AUTO: 146 K/UL — LOW (ref 150–400)
PLATELET # BLD AUTO: 153 K/UL — SIGNIFICANT CHANGE UP (ref 150–400)
POTASSIUM SERPL-MCNC: 4.2 MMOL/L — SIGNIFICANT CHANGE UP (ref 3.5–5.3)
POTASSIUM SERPL-MCNC: 4.3 MMOL/L — SIGNIFICANT CHANGE UP (ref 3.5–5.3)
POTASSIUM SERPL-MCNC: 4.4 MMOL/L — SIGNIFICANT CHANGE UP (ref 3.5–5.3)
POTASSIUM SERPL-SCNC: 4.2 MMOL/L — SIGNIFICANT CHANGE UP (ref 3.5–5.3)
POTASSIUM SERPL-SCNC: 4.3 MMOL/L — SIGNIFICANT CHANGE UP (ref 3.5–5.3)
POTASSIUM SERPL-SCNC: 4.4 MMOL/L — SIGNIFICANT CHANGE UP (ref 3.5–5.3)
PROT SERPL-MCNC: 6.3 G/DL — SIGNIFICANT CHANGE UP (ref 6–8.3)
PROT SERPL-MCNC: 6.9 G/DL — SIGNIFICANT CHANGE UP (ref 6–8.3)
PROT SERPL-MCNC: 7.2 G/DL — SIGNIFICANT CHANGE UP (ref 6–8.3)
PROTHROM AB SERPL-ACNC: 12.3 SEC — SIGNIFICANT CHANGE UP (ref 10–12.9)
RBC # BLD: 2.75 M/UL — LOW (ref 3.8–5.2)
RBC # BLD: 3.26 M/UL — LOW (ref 3.8–5.2)
RBC # BLD: 3.36 M/UL — LOW (ref 3.8–5.2)
RBC # FLD: 13.9 % — SIGNIFICANT CHANGE UP (ref 10.3–14.5)
RBC # FLD: 14.1 % — SIGNIFICANT CHANGE UP (ref 10.3–14.5)
RBC # FLD: 14.1 % — SIGNIFICANT CHANGE UP (ref 10.3–14.5)
SODIUM SERPL-SCNC: 128 MMOL/L — LOW (ref 135–145)
SODIUM SERPL-SCNC: 131 MMOL/L — LOW (ref 135–145)
SODIUM SERPL-SCNC: 132 MMOL/L — LOW (ref 135–145)
TOTAL CHOLESTEROL/HDL RATIO MEASUREMENT: 4 RATIO — SIGNIFICANT CHANGE UP (ref 3.3–7.1)
TRIGL SERPL-MCNC: 214 MG/DL — HIGH (ref 10–149)
TROPONIN T, HIGH SENSITIVITY RESULT: 3312 NG/L — HIGH (ref 0–51)
WBC # BLD: 8.86 K/UL — SIGNIFICANT CHANGE UP (ref 3.8–10.5)
WBC # BLD: 9.02 K/UL — SIGNIFICANT CHANGE UP (ref 3.8–10.5)
WBC # BLD: 9.51 K/UL — SIGNIFICANT CHANGE UP (ref 3.8–10.5)
WBC # FLD AUTO: 8.86 K/UL — SIGNIFICANT CHANGE UP (ref 3.8–10.5)
WBC # FLD AUTO: 9.02 K/UL — SIGNIFICANT CHANGE UP (ref 3.8–10.5)
WBC # FLD AUTO: 9.51 K/UL — SIGNIFICANT CHANGE UP (ref 3.8–10.5)

## 2020-02-02 PROCEDURE — 93321 DOPPLER ECHO F-UP/LMTD STD: CPT | Mod: 26

## 2020-02-02 PROCEDURE — 93308 TTE F-UP OR LMTD: CPT | Mod: 26

## 2020-02-02 PROCEDURE — 99233 SBSQ HOSP IP/OBS HIGH 50: CPT

## 2020-02-02 RX ORDER — CLOPIDOGREL BISULFATE 75 MG/1
1 TABLET, FILM COATED ORAL
Qty: 0 | Refills: 0 | DISCHARGE
Start: 2020-02-02

## 2020-02-02 RX ORDER — INSULIN LISPRO 100/ML
VIAL (ML) SUBCUTANEOUS
Refills: 0 | Status: DISCONTINUED | OUTPATIENT
Start: 2020-02-02 | End: 2020-02-02

## 2020-02-02 RX ORDER — SODIUM CHLORIDE 9 MG/ML
250 INJECTION INTRAMUSCULAR; INTRAVENOUS; SUBCUTANEOUS ONCE
Refills: 0 | Status: COMPLETED | OUTPATIENT
Start: 2020-02-02 | End: 2020-02-02

## 2020-02-02 RX ORDER — ASPIRIN/CALCIUM CARB/MAGNESIUM 324 MG
1 TABLET ORAL
Qty: 90 | Refills: 0
Start: 2020-02-02 | End: 2020-05-01

## 2020-02-02 RX ORDER — SENNA PLUS 8.6 MG/1
2 TABLET ORAL AT BEDTIME
Refills: 0 | Status: DISCONTINUED | OUTPATIENT
Start: 2020-02-02 | End: 2020-02-06

## 2020-02-02 RX ORDER — METOPROLOL TARTRATE 50 MG
1 TABLET ORAL
Qty: 0 | Refills: 0 | DISCHARGE

## 2020-02-02 RX ORDER — INSULIN LISPRO 100/ML
VIAL (ML) SUBCUTANEOUS AT BEDTIME
Refills: 0 | Status: DISCONTINUED | OUTPATIENT
Start: 2020-02-02 | End: 2020-02-03

## 2020-02-02 RX ORDER — INSULIN LISPRO 100/ML
VIAL (ML) SUBCUTANEOUS
Refills: 0 | Status: DISCONTINUED | OUTPATIENT
Start: 2020-02-02 | End: 2020-02-03

## 2020-02-02 RX ORDER — VALSARTAN 80 MG/1
1 TABLET ORAL
Qty: 90 | Refills: 0
Start: 2020-02-02 | End: 2020-05-01

## 2020-02-02 RX ORDER — CARVEDILOL PHOSPHATE 80 MG/1
1 CAPSULE, EXTENDED RELEASE ORAL
Qty: 180 | Refills: 0
Start: 2020-02-02 | End: 2020-05-01

## 2020-02-02 RX ORDER — ATORVASTATIN CALCIUM 80 MG/1
1 TABLET, FILM COATED ORAL
Qty: 0 | Refills: 0 | DISCHARGE

## 2020-02-02 RX ORDER — ATORVASTATIN CALCIUM 80 MG/1
1 TABLET, FILM COATED ORAL
Qty: 90 | Refills: 0
Start: 2020-02-02 | End: 2020-05-01

## 2020-02-02 RX ORDER — INSULIN GLARGINE 100 [IU]/ML
18 INJECTION, SOLUTION SUBCUTANEOUS AT BEDTIME
Refills: 0 | Status: DISCONTINUED | OUTPATIENT
Start: 2020-02-02 | End: 2020-02-03

## 2020-02-02 RX ORDER — INSULIN LISPRO 100/ML
VIAL (ML) SUBCUTANEOUS AT BEDTIME
Refills: 0 | Status: DISCONTINUED | OUTPATIENT
Start: 2020-02-02 | End: 2020-02-02

## 2020-02-02 RX ORDER — HYDRALAZINE HCL 50 MG
50 TABLET ORAL THREE TIMES A DAY
Refills: 0 | Status: DISCONTINUED | OUTPATIENT
Start: 2020-02-02 | End: 2020-02-05

## 2020-02-02 RX ORDER — INSULIN GLARGINE 100 [IU]/ML
3 INJECTION, SOLUTION SUBCUTANEOUS ONCE
Refills: 0 | Status: COMPLETED | OUTPATIENT
Start: 2020-02-02 | End: 2020-02-02

## 2020-02-02 RX ORDER — HEPARIN SODIUM 5000 [USP'U]/ML
5000 INJECTION INTRAVENOUS; SUBCUTANEOUS EVERY 12 HOURS
Refills: 0 | Status: DISCONTINUED | OUTPATIENT
Start: 2020-02-02 | End: 2020-02-06

## 2020-02-02 RX ORDER — FUROSEMIDE 40 MG
1 TABLET ORAL
Qty: 90 | Refills: 0
Start: 2020-02-02 | End: 2020-05-01

## 2020-02-02 RX ORDER — CLOPIDOGREL BISULFATE 75 MG/1
1 TABLET, FILM COATED ORAL
Qty: 90 | Refills: 0
Start: 2020-02-02 | End: 2020-05-01

## 2020-02-02 RX ORDER — INSULIN LISPRO 100/ML
2 VIAL (ML) SUBCUTANEOUS
Refills: 0 | Status: DISCONTINUED | OUTPATIENT
Start: 2020-02-02 | End: 2020-02-03

## 2020-02-02 RX ADMIN — SODIUM CHLORIDE 3 MILLILITER(S): 9 INJECTION INTRAMUSCULAR; INTRAVENOUS; SUBCUTANEOUS at 05:18

## 2020-02-02 RX ADMIN — Medication 0.12 MILLIGRAM(S): at 05:17

## 2020-02-02 RX ADMIN — Medication 81 MILLIGRAM(S): at 12:15

## 2020-02-02 RX ADMIN — ATORVASTATIN CALCIUM 40 MILLIGRAM(S): 80 TABLET, FILM COATED ORAL at 21:09

## 2020-02-02 RX ADMIN — INSULIN GLARGINE 3 UNIT(S): 100 INJECTION, SOLUTION SUBCUTANEOUS at 22:52

## 2020-02-02 RX ADMIN — Medication 50 MILLIGRAM(S): at 16:44

## 2020-02-02 RX ADMIN — Medication 50 MILLIGRAM(S): at 21:10

## 2020-02-02 RX ADMIN — MEMANTINE HYDROCHLORIDE 5 MILLIGRAM(S): 10 TABLET ORAL at 21:09

## 2020-02-02 RX ADMIN — Medication 50 MILLIGRAM(S): at 09:34

## 2020-02-02 RX ADMIN — CARVEDILOL PHOSPHATE 3.12 MILLIGRAM(S): 80 CAPSULE, EXTENDED RELEASE ORAL at 05:37

## 2020-02-02 RX ADMIN — SODIUM CHLORIDE 3 MILLILITER(S): 9 INJECTION INTRAMUSCULAR; INTRAVENOUS; SUBCUTANEOUS at 16:03

## 2020-02-02 RX ADMIN — Medication 2: at 12:15

## 2020-02-02 RX ADMIN — CHLORHEXIDINE GLUCONATE 1 APPLICATION(S): 213 SOLUTION TOPICAL at 05:16

## 2020-02-02 RX ADMIN — SODIUM CHLORIDE 250 MILLILITER(S): 9 INJECTION INTRAMUSCULAR; INTRAVENOUS; SUBCUTANEOUS at 16:06

## 2020-02-02 RX ADMIN — SODIUM CHLORIDE 3 MILLILITER(S): 9 INJECTION INTRAMUSCULAR; INTRAVENOUS; SUBCUTANEOUS at 21:05

## 2020-02-02 RX ADMIN — Medication 20 MILLIGRAM(S): at 05:17

## 2020-02-02 RX ADMIN — SENNA PLUS 2 TABLET(S): 8.6 TABLET ORAL at 21:09

## 2020-02-02 RX ADMIN — CARVEDILOL PHOSPHATE 3.12 MILLIGRAM(S): 80 CAPSULE, EXTENDED RELEASE ORAL at 17:17

## 2020-02-02 RX ADMIN — Medication 1 MILLIGRAM(S): at 12:15

## 2020-02-02 RX ADMIN — Medication 6: at 17:16

## 2020-02-02 RX ADMIN — INSULIN GLARGINE 15 UNIT(S): 100 INJECTION, SOLUTION SUBCUTANEOUS at 21:31

## 2020-02-02 RX ADMIN — CLOPIDOGREL BISULFATE 75 MILLIGRAM(S): 75 TABLET, FILM COATED ORAL at 12:15

## 2020-02-02 RX ADMIN — DONEPEZIL HYDROCHLORIDE 10 MILLIGRAM(S): 10 TABLET, FILM COATED ORAL at 21:10

## 2020-02-02 RX ADMIN — Medication 4: at 21:10

## 2020-02-02 RX ADMIN — Medication 1: at 08:31

## 2020-02-02 RX ADMIN — Medication 88 MICROGRAM(S): at 05:18

## 2020-02-02 NOTE — PROGRESS NOTE ADULT - SUBJECTIVE AND OBJECTIVE BOX
====================  CCU MIDNIGHT ROUNDS  ====================    DAPHNE PARNELL  [id number]  HPI: 84 yr old [place]ian female with PMH of dementia (daughter will consent for pt), MI (remote), CAD with prior stent, HTN, HLD, HFrEF, suspected A fib (on digoxin/ASA), and CKD admitted for abn stress test s/p LHC c/b perf s/p covered stents to mLAD and         ====================  NEW EVENTS: Discontinued Norvasc, Administered 250ml NS for hypotension  ====================        ====================  VITALS (Last 12 hrs):  ====================    T(C): 37 (02-02-20 @ 20:00), Max: 37 (02-02-20 @ 20:00)  T(F): 98.6 (02-02-20 @ 20:00), Max: 98.6 (02-02-20 @ 20:00)  HR: 88 (02-02-20 @ 21:00) (66 - 90)  BP: 124/52 (02-02-20 @ 21:00) (87/46 - 133/60)  BP(mean): 79 (02-02-20 @ 21:00) (58 - 91)  ABP: --  ABP(mean): --  RR: 12 (02-02-20 @ 21:00) (8 - 21)  SpO2: 100% (02-02-20 @ 21:00) (99% - 100%)      I&O's Summary    01 Feb 2020 07:01  -  02 Feb 2020 07:00  --------------------------------------------------------  IN: 720 mL / OUT: 1230 mL / NET: -510 mL    02 Feb 2020 07:01  -  02 Feb 2020 22:02  --------------------------------------------------------  IN: 120 mL / OUT: 900 mL / NET: -780 mL      PHYSICAL EXAM:  GENERAL: No acute distress, well-developed  HEAD:  Atraumatic, Normocephalic  NECK:  no JVD  CHEST/LUNG: CTAB; No wheezes, rales, or rhonchi  HEART: Regular rate and rhythm. Normal S1/S2. No murmurs, rubs, or gallops  ABDOMEN: Soft, non-tender, non-distended  EXTREMITIES:  2+ peripheral pulses Dp/PT b/l, No clubbing, cyanosis, or edema  NEUROLOGY: A&O x 3, no focal deficits      ====================  NEW LABS:  ====================                          9.2    8.86  )-----------( 153      ( 02 Feb 2020 14:05 )             30.1     02-02    131<L>  |  93<L>  |  41<H>  ----------------------------<  253<H>  4.2   |  24  |  2.48<H>    Ca    9.1      02 Feb 2020 14:05  Phos  3.9     02-02  Mg     2.5     02-02    TPro  7.2  /  Alb  3.3  /  TBili  0.3  /  DBili  x   /  AST  55<H>  /  ALT  22  /  AlkPhos  65  02-02    PT/INR - ( 02 Feb 2020 07:41 )   PT: 12.3 sec;   INR: 1.07 ratio         PTT - ( 02 Feb 2020 07:41 )  PTT:27.2 sec  Creatine Kinase, Serum: 578 U/L <H> (02-02-20 @ 03:40)    CKMB Units: 17.8 ng/mL (02-02 @ 03:40)

## 2020-02-02 NOTE — DISCHARGE NOTE PROVIDER - HOSPITAL COURSE
83 y/o F w/ pmhx of Dementia ( aaox 1-2) CAD, TD2MA (on insulin), HLD, HTN, Hypothyroid and abn stress test presented for elective cardiac cath c/b LAD perforation s/p covered stent to m LAD and D LAD. Admitted to ccu for further monitoring. Pt remains hemodynamically stable and CP free overnight. Family at bedside Post procedure echo showed no pericardial effusion. Pt will be discharged w/ ASA, Plavix, lipitor, digoxin, Lasix, hydralazine and Coreg. Hydralazine and coreg dosages adjusted due to low SBP. Pt will follow up her cardiologist and PCP in 1-2 weeks. 85 y/o F w/ pmhx of Dementia ( aaox 1-2) CAD, TD2MA (on insulin), HLD, HTN, Hypothyroid and abn stress test presented for elective cardiac cath c/b LAD perforation s/p covered stent to m LAD and D LAD. Admitted to ccu for further monitoring. Pt remains hemodynamically stable and CP free overnight. Family at bedside Post procedure echo showed no pericardial effusion. Pt will be discharged w/ ASA, Plavix, lipitor, digoxin, Lasix, and Coreg. Evaluated by physical therapy and recommended for Subacute Rehab, family has refused rehab as they have a 12 hour aid.  Pt will follow up her cardiologist and PCP in 1-2 weeks.

## 2020-02-02 NOTE — DIETITIAN INITIAL EVALUATION ADULT. - REASON INDICATOR FOR ASSESSMENT
Consult received for "DM Type 2"  Source: pt, daughter, previous RD note, EMR    Per chart, pt is a 84 year old female with PMH of dementia, MI, CAD with prior stent, HTN, HLD, HFrEF, suspected AFib, CKD, hypothyroidism, admitted s/p abnormal stress test. S/p LHC c/b perf s/p covered stents to mLAD and dLAD.

## 2020-02-02 NOTE — DIETITIAN INITIAL EVALUATION ADULT. - OTHER INFO
DIET Hx: Pt noted with dementia; interview deferred to daughter Brissa at bedside. Reports pt with good appetite and intake PTA. Denies pt following certain modified diet or pt/family ever having received nutrition education in the past. Per diet recall, pt frequently consumes concentrated sweets and high sodium foods. NKFA. PTA micronutrient supplementation includes vitamin D, per report.    T2DM Hx: Daughter reports pt fingersticks are checked once daily at night; typical range 150-300 mg/dL. Confirms pt takes Prandin and insulin glargine PTA. Denies hypoglycemic events or signs/symptoms of such. Noted increased in HbA1c from 6.6% (8/1/19) to 8.4% (1/31/20). Daughter admits family has been "slacking" on providing pt with healthful foods and reports motivation to assist in making lifestyle changes for her mother upon discharge.     WEIGHT Hx: Daughter reports UBW of 140 pounds and recent unintentional weight gain in setting of increased PO intake. Unable to quantify weight gain. Weights per EMR: 106 pounds (8/9/19, noted discrepancy at this time), 134 pounds (1/31), 128 pounds (2/1), 134 pounds (2/2). Question accuracy of weights in setting of discrepancies; differences may be related to fluid shifts as pt noted with edema and/or bed scale. Pt visually appears well-nourished. Currently no indication for Nutrition Focused Physical Assessment in setting of reported adequate PO intake and weight gain.     Daughter reports pt with good PO intake in-house; completed 75% of breakfast this morning and dinner last night. Denies chewing/swallowing issues, nausea/vomiting, diarrhea/constipation or other signs of acute GI distress at this time. Per chart, last BM 1/30.     INTERVENTION: RD reviewed T2DM, heart failure nutrition therapy with pt's daughter, stressing carbohydrate portion sizes using MyPlate Planner, limiting sugar-sweetened foods/beverages, sources of high sodium foods to limit/avoid and alternative options, sources of lean proteins and healthy fats, and idea of daily weights and weight gain parameters on when to contact MD. Daughter verbalized understanding and accepted written materials on discussed topics.     Skin per chart: free of pressure injuries  Edema per chart: (2/1) 2+ bilateral ankles    Ht: 63 inches, Wt: 60.9 kg (1/31), BMI: 23.8 kg/m2, IBW: 115 pounds (+/-10%), %IBW: 116% Statement Selected

## 2020-02-02 NOTE — DIETITIAN INITIAL EVALUATION ADULT. - ADD RECOMMEND
Continue current diet order of Consistent Carbohydrate, DASH/TLC. Verbal/written nutrition education provided; reinforce PRN. Monitor tolerance to diet prescription, nutritional intake, weight trends, labs and skin integrity. RD availability made known.

## 2020-02-02 NOTE — PROGRESS NOTE ADULT - ASSESSMENT
84 y/ F w/ pmh of dementia, CAD, T2DM, HLD, HTN, hypothryoid, AF who presented for cath 2/2 abnormal stress test w/ LAD perf s/p stents w/ IABP    # Neuro: Cont. Home dose of donepezil and Mementine    # Resp: Denies SOB or dyspnea Soa2 % in room air     # CAD w/ covered stents to mLAD and d LAD s/p IABP   - ECHO shows mild to mod left seg ventricular dysfunction   - Continue ASA, brilinta, lipitor  - Cont. Coreg, Hydralazine and digoxin   - Cont. Lasix 20 po daily     # ABD: DASH and low carb diet     # Endo: A1c 8.4   - ISS and lantus    CKD  - Cr 1.78 from 1.8, appears to be at baseline  Hyponatremia and worsenging Cr  extra dose of Lasix   Mis: SCD while in bed  OOB to chair  PT eval 84 y/ F w/ pmh of dementia, CAD, T2DM, HLD, HTN, hypothryoid, AF who presented for cath 2/2 abnormal stress test w/ LAD perf s/p stents w/ IABP    # Neuro: Cont. Home dose of donepezil and Mementine    # Resp: Denies SOB or dyspnea Soa2 % in room air     # CAD w/ covered stents to mLAD and d LAD s/p IABP   - ECHO shows mild to mod left seg ventricular dysfunction   - Continue ASA, brilinta, lipitor  - Cont. Coreg, Hydralazine and digoxin   - Cont. Lasix 20 po daily     # ABD: DASH and low carb diet     # Endo: A1c 8.4   - ISS and lantus    #CKD  - Cr 1.78 from 1.8, appears to be at baseline  - Hyponatremia and worsenging Cr  - extra dose of Lasix     # Mis: SCD while in bed  -OOB to chair  - PT eval 84 y/ F w/ pmh of dementia, CAD, T2DM, HLD, HTN, hypothryoid, AF who presented for cath 2/2 abnormal stress test w/ LAD perf s/p stents w/ IABP    # Neuro: Cont. Home dose of donepezil and Mementine    # Resp: Denies SOB or dyspnea Soa2 % in room air     # CAD w/ covered stents to mLAD and d LAD s/p IABP   - ECHO shows mild to mod left seg ventricular dysfunction   - Continue ASA, Plavix, lipitor  - Cont. Coreg, Hydralazine and digoxin   - Cont. Lasix 20 po daily     # ABD: DASH and low carb diet     # Endo: A1c 8.4   - ISS and lantus    #CKD  - Cr 1.78 from 1.8, appears to be at baseline  - Hyponatremia and worsenging Cr  - extra dose of Lasix     # Mis: SCD while in bed  -OOB to chair  - PT eval

## 2020-02-02 NOTE — DISCHARGE NOTE PROVIDER - CARE PROVIDER_API CALL
Karel Nichols)  Internal Medicine; Nephrology  [address] [facility] [address] [facility] [address]  [place] [address]  Phone: [phone]  Fax: [phone]  Follow Up Time:     Eric Guevara)  Cardiovascular Disease; Internal Medicine  [address] [facility] [address] [facility] [address]  [place] [address]  Phone: [phone]  Fax: [phone]  Follow Up Time:

## 2020-02-02 NOTE — DISCHARGE NOTE PROVIDER - NSDCMRMEDTOKEN_GEN_ALL_CORE_FT
aspirin 81 mg oral tablet: 1 tab(s) orally once a day  atorvastatin 40 mg oral tablet: 1 tab(s) orally once a day  carvedilol 3.125 mg oral tablet: 1 tab(s) orally every 12 hours  clopidogrel 75 mg oral tablet: 1 tab(s) orally once a day  Digox 125 mcg (0.125 mg) oral tablet: 1 tab(s) orally once a day  donepezil 10 mg oral tablet: 1 tab(s) orally once a day (at bedtime)  folic acid 1 mg oral tablet: 1 tab(s) orally once a day  hydrALAZINE 50 mg oral tablet: 1 tab(s) orally 3 times a day  insulin glargine 100 units/mL subcutaneous solution: 15 unit(s) subcutaneous once a day (at bedtime)   Lasix 20 mg oral tablet: 1 tab(s) orally once a day  levothyroxine 88 mcg (0.088 mg) oral tablet: 1 tab(s) orally once a day  memantine 5 mg oral tablet: 1 tab(s) orally once a day (at bedtime)  Prandin 1 mg oral tablet: 1 tab(s) orally once a day  valsartan 320 mg oral tablet: 1 tab(s) orally once a day  Vitamin D2 50,000 intl units (1.25 mg) oral capsule: 1 cap(s) orally once a week aspirin 81 mg oral delayed release tablet: 1 tab(s) orally once a day  atorvastatin 40 mg oral tablet: 1 tab(s) orally once a day (at bedtime)  bisacodyl 10 mg rectal suppository: 1 suppository(ies) rectal once a day, As needed, Constipation  carvedilol 6.25 mg oral tablet: 1 tab(s) orally every 12 hours  clopidogrel 75 mg oral tablet: 1 tab(s) orally once a day  digoxin 125 mcg (0.125 mg) oral tablet: 1 tab(s) orally once a day  donepezil 10 mg oral tablet: 1 tab(s) orally once a day (at bedtime)  folic acid 1 mg oral tablet: 1 tab(s) orally once a day  insulin glargine 100 units/mL subcutaneous solution: 15 unit(s) subcutaneous once a day (at bedtime)   levothyroxine 88 mcg (0.088 mg) oral tablet: 1 tab(s) orally once a day  memantine 5 mg oral tablet: 1 tab(s) orally once a day (at bedtime)  ocular lubricant ophthalmic solution: 1 drop(s) to each affected eye once a day  Prandin 1 mg oral tablet: 1 tab(s) orally once a day  senna oral tablet: 2 tab(s) orally once a day (at bedtime)  Vitamin D2 50,000 intl units (1.25 mg) oral capsule: 1 cap(s) orally once a week aspirin 81 mg oral delayed release tablet: 1 tab(s) orally once a day  atorvastatin 40 mg oral tablet: 1 tab(s) orally once a day (at bedtime)  bisacodyl 10 mg rectal suppository: 1 suppository(ies) rectal once a day, As needed, Constipation  carvedilol 6.25 mg oral tablet: 1 tab(s) orally every 12 hours  clopidogrel 75 mg oral tablet: 1 tab(s) orally once a day  digoxin 125 mcg (0.125 mg) oral tablet: 1 tab(s) orally once a day  donepezil 10 mg oral tablet: 1 tab(s) orally once a day (at bedtime)  folic acid 1 mg oral tablet: 1 tab(s) orally once a day  insulin glargine 100 units/mL subcutaneous solution: 30 unit(s) subcutaneous once a day (at bedtime)   levothyroxine 88 mcg (0.088 mg) oral tablet: 1 tab(s) orally once a day  memantine 5 mg oral tablet: 1 tab(s) orally once a day (at bedtime)  ocular lubricant ophthalmic solution: 1 drop(s) to each affected eye once a day  Prandin 2 mg oral tablet: 1 tab(s) orally 3 times a day (before meals)  senna oral tablet: 2 tab(s) orally once a day (at bedtime)  Vitamin D2 50,000 intl units (1.25 mg) oral capsule: 1 cap(s) orally once a week

## 2020-02-02 NOTE — DIETITIAN INITIAL EVALUATION ADULT. - PERTINENT LABORATORY DATA
(2/2) Sodium 132, BUN 36, Creatinine 1.45, Glucose 182, Albumin 3.1, AST 62, eGFR if Non  33, eGFR if  38, (2/1) POCT blood glucose 220-273, (1/31) Triglycerides 244, HDL 28, HbA1c 8.4% (8/1/19) HbA1c 6.6%

## 2020-02-02 NOTE — DISCHARGE NOTE PROVIDER - NSDCCPCAREPLAN_GEN_ALL_CORE_FT
PRINCIPAL DISCHARGE DIAGNOSIS  Diagnosis: CAD (coronary artery disease)  Assessment and Plan of Treatment: Coronary artery disease is a condition where the arteries the supply the heart muscle get clogges with fatty deposits & puts you at risk for a heart attack  Call your doctor if you have any new pain, pressure, or discomfort in the center of your chest, pain, tingling or discomfort in arms, back, neck, jaw, or stomach, shortness of breath, nausea, vomiting, burping or heartburn, sweating, cold and clammy skin, racing or abnormal heartbeat for more than 10 minutes or if they keep coming & going.  Call 911 and do not tr to get to hospital by care  You can help yourself with lefestyle changes (quitting smoking if you smoke), eat lots of fruits & vegetables & low fat dairy products, not a lot of meat & fatty foods, walk or some form of physical activity most days of the week, lose weight if you are overweight  Take your cardiac medication as prescribed to lower cholesterol, to lower blood pressure, aspirin to prevent blood clots, and diabetes control  Make sure to keep appointments with doctor for cardiac follow up care  No heavy lifting, strenuous activity, bending, straining or unnecessary stair climbing  for 2 weeks. No sex for 1 week.  No driving for 2 days. You may shower 24 hours following procedure but avoid baths and swimming for 1 week. Check groin site for bleeding and/or swelling daily following procedure. Call your doctor/cardiologist immediately should it occur or if you have increased/persistent pain at the site. Follow up with your cardiologist in 1- 2 weeks. You may call [facility] at [phone] or [phone] after office hours and weekends  with any questions or concerns following your procedure. Take medications as prescribed.        SECONDARY DISCHARGE DIAGNOSES  Diagnosis: DM (diabetes mellitus)  Assessment and Plan of Treatment: HgA1C this admission.  Make sure you get your HgA1c checked every three months.  If you take oral diabetes medications, check your blood glucose two times a day.  If you take insulin, check your blood glucose before meals and at bedtime.  It's important not to skip any meals.  Keep a log of your blood glucose results and always take it with you to your doctor appointments.  Keep a list of your current medications including injectables and over the counter medications and bring this medication list with you to all your doctor appointments.  If you have not seen your ophthalmologist this year call for appointment.  Check your feet daily for redness, sores, or openings. Do not self treat. If no improvement in two days call your primary care physician for an appointment.  Low blood sugar (hypoglycemia) is a blood sugar below 70mg/dl. Check your blood sugar if you feel signs/symptoms of hypoglycemia. If your blood sugar is below 70 take 15 grams of carbohydrates (ex 4 oz of apple juice, 3-4 glucose tablets, or 4-6 oz of regular soda) wait 15 minutes and repeat blood sugar to make sure it comes up above 70.  If your blood sugar is above 70 and you are due for a meal, have a meal.  If you are not due for a meal have a snack.  This snack helps keeps your blood sugar at a safe range.      Diagnosis: CKD (chronic kidney disease) stage 3, GFR 30-59 ml/min  Assessment and Plan of Treatment: Avoid taking (NSAIDs) - (ex: Ibuprofen, Advil, Celebrex, Naprosyn)  Avoid taking any nephrotoxic agents (can harm kidneys) - Intravenous contrast for diagnostic testing, combination cold medications.  Have all medications adjusted for your renal function by your Health Care Provider.  Blood pressure control is important.  Take all medication as prescribed.      Diagnosis: HTN (hypertension)  Assessment and Plan of Treatment: Low salt diet  Activity as tolerated.  Take all medication as prescribed.  Follow up with your medical doctor for routine blood pressure monitoring at your next visit.  Amlodipine on hold due to low bp   Notify your doctor if you have any of the following symptoms:   Dizziness, Lightheadedness, Blurry vision, Headache, Chest pain, Shortness of breath PRINCIPAL DISCHARGE DIAGNOSIS  Diagnosis: CAD (coronary artery disease)  Assessment and Plan of Treatment: Coronary artery disease is a condition where the arteries the supply the heart muscle get clogges with fatty deposits & puts you at risk for a heart attack  Call your doctor if you have any new pain, pressure, or discomfort in the center of your chest, pain, tingling or discomfort in arms, back, neck, jaw, or stomach, shortness of breath, nausea, vomiting, burping or heartburn, sweating, cold and clammy skin, racing or abnormal heartbeat for more than 10 minutes or if they keep coming & going.  Call 911 and do not tr to get to hospital by care  You can help yourself with lefestyle changes (quitting smoking if you smoke), eat lots of fruits & vegetables & low fat dairy products, not a lot of meat & fatty foods, walk or some form of physical activity most days of the week, lose weight if you are overweight  Take your cardiac medication as prescribed to lower cholesterol, to lower blood pressure, aspirin to prevent blood clots, and diabetes control  Make sure to keep appointments with doctor for cardiac follow up care  No heavy lifting, strenuous activity, bending, straining or unnecessary stair climbing  for 2 weeks. No sex for 1 week.  No driving for 2 days. You may shower 24 hours following procedure but avoid baths and swimming for 1 week. Check groin site for bleeding and/or swelling daily following procedure. Call your doctor/cardiologist immediately should it occur or if you have increased/persistent pain at the site. Follow up with your cardiologist in 1- 2 weeks. You may call [facility] at [phone] or [phone] after office hours and weekends  with any questions or concerns following your procedure. Take medications as prescribed.        SECONDARY DISCHARGE DIAGNOSES  Diagnosis: DM (diabetes mellitus)  Assessment and Plan of Treatment: Make sure you get your HgA1c checked every three months.  If you take oral diabetes medications, check your blood glucose two times a day.  If you take insulin, check your blood glucose before meals and at bedtime.  It's important not to skip any meals.  Keep a log of your blood glucose results and always take it with you to your doctor appointments.  Keep a list of your current medications including injectables and over the counter medications and bring this medication list with you to all your doctor appointments.  If you have not seen your ophthalmologist this year call for appointment.  Check your feet daily for redness, sores, or openings. Do not self treat. If no improvement in two days call your primary care physician for an appointment.  Low blood sugar (hypoglycemia) is a blood sugar below 70mg/dl. Check your blood sugar if you feel signs/symptoms of hypoglycemia. If your blood sugar is below 70 take 15 grams of carbohydrates (ex 4 oz of apple juice, 3-4 glucose tablets, or 4-6 oz of regular soda) wait 15 minutes and repeat blood sugar to make sure it comes up above 70.  If your blood sugar is above 70 and you are due for a meal, have a meal.  If you are not due for a meal have a snack.  This snack helps keeps your blood sugar at a safe range.      Diagnosis: CKD (chronic kidney disease) stage 3, GFR 30-59 ml/min  Assessment and Plan of Treatment: Avoid taking (NSAIDs) - (ex: Ibuprofen, Advil, Celebrex, Naprosyn)  Avoid taking any nephrotoxic agents (can harm kidneys) - Intravenous contrast for diagnostic testing, combination cold medications.  Have all medications adjusted for your renal function by your Health Care Provider.  Blood pressure control is important.  Take all medication as prescribed.      Diagnosis: HTN (hypertension)  Assessment and Plan of Treatment: Low salt diet  Activity as tolerated.  Take all medication as prescribed.  Follow up with your medical doctor for routine blood pressure monitoring at your next visit.  Amlodipine on hold due to low bp   Notify your doctor if you have any of the following symptoms:   Dizziness, Lightheadedness, Blurry vision, Headache, Chest pain, Shortness of breath

## 2020-02-02 NOTE — PROGRESS NOTE ADULT - SUBJECTIVE AND OBJECTIVE BOX
CCU NOTE    Admission date: 1/31/2020  Chief complaint/ Diagnosis: S/P [facility]   HPI: 84 yr old [place] female with PMH of dementia (daughter will consent for pt), MI (remote), CAD with prior stent, HTN, HLD, HFrEF, suspected A fib (on digoxin/ASA), and CKD admitted for abn stress test s/p [facility] c/b perf s/p covered stents to mLAD and dLAD    Interval history: Uneventful overnight    REVIEW OF SYSTEMS  Denies CP, Palpitation, SOB, Dyspnea [ X ] All other systems negative      MEDICATIONS  (STANDING)  amLODIPine   Tablet 5 milliGRAM(s) Oral at bedtime  aspirin enteric coated 81 milliGRAM(s) Oral daily  atorvastatin 40 milliGRAM(s) Oral at bedtime  carvedilol 3.125 milliGRAM(s) Oral every 12 hours  chlorhexidine 2% Cloths 1 Application(s) Topical <User Schedule>  clopidogrel Tablet 75 milliGRAM(s) Oral daily  digoxin     Tablet 0.125 milliGRAM(s) Oral daily  donepezil 10 milliGRAM(s) Oral at bedtime  ergocalciferol 29864 Unit(s) Oral every week  folic acid 1 milliGRAM(s) Oral daily  furosemide    Tablet 20 milliGRAM(s) Oral daily  hydrALAZINE 50 milliGRAM(s) Oral three times a day  influenza   Vaccine 0.5 milliLiter(s) IntraMuscular once  insulin glargine Injectable (LANTUS) 15 Unit(s) SubCutaneous at bedtime  insulin lispro (HumaLOG) corrective regimen sliding scale   SubCutaneous three times a day before meals  insulin lispro (HumaLOG) corrective regimen sliding scale   SubCutaneous at bedtime  levothyroxine 88 MICROGram(s) Oral daily  memantine 5 milliGRAM(s) Oral at bedtime  sodium chloride 0.9% lock flush 3 milliLiter(s) IV Push every 8 hours  sodium chloride 0.9%. 1000 milliLiter(s) (60 mL/Hr) IV Continuous <Continuous>    MEDICATIONS  (PRN):  dextrose 40% Gel 15 Gram(s) Oral once PRN Blood Glucose LESS THAN 70 milliGRAM(s)/deciliter  glucagon  Injectable 1 milliGRAM(s) IntraMuscular once PRN Glucose LESS THAN 70 milligrams/deciliter    FAMILY HISTORY: Family history of heart disease (Sibling)    Allergy: No Known Allergies    ICU Vital Signs Last 24 Hrs  Tmax: 36.8   HR: 66  (66 - 96)  BP: 92/45  (88/54 - 149/68)  RR: 11  (11 - 17)  SpO2: 100% (99% - 100%) in room air     I&O's Summary  IN: 720 mL / OUT: 1230 mL / NET: -510 mL    PHYSICAL EXAM  Appearance: Normal, NAD  HEAD:   Normocephalic  EYES: PERRLA, conjunctiva and sclera clear  NECK: Supple, No JVD  CHEST/LUNG: Clear to auscultation bilaterally; No wheezing  HEART: Normal S1, S2. No murmurs, rubs, or gallops  ABDOMEN: + Bowel sounds, Soft, NT, ND   EXTREMITIES:  2+ Peripheral Pulses, No clubbing, cyanosis, or edema  NEUROLOGY: non-focal, aaox3  SKIN: No rashes or lesions, Right groin stable no hematoma or bleeding     Interpretation of Telemetry: NSR                     8.8< 7.5<9.8  9.02  )-----------( 146                 24.6       128<131  |  97  |  35<H>  ----------------------------<  207<H>  4.3   |  21<L>  |  2.11<1.83    Ca    8.6     Phos  3.5     Mg     2.3       TPro  6.3  /  Alb  3.0<L>  /  TBili  0.3  /  DBili  x   /  AST  67<H>  /  ALT  26  /  AlkPhos  59

## 2020-02-02 NOTE — PROGRESS NOTE ADULT - ASSESSMENT
84 y/ F w/ pmh of dementia, CAD, T2DM, HLD, HTN, hypothryoid, AF who presented for cath 2/2 abnormal stress test w/ LAD perf s/p stents w/ IABP    # Neuro: Cont. Home dose of donepezil and Memantine    # Resp: No active issues  - Will continue to monitor on RA, goal SpO2 > 90%  - Encourage ISS, OOB, ambulation to prevent atelectasis     # CAD w/ covered stents to mLAD and d LAD s/p IABP   - ECHO shows mild to mod left seg ventricular dysfunction   - Continue ASA, Plavix, Lipitor  - Cont. Coreg, Hydralazine and digoxin   - Would consider discontinuing Lasix, with worsening Acute on Chronic Kidney disease    # ABD: DASH and low carb diet     # Endo: A1c 8.4 , Complicated by hyperglycemia  - Will start pre-meal Humalog 2Units before meals, Increase Lantus to 18Units, with low dose ISS    Renal  Worsening Acute on Chronic Kidney disease  - Will consider holding diuretic therapy, as patient appears euvolemic  - Strict I/Os  - BMP daily    # Mis: SCD while in bed  -OOB to chair  - PT suleman Jacome PA-C

## 2020-02-03 DIAGNOSIS — E78.5 HYPERLIPIDEMIA, UNSPECIFIED: ICD-10-CM

## 2020-02-03 DIAGNOSIS — I25.10 ATHEROSCLEROTIC HEART DISEASE OF NATIVE CORONARY ARTERY WITHOUT ANGINA PECTORIS: ICD-10-CM

## 2020-02-03 DIAGNOSIS — E11.9 TYPE 2 DIABETES MELLITUS WITHOUT COMPLICATIONS: ICD-10-CM

## 2020-02-03 DIAGNOSIS — I10 ESSENTIAL (PRIMARY) HYPERTENSION: ICD-10-CM

## 2020-02-03 DIAGNOSIS — N18.3 CHRONIC KIDNEY DISEASE, STAGE 3 (MODERATE): ICD-10-CM

## 2020-02-03 DIAGNOSIS — E03.9 HYPOTHYROIDISM, UNSPECIFIED: ICD-10-CM

## 2020-02-03 LAB
ALBUMIN SERPL ELPH-MCNC: 3.4 G/DL — SIGNIFICANT CHANGE UP (ref 3.3–5)
ALP SERPL-CCNC: 76 U/L — SIGNIFICANT CHANGE UP (ref 40–120)
ALT FLD-CCNC: 25 U/L — SIGNIFICANT CHANGE UP (ref 10–45)
ANION GAP SERPL CALC-SCNC: 17 MMOL/L — SIGNIFICANT CHANGE UP (ref 5–17)
AST SERPL-CCNC: 45 U/L — HIGH (ref 10–40)
BILIRUB SERPL-MCNC: 0.3 MG/DL — SIGNIFICANT CHANGE UP (ref 0.2–1.2)
BUN SERPL-MCNC: 46 MG/DL — HIGH (ref 7–23)
CALCIUM SERPL-MCNC: 9.3 MG/DL — SIGNIFICANT CHANGE UP (ref 8.4–10.5)
CHLORIDE SERPL-SCNC: 94 MMOL/L — LOW (ref 96–108)
CHLORIDE UR-SCNC: <35 MMOL/L — SIGNIFICANT CHANGE UP
CK MB BLD-MCNC: 1.8 % — SIGNIFICANT CHANGE UP (ref 0–3.5)
CK MB CFR SERPL CALC: 8.8 NG/ML — HIGH (ref 0–3.8)
CK SERPL-CCNC: 498 U/L — HIGH (ref 25–170)
CO2 SERPL-SCNC: 19 MMOL/L — LOW (ref 22–31)
CREAT SERPL-MCNC: 2.46 MG/DL — HIGH (ref 0.5–1.3)
DIGOXIN SERPL-MCNC: 1.1 NG/ML — SIGNIFICANT CHANGE UP (ref 0.8–2)
GLUCOSE BLDC GLUCOMTR-MCNC: 248 MG/DL — HIGH (ref 70–99)
GLUCOSE BLDC GLUCOMTR-MCNC: 257 MG/DL — HIGH (ref 70–99)
GLUCOSE BLDC GLUCOMTR-MCNC: 275 MG/DL — HIGH (ref 70–99)
GLUCOSE BLDC GLUCOMTR-MCNC: 276 MG/DL — HIGH (ref 70–99)
GLUCOSE BLDC GLUCOMTR-MCNC: 283 MG/DL — HIGH (ref 70–99)
GLUCOSE BLDC GLUCOMTR-MCNC: 364 MG/DL — HIGH (ref 70–99)
GLUCOSE SERPL-MCNC: 273 MG/DL — HIGH (ref 70–99)
HCT VFR BLD CALC: 30.4 % — LOW (ref 34.5–45)
HGB BLD-MCNC: 9.5 G/DL — LOW (ref 11.5–15.5)
MAGNESIUM SERPL-MCNC: 2.6 MG/DL — SIGNIFICANT CHANGE UP (ref 1.6–2.6)
MCHC RBC-ENTMCNC: 27.7 PG — SIGNIFICANT CHANGE UP (ref 27–34)
MCHC RBC-ENTMCNC: 31.3 GM/DL — LOW (ref 32–36)
MCV RBC AUTO: 88.6 FL — SIGNIFICANT CHANGE UP (ref 80–100)
NRBC # BLD: 0 /100 WBCS — SIGNIFICANT CHANGE UP (ref 0–0)
PHOSPHATE SERPL-MCNC: 4.1 MG/DL — SIGNIFICANT CHANGE UP (ref 2.5–4.5)
PLATELET # BLD AUTO: 144 K/UL — LOW (ref 150–400)
POTASSIUM SERPL-MCNC: 4.7 MMOL/L — SIGNIFICANT CHANGE UP (ref 3.5–5.3)
POTASSIUM SERPL-SCNC: 4.7 MMOL/L — SIGNIFICANT CHANGE UP (ref 3.5–5.3)
POTASSIUM UR-SCNC: 16 MMOL/L — SIGNIFICANT CHANGE UP
PROT SERPL-MCNC: 7.5 G/DL — SIGNIFICANT CHANGE UP (ref 6–8.3)
RBC # BLD: 3.43 M/UL — LOW (ref 3.8–5.2)
RBC # FLD: 13.5 % — SIGNIFICANT CHANGE UP (ref 10.3–14.5)
SODIUM SERPL-SCNC: 130 MMOL/L — LOW (ref 135–145)
SODIUM UR-SCNC: <35 MMOL/L — SIGNIFICANT CHANGE UP
UUN UR-MCNC: 385 MG/DL — SIGNIFICANT CHANGE UP
WBC # BLD: 7.15 K/UL — SIGNIFICANT CHANGE UP (ref 3.8–10.5)
WBC # FLD AUTO: 7.15 K/UL — SIGNIFICANT CHANGE UP (ref 3.8–10.5)

## 2020-02-03 PROCEDURE — 93306 TTE W/DOPPLER COMPLETE: CPT | Mod: 26

## 2020-02-03 RX ORDER — INSULIN LISPRO 100/ML
4 VIAL (ML) SUBCUTANEOUS
Refills: 0 | Status: DISCONTINUED | OUTPATIENT
Start: 2020-02-03 | End: 2020-02-03

## 2020-02-03 RX ORDER — INSULIN LISPRO 100/ML
7 VIAL (ML) SUBCUTANEOUS
Refills: 0 | Status: DISCONTINUED | OUTPATIENT
Start: 2020-02-03 | End: 2020-02-04

## 2020-02-03 RX ORDER — INSULIN LISPRO 100/ML
VIAL (ML) SUBCUTANEOUS AT BEDTIME
Refills: 0 | Status: DISCONTINUED | OUTPATIENT
Start: 2020-02-03 | End: 2020-02-06

## 2020-02-03 RX ORDER — INSULIN GLARGINE 100 [IU]/ML
24 INJECTION, SOLUTION SUBCUTANEOUS AT BEDTIME
Refills: 0 | Status: DISCONTINUED | OUTPATIENT
Start: 2020-02-03 | End: 2020-02-04

## 2020-02-03 RX ORDER — CARVEDILOL PHOSPHATE 80 MG/1
3.12 CAPSULE, EXTENDED RELEASE ORAL EVERY 12 HOURS
Refills: 0 | Status: DISCONTINUED | OUTPATIENT
Start: 2020-02-03 | End: 2020-02-05

## 2020-02-03 RX ORDER — POLYETHYLENE GLYCOL 3350 17 G/17G
17 POWDER, FOR SOLUTION ORAL ONCE
Refills: 0 | Status: COMPLETED | OUTPATIENT
Start: 2020-02-03 | End: 2020-02-04

## 2020-02-03 RX ORDER — CARVEDILOL PHOSPHATE 80 MG/1
6.25 CAPSULE, EXTENDED RELEASE ORAL EVERY 12 HOURS
Refills: 0 | Status: DISCONTINUED | OUTPATIENT
Start: 2020-02-03 | End: 2020-02-03

## 2020-02-03 RX ORDER — INSULIN LISPRO 100/ML
VIAL (ML) SUBCUTANEOUS
Refills: 0 | Status: DISCONTINUED | OUTPATIENT
Start: 2020-02-03 | End: 2020-02-03

## 2020-02-03 RX ORDER — INSULIN LISPRO 100/ML
VIAL (ML) SUBCUTANEOUS
Refills: 0 | Status: DISCONTINUED | OUTPATIENT
Start: 2020-02-03 | End: 2020-02-06

## 2020-02-03 RX ADMIN — Medication 4 UNIT(S): at 08:30

## 2020-02-03 RX ADMIN — ATORVASTATIN CALCIUM 40 MILLIGRAM(S): 80 TABLET, FILM COATED ORAL at 21:13

## 2020-02-03 RX ADMIN — INSULIN GLARGINE 24 UNIT(S): 100 INJECTION, SOLUTION SUBCUTANEOUS at 21:02

## 2020-02-03 RX ADMIN — Medication 7 UNIT(S): at 17:26

## 2020-02-03 RX ADMIN — HEPARIN SODIUM 5000 UNIT(S): 5000 INJECTION INTRAVENOUS; SUBCUTANEOUS at 17:26

## 2020-02-03 RX ADMIN — SODIUM CHLORIDE 3 MILLILITER(S): 9 INJECTION INTRAMUSCULAR; INTRAVENOUS; SUBCUTANEOUS at 05:42

## 2020-02-03 RX ADMIN — CARVEDILOL PHOSPHATE 3.12 MILLIGRAM(S): 80 CAPSULE, EXTENDED RELEASE ORAL at 17:26

## 2020-02-03 RX ADMIN — Medication 3: at 21:04

## 2020-02-03 RX ADMIN — Medication 4 UNIT(S): at 12:17

## 2020-02-03 RX ADMIN — DONEPEZIL HYDROCHLORIDE 10 MILLIGRAM(S): 10 TABLET, FILM COATED ORAL at 21:13

## 2020-02-03 RX ADMIN — MEMANTINE HYDROCHLORIDE 5 MILLIGRAM(S): 10 TABLET ORAL at 21:13

## 2020-02-03 RX ADMIN — Medication 81 MILLIGRAM(S): at 12:16

## 2020-02-03 RX ADMIN — Medication 50 MILLIGRAM(S): at 21:13

## 2020-02-03 RX ADMIN — Medication 6: at 12:17

## 2020-02-03 RX ADMIN — Medication 50 MILLIGRAM(S): at 14:51

## 2020-02-03 RX ADMIN — Medication 6: at 08:30

## 2020-02-03 RX ADMIN — HEPARIN SODIUM 5000 UNIT(S): 5000 INJECTION INTRAVENOUS; SUBCUTANEOUS at 05:28

## 2020-02-03 RX ADMIN — Medication 0.12 MILLIGRAM(S): at 05:28

## 2020-02-03 RX ADMIN — SODIUM CHLORIDE 3 MILLILITER(S): 9 INJECTION INTRAMUSCULAR; INTRAVENOUS; SUBCUTANEOUS at 14:42

## 2020-02-03 RX ADMIN — SODIUM CHLORIDE 3 MILLILITER(S): 9 INJECTION INTRAMUSCULAR; INTRAVENOUS; SUBCUTANEOUS at 21:16

## 2020-02-03 RX ADMIN — Medication 1 MILLIGRAM(S): at 12:17

## 2020-02-03 RX ADMIN — CLOPIDOGREL BISULFATE 75 MILLIGRAM(S): 75 TABLET, FILM COATED ORAL at 12:16

## 2020-02-03 RX ADMIN — Medication 3: at 17:27

## 2020-02-03 RX ADMIN — Medication 88 MICROGRAM(S): at 05:28

## 2020-02-03 RX ADMIN — Medication 50 MILLIGRAM(S): at 05:28

## 2020-02-03 RX ADMIN — CARVEDILOL PHOSPHATE 3.12 MILLIGRAM(S): 80 CAPSULE, EXTENDED RELEASE ORAL at 05:28

## 2020-02-03 RX ADMIN — CHLORHEXIDINE GLUCONATE 1 APPLICATION(S): 213 SOLUTION TOPICAL at 05:28

## 2020-02-03 NOTE — CONSULT NOTE ADULT - ATTENDING COMMENTS
Will increase Lantus to 24u at bedtime.  Will increase Humalog to 7u before each meal and continue Humalog correction scale coverage. Will continue monitoring FS and FU.

## 2020-02-03 NOTE — CHART NOTE - [id number]_GEN_A_CORE
CCU Transfer Note    Transfer from: CCU    Transfer to: (  ) Medicine    (  ) Telemetry     (   ) RCU        (    ) Palliative         (   ) Stroke Unit          (   ) __________________    Accepting Physician:  Signout given to:     CCU COURSE:  85 yo [place] female w/ PMHx of dementia (A&Ox1-2 at baseline), MI (remote), CAD w/ prior stent (around 10 years ago),  HTN, HLD, HFrEF, suspected A fib (on digoxin/ASA), no implantable cardiac monitoring device, CKD- followed by PCP Dr Nicole Staples at [facility], DMT2- well controlled, complicated by CKD, followed by PCP, hypothyroidism presented on 1/31 for cardiac catheterization. Patient's daughter reports REYES for past month. Evaluated by cardiologist Dr Sanchez where NST was done revealing abnormality. Referred for cath. Cath c/b LAD perforation now transferred to the CCU with IABP. DILSHAD x2 to mLAD and dLAD. TTE 1/31: Mild to moderate segmental left ventricular systolic dysfunction.  Hypokinesis of the mid and apical anteroseptum and apex.  The anterior wall suggests hypokinesis. Trace free effusion with thrombus within the pericardial space.  No evidence of cardiac tamponade.        ASSESSMENT & PLAN:   84 y/ F w/ pmh of dementia, CAD, T2DM, HLD, HTN, hypothryoid, AF who presented for cath 2/2 abnormal stress test w/ LAD perf s/p stents w/ IABP    # Neuro: Cont. Home dose of donepezil and Mementine    # Resp: Denies SOB or dyspnea Soa2 % in room air     # CAD w/ covered stents to mLAD and d LAD s/p IABP   - ECHO shows mild to mod left seg ventricular dysfunction  - f/u repeat TTE to eval size of effusion  - Continue ASA, Plavix, lipitor  - Cont. Coreg, Hydralazine and digoxin   - Cont. Lasix 20 po daily     # ABD: DASH and low carb diet     # Endo: A1c 8.4   - ISS and lantus  - endo consulted    #CKD  - Cr 2.46 (baseline 1.8s), down from 2.48 yesterday, will continue to monitor  - good urinary output  - Hyponatremia    # Mis: SCD while in bed  -OOB to chair  - PT recs rehab or home PT                FOR FOLLOW UP: CCU Transfer Note    Transfer from: CCU    Transfer to: (  ) Medicine    (  ) Telemetry     (   ) RCU        (    ) Palliative         (   ) Stroke Unit          (   ) __________________    Accepting Physician:  Signout given to:     CCU COURSE:  85 yo [place] female w/ PMHx of dementia (A&Ox1-2 at baseline), MI (remote), CAD w/ prior stent (around 10 years ago),  HTN, HLD, HFrEF, suspected A fib (on digoxin/ASA), no implantable cardiac monitoring device, CKD- followed by PCP Dr Nicole Staples at [facility], DMT2- well controlled, complicated by CKD, followed by PCP, hypothyroidism presented on 1/31 for cardiac catheterization. Patient's daughter reports REYES for past month. Evaluated by cardiologist Dr Sanchez where NST was done revealing abnormality. Referred for cath. Cath c/b LAD perforation now transferred to the CCU with IABP. DILSHAD x2 to mLAD and dLAD. TTE 1/31: Mild to moderate segmental left ventricular systolic dysfunction.  Hypokinesis of the mid and apical anteroseptum and apex.  The anterior wall suggests hypokinesis. Trace free effusion with thrombus within the pericardial space.  No evidence of cardiac tamponade. IABP removed on 2/1. Repeat TTE 2/3: Moderate segmental left ventricular systolic dysfunction. The anterior wall, the anterior septum, and the apical cap are hypokinetic. Thickened pericardium with small pericardial effusion.     ASSESSMENT & PLAN:   84 y/ F w/ pmh of dementia, CAD, T2DM, HLD, HTN, hypothryoid, AF who presented for cath 2/2 abnormal stress test w/ LAD perf s/p stents w/ IABP    # Neuro:  Dementia ( A & O x1-2 at baseline)   - Cont. Home dose of donepezil and Mementine    # Resp: Denies SOB or dyspnea Soa2 % in room air     # CAD w/ covered stents to mLAD and d LAD   - IABP (1/31--2/1)   - TTE 2/3: moderate segmental left ventricular systolic dysfunction. Thickened pericardium with small pericardial effusion.   - Continue ASA, Plavix, lipitor  - Cont. Coreg, Hydralazine and digoxin     # GI: DASH and low carb diet     # Endo:      1. DM Type 2         - A1c 8.4         - Lantus 24 units subQ bedtime, Lispro 7 units TID with meals, low does sliding scale insuline TID w/ meals and qHS         - s/p endo consult, appreciate recs     2. Hypothyroidism        - TSH 2.66        - Continue Synthyroid 88mcg PO daily        #CKD  - Cr 2.46 (baseline 1.8s), down from 2.48 yesterday, will continue to monitor  - good urinary output  - Hyponatremia    # Mis:   -DVT prophylaxis: Heparin subQ 5000 units q12h   -OOB to chair  - PT recs rehab or home PT    FOR FOLLOW UP: CCU Transfer Note    Transfer from: CCU    Transfer to: (  ) Medicine    ( s ) Telemetry     (   ) RCU        (    ) Palliative         (   ) Stroke Unit          (   ) __________________    Accepting Physician: Jamar Douglas   Signout given to:     CCU COURSE:  83 yo Barbadian female w/ PMHx of dementia (A&Ox1-2 at baseline), MI (remote), CAD w/ prior stent (around 10 years ago),  HTN, HLD, HFrEF, suspected A fib (on digoxin/ASA), no implantable cardiac monitoring device, CKD- followed by PCP Dr Nicole Staples at [facility], DMT2- well controlled, complicated by CKD, followed by PCP, hypothyroidism presented on 1/31 for cardiac catheterization. Patient's daughter reports REYES for past month. Evaluated by cardiologist Dr Sanchez where NST was done revealing abnormality. Referred for cath. Cath c/b LAD perforation now transferred to the CCU with IABP. DILSHAD x2 to mLAD and dLAD. TTE 1/31: Mild to moderate segmental left ventricular systolic dysfunction.  Hypokinesis of the mid and apical anteroseptum and apex.  The anterior wall suggests hypokinesis. Trace free effusion with thrombus within the pericardial space.  No evidence of cardiac tamponade. IABP removed on 2/1. Repeat TTE 2/3: Moderate segmental left ventricular systolic dysfunction. The anterior wall, the anterior septum, and the apical cap are hypokinetic. Thickened pericardium with small pericardial effusion.     ASSESSMENT & PLAN:   84 y/ F w/ pmh of dementia, CAD, T2DM, HLD, HTN, hypothryoid, AF who presented for cath 2/2 abnormal stress test w/ LAD perf s/p stents w/ IABP    # Neuro:  Dementia ( A & O x1-2 at baseline)   - Cont. Home dose of donepezil and Mementine    # Resp: Denies SOB or dyspnea Soa2 % in room air     # CAD w/ covered stents to mLAD and d LAD   - IABP (1/31--2/1)   - TTE 2/3: moderate segmental left ventricular systolic dysfunction. Thickened pericardium with small pericardial effusion.   - Continue ASA, Plavix, lipitor  - Cont. Coreg, Hydralazine and digoxin     # GI: DASH and low carb diet     # Endo:      1. DM Type 2         - A1c 8.4         - Lantus 24 units subQ bedtime, Lispro 7 units TID with meals, low does sliding scale insuline TID w/ meals and qHS         - s/p endo consult, appreciate recs     2. Hypothyroidism        - TSH 2.66        - Continue Synthyroid 88mcg PO daily        #CKD  - Cr 2.46 (baseline 1.8s), down from 2.48 yesterday, will continue to monitor  - good urinary output  - Hyponatremia    # Mis:   -DVT prophylaxis: Heparin subQ 5000 units q12h   -OOB to chair  - PT recs rehab or home PT    FOR FOLLOW UP: CCU Transfer Note    Transfer from: CCU    Transfer to: (  ) Medicine    ( s ) Telemetry     (   ) RCU        (    ) Palliative         (   ) Stroke Unit          (   ) __________________    Accepting Physician: Jamar Douglas   Signout given to: YAZAN Benitez     CCU COURSE:  83 yo Norwegian female w/ PMHx of dementia (A&Ox1-2 at baseline), MI (remote), CAD w/ prior stent (around 10 years ago),  HTN, HLD, HFrEF, suspected A fib (on digoxin/ASA), no implantable cardiac monitoring device, CKD- followed by PCP Dr Nicole Staples at [facility], DMT2- well controlled, complicated by CKD, followed by PCP, hypothyroidism presented on 1/31 for cardiac catheterization. Patient's daughter reports REYES for past month. Evaluated by cardiologist Dr Sanchez where NST was done revealing abnormality. Referred for cath. Cath c/b LAD perforation now transferred to the CCU with IABP. DILSHAD x2 to mLAD and dLAD. TTE 1/31: Mild to moderate segmental left ventricular systolic dysfunction.  Hypokinesis of the mid and apical anteroseptum and apex.  The anterior wall suggests hypokinesis. Trace free effusion with thrombus within the pericardial space.  No evidence of cardiac tamponade. IABP removed on 2/1. Repeat TTE 2/3: Moderate segmental left ventricular systolic dysfunction. The anterior wall, the anterior septum, and the apical cap are hypokinetic. Thickened pericardium with small pericardial effusion.     ASSESSMENT & PLAN:   84 y/ F w/ pmh of dementia, CAD, T2DM, HLD, HTN, hypothryoid, AF who presented for cath 2/2 abnormal stress test w/ LAD perf s/p stents w/ IABP    # Neuro:  Dementia ( A & O x1-2 at baseline)   - Cont. Home dose of donepezil and Mementine    # Resp: Denies SOB or dyspnea Soa2 % in room air     # CAD w/ covered stents to mLAD and d LAD   - IABP (1/31--2/1)   - TTE 2/3: moderate segmental left ventricular systolic dysfunction. Thickened pericardium with small pericardial effusion.   - Continue ASA, Plavix, lipitor  - Cont. Coreg, Hydralazine and digoxin     # GI: DASH and low carb diet     # Endo:      1. DM Type 2         - A1c 8.4         - Lantus 24 units subQ bedtime, Lispro 7 units TID with meals, low does sliding scale insuline TID w/ meals and qHS         - s/p endo consult, appreciate recs     2. Hypothyroidism        - TSH 2.66        - Continue Synthyroid 88mcg PO daily        #CKD  - Cr 2.46 (baseline 1.8s), down from 2.48 yesterday, will continue to monitor  - good urinary output  - Hyponatremia    # Mis:   -DVT prophylaxis: Heparin subQ 5000 units q12h   -OOB to chair  - PT recs rehab or home PT    FOR FOLLOW UP: CCU Transfer Note    Transfer from: CCU    Transfer to: (  ) Medicine    ( s ) Telemetry     (   ) RCU        (    ) Palliative         (   ) Stroke Unit          (   ) __________________    Accepting Physician: Jamar Douglas   Signout given to: NP     CCU COURSE:  85 yo [place]ian female w/ PMHx of dementia (A&Ox1-2 at baseline), MI (remote), CAD w/ prior stent (around 10 years ago),  HTN, HLD, HFrEF, suspected A fib (on digoxin/ASA), no implantable cardiac monitoring device, CKD- followed by PCP Dr Nicole Staples at [facility], DMT2- well controlled, complicated by CKD, followed by PCP, hypothyroidism presented on 1/31 for cardiac catheterization. Patient's daughter reports REYES for past month. Evaluated by cardiologist Dr Sanchez where NST was done revealing abnormality. Referred for cath. Cath c/b LAD perforation now transferred to the CCU with IABP. DILSHAD x2 to mLAD and dLAD. TTE 1/31: Mild to moderate segmental left ventricular systolic dysfunction.  Hypokinesis of the mid and apical anteroseptum and apex.  The anterior wall suggests hypokinesis. Trace free effusion with thrombus within the pericardial space.  No evidence of cardiac tamponade. IABP removed on 2/1. Repeat TTE 2/3: Moderate segmental left ventricular systolic dysfunction. The anterior wall, the anterior septum, and the apical cap are hypokinetic. Thickened pericardium with small pericardial effusion.     ASSESSMENT & PLAN:   84 y/ F w/ pmh of dementia, CAD, T2DM, HLD, HTN, hypothryoid, AF who presented for cath 2/2 abnormal stress test w/ LAD perf s/p stents w/ IABP    # Neuro:  Dementia ( A & O x1-2 at baseline)   - Cont. Home dose of donepezil and Mementine    # Resp: Denies SOB or dyspnea Soa2 % in room air     # CAD w/ covered stents to mLAD and d LAD   - IABP (1/31--2/1)   - TTE 2/3: moderate segmental left ventricular systolic dysfunction. Thickened pericardium with small pericardial effusion.   - Continue ASA, Plavix, lipitor  - Cont. Coreg, Hydralazine and digoxin     # GI: DASH and low carb diet     # Endo:      1. DM Type 2         - A1c 8.4         - Lantus 24 units subQ bedtime, Lispro 7 units TID with meals, low does sliding scale insuline TID w/ meals and qHS         - s/p endo consult, appreciate recs     2. Hypothyroidism        - TSH 2.66        - Continue Synthyroid 88mcg PO daily        #CKD  - Cr 2.46 (baseline 1.8s), down from 2.48 yesterday, will continue to monitor  - good urinary output  - Hyponatremia    # Mis:   -DVT prophylaxis: Heparin subQ 5000 units q12h   -OOB to chair  - PT recs rehab or home PT    FOR FOLLOW UP:

## 2020-02-03 NOTE — CONSULT NOTE ADULT - PROBLEM SELECTOR RECOMMENDATION 9
Will increase Lantus to 24u at bedtime.  Will increase Humalog to 7u before each meal and continue Humalog correction scale coverage. Will continue monitoring FS and FU.  Patient counseled for compliance with consistent low carb diet and exercise as tolerated outpatient. Patient counseled for compliance with consistent low carb diet and exercise as tolerated outpatient.

## 2020-02-03 NOTE — CONSULT NOTE ADULT - SUBJECTIVE AND OBJECTIVE BOX
HPI:  84 yr old [place] female with PMH of dementia (daughter will consent for pt), MI (remote), CAD with prior stent, HTN, HLD, HFrEF, suspected A fib (on digoxin/ASA), no implantable cardiac monitoring device, CKD- followed by PCP Dr Nicole Staples at [facility], DMT2- well controlled, complicated by CKD, followed by PCP, hypothyroidism presents today for cardiac catheterization. Patient's daughter reports REYES for past month. Evaluated by cardiologist Dr Sanchez where NST was done  revealing abnormality. Referred for cath.  Narrative:     Symptoms:        Angina (Class): 11       Ischemic Symptoms: REYES    Heart Failure:        Systolic/Diastolic/Combined:        NYHA Class (within 2 weeks):     Assessment of LVEF: no report on Allscripts/Sunrise       EF:        Assessed by:        Date:     Prior Cardiac Interventions: details not documented on office chart       PCI's: yes       CABG:     Noninvasive Testing:   Stress Test: Date: no report on Allscripts/Sunrise       Protocol:        Duration of Exercise:        Symptoms:        EKG Changes:        DTS:        Myocardial Imaging:        Risk Assessment:     Echo:     Antianginal Therapies:        Beta Blockers:  yes       Calcium Channel Blockers: yes       Long Acting Nitrates: no       Ranexa: no    Associated Risk Factors:        Cerebrovascular Disease: N/A       Chronic Lung Disease: N/A       Peripheral Arterial Disease: N/A       Chronic Kidney Disease (if yes, what is GFR): yes       Uncontrolled Diabetes (if yes, what is HgbA1C or FBS): N/A       Poorly Controlled Hypertension (if yes, what is SBP): N/A       Morbid Obesity (if yes, what is BMI): N/A       History of Recent Ventricular Arrhythmia: N/A       Inability to Ambulate Safely: 1-2  person assistance       Need for Therapeutic Anticoagulation: on ASA       Antiplatelet or Contrast Allergy: N/A (31 Jan 2020 09:36)    Patient has history of diabetes, A1C 8.5%, was on oral meds and insulin at home (Lantus 15u at bedtime, Prandin 1mg daily), no recent hypoglycemic episodes, no polyuria polydipsia. Patient follows up with PCP for diabetes management.  Endo was consulted for glycemic control.    PAST MEDICAL & SURGICAL HISTORY:  Dementia  Hypothyroid  HLD (hyperlipidemia)  HTN (hypertension)  DM (diabetes mellitus)  CAD (coronary artery disease)  MI (myocardial infarction)  Stented coronary artery      FAMILY HISTORY:  Family history of heart disease (Sibling)      Social History:    Outpatient Medications:    MEDICATIONS  (STANDING):  aspirin enteric coated 81 milliGRAM(s) Oral daily  atorvastatin 40 milliGRAM(s) Oral at bedtime  carvedilol 3.125 milliGRAM(s) Oral every 12 hours  chlorhexidine 2% Cloths 1 Application(s) Topical <User Schedule>  clopidogrel Tablet 75 milliGRAM(s) Oral daily  dextrose 5%. 1000 milliLiter(s) (50 mL/Hr) IV Continuous <Continuous>  dextrose 50% Injectable 12.5 Gram(s) IV Push once  dextrose 50% Injectable 25 Gram(s) IV Push once  dextrose 50% Injectable 25 Gram(s) IV Push once  digoxin     Tablet 0.125 milliGRAM(s) Oral daily  donepezil 10 milliGRAM(s) Oral at bedtime  ergocalciferol 89179 Unit(s) Oral every week  folic acid 1 milliGRAM(s) Oral daily  heparin  Injectable 5000 Unit(s) SubCutaneous every 12 hours  hydrALAZINE 50 milliGRAM(s) Oral three times a day  influenza   Vaccine 0.5 milliLiter(s) IntraMuscular once  insulin glargine Injectable (LANTUS) 24 Unit(s) SubCutaneous at bedtime  insulin lispro (HumaLOG) corrective regimen sliding scale   SubCutaneous three times a day before meals  insulin lispro (HumaLOG) corrective regimen sliding scale   SubCutaneous at bedtime  insulin lispro Injectable (HumaLOG) 7 Unit(s) SubCutaneous three times a day before meals  levothyroxine 88 MICROGram(s) Oral daily  memantine 5 milliGRAM(s) Oral at bedtime  senna 2 Tablet(s) Oral at bedtime  sodium chloride 0.9% lock flush 3 milliLiter(s) IV Push every 8 hours    MEDICATIONS  (PRN):  dextrose 40% Gel 15 Gram(s) Oral once PRN Blood Glucose LESS THAN 70 milliGRAM(s)/deciliter  glucagon  Injectable 1 milliGRAM(s) IntraMuscular once PRN Glucose LESS THAN 70 milligrams/deciliter      Allergies    No Known Allergies    Intolerances      Review of Systems:  Constitutional: No fever, no chills  Eyes: No blurry vision  Neuro: No tremors  HEENT: No pain, no neck swelling  Cardiovascular: No chest pain, no palpitations  Respiratory: Has SOB, no cough  GI: No nausea, vomiting, abdominal pain  : No dysuria  Skin: no rash  MSK: Has leg swelling.  Psych: no depression  Endocrine: no polyuria, polydipsia    ALL OTHER SYSTEMS REVIEWED AND NEGATIVE    UNABLE TO OBTAIN    PHYSICAL EXAM:  VITALS: T(C): 36.8 (02-03-20 @ 15:00)  T(F): 98.3 (02-03-20 @ 15:00), Max: 98.6 (02-02-20 @ 20:00)  HR: 88 (02-03-20 @ 15:00) (68 - 94)  BP: 140/69 (02-03-20 @ 15:00) (89/46 - 140/69)  RR:  (10 - 21)  SpO2:  (99% - 100%)  Wt(kg): --  GENERAL: NAD, well-groomed, well-developed  EYES: No proptosis, no lid lag  HEENT:  Atraumatic, Normocephalic  THYROID: Normal size, no palpable nodules  RESPIRATORY: Clear to auscultation bilaterally; No rales, rhonchi, wheezing  CARDIOVASCULAR: Si S2, No murmurs;  GI: Soft, non distended, normal bowel sounds  SKIN: Dry, intact, No rashes or lesions  MUSCULOSKELETAL: Has BL lower extremity edema.  NEURO:  no tremor, sensation decreased in feet BL,    POCT Blood Glucose.: 257 mg/dL (02-03-20 @ 12:16)  POCT Blood Glucose.: 276 mg/dL (02-03-20 @ 08:29)  POCT Blood Glucose.: 283 mg/dL (02-03-20 @ 05:03)  POCT Blood Glucose.: 337 mg/dL (02-02-20 @ 21:01)  POCT Blood Glucose.: 287 mg/dL (02-02-20 @ 16:39)  POCT Blood Glucose.: 246 mg/dL (02-02-20 @ 11:50)  POCT Blood Glucose.: 236 mg/dL (02-01-20 @ 21:41)  POCT Blood Glucose.: 271 mg/dL (02-01-20 @ 16:57)  POCT Blood Glucose.: 273 mg/dL (02-01-20 @ 12:08)  POCT Blood Glucose.: 220 mg/dL (02-01-20 @ 08:18)  POCT Blood Glucose.: 206 mg/dL (01-31-20 @ 21:43)  POCT Blood Glucose.: 277 mg/dL (01-31-20 @ 17:45)                            9.5    7.15  )-----------( 144      ( 03 Feb 2020 06:21 )             30.4       02-03    130<L>  |  94<L>  |  46<H>  ----------------------------<  273<H>  4.7   |  19<L>  |  2.46<H>    EGFR if : 20<L>  EGFR if non : 17<L>    Ca    9.3      02-03  Mg     2.6     02-03  Phos  4.1     02-03    TPro  7.5  /  Alb  3.4  /  TBili  0.3  /  DBili  x   /  AST  45<H>  /  ALT  25  /  AlkPhos  76  02-03      Thyroid Function Tests:  01-31 @ 18:26 TSH 2.66 FreeT4 -- T3 -- Anti TPO -- Anti Thyroglobulin Ab -- TSI --      Hemoglobin A1C, Whole Blood: 8.5 % <H> [4.0 - 5.6] (02-02-20 @ 09:20)  Hemoglobin A1C, Whole Blood: 8.4 % <H> [4.0 - 5.6] (01-31-20 @ 18:26)      02-02 Chol 91 LDL 25 HDL 23<L> Trig 214<H>, 01-31 Chol 104 LDL 27 HDL 28<L> Trig 244<H>    Radiology: HPI:  84 yr old [place] female with PMH of dementia (daughter will consent for pt), MI (remote), CAD with prior stent, HTN, HLD, HFrEF, suspected A fib (on digoxin/ASA), no implantable cardiac monitoring device, CKD- followed by PCP Dr Nicole Staples at [facility], DMT2- well controlled, complicated by CKD, followed by PCP, hypothyroidism presents today for cardiac catheterization. Patient's daughter reports REYES for past month. Evaluated by cardiologist Dr Sanchez where NST was done  revealing abnormality. Referred for cath.  Narrative:     Symptoms:        Angina (Class): 11       Ischemic Symptoms: REYES    Heart Failure:        Systolic/Diastolic/Combined:        NYHA Class (within 2 weeks):     Assessment of LVEF: no report on Allscripts/Sunrise       EF:        Assessed by:        Date:     Prior Cardiac Interventions: details not documented on office chart       PCI's: yes       CABG:     Noninvasive Testing:   Stress Test: Date: no report on Allscripts/Sunrise       Protocol:        Duration of Exercise:        Symptoms:        EKG Changes:        DTS:        Myocardial Imaging:        Risk Assessment:     Echo:     Antianginal Therapies:        Beta Blockers:  yes       Calcium Channel Blockers: yes       Long Acting Nitrates: no       Ranexa: no    Associated Risk Factors:        Cerebrovascular Disease: N/A       Chronic Lung Disease: N/A       Peripheral Arterial Disease: N/A       Chronic Kidney Disease (if yes, what is GFR): yes       Uncontrolled Diabetes (if yes, what is HgbA1C or FBS): N/A       Poorly Controlled Hypertension (if yes, what is SBP): N/A       Morbid Obesity (if yes, what is BMI): N/A       History of Recent Ventricular Arrhythmia: N/A       Inability to Ambulate Safely: 1-2  person assistance       Need for Therapeutic Anticoagulation: on ASA       Antiplatelet or Contrast Allergy: N/A (31 Jan 2020 09:36)    Patient has history of diabetes, A1C 8.5%, was on oral meds and insulin at home (Lantus 15u at bedtime, Prandin 1mg daily), no recent hypoglycemic episodes, no polyuria polydipsia. Patient follows up with PCP for diabetes management.  Endo was consulted for glycemic control.    PAST MEDICAL & SURGICAL HISTORY:  Dementia  Hypothyroid  HLD (hyperlipidemia)  HTN (hypertension)  DM (diabetes mellitus)  CAD (coronary artery disease)  MI (myocardial infarction)  Stented coronary artery      FAMILY HISTORY:  Family history of heart disease (Sibling)      Social History:    Outpatient Medications:    MEDICATIONS  (STANDING):  aspirin enteric coated 81 milliGRAM(s) Oral daily  atorvastatin 40 milliGRAM(s) Oral at bedtime  carvedilol 3.125 milliGRAM(s) Oral every 12 hours  chlorhexidine 2% Cloths 1 Application(s) Topical <User Schedule>  clopidogrel Tablet 75 milliGRAM(s) Oral daily  dextrose 5%. 1000 milliLiter(s) (50 mL/Hr) IV Continuous <Continuous>  dextrose 50% Injectable 12.5 Gram(s) IV Push once  dextrose 50% Injectable 25 Gram(s) IV Push once  dextrose 50% Injectable 25 Gram(s) IV Push once  digoxin     Tablet 0.125 milliGRAM(s) Oral daily  donepezil 10 milliGRAM(s) Oral at bedtime  ergocalciferol 92862 Unit(s) Oral every week  folic acid 1 milliGRAM(s) Oral daily  heparin  Injectable 5000 Unit(s) SubCutaneous every 12 hours  hydrALAZINE 50 milliGRAM(s) Oral three times a day  influenza   Vaccine 0.5 milliLiter(s) IntraMuscular once  insulin glargine Injectable (LANTUS) 24 Unit(s) SubCutaneous at bedtime  insulin lispro (HumaLOG) corrective regimen sliding scale   SubCutaneous three times a day before meals  insulin lispro (HumaLOG) corrective regimen sliding scale   SubCutaneous at bedtime  insulin lispro Injectable (HumaLOG) 7 Unit(s) SubCutaneous three times a day before meals  levothyroxine 88 MICROGram(s) Oral daily  memantine 5 milliGRAM(s) Oral at bedtime  senna 2 Tablet(s) Oral at bedtime  sodium chloride 0.9% lock flush 3 milliLiter(s) IV Push every 8 hours    MEDICATIONS  (PRN):  dextrose 40% Gel 15 Gram(s) Oral once PRN Blood Glucose LESS THAN 70 milliGRAM(s)/deciliter  glucagon  Injectable 1 milliGRAM(s) IntraMuscular once PRN Glucose LESS THAN 70 milligrams/deciliter      Allergies    No Known Allergies    Intolerances      Review of Systems:  Constitutional: No fever, no chills  Eyes: No blurry vision  Neuro: No tremors  HEENT: No pain, no neck swelling  Cardiovascular: No chest pain, no palpitations  Respiratory: Has SOB, no cough  GI: No nausea, vomiting, abdominal pain  : No dysuria  Skin: no rash  MSK: Has leg swelling.  Psych: no depression  Endocrine: no polyuria, polydipsia    ALL OTHER SYSTEMS REVIEWED AND NEGATIVE    UNABLE TO OBTAIN    PHYSICAL EXAM:  VITALS: T(C): 36.8 (02-03-20 @ 15:00)  T(F): 98.3 (02-03-20 @ 15:00), Max: 98.6 (02-02-20 @ 20:00)  HR: 88 (02-03-20 @ 15:00) (68 - 94)  BP: 140/69 (02-03-20 @ 15:00) (89/46 - 140/69)  RR:  (10 - 21)  SpO2:  (99% - 100%)  Wt(kg): --  GENERAL: NAD, well-groomed, well-developed  EYES: No proptosis, no lid lag  HEENT:  Atraumatic, Normocephalic  THYROID: Normal size, no palpable nodules  RESPIRATORY: Clear to auscultation bilaterally; No rales, rhonchi, wheezing  CARDIOVASCULAR: Si S2, No murmurs;  GI: Soft, non distended, normal bowel sounds  SKIN: Dry, intact, No rashes or lesions  MUSCULOSKELETAL: Has BL lower extremity edema.  NEURO:  no tremor, sensation decreased in feet BL,    POCT Blood Glucose.: 257 mg/dL (02-03-20 @ 12:16)  POCT Blood Glucose.: 276 mg/dL (02-03-20 @ 08:29)  POCT Blood Glucose.: 283 mg/dL (02-03-20 @ 05:03)  POCT Blood Glucose.: 337 mg/dL (02-02-20 @ 21:01)  POCT Blood Glucose.: 287 mg/dL (02-02-20 @ 16:39)  POCT Blood Glucose.: 246 mg/dL (02-02-20 @ 11:50)  POCT Blood Glucose.: 236 mg/dL (02-01-20 @ 21:41)  POCT Blood Glucose.: 271 mg/dL (02-01-20 @ 16:57)  POCT Blood Glucose.: 273 mg/dL (02-01-20 @ 12:08)  POCT Blood Glucose.: 220 mg/dL (02-01-20 @ 08:18)  POCT Blood Glucose.: 206 mg/dL (01-31-20 @ 21:43)  POCT Blood Glucose.: 277 mg/dL (01-31-20 @ 17:45)                            9.5    7.15  )-----------( 144      ( 03 Feb 2020 06:21 )             30.4       02-03    130<L>  |  94<L>  |  46<H>  ----------------------------<  273<H>  4.7   |  19<L>  |  2.46<H>    EGFR if : 20<L>  EGFR if non : 17<L>    Ca    9.3      02-03  Mg     2.6     02-03  Phos  4.1     02-03    TPro  7.5  /  Alb  3.4  /  TBili  0.3  /  DBili  x   /  AST  45<H>  /  ALT  25  /  AlkPhos  76  02-03      Thyroid Function Tests:  01-31 @ 18:26 TSH 2.66 FreeT4 -- T3 -- Anti TPO -- Anti Thyroglobulin Ab -- TSI --      Hemoglobin A1C, Whole Blood: 8.5 % <H> [4.0 - 5.6] (02-02-20 @ 09:20)  Hemoglobin A1C, Whole Blood: 8.4 % <H> [4.0 - 5.6] (01-31-20 @ 18:26)      02-02 Chol 91 LDL 25 HDL 23<L> Trig 214<H>, 01-31 Chol 104 LDL 27 HDL 28<L> Trig 244<H>    Radiology:

## 2020-02-03 NOTE — PHYSICAL THERAPY INITIAL EVALUATION ADULT - ADDITIONAL COMMENTS
Per daughter Deisi, patient lives in private home with son and daughter in law, 1 step to enter and 5 steps x 2 to bedroom level. Patient is carried up / down the stairs when she needs to leave the house. Dependent with ADLs, has HHA 8 hours x 7 days, patient ambulates with rolling walker and assistance.

## 2020-02-03 NOTE — PROGRESS NOTE ADULT - ASSESSMENT
84 y/ F w/ pmh of dementia, CAD, T2DM, HLD, HTN, hypothryoid, AF who presented for cath 2/2 abnormal stress test w/ LAD perf s/p stents w/ IABP    # Neuro: Cont. Home dose of donepezil and Mementine    # Resp: Denies SOB or dyspnea Soa2 % in room air     # CAD w/ covered stents to mLAD and d LAD s/p IABP   - ECHO shows mild to mod left seg ventricular dysfunction   - Continue ASA, Plavix, lipitor  - Cont. Coreg, Hydralazine and digoxin   - Cont. Lasix 20 po daily     # ABD: DASH and low carb diet     # Endo: A1c 8.4   - ISS and lantus    #CKD  - Cr 1.78 from 1.8, appears to be at baseline  - Hyponatremia and worsenging Cr  - extra dose of Lasix     # Mis: SCD while in bed  -OOB to chair  - PT eval 84 y/ F w/ pmh of dementia, CAD, T2DM, HLD, HTN, hypothryoid, AF who presented for cath 2/2 abnormal stress test w/ LAD perf s/p stents w/ IABP    # Neuro: Cont. Home dose of donepezil and Mementine    # Resp: Denies SOB or dyspnea Soa2 % in room air     # CAD w/ covered stents to mLAD and d LAD s/p IABP   - ECHO shows mild to mod left seg ventricular dysfunction  - f/u repeat TTE to eval size of effusion  - Continue ASA, Plavix, lipitor  - Cont. Coreg, Hydralazine and digoxin   - Cont. Lasix 20 po daily     # ABD: DASH and low carb diet     # Endo: A1c 8.4   - ISS and lantus  - endo consulted    #CKD  - Cr 2.46 (baseline 1.8s), down from 2.48 yesterday, will continue to monitor  - good urinary output  - Hyponatremia    # Mis: SCD while in bed  -OOB to chair  - PT recs rehab or home PT

## 2020-02-03 NOTE — PROGRESS NOTE ADULT - SUBJECTIVE AND OBJECTIVE BOX
INTERVAL HISTORY:    REVIEW OF SYSTEMS: As above; all others negative    MEDICATIONS  (STANDING):  aspirin enteric coated 81 milliGRAM(s) Oral daily  atorvastatin 40 milliGRAM(s) Oral at bedtime  carvedilol 3.125 milliGRAM(s) Oral every 12 hours  chlorhexidine 2% Cloths 1 Application(s) Topical <User Schedule>  clopidogrel Tablet 75 milliGRAM(s) Oral daily  dextrose 5%. 1000 milliLiter(s) (50 mL/Hr) IV Continuous <Continuous>  dextrose 50% Injectable 12.5 Gram(s) IV Push once  dextrose 50% Injectable 25 Gram(s) IV Push once  dextrose 50% Injectable 25 Gram(s) IV Push once  digoxin     Tablet 0.125 milliGRAM(s) Oral daily  donepezil 10 milliGRAM(s) Oral at bedtime  ergocalciferol 26938 Unit(s) Oral every week  folic acid 1 milliGRAM(s) Oral daily  heparin  Injectable 5000 Unit(s) SubCutaneous every 12 hours  hydrALAZINE 50 milliGRAM(s) Oral three times a day  influenza   Vaccine 0.5 milliLiter(s) IntraMuscular once  insulin glargine Injectable (LANTUS) 18 Unit(s) SubCutaneous at bedtime  insulin lispro (HumaLOG) corrective regimen sliding scale   SubCutaneous at bedtime  insulin lispro (HumaLOG) corrective regimen sliding scale   SubCutaneous three times a day before meals  insulin lispro Injectable (HumaLOG) 4 Unit(s) SubCutaneous three times a day before meals  levothyroxine 88 MICROGram(s) Oral daily  memantine 5 milliGRAM(s) Oral at bedtime  senna 2 Tablet(s) Oral at bedtime  sodium chloride 0.9% lock flush 3 milliLiter(s) IV Push every 8 hours    MEDICATIONS  (PRN):  dextrose 40% Gel 15 Gram(s) Oral once PRN Blood Glucose LESS THAN 70 milliGRAM(s)/deciliter  glucagon  Injectable 1 milliGRAM(s) IntraMuscular once PRN Glucose LESS THAN 70 milligrams/deciliter      Objective:  ICU Vital Signs Last 24 Hrs  T(C): 36.8 (2020 08:00), Max: 37 (2020 20:00)  T(F): 98.2 (2020 08:00), Max: 98.6 (2020 20:00)  HR: 68 (2020 08:00) (66 - 94)  BP: 110/55 (2020 08:00) (87/46 - 133/60)  BP(mean): 83 (2020 08:00) (58 - 104)  ABP: --  ABP(mean): --  RR: 16 (2020 08:00) (8 - 21)  SpO2: 100% (2020 08:00) (99% - 100%)      Adult Advanced Hemodynamics Last 24 Hrs  CVP(mm Hg): --  CVP(cm H2O): --  CO: --  CI: --  PA: --  PA(mean): --  PCWP: --  SVR: --  SVRI: --  PVR: --  PVRI: --       @ 07:01  -  - @ 07:00  --------------------------------------------------------  IN: 240 mL / OUT: 1500 mL / NET: -1260 mL      Daily     Daily Weight in k.1 (2020 00:00)    PHYSICAL EXAM:      Constitutional:    HEENT:    Respiratory:    Cardiovascular:  Access site:    Gastrointestinal:    Genitourinary:    Extremities:    Vascular:    Neurological:    Skin:      TELEMETRY:     EKG:     IMAGING:    Labs:                          9.5    7.15  )-----------( 144      ( 2020 06:21 )             30.4     02-03    130<L>  |  94<L>  |  46<H>  ----------------------------<  273<H>  4.7   |  19<L>  |  2.46<H>    Ca    9.3      2020 05:28  Phos  4.1     02-03  Mg     2.6     02-03    TPro  7.5  /  Alb  3.4  /  TBili  0.3  /  DBili  x   /  AST  45<H>  /  ALT  25  /  AlkPhos  76  02-03    LIVER FUNCTIONS - ( 2020 05:28 )  Alb: 3.4 g/dL / Pro: 7.5 g/dL / ALK PHOS: 76 U/L / ALT: 25 U/L / AST: 45 U/L / GGT: x           PT/INR - ( 2020 07:41 )   PT: 12.3 sec;   INR: 1.07 ratio         PTT - ( 2020 07:41 )  PTT:27.2 sec  Creatine Kinase, Serum: 498 U/L ( @ 05:28)  CKMB Units: 8.8 ng/mL ( @ 05:28)        HEALTH ISSUES - PROBLEM Dx: INTERVAL HISTORY:  No acute events. No cp, sob, or abdominal pain.     REVIEW OF SYSTEMS: As above; all others negative    MEDICATIONS  (STANDING):  aspirin enteric coated 81 milliGRAM(s) Oral daily  atorvastatin 40 milliGRAM(s) Oral at bedtime  carvedilol 3.125 milliGRAM(s) Oral every 12 hours  chlorhexidine 2% Cloths 1 Application(s) Topical <User Schedule>  clopidogrel Tablet 75 milliGRAM(s) Oral daily  dextrose 5%. 1000 milliLiter(s) (50 mL/Hr) IV Continuous <Continuous>  dextrose 50% Injectable 12.5 Gram(s) IV Push once  dextrose 50% Injectable 25 Gram(s) IV Push once  dextrose 50% Injectable 25 Gram(s) IV Push once  digoxin     Tablet 0.125 milliGRAM(s) Oral daily  donepezil 10 milliGRAM(s) Oral at bedtime  ergocalciferol 65160 Unit(s) Oral every week  folic acid 1 milliGRAM(s) Oral daily  heparin  Injectable 5000 Unit(s) SubCutaneous every 12 hours  hydrALAZINE 50 milliGRAM(s) Oral three times a day  influenza   Vaccine 0.5 milliLiter(s) IntraMuscular once  insulin glargine Injectable (LANTUS) 18 Unit(s) SubCutaneous at bedtime  insulin lispro (HumaLOG) corrective regimen sliding scale   SubCutaneous at bedtime  insulin lispro (HumaLOG) corrective regimen sliding scale   SubCutaneous three times a day before meals  insulin lispro Injectable (HumaLOG) 4 Unit(s) SubCutaneous three times a day before meals  levothyroxine 88 MICROGram(s) Oral daily  memantine 5 milliGRAM(s) Oral at bedtime  senna 2 Tablet(s) Oral at bedtime  sodium chloride 0.9% lock flush 3 milliLiter(s) IV Push every 8 hours    MEDICATIONS  (PRN):  dextrose 40% Gel 15 Gram(s) Oral once PRN Blood Glucose LESS THAN 70 milliGRAM(s)/deciliter  glucagon  Injectable 1 milliGRAM(s) IntraMuscular once PRN Glucose LESS THAN 70 milligrams/deciliter      Objective:  ICU Vital Signs Last 24 Hrs  T(C): 36.8 (2020 08:00), Max: 37 (2020 20:00)  T(F): 98.2 (2020 08:00), Max: 98.6 (2020 20:00)  HR: 68 (2020 08:00) (66 - 94)  BP: 110/55 (2020 08:00) (87/46 - 133/60)  BP(mean): 83 (2020 08:00) (58 - 104)  ABP: --  ABP(mean): --  RR: 16 (2020 08:00) (8 - 21)  SpO2: 100% (2020 08:00) (99% - 100%)      Adult Advanced Hemodynamics Last 24 Hrs  CVP(mm Hg): --  CVP(cm H2O): --  CO: --  CI: --  PA: --  PA(mean): --  PCWP: --  SVR: --  SVRI: --  PVR: --  PVRI: --       @ 07:01  -   @ 07:00  --------------------------------------------------------  IN: 240 mL / OUT: 1500 mL / NET: -1260 mL      Daily     Daily Weight in k.1 (2020 00:00)    T(C): 36.9 (20 @ 11:00), Max: 37 (20 @ 20:00)  HR: 82 (20 @ 13:00) (68 - 94)  BP: 117/56 (20 @ 13:00) (89/46 - 133/58)  RR: 12 (20 @ 13:00) (8 - 21)  SpO2: 100% (20 @ 13:00) (99% - 100%)    PHYSICAL EXAM:  GENERAL: NAD, well-developed  HEAD:  Atraumatic, Normocephalic  EYES: EOMI, PERRLA, conjunctiva and sclera clear  NECK: Supple, No JVD  CHEST/LUNG: Clear to auscultation bilaterally; No wheeze  HEART: NL s1s2, regular rate and rhythm; No murmurs, rubs, or gallops  ABDOMEN: Soft, Nontender, Nondistended; Bowel sounds present  EXTREMITIES:  2+ Peripheral Pulses, No clubbing, cyanosis, or edema  PSYCH: AAOx2  NEUROLOGY: non-focal  SKIN: No rashes or lesions      TELEMETRY:     EKG:     IMAGING:    Labs:                          9.5    7.15  )-----------( 144      ( 2020 06:21 )             30.4     02-    130<L>  |  94<L>  |  46<H>  ----------------------------<  273<H>  4.7   |  19<L>  |  2.46<H>    Ca    9.3      2020 05:28  Phos  4.1     -  Mg     2.6     -    TPro  7.5  /  Alb  3.4  /  TBili  0.3  /  DBili  x   /  AST  45<H>  /  ALT  25  /  AlkPhos  76  02-03    LIVER FUNCTIONS - ( 2020 05:28 )  Alb: 3.4 g/dL / Pro: 7.5 g/dL / ALK PHOS: 76 U/L / ALT: 25 U/L / AST: 45 U/L / GGT: x           PT/INR - ( 2020 07:41 )   PT: 12.3 sec;   INR: 1.07 ratio         PTT - ( 2020 07:41 )  PTT:27.2 sec  Creatine Kinase, Serum: 498 U/L ( @ 05:28)  CKMB Units: 8.8 ng/mL ( @ 05:28)        HEALTH ISSUES - PROBLEM Dx:

## 2020-02-03 NOTE — PHYSICAL THERAPY INITIAL EVALUATION ADULT - DISCHARGE DISPOSITION, PT EVAL
if patient returns home, will benefit from resumptions of HHA services, home PT, and assistance for all ADLs. Educated daughter on chair lift to safe negotiation up / down stairs/rehabilitation facility

## 2020-02-03 NOTE — CONSULT NOTE ADULT - ASSESSMENT
Assessment  DMT2: 84y Female with DM T2 with hyperglycemia, A1C 8.5% , was on oral meds and insulin at home, now on basal bolus insulin, blood sugars running high and not at target (, 257), no hypoglycemic episode. Patient is eating meals, alert, family at the bedside.  CAD: on medications, no chest pain, stable, monitored.  Hypothyroidism: On Synthroid 88 mcg po daily, compliant with Synthroid intake, asymptomatic. TSH 2.66.  HTN: Controlled,  on antihypertensive medications.  HLD: Controlled, on statin.  CKD: Monitor labs/BMP.          Mayra Rocha MD  Cell: [phone]  Office: [phone] Assessment  DMT2: 84y Female with DM T2 with hyperglycemia, A1C 8.5% , was on oral meds and insulin at home, now on basal bolus insulin,  blood sugars running high and not at target (, 257), no hypoglycemic episode. Patient is eating meals, alert, family at the bedside.  CAD: on medications, no chest pain, stable, monitored.  Hypothyroidism: On Synthroid 88 mcg po daily, compliant with Synthroid intake, asymptomatic. TSH 2.66.  HTN: Controlled,  on antihypertensive medications.  HLD: Controlled, on statin.  CKD: Monitor labs/BMP.          Mayra Rocha MD  Cell: [phone]  Office: [phone]

## 2020-02-03 NOTE — PHYSICAL THERAPY INITIAL EVALUATION ADULT - GENERAL OBSERVATIONS, REHAB EVAL
Patient received supine in bed in CCU, NAD, VSS on room air, +PIV capped, +primafit (disconnected by RN for PT), +venodynes, daughter Deisi present throughout.

## 2020-02-04 DIAGNOSIS — N18.4 CHRONIC KIDNEY DISEASE, STAGE 4 (SEVERE): ICD-10-CM

## 2020-02-04 DIAGNOSIS — F03.90 UNSPECIFIED DEMENTIA WITHOUT BEHAVIORAL DISTURBANCE: ICD-10-CM

## 2020-02-04 LAB
ANION GAP SERPL CALC-SCNC: 17 MMOL/L — SIGNIFICANT CHANGE UP (ref 5–17)
BUN SERPL-MCNC: 44 MG/DL — HIGH (ref 7–23)
CALCIUM SERPL-MCNC: 9.3 MG/DL — SIGNIFICANT CHANGE UP (ref 8.4–10.5)
CHLORIDE SERPL-SCNC: 96 MMOL/L — SIGNIFICANT CHANGE UP (ref 96–108)
CO2 SERPL-SCNC: 20 MMOL/L — LOW (ref 22–31)
CREAT SERPL-MCNC: 2.24 MG/DL — HIGH (ref 0.5–1.3)
GLUCOSE BLDC GLUCOMTR-MCNC: 212 MG/DL — HIGH (ref 70–99)
GLUCOSE BLDC GLUCOMTR-MCNC: 252 MG/DL — HIGH (ref 70–99)
GLUCOSE BLDC GLUCOMTR-MCNC: 253 MG/DL — HIGH (ref 70–99)
GLUCOSE BLDC GLUCOMTR-MCNC: 259 MG/DL — HIGH (ref 70–99)
GLUCOSE SERPL-MCNC: 237 MG/DL — HIGH (ref 70–99)
HCT VFR BLD CALC: 30.5 % — LOW (ref 34.5–45)
HGB BLD-MCNC: 9.4 G/DL — LOW (ref 11.5–15.5)
MAGNESIUM SERPL-MCNC: 2.4 MG/DL — SIGNIFICANT CHANGE UP (ref 1.6–2.6)
MCHC RBC-ENTMCNC: 27.2 PG — SIGNIFICANT CHANGE UP (ref 27–34)
MCHC RBC-ENTMCNC: 30.8 GM/DL — LOW (ref 32–36)
MCV RBC AUTO: 88.4 FL — SIGNIFICANT CHANGE UP (ref 80–100)
NRBC # BLD: 0 /100 WBCS — SIGNIFICANT CHANGE UP (ref 0–0)
PHOSPHATE SERPL-MCNC: 3.9 MG/DL — SIGNIFICANT CHANGE UP (ref 2.5–4.5)
PLATELET # BLD AUTO: 189 K/UL — SIGNIFICANT CHANGE UP (ref 150–400)
POTASSIUM SERPL-MCNC: 4.3 MMOL/L — SIGNIFICANT CHANGE UP (ref 3.5–5.3)
POTASSIUM SERPL-SCNC: 4.3 MMOL/L — SIGNIFICANT CHANGE UP (ref 3.5–5.3)
RBC # BLD: 3.45 M/UL — LOW (ref 3.8–5.2)
RBC # FLD: 13.6 % — SIGNIFICANT CHANGE UP (ref 10.3–14.5)
SODIUM SERPL-SCNC: 133 MMOL/L — LOW (ref 135–145)
WBC # BLD: 7.23 K/UL — SIGNIFICANT CHANGE UP (ref 3.8–10.5)
WBC # FLD AUTO: 7.23 K/UL — SIGNIFICANT CHANGE UP (ref 3.8–10.5)

## 2020-02-04 PROCEDURE — 93321 DOPPLER ECHO F-UP/LMTD STD: CPT | Mod: 26

## 2020-02-04 PROCEDURE — 93308 TTE F-UP OR LMTD: CPT | Mod: 26

## 2020-02-04 PROCEDURE — 99233 SBSQ HOSP IP/OBS HIGH 50: CPT | Mod: GC

## 2020-02-04 RX ORDER — INSULIN LISPRO 100/ML
10 VIAL (ML) SUBCUTANEOUS
Refills: 0 | Status: DISCONTINUED | OUTPATIENT
Start: 2020-02-04 | End: 2020-02-06

## 2020-02-04 RX ORDER — INSULIN GLARGINE 100 [IU]/ML
30 INJECTION, SOLUTION SUBCUTANEOUS AT BEDTIME
Refills: 0 | Status: DISCONTINUED | OUTPATIENT
Start: 2020-02-04 | End: 2020-02-06

## 2020-02-04 RX ADMIN — SENNA PLUS 2 TABLET(S): 8.6 TABLET ORAL at 21:03

## 2020-02-04 RX ADMIN — Medication 50 MILLIGRAM(S): at 14:04

## 2020-02-04 RX ADMIN — SODIUM CHLORIDE 3 MILLILITER(S): 9 INJECTION INTRAMUSCULAR; INTRAVENOUS; SUBCUTANEOUS at 13:59

## 2020-02-04 RX ADMIN — Medication 50 MILLIGRAM(S): at 21:03

## 2020-02-04 RX ADMIN — Medication 50 MILLIGRAM(S): at 05:27

## 2020-02-04 RX ADMIN — Medication 7 UNIT(S): at 07:54

## 2020-02-04 RX ADMIN — DONEPEZIL HYDROCHLORIDE 10 MILLIGRAM(S): 10 TABLET, FILM COATED ORAL at 21:03

## 2020-02-04 RX ADMIN — Medication 10 UNIT(S): at 12:03

## 2020-02-04 RX ADMIN — Medication 2: at 16:44

## 2020-02-04 RX ADMIN — Medication 3: at 07:54

## 2020-02-04 RX ADMIN — Medication 1: at 21:11

## 2020-02-04 RX ADMIN — INSULIN GLARGINE 30 UNIT(S): 100 INJECTION, SOLUTION SUBCUTANEOUS at 21:12

## 2020-02-04 RX ADMIN — CARVEDILOL PHOSPHATE 3.12 MILLIGRAM(S): 80 CAPSULE, EXTENDED RELEASE ORAL at 05:27

## 2020-02-04 RX ADMIN — POLYETHYLENE GLYCOL 3350 17 GRAM(S): 17 POWDER, FOR SOLUTION ORAL at 21:03

## 2020-02-04 RX ADMIN — Medication 1 MILLIGRAM(S): at 12:05

## 2020-02-04 RX ADMIN — Medication 81 MILLIGRAM(S): at 12:05

## 2020-02-04 RX ADMIN — Medication 1 DROP(S): at 12:05

## 2020-02-04 RX ADMIN — HEPARIN SODIUM 5000 UNIT(S): 5000 INJECTION INTRAVENOUS; SUBCUTANEOUS at 05:27

## 2020-02-04 RX ADMIN — CHLORHEXIDINE GLUCONATE 1 APPLICATION(S): 213 SOLUTION TOPICAL at 07:54

## 2020-02-04 RX ADMIN — CLOPIDOGREL BISULFATE 75 MILLIGRAM(S): 75 TABLET, FILM COATED ORAL at 12:05

## 2020-02-04 RX ADMIN — SODIUM CHLORIDE 3 MILLILITER(S): 9 INJECTION INTRAMUSCULAR; INTRAVENOUS; SUBCUTANEOUS at 22:00

## 2020-02-04 RX ADMIN — Medication 3: at 12:04

## 2020-02-04 RX ADMIN — SODIUM CHLORIDE 3 MILLILITER(S): 9 INJECTION INTRAMUSCULAR; INTRAVENOUS; SUBCUTANEOUS at 05:28

## 2020-02-04 RX ADMIN — CARVEDILOL PHOSPHATE 3.12 MILLIGRAM(S): 80 CAPSULE, EXTENDED RELEASE ORAL at 16:46

## 2020-02-04 RX ADMIN — HEPARIN SODIUM 5000 UNIT(S): 5000 INJECTION INTRAVENOUS; SUBCUTANEOUS at 16:45

## 2020-02-04 RX ADMIN — Medication 88 MICROGRAM(S): at 05:27

## 2020-02-04 RX ADMIN — Medication 10 UNIT(S): at 16:44

## 2020-02-04 RX ADMIN — Medication 0.12 MILLIGRAM(S): at 05:28

## 2020-02-04 RX ADMIN — ATORVASTATIN CALCIUM 40 MILLIGRAM(S): 80 TABLET, FILM COATED ORAL at 21:03

## 2020-02-04 NOTE — PROGRESS NOTE ADULT - SUBJECTIVE AND OBJECTIVE BOX
Patient is a 84y old  Female who presents with a chief complaint of I need a stent (04 Feb 2020 10:36)  patient not known to me, assigned this AM to assume care  chart reviewed and events thus far noted   admitted into CCU now transferred out    SUBJECTIVE / OVERNIGHT EVENTS: overnight events noted  lying in bed no apparent distress     ROS:    Resp: No cough no sputum production  CVS: No chest pain no palpitations no orthopnea  GI: no N/V/D  : no dysuria, no hematuria  Neuro: no weakness no paresthesias  Heme: No petechiae no easy bruising  Msk: No joint pain no swelling  Skin: No rash no itching        MEDICATIONS  (STANDING):  artificial tears (preservative free) Ophthalmic Solution 1 Drop(s) Both EYES daily  aspirin enteric coated 81 milliGRAM(s) Oral daily  atorvastatin 40 milliGRAM(s) Oral at bedtime  carvedilol 3.125 milliGRAM(s) Oral every 12 hours  chlorhexidine 2% Cloths 1 Application(s) Topical <User Schedule>  clopidogrel Tablet 75 milliGRAM(s) Oral daily  dextrose 5%. 1000 milliLiter(s) (50 mL/Hr) IV Continuous <Continuous>  dextrose 50% Injectable 12.5 Gram(s) IV Push once  dextrose 50% Injectable 25 Gram(s) IV Push once  dextrose 50% Injectable 25 Gram(s) IV Push once  digoxin     Tablet 0.125 milliGRAM(s) Oral daily  donepezil 10 milliGRAM(s) Oral at bedtime  ergocalciferol 30926 Unit(s) Oral every week  folic acid 1 milliGRAM(s) Oral daily  heparin  Injectable 5000 Unit(s) SubCutaneous every 12 hours  hydrALAZINE 50 milliGRAM(s) Oral three times a day  influenza   Vaccine 0.5 milliLiter(s) IntraMuscular once  insulin glargine Injectable (LANTUS) 30 Unit(s) SubCutaneous at bedtime  insulin lispro (HumaLOG) corrective regimen sliding scale   SubCutaneous three times a day before meals  insulin lispro (HumaLOG) corrective regimen sliding scale   SubCutaneous at bedtime  insulin lispro Injectable (HumaLOG) 10 Unit(s) SubCutaneous three times a day before meals  levothyroxine 88 MICROGram(s) Oral daily  memantine 5 milliGRAM(s) Oral at bedtime  polyethylene glycol 3350 17 Gram(s) Oral once  senna 2 Tablet(s) Oral at bedtime  sodium chloride 0.9% lock flush 3 milliLiter(s) IV Push every 8 hours    MEDICATIONS  (PRN):  dextrose 40% Gel 15 Gram(s) Oral once PRN Blood Glucose LESS THAN 70 milliGRAM(s)/deciliter  glucagon  Injectable 1 milliGRAM(s) IntraMuscular once PRN Glucose LESS THAN 70 milligrams/deciliter        CAPILLARY BLOOD GLUCOSE      POCT Blood Glucose.: 252 mg/dL (04 Feb 2020 11:35)  POCT Blood Glucose.: 253 mg/dL (04 Feb 2020 07:34)  POCT Blood Glucose.: 364 mg/dL (03 Feb 2020 20:06)  POCT Blood Glucose.: 275 mg/dL (03 Feb 2020 17:13)    I&O's Summary    03 Feb 2020 07:01  -  04 Feb 2020 07:00  --------------------------------------------------------  IN: 120 mL / OUT: 1500 mL / NET: -1380 mL    04 Feb 2020 07:01  -  04 Feb 2020 14:23  --------------------------------------------------------  IN: 480 mL / OUT: 550 mL / NET: -70 mL        Vital Signs Last 24 Hrs  T(C): 37 (04 Feb 2020 11:14), Max: 37.1 (04 Feb 2020 00:39)  T(F): 98.6 (04 Feb 2020 11:14), Max: 98.7 (04 Feb 2020 00:39)  HR: 90 (04 Feb 2020 14:00) (76 - 114)  BP: 131/71 (04 Feb 2020 14:00) (101/47 - 145/76)  BP(mean): 76 (04 Feb 2020 00:00) (63 - 114)  RR: 18 (04 Feb 2020 11:14) (10 - 21)  SpO2: 100% (04 Feb 2020 11:14) (98% - 100%)    PHYSICAL EXAM:  GENERAL: in no apparent distress  HEAD:  Atraumatic, Normocephalic  EYES: EOMI, PERRLA, conjunctiva and sclera clear  NECK: Supple, No JVD  CHEST/LUNG: no wheeze, decreased breath sounds at bases   HEART: S1 S2; soft ejection systolic murmur best heard at left sternal border   ABDOMEN: Soft, Nontender, Nondistended; Bowel sounds present  EXTREMITIES:  No clubbing or cyanosis, + Peripheral Pulses,  no edema  NEUROLOGY: alert, confused at baseline grossly non-focal  SKIN: No rashes or lesions    LABS:                        9.4    7.23  )-----------( 189      ( 04 Feb 2020 06:44 )             30.5     02-04    133<L>  |  96  |  44<H>  ----------------------------<  237<H>  4.3   |  20<L>  |  2.24<H>    Ca    9.3      04 Feb 2020 06:44  Phos  3.9     02-04  Mg     2.4     02-04    TPro  7.5  /  Alb  3.4  /  TBili  0.3  /  DBili  x   /  AST  45<H>  /  ALT  25  /  AlkPhos  76  02-03      CARDIAC MARKERS ( 03 Feb 2020 05:28 )  x     / x     / 498 U/L / x     / 8.8 ng/mL            All consultant(s) notes reviewed and care discussed with other providers        Contact Number, Dr Douglas [phone]

## 2020-02-04 NOTE — PROGRESS NOTE ADULT - ASSESSMENT
84F with CAD, HTN, HLD, Hypothyroidism, AF, Dementia with abnormal stress with subsequent PCI LAD c/b perforation requiring covered stent. TTE with EF 40%.    Recommendations:  1. ASA 81mg daily, Plavix 75mg daily, Atorvastatin 40mg qhs  2. Coreg 3.126mg BID  3. Repeat Limited Echo with Definity     Tex Ta M.D.  Cardiology Fellow  [phone]  For all Cardiology service contact information, go to [email or web address] and use "[email or web address]" to login.

## 2020-02-04 NOTE — CONSULT NOTE ADULT - SUBJECTIVE AND OBJECTIVE BOX
NEPHROLOGY - NSN    Patient seen and examined.    HPI:  84 yr old [place] female with PMH of dementia (daughter will consent for pt), MI (remote), CAD with prior stent, HTN, HLD, HFrEF, suspected A fib (on digoxin/ASA), no implantable cardiac monitoring device, CKD- followed by PCP Dr Nicole Staples at [facility], DMT2- well controlled, complicated by CKD, followed by PCP, hypothyroidism presents today for cardiac catheterization. Patient's daughter reports REYES for past month. Evaluated by cardiologist Dr Sanchez where NST was done  revealing abnormality. Referred for cath..  Pt received a stent to the LAD and cb perforation and later a covered stent.  She had a IABP as well that was removed    Narrative:     Symptoms:        Angina (Class): 11       Ischemic Symptoms: REYES    Heart Failure:        Systolic/Diastolic/Combined:        NYHA Class (within 2 weeks):     Assessment of LVEF: no report on Allscripts/Sunrise       EF:        Assessed by:        Date:     Prior Cardiac Interventions: details not documented on office chart       PCI's: yes       CABG:     Noninvasive Testing:   Stress Test: Date: no report on Allscripts/Sunrise       Protocol:        Duration of Exercise:        Symptoms:        EKG Changes:        DTS:        Myocardial Imaging:        Risk Assessment:     Echo:     Antianginal Therapies:        Beta Blockers:  yes       Calcium Channel Blockers: yes       Long Acting Nitrates: no       Ranexa: no    Associated Risk Factors:        Cerebrovascular Disease: N/A       Chronic Lung Disease: N/A       Peripheral Arterial Disease: N/A       Chronic Kidney Disease (if yes, what is GFR): yes       Uncontrolled Diabetes (if yes, what is HgbA1C or FBS): N/A       Poorly Controlled Hypertension (if yes, what is SBP): N/A       Morbid Obesity (if yes, what is BMI): N/A       History of Recent Ventricular Arrhythmia: N/A       Inability to Ambulate Safely: 1-2  person assistance       Need for Therapeutic Anticoagulation: on ASA       Antiplatelet or Contrast Allergy: N/A (31 Jan 2020 09:36)      PAST MEDICAL & SURGICAL HISTORY:  Dementia  Hypothyroid  HLD (hyperlipidemia)  HTN (hypertension)  DM (diabetes mellitus)  CAD (coronary artery disease)  MI (myocardial infarction)  Stented coronary artery      MEDICATIONS  (STANDING):  artificial tears (preservative free) Ophthalmic Solution 1 Drop(s) Both EYES daily  aspirin enteric coated 81 milliGRAM(s) Oral daily  atorvastatin 40 milliGRAM(s) Oral at bedtime  carvedilol 3.125 milliGRAM(s) Oral every 12 hours  chlorhexidine 2% Cloths 1 Application(s) Topical <User Schedule>  clopidogrel Tablet 75 milliGRAM(s) Oral daily  dextrose 5%. 1000 milliLiter(s) (50 mL/Hr) IV Continuous <Continuous>  dextrose 50% Injectable 12.5 Gram(s) IV Push once  dextrose 50% Injectable 25 Gram(s) IV Push once  dextrose 50% Injectable 25 Gram(s) IV Push once  digoxin     Tablet 0.125 milliGRAM(s) Oral daily  donepezil 10 milliGRAM(s) Oral at bedtime  ergocalciferol 03719 Unit(s) Oral every week  folic acid 1 milliGRAM(s) Oral daily  heparin  Injectable 5000 Unit(s) SubCutaneous every 12 hours  hydrALAZINE 50 milliGRAM(s) Oral three times a day  influenza   Vaccine 0.5 milliLiter(s) IntraMuscular once  insulin glargine Injectable (LANTUS) 30 Unit(s) SubCutaneous at bedtime  insulin lispro (HumaLOG) corrective regimen sliding scale   SubCutaneous three times a day before meals  insulin lispro (HumaLOG) corrective regimen sliding scale   SubCutaneous at bedtime  insulin lispro Injectable (HumaLOG) 10 Unit(s) SubCutaneous three times a day before meals  levothyroxine 88 MICROGram(s) Oral daily  memantine 5 milliGRAM(s) Oral at bedtime  polyethylene glycol 3350 17 Gram(s) Oral once  senna 2 Tablet(s) Oral at bedtime  sodium chloride 0.9% lock flush 3 milliLiter(s) IV Push every 8 hours      Allergies    No Known Allergies    Intolerances        SOCIAL HISTORY:  Denies alcohol abuse, drug abuse or tobacco usage.     FAMILY HISTORY:  Family history of heart disease (Sibling)      VITALS:  T(C): 37 (02-04-20 @ 08:08), Max: 37.1 (02-04-20 @ 00:39)  HR: 88 (02-04-20 @ 08:08) (72 - 114)  BP: 125/75 (02-04-20 @ 08:08) (101/47 - 145/76)  RR: 18 (02-04-20 @ 08:08) (10 - 21)  SpO2: 100% (02-04-20 @ 08:08) (98% - 100%)    REVIEW OF SYSTEMS:  Denies any nausea, vomiting, diarrhea, fever or chills. Denies chest pain, SOB, focal weakness, hematuria or dysuria. Good oral intake and denies fatigue or weakness. All other pertinent systems are reviewed and are negative.    PHYSICAL EXAM:  Constitutional: NAD  HEENT: EOMI  Neck:  No JVD, supple   Respiratory: CTA B/L  Cardiovascular: S1 and S2, RRR  Gastrointestinal: + BS, soft, NT, ND  Extremities: No peripheral edema, + peripheral pulses  Neurological: A/O x 3, CN2-12 intact  Psychiatric: Normal mood, normal affect  : No Naik  Skin: No rashes, C/D/I  Access: Not applicable    I and O's:    02-02 @ 07:01  -  02-03 @ 07:00  --------------------------------------------------------  IN: 240 mL / OUT: 1500 mL / NET: -1260 mL    02-03 @ 07:01 - 02-04 @ 07:00  --------------------------------------------------------  IN: 120 mL / OUT: 1500 mL / NET: -1380 mL    02-04 @ 07:01 - 02-04 @ 10:38  --------------------------------------------------------  IN: 240 mL / OUT: 300 mL / NET: -60 mL          LABS:                        9.4    7.23  )-----------( 189      ( 04 Feb 2020 06:44 )             30.5     02-04    133<L>  |  96  |  44<H>  ----------------------------<  237<H>  4.3   |  20<L>  |  2.24<H>    Ca    9.3      04 Feb 2020 06:44  Phos  3.9     02-04  Mg     2.4     02-04    TPro  7.5  /  Alb  3.4  /  TBili  0.3  /  DBili  x   /  AST  45<H>  /  ALT  25  /  AlkPhos  76  02-03      URINE:    Chloride, Random Urine: <35 mmol/L (02-03 @ 06:23)  Potassium, Random Urine: 16 mmol/L (02-03 @ 06:23)  Sodium, Random Urine: <35 mmol/L (02-03 @ 06:23)    RADIOLOGY & ADDITIONAL STUDIES:    ASSESSMENT    RECOMMEND    Lesa Lino NP  [facility]  [phone] NEPHROLOGY - NSN    Patient seen and examined.    HPI:  84 yr old [place]ic female with PMH of dementia (daughter will consent for pt), MI (remote), CAD with prior stent, HTN, HLD, HFrEF, suspected A fib (on digoxin/ASA), no implantable cardiac monitoring device, CKD- followed by PCP Dr Nicole Staples at [facility], DMT2- well controlled, complicated by CKD, followed by PCP, hypothyroidism presents today for cardiac catheterization. Patient's daughter reports REYES for past month. Evaluated by cardiologist Dr Sanchez where NST was done  revealing abnormality. Referred for cath..  Pt received a stent to the LAD and cb perforation and later a covered stent.  She had a IABP as well that was removed  According to the daughter she has DM for 10 years and no retinopathy.  She is unaware of the baseline renal function but is aware that it is abnormal.  There is no hematuria or bubbles in the urine that is known.  No history of NSAIDS or nephrolithisis.  The patient urinates once or twice in the night and there is no incontinence.  No family hx or renal disease or back pain.    No recent abx use.  No alleviating or aggravating factors with respect to the kidneys.     Narrative:     Symptoms:        Angina (Class): 11       Ischemic Symptoms: REYES    Heart Failure:        Systolic/Diastolic/Combined:        NYHA Class (within 2 weeks):     Assessment of LVEF: no report on Allscripts/[facility]       EF:        Assessed by:        Date:     Prior Cardiac Interventions: details not documented on office chart       PCI's: yes       CABG:     Noninvasive Testing:   Stress Test: Date: no report on Allscripts/Sunrise       Protocol:        Duration of Exercise:        Symptoms:        EKG Changes:        DTS:        Myocardial Imaging:        Risk Assessment:     Echo:     Antianginal Therapies:        Beta Blockers:  yes       Calcium Channel Blockers: yes       Long Acting Nitrates: no       Ranexa: no    Associated Risk Factors:        Cerebrovascular Disease: N/A       Chronic Lung Disease: N/A       Peripheral Arterial Disease: N/A       Chronic Kidney Disease (if yes, what is GFR): yes       Uncontrolled Diabetes (if yes, what is HgbA1C or FBS): N/A       Poorly Controlled Hypertension (if yes, what is SBP): N/A       Morbid Obesity (if yes, what is BMI): N/A       History of Recent Ventricular Arrhythmia: N/A       Inability to Ambulate Safely: 1-2  person assistance       Need for Therapeutic Anticoagulation: on ASA       Antiplatelet or Contrast Allergy: N/A (31 Jan 2020 09:36)      PAST MEDICAL & SURGICAL HISTORY:  Dementia  Hypothyroid  HLD (hyperlipidemia)  HTN (hypertension)  DM (diabetes mellitus)  CAD (coronary artery disease)  MI (myocardial infarction)  Stented coronary artery      MEDICATIONS  (STANDING):  artificial tears (preservative free) Ophthalmic Solution 1 Drop(s) Both EYES daily  aspirin enteric coated 81 milliGRAM(s) Oral daily  atorvastatin 40 milliGRAM(s) Oral at bedtime  carvedilol 3.125 milliGRAM(s) Oral every 12 hours  chlorhexidine 2% Cloths 1 Application(s) Topical <User Schedule>  clopidogrel Tablet 75 milliGRAM(s) Oral daily  dextrose 5%. 1000 milliLiter(s) (50 mL/Hr) IV Continuous <Continuous>  dextrose 50% Injectable 12.5 Gram(s) IV Push once  dextrose 50% Injectable 25 Gram(s) IV Push once  dextrose 50% Injectable 25 Gram(s) IV Push once  digoxin     Tablet 0.125 milliGRAM(s) Oral daily  donepezil 10 milliGRAM(s) Oral at bedtime  ergocalciferol 77038 Unit(s) Oral every week  folic acid 1 milliGRAM(s) Oral daily  heparin  Injectable 5000 Unit(s) SubCutaneous every 12 hours  hydrALAZINE 50 milliGRAM(s) Oral three times a day  influenza   Vaccine 0.5 milliLiter(s) IntraMuscular once  insulin glargine Injectable (LANTUS) 30 Unit(s) SubCutaneous at bedtime  insulin lispro (HumaLOG) corrective regimen sliding scale   SubCutaneous three times a day before meals  insulin lispro (HumaLOG) corrective regimen sliding scale   SubCutaneous at bedtime  insulin lispro Injectable (HumaLOG) 10 Unit(s) SubCutaneous three times a day before meals  levothyroxine 88 MICROGram(s) Oral daily  memantine 5 milliGRAM(s) Oral at bedtime  polyethylene glycol 3350 17 Gram(s) Oral once  senna 2 Tablet(s) Oral at bedtime  sodium chloride 0.9% lock flush 3 milliLiter(s) IV Push every 8 hours      Allergies    No Known Allergies    Intolerances        SOCIAL HISTORY:  Denies alcohol abuse, drug abuse or tobacco usage.     FAMILY HISTORY:  Family history of heart disease (Sibling)      VITALS:  T(C): 37 (02-04-20 @ 08:08), Max: 37.1 (02-04-20 @ 00:39)  HR: 88 (02-04-20 @ 08:08) (72 - 114)  BP: 125/75 (02-04-20 @ 08:08) (101/47 - 145/76)  RR: 18 (02-04-20 @ 08:08) (10 - 21)  SpO2: 100% (02-04-20 @ 08:08) (98% - 100%)    REVIEW OF SYSTEMS:    Denies chest pain, SOB, focal weakness, hematuria or dysuria. . All other pertinent systems are reviewed and are negative.    PHYSICAL EXAM:  Constitutional: NAD  HEENT: EOMI  Neck:  No JVD, supple   Respiratory: CTA B/L  Cardiovascular: S1 and S2, RRR  Gastrointestinal: + BS, soft, NT, ND  Extremities: No peripheral edema, + peripheral pulses  Neurological: A/O x 3, CN2-12 intact  Psychiatric: Normal mood, normal affect  : No Naik  Skin: No rashes, C/D/I  Access: Not applicable    I and O's:    02-02 @ 07:01  -  02-03 @ 07:00  --------------------------------------------------------  IN: 240 mL / OUT: 1500 mL / NET: -1260 mL    02-03 @ 07:01  -  02-04 @ 07:00  --------------------------------------------------------  IN: 120 mL / OUT: 1500 mL / NET: -1380 mL    02-04 @ 07:01  -  02-04 @ 10:38  --------------------------------------------------------  IN: 240 mL / OUT: 300 mL / NET: -60 mL          LABS:                        9.4    7.23  )-----------( 189      ( 04 Feb 2020 06:44 )             30.5     02-04    133<L>  |  96  |  44<H>  ----------------------------<  237<H>  4.3   |  20<L>  |  2.24<H>    Ca    9.3      04 Feb 2020 06:44  Phos  3.9     02-04  Mg     2.4     02-04    TPro  7.5  /  Alb  3.4  /  TBili  0.3  /  DBili  x   /  AST  45<H>  /  ALT  25  /  AlkPhos  76  02-03      URINE:    Chloride, Random Urine: <35 mmol/L (02-03 @ 06:23)  Potassium, Random Urine: 16 mmol/L (02-03 @ 06:23)  Sodium, Random Urine: <35 mmol/L (02-03 @ 06:23)    RADIOLOGY & ADDITIONAL STUDIES:  < from: Xray Chest 1 View AP/PA (02.01.20 @ 08:44) >    EXAM:  XR CHEST AP OR PA 1V                            PROCEDURE DATE:  02/01/2020            INTERPRETATION:  A single chest x-ray was obtained on February 1, 2020.    Indication: Position of intra-aortic balloon pump.    Impression:    The heart is slightly enlarged. The lungs are clear. Intra-aortic balloon pump is 3 cm airway from the top of the aortic knob. No pneumothorax.                    EUSEBIA TEMPLE M.D., ATTENDING RADIOLOGIST  This document has been electronically signed. Feb 12020 10:11AM

## 2020-02-04 NOTE — CONSULT NOTE ADULT - ASSESSMENT
84 yr old Indonesian female with PMH of dementia (daughter will consent for pt), MI (remote), CAD with prior stent, HTN, HLD, HFrEF, suspected A fib 84 yr old Indian female with PMH of dementia (daughter will consent for pt), MI (remote), CAD with prior stent, HTN, HLD, HFrEF, suspected A fib   sp cardiac cath c stents and cb perforation  CKD stage 4 and acute on chronic renal failure that is now almost at baseline  Hyponatremia     1 Renal -No need for renal sono; Check PTH and Phos;  Check UA and quantify proteinuria  2 CVS-Maybe can entertain a ACE or ARB before dc?  Seems euvolemic at present and off diuretics but may need on dc?  3 Anemia-Check iron stores in am and assess for Procrit or iron

## 2020-02-04 NOTE — PROGRESS NOTE ADULT - PROBLEM SELECTOR PLAN 1
s/p PCI  defer to cardiology attending  hemodynamically stable and asymptomatic at this time  continue to follow cardiology recommendations

## 2020-02-04 NOTE — PROGRESS NOTE ADULT - ASSESSMENT
Assessment  DMT2: 84y Female with DM T2 with hyperglycemia, A1C 8.5% , was on oral meds and insulin at home, now on basal bolus insulin, increased dose yesterday, blood sugars still running high and not at target, no hypoglycemic episodes. Patient is eating full meals, family at the bedside state "she is always hungry", noncompliant with low-carb diet.  CAD: on medications, no chest pain, stable, monitored.  Hypothyroidism: On Synthroid 88 mcg po daily, compliant with Synthroid intake, asymptomatic. TSH 2.66.  HTN: Controlled,  on antihypertensive medications.  HLD: Controlled, on statin.  CKD: Monitor labs/BMP.          Mayra Rocha MD  Cell: [phone]  Office: [phone] Assessment  DMT2: 84y Female with DM T2 with hyperglycemia, A1C 8.5% , was on oral meds and insulin at home, now on basal bolus insulin, increased dose yesterday, blood sugars still running high and not at target, no hypoglycemic episodes.  Patient is eating full meals, family at the bedside state "she is always hungry", noncompliant with low-carb diet.  CAD: on medications, no chest pain, stable, monitored.  Hypothyroidism: On Synthroid 88 mcg po daily, compliant with Synthroid intake, asymptomatic. TSH 2.66.  HTN: Controlled,  on antihypertensive medications.  HLD: Controlled, on statin.  CKD: Monitor labs/BMP.          Mayra Rocha MD  Cell: [phone]  Office: [phone]

## 2020-02-04 NOTE — PROGRESS NOTE ADULT - SUBJECTIVE AND OBJECTIVE BOX
Chief complaint  Patient is a 84y old  Female who presents with a chief complaint of I need a stent (04 Feb 2020 10:36)   Review of systems  Patient in bed, looks comfortable, no hypoglycemia.    Labs and Fingersticks  CAPILLARY BLOOD GLUCOSE      POCT Blood Glucose.: 252 mg/dL (04 Feb 2020 11:35)  POCT Blood Glucose.: 253 mg/dL (04 Feb 2020 07:34)  POCT Blood Glucose.: 364 mg/dL (03 Feb 2020 20:06)  POCT Blood Glucose.: 275 mg/dL (03 Feb 2020 17:13)  POCT Blood Glucose.: 257 mg/dL (03 Feb 2020 12:16)      Anion Gap, Serum: 17 (02-04 @ 06:44)  Anion Gap, Serum: 17 (02-03 @ 05:28)  Anion Gap, Serum: 14 (02-02 @ 14:05)      Calcium, Total Serum: 9.3 (02-04 @ 06:44)  Calcium, Total Serum: 9.3 (02-03 @ 05:28)  Calcium, Total Serum: 9.1 (02-02 @ 14:05)  Albumin, Serum: 3.4 (02-03 @ 05:28)  Albumin, Serum: 3.3 (02-02 @ 14:05)    Alanine Aminotransferase (ALT/SGPT): 25 (02-03 @ 05:28)  Alanine Aminotransferase (ALT/SGPT): 22 (02-02 @ 14:05)  Alkaline Phosphatase, Serum: 76 (02-03 @ 05:28)  Alkaline Phosphatase, Serum: 65 (02-02 @ 14:05)  Aspartate Aminotransferase (AST/SGOT): 45 <H> (02-03 @ 05:28)  Aspartate Aminotransferase (AST/SGOT): 55 <H> (02-02 @ 14:05)        02-04    133<L>  |  96  |  44<H>  ----------------------------<  237<H>  4.3   |  20<L>  |  2.24<H>    Ca    9.3      04 Feb 2020 06:44  Phos  3.9     02-04  Mg     2.4     02-04    TPro  7.5  /  Alb  3.4  /  TBili  0.3  /  DBili  x   /  AST  45<H>  /  ALT  25  /  AlkPhos  76  02-03                        9.4    7.23  )-----------( 189      ( 04 Feb 2020 06:44 )             30.5     Medications  MEDICATIONS  (STANDING):  artificial tears (preservative free) Ophthalmic Solution 1 Drop(s) Both EYES daily  aspirin enteric coated 81 milliGRAM(s) Oral daily  atorvastatin 40 milliGRAM(s) Oral at bedtime  carvedilol 3.125 milliGRAM(s) Oral every 12 hours  chlorhexidine 2% Cloths 1 Application(s) Topical <User Schedule>  clopidogrel Tablet 75 milliGRAM(s) Oral daily  dextrose 5%. 1000 milliLiter(s) (50 mL/Hr) IV Continuous <Continuous>  dextrose 50% Injectable 12.5 Gram(s) IV Push once  dextrose 50% Injectable 25 Gram(s) IV Push once  dextrose 50% Injectable 25 Gram(s) IV Push once  digoxin     Tablet 0.125 milliGRAM(s) Oral daily  donepezil 10 milliGRAM(s) Oral at bedtime  ergocalciferol 39129 Unit(s) Oral every week  folic acid 1 milliGRAM(s) Oral daily  heparin  Injectable 5000 Unit(s) SubCutaneous every 12 hours  hydrALAZINE 50 milliGRAM(s) Oral three times a day  influenza   Vaccine 0.5 milliLiter(s) IntraMuscular once  insulin glargine Injectable (LANTUS) 30 Unit(s) SubCutaneous at bedtime  insulin lispro (HumaLOG) corrective regimen sliding scale   SubCutaneous three times a day before meals  insulin lispro (HumaLOG) corrective regimen sliding scale   SubCutaneous at bedtime  insulin lispro Injectable (HumaLOG) 10 Unit(s) SubCutaneous three times a day before meals  levothyroxine 88 MICROGram(s) Oral daily  memantine 5 milliGRAM(s) Oral at bedtime  polyethylene glycol 3350 17 Gram(s) Oral once  senna 2 Tablet(s) Oral at bedtime  sodium chloride 0.9% lock flush 3 milliLiter(s) IV Push every 8 hours      Physical Exam  General: Patient comfortable in bed  Vital Signs Last 12 Hrs  T(F): 98.6 (02-04-20 @ 11:14), Max: 98.7 (02-04-20 @ 00:39)  HR: 80 (02-04-20 @ 11:14) (80 - 89)  BP: 116/72 (02-04-20 @ 11:14) (116/72 - 143/72)  BP(mean): --  RR: 18 (02-04-20 @ 11:14) (16 - 18)  SpO2: 100% (02-04-20 @ 11:14) (99% - 100%)  Neck: No palpable thyroid nodules.  CVS: S1S2, No murmurs  Respiratory: No wheezing, no crepitations  GI: Abdomen soft, bowel sounds positive  Musculoskeletal:  edema lower extremities.   Skin: No skin rashes, no ecchymosis    Diagnostics Chief complaint  Patient is a 84y old  Female who presents with a chief complaint of I need a stent (04 Feb 2020 10:36)   Review of systems  Patient in bed, looks comfortable,  no hypoglycemia.    Labs and Fingersticks  CAPILLARY BLOOD GLUCOSE      POCT Blood Glucose.: 252 mg/dL (04 Feb 2020 11:35)  POCT Blood Glucose.: 253 mg/dL (04 Feb 2020 07:34)  POCT Blood Glucose.: 364 mg/dL (03 Feb 2020 20:06)  POCT Blood Glucose.: 275 mg/dL (03 Feb 2020 17:13)  POCT Blood Glucose.: 257 mg/dL (03 Feb 2020 12:16)      Anion Gap, Serum: 17 (02-04 @ 06:44)  Anion Gap, Serum: 17 (02-03 @ 05:28)  Anion Gap, Serum: 14 (02-02 @ 14:05)      Calcium, Total Serum: 9.3 (02-04 @ 06:44)  Calcium, Total Serum: 9.3 (02-03 @ 05:28)  Calcium, Total Serum: 9.1 (02-02 @ 14:05)  Albumin, Serum: 3.4 (02-03 @ 05:28)  Albumin, Serum: 3.3 (02-02 @ 14:05)    Alanine Aminotransferase (ALT/SGPT): 25 (02-03 @ 05:28)  Alanine Aminotransferase (ALT/SGPT): 22 (02-02 @ 14:05)  Alkaline Phosphatase, Serum: 76 (02-03 @ 05:28)  Alkaline Phosphatase, Serum: 65 (02-02 @ 14:05)  Aspartate Aminotransferase (AST/SGOT): 45 <H> (02-03 @ 05:28)  Aspartate Aminotransferase (AST/SGOT): 55 <H> (02-02 @ 14:05)        02-04    133<L>  |  96  |  44<H>  ----------------------------<  237<H>  4.3   |  20<L>  |  2.24<H>    Ca    9.3      04 Feb 2020 06:44  Phos  3.9     02-04  Mg     2.4     02-04    TPro  7.5  /  Alb  3.4  /  TBili  0.3  /  DBili  x   /  AST  45<H>  /  ALT  25  /  AlkPhos  76  02-03                        9.4    7.23  )-----------( 189      ( 04 Feb 2020 06:44 )             30.5     Medications  MEDICATIONS  (STANDING):  artificial tears (preservative free) Ophthalmic Solution 1 Drop(s) Both EYES daily  aspirin enteric coated 81 milliGRAM(s) Oral daily  atorvastatin 40 milliGRAM(s) Oral at bedtime  carvedilol 3.125 milliGRAM(s) Oral every 12 hours  chlorhexidine 2% Cloths 1 Application(s) Topical <User Schedule>  clopidogrel Tablet 75 milliGRAM(s) Oral daily  dextrose 5%. 1000 milliLiter(s) (50 mL/Hr) IV Continuous <Continuous>  dextrose 50% Injectable 12.5 Gram(s) IV Push once  dextrose 50% Injectable 25 Gram(s) IV Push once  dextrose 50% Injectable 25 Gram(s) IV Push once  digoxin     Tablet 0.125 milliGRAM(s) Oral daily  donepezil 10 milliGRAM(s) Oral at bedtime  ergocalciferol 41796 Unit(s) Oral every week  folic acid 1 milliGRAM(s) Oral daily  heparin  Injectable 5000 Unit(s) SubCutaneous every 12 hours  hydrALAZINE 50 milliGRAM(s) Oral three times a day  influenza   Vaccine 0.5 milliLiter(s) IntraMuscular once  insulin glargine Injectable (LANTUS) 30 Unit(s) SubCutaneous at bedtime  insulin lispro (HumaLOG) corrective regimen sliding scale   SubCutaneous three times a day before meals  insulin lispro (HumaLOG) corrective regimen sliding scale   SubCutaneous at bedtime  insulin lispro Injectable (HumaLOG) 10 Unit(s) SubCutaneous three times a day before meals  levothyroxine 88 MICROGram(s) Oral daily  memantine 5 milliGRAM(s) Oral at bedtime  polyethylene glycol 3350 17 Gram(s) Oral once  senna 2 Tablet(s) Oral at bedtime  sodium chloride 0.9% lock flush 3 milliLiter(s) IV Push every 8 hours      Physical Exam  General: Patient comfortable in bed  Vital Signs Last 12 Hrs  T(F): 98.6 (02-04-20 @ 11:14), Max: 98.7 (02-04-20 @ 00:39)  HR: 80 (02-04-20 @ 11:14) (80 - 89)  BP: 116/72 (02-04-20 @ 11:14) (116/72 - 143/72)  BP(mean): --  RR: 18 (02-04-20 @ 11:14) (16 - 18)  SpO2: 100% (02-04-20 @ 11:14) (99% - 100%)  Neck: No palpable thyroid nodules.  CVS: S1S2, No murmurs  Respiratory: No wheezing, no crepitations  GI: Abdomen soft, bowel sounds positive  Musculoskeletal:  edema lower extremities.   Skin: No skin rashes, no ecchymosis    Diagnostics

## 2020-02-04 NOTE — PROGRESS NOTE ADULT - ASSESSMENT
84 yr old [place] female with PMH of dementia, hypothyroidism admitted for cardiac catheterization which was complicated by LAD perforation s/p stent, initially managed in the CCU, now transferred out

## 2020-02-04 NOTE — PROGRESS NOTE ADULT - SUBJECTIVE AND OBJECTIVE BOX
CARDIOLOGY PROGRESS NOTE    Subjective/24 hour events:   - Transfer out of CCU.  - Denies chest pain, palpitations, SOB, lightheadedness, dizziness.    Telemetry:    MEDICATIONS  (STANDING):  artificial tears (preservative free) Ophthalmic Solution 1 Drop(s) Both EYES daily  aspirin enteric coated 81 milliGRAM(s) Oral daily  atorvastatin 40 milliGRAM(s) Oral at bedtime  carvedilol 3.125 milliGRAM(s) Oral every 12 hours  chlorhexidine 2% Cloths 1 Application(s) Topical <User Schedule>  clopidogrel Tablet 75 milliGRAM(s) Oral daily  digoxin     Tablet 0.125 milliGRAM(s) Oral daily  donepezil 10 milliGRAM(s) Oral at bedtime  ergocalciferol 98298 Unit(s) Oral every week  folic acid 1 milliGRAM(s) Oral daily  heparin  Injectable 5000 Unit(s) SubCutaneous every 12 hours  hydrALAZINE 50 milliGRAM(s) Oral three times a day  influenza   Vaccine 0.5 milliLiter(s) IntraMuscular once  insulin glargine Injectable (LANTUS) 24 Unit(s) SubCutaneous at bedtime  insulin lispro (HumaLOG) corrective regimen sliding scale   SubCutaneous three times a day before meals  insulin lispro (HumaLOG) corrective regimen sliding scale   SubCutaneous at bedtime  insulin lispro Injectable (HumaLOG) 7 Unit(s) SubCutaneous three times a day before meals  levothyroxine 88 MICROGram(s) Oral daily  memantine 5 milliGRAM(s) Oral at bedtime  polyethylene glycol 3350 17 Gram(s) Oral once  senna 2 Tablet(s) Oral at bedtime  sodium chloride 0.9% lock flush 3 milliLiter(s) IV Push every 8 hours    Vital Signs Last 24 Hrs  T(C): 37 (2020 08:08), Max: 37.1 (2020 00:39)  T(F): 98.6 (2020 08:08), Max: 98.7 (2020 00:39)  HR: 88 (2020 08:08) (72 - 114)  BP: 125/75 (2020 08:08) (101/47 - 145/76)  BP(mean): 76 (2020 00:00) (63 - 114)  RR: 18 (2020 08:08) (10 - 21)  SpO2: 100% (2020 08:08) (98% - 100%)    I&O's Summary  2020 07:01  -  2020 07:00  --------------------------------------------------------  IN: 120 mL / OUT: 1500 mL / NET: -1380 mL    Physical Exam:  General: No distress. Comfortable  HEENT: EOMI	  Neck: JVP not elevated  Chest: Clear to auscultation bilaterally  CV: RRR. Normal S1 and S2. No murmurs, rubs, or gallops. No edema.  Abdomen: Soft, non-distended, non-tender  Skin: No rashes, ecchymoses, or skin breakdown  Extremities: Warm.  Neuro: Alert and oriented x 3. No focal deficits.  Psych: Mood and affect appropriate    Labs:               9.4    7.23  )-----------( 189      ( 2020 06:44 )             30.5     02-04    133<L>  |  96  |  44<H>  ----------------------------<  237<H>  4.3   |  20<L>  |  2.24<H>    Ca    9.3      2020 06:44  Phos  3.9     02-04  Mg     2.4     02-04    TPro  7.5  /  Alb  3.4  /  TBili  0.3  /  DBili  x   /  AST  45<H>  /  ALT  25  /  AlkPhos  76  02-03    Lipid Profile: Total Cholesterol: 91, LDL: 25, HDL: 23, T  HgA1c: Hemoglobin A1C, Whole Blood: 8.5 % ( @ 09:20)    CARDIOVASCULAR DIAGNOSTIC TESTING:  [] Echocardiogram:  < from: TTE with Doppler (w/Cont) (20 @ 11:26) >  Conclusions:  1. Endocardial visualization enhanced with intravenous  injection of Ultrasonic Enhancing Agent (Definity).  Moderate segmental left ventricular systolic dysfunction.  The anterior wall, the anterior septum, and the apical cap  are hypokinetic.  2. Thickened pericardium with small pericardial effusion.    [] Catheterization:  < from: Cardiac Cath Lab - Adult (20 @ 10:35) >  CORONARY VESSELS: The coronary circulation is left dominant.  LM:   --  LM: Normal.  LAD:   --  Mid LAD: There was a 99 % stenosis.  --  Distal LAD: There was a 80 % stenosis.  --  D1: There was a 100 % stenosis.  CX:   --  Distal circumflex: Angiography showed mild atherosclerosis with  no flow limiting lesions.  RCA:   --  Proximal RCA: There was a 100 % stenosis.  COMPLICATIONS: There were no complications.  INTERVENTIONAL RECOMMENDATIONS: DAPT for 1 year. After balloon of the LAD  the D1 was occluded. diag was wired with miracle wire but after balloon of  the diag there was a small coronary perforation. This was covered with a  papyrus covered stent in viktor LAD. A second papyrus stent was placed in the  proximal LAD after the stent came off the balloon attempting to pass it.  That stent was progressively deployed with balloons and IVUS confirmed  proper deployment in the ostial and proximal LAD

## 2020-02-04 NOTE — PROGRESS NOTE ADULT - PROBLEM SELECTOR PLAN 1
Will increase Lantus to 30u at bedtime.  Will increase Humalog to 10u before each meal and continue Humalog correction scale coverage. Will continue monitoring FS and FU.  Patient counseled for compliance with consistent low carb diet and exercise as tolerated outpatient. Patient counseled for compliance with consistent low carb diet and exercise as tolerated outpatient.

## 2020-02-05 LAB
ANION GAP SERPL CALC-SCNC: 14 MMOL/L — SIGNIFICANT CHANGE UP (ref 5–17)
APPEARANCE UR: ABNORMAL
BACTERIA # UR AUTO: ABNORMAL
BILIRUB UR-MCNC: NEGATIVE — SIGNIFICANT CHANGE UP
BUN SERPL-MCNC: 48 MG/DL — HIGH (ref 7–23)
CALCIUM SERPL-MCNC: 9.4 MG/DL — SIGNIFICANT CHANGE UP (ref 8.4–10.5)
CALCIUM SERPL-MCNC: 9.5 MG/DL — SIGNIFICANT CHANGE UP (ref 8.4–10.5)
CHLORIDE SERPL-SCNC: 98 MMOL/L — SIGNIFICANT CHANGE UP (ref 96–108)
CO2 SERPL-SCNC: 19 MMOL/L — LOW (ref 22–31)
COLOR SPEC: SIGNIFICANT CHANGE UP
CREAT ?TM UR-MCNC: 76 MG/DL — SIGNIFICANT CHANGE UP
CREAT SERPL-MCNC: 2.3 MG/DL — HIGH (ref 0.5–1.3)
DIFF PNL FLD: NEGATIVE — SIGNIFICANT CHANGE UP
EPI CELLS # UR: 1 /HPF — SIGNIFICANT CHANGE UP (ref 0–5)
FERRITIN SERPL-MCNC: 49 NG/ML — SIGNIFICANT CHANGE UP (ref 15–150)
GLUCOSE BLDC GLUCOMTR-MCNC: 139 MG/DL — HIGH (ref 70–99)
GLUCOSE BLDC GLUCOMTR-MCNC: 163 MG/DL — HIGH (ref 70–99)
GLUCOSE BLDC GLUCOMTR-MCNC: 213 MG/DL — HIGH (ref 70–99)
GLUCOSE BLDC GLUCOMTR-MCNC: 245 MG/DL — HIGH (ref 70–99)
GLUCOSE SERPL-MCNC: 186 MG/DL — HIGH (ref 70–99)
GLUCOSE UR QL: NEGATIVE — SIGNIFICANT CHANGE UP
HCT VFR BLD CALC: 28 % — LOW (ref 34.5–45)
HGB BLD-MCNC: 9 G/DL — LOW (ref 11.5–15.5)
HYALINE CASTS # UR AUTO: 1 /LPF — SIGNIFICANT CHANGE UP (ref 0–7)
IRON SATN MFR SERPL: 13 % — LOW (ref 14–50)
IRON SATN MFR SERPL: 35 UG/DL — SIGNIFICANT CHANGE UP (ref 30–160)
KETONES UR-MCNC: NEGATIVE — SIGNIFICANT CHANGE UP
LEUKOCYTE ESTERASE UR-ACNC: ABNORMAL
MCHC RBC-ENTMCNC: 28.3 PG — SIGNIFICANT CHANGE UP (ref 27–34)
MCHC RBC-ENTMCNC: 32.1 GM/DL — SIGNIFICANT CHANGE UP (ref 32–36)
MCV RBC AUTO: 88.1 FL — SIGNIFICANT CHANGE UP (ref 80–100)
NITRITE UR-MCNC: POSITIVE
NRBC # BLD: 0 /100 WBCS — SIGNIFICANT CHANGE UP (ref 0–0)
NT-PROBNP SERPL-SCNC: 2833 PG/ML — HIGH (ref 0–300)
PH UR: 6 — SIGNIFICANT CHANGE UP (ref 5–8)
PLATELET # BLD AUTO: 197 K/UL — SIGNIFICANT CHANGE UP (ref 150–400)
POTASSIUM SERPL-MCNC: 4.4 MMOL/L — SIGNIFICANT CHANGE UP (ref 3.5–5.3)
POTASSIUM SERPL-SCNC: 4.4 MMOL/L — SIGNIFICANT CHANGE UP (ref 3.5–5.3)
PROT ?TM UR-MCNC: 22 MG/DL — HIGH (ref 0–12)
PROT UR-MCNC: SIGNIFICANT CHANGE UP
PROT/CREAT UR-RTO: 0.3 RATIO — HIGH (ref 0–0.2)
PTH-INTACT FLD-MCNC: 42 PG/ML — SIGNIFICANT CHANGE UP (ref 15–65)
RBC # BLD: 3.18 M/UL — LOW (ref 3.8–5.2)
RBC # FLD: 13.7 % — SIGNIFICANT CHANGE UP (ref 10.3–14.5)
RBC CASTS # UR COMP ASSIST: 4 /HPF — SIGNIFICANT CHANGE UP (ref 0–4)
SODIUM SERPL-SCNC: 131 MMOL/L — LOW (ref 135–145)
SP GR SPEC: 1.02 — SIGNIFICANT CHANGE UP (ref 1.01–1.02)
TIBC SERPL-MCNC: 274 UG/DL — SIGNIFICANT CHANGE UP (ref 220–430)
UIBC SERPL-MCNC: 239 UG/DL — SIGNIFICANT CHANGE UP (ref 110–370)
UROBILINOGEN FLD QL: SIGNIFICANT CHANGE UP
VIT B12 SERPL-MCNC: 266 PG/ML — SIGNIFICANT CHANGE UP (ref 232–1245)
WBC # BLD: 7.25 K/UL — SIGNIFICANT CHANGE UP (ref 3.8–10.5)
WBC # FLD AUTO: 7.25 K/UL — SIGNIFICANT CHANGE UP (ref 3.8–10.5)
WBC UR QL: 152 /HPF — HIGH (ref 0–5)

## 2020-02-05 PROCEDURE — 99233 SBSQ HOSP IP/OBS HIGH 50: CPT | Mod: GC

## 2020-02-05 RX ORDER — CARVEDILOL PHOSPHATE 80 MG/1
6.25 CAPSULE, EXTENDED RELEASE ORAL EVERY 12 HOURS
Refills: 0 | Status: DISCONTINUED | OUTPATIENT
Start: 2020-02-05 | End: 2020-02-06

## 2020-02-05 RX ORDER — PREGABALIN 225 MG/1
1000 CAPSULE ORAL DAILY
Refills: 0 | Status: COMPLETED | OUTPATIENT
Start: 2020-02-05 | End: 2020-02-06

## 2020-02-05 RX ORDER — CARVEDILOL PHOSPHATE 80 MG/1
3.12 CAPSULE, EXTENDED RELEASE ORAL ONCE
Refills: 0 | Status: COMPLETED | OUTPATIENT
Start: 2020-02-05 | End: 2020-02-05

## 2020-02-05 RX ADMIN — Medication 1 MILLIGRAM(S): at 11:50

## 2020-02-05 RX ADMIN — Medication 50 MILLIGRAM(S): at 05:09

## 2020-02-05 RX ADMIN — Medication 1 DROP(S): at 11:50

## 2020-02-05 RX ADMIN — Medication 10 UNIT(S): at 08:11

## 2020-02-05 RX ADMIN — Medication 10 UNIT(S): at 11:49

## 2020-02-05 RX ADMIN — MEMANTINE HYDROCHLORIDE 5 MILLIGRAM(S): 10 TABLET ORAL at 21:11

## 2020-02-05 RX ADMIN — Medication 10 UNIT(S): at 16:56

## 2020-02-05 RX ADMIN — Medication 1: at 11:49

## 2020-02-05 RX ADMIN — CARVEDILOL PHOSPHATE 3.12 MILLIGRAM(S): 80 CAPSULE, EXTENDED RELEASE ORAL at 11:50

## 2020-02-05 RX ADMIN — SODIUM CHLORIDE 3 MILLILITER(S): 9 INJECTION INTRAMUSCULAR; INTRAVENOUS; SUBCUTANEOUS at 21:08

## 2020-02-05 RX ADMIN — HEPARIN SODIUM 5000 UNIT(S): 5000 INJECTION INTRAVENOUS; SUBCUTANEOUS at 16:56

## 2020-02-05 RX ADMIN — INSULIN GLARGINE 30 UNIT(S): 100 INJECTION, SOLUTION SUBCUTANEOUS at 21:11

## 2020-02-05 RX ADMIN — HEPARIN SODIUM 5000 UNIT(S): 5000 INJECTION INTRAVENOUS; SUBCUTANEOUS at 05:10

## 2020-02-05 RX ADMIN — ATORVASTATIN CALCIUM 40 MILLIGRAM(S): 80 TABLET, FILM COATED ORAL at 21:11

## 2020-02-05 RX ADMIN — Medication 81 MILLIGRAM(S): at 11:51

## 2020-02-05 RX ADMIN — Medication 2: at 08:11

## 2020-02-05 RX ADMIN — DONEPEZIL HYDROCHLORIDE 10 MILLIGRAM(S): 10 TABLET, FILM COATED ORAL at 21:11

## 2020-02-05 RX ADMIN — SODIUM CHLORIDE 3 MILLILITER(S): 9 INJECTION INTRAMUSCULAR; INTRAVENOUS; SUBCUTANEOUS at 05:08

## 2020-02-05 RX ADMIN — CHLORHEXIDINE GLUCONATE 1 APPLICATION(S): 213 SOLUTION TOPICAL at 08:13

## 2020-02-05 RX ADMIN — CLOPIDOGREL BISULFATE 75 MILLIGRAM(S): 75 TABLET, FILM COATED ORAL at 11:50

## 2020-02-05 RX ADMIN — CARVEDILOL PHOSPHATE 6.25 MILLIGRAM(S): 80 CAPSULE, EXTENDED RELEASE ORAL at 16:56

## 2020-02-05 RX ADMIN — SENNA PLUS 2 TABLET(S): 8.6 TABLET ORAL at 21:11

## 2020-02-05 RX ADMIN — SODIUM CHLORIDE 3 MILLILITER(S): 9 INJECTION INTRAMUSCULAR; INTRAVENOUS; SUBCUTANEOUS at 14:24

## 2020-02-05 RX ADMIN — CARVEDILOL PHOSPHATE 3.12 MILLIGRAM(S): 80 CAPSULE, EXTENDED RELEASE ORAL at 05:09

## 2020-02-05 RX ADMIN — PREGABALIN 1000 MICROGRAM(S): 225 CAPSULE ORAL at 18:29

## 2020-02-05 RX ADMIN — Medication 88 MICROGRAM(S): at 05:10

## 2020-02-05 RX ADMIN — Medication 0.12 MILLIGRAM(S): at 05:09

## 2020-02-05 NOTE — PROGRESS NOTE ADULT - SUBJECTIVE AND OBJECTIVE BOX
CARDIOLOGY PROGRESS NOTE    Subjective/24 hour events:   - No overnight events.   - Denies chest pain, palpitations, SOB, lightheadedness, dizziness.    Telemetry: SR 60-80    MEDICATIONS  (STANDING):  artificial tears (preservative free) Ophthalmic Solution 1 Drop(s) Both EYES daily  aspirin enteric coated 81 milliGRAM(s) Oral daily  atorvastatin 40 milliGRAM(s) Oral at bedtime  carvedilol 3.125 milliGRAM(s) Oral every 12 hours  chlorhexidine 2% Cloths 1 Application(s) Topical <User Schedule>  clopidogrel Tablet 75 milliGRAM(s) Oral daily  digoxin     Tablet 0.125 milliGRAM(s) Oral daily  donepezil 10 milliGRAM(s) Oral at bedtime  ergocalciferol 82663 Unit(s) Oral every week  folic acid 1 milliGRAM(s) Oral daily  heparin  Injectable 5000 Unit(s) SubCutaneous every 12 hours  hydrALAZINE 50 milliGRAM(s) Oral three times a day  influenza   Vaccine 0.5 milliLiter(s) IntraMuscular once  insulin glargine Injectable (LANTUS) 30 Unit(s) SubCutaneous at bedtime  insulin lispro (HumaLOG) corrective regimen sliding scale   SubCutaneous three times a day before meals  insulin lispro (HumaLOG) corrective regimen sliding scale   SubCutaneous at bedtime  insulin lispro Injectable (HumaLOG) 10 Unit(s) SubCutaneous three times a day before meals  levothyroxine 88 MICROGram(s) Oral daily  memantine 5 milliGRAM(s) Oral at bedtime  senna 2 Tablet(s) Oral at bedtime  sodium chloride 0.9% lock flush 3 milliLiter(s) IV Push every 8 hours    MEDICATIONS  (PRN):  bisacodyl Suppository 10 milliGRAM(s) Rectal daily PRN Constipation  dextrose 40% Gel 15 Gram(s) Oral once PRN Blood Glucose LESS THAN 70 milliGRAM(s)/deciliter  glucagon  Injectable 1 milliGRAM(s) IntraMuscular once PRN Glucose LESS THAN 70 milligrams/deciliter    Vital Signs Last 24 Hrs  T(C): 37.1 (2020 04:15), Max: 37.6 (2020 20:02)  T(F): 98.8 (2020 04:15), Max: 99.7 (2020 20:02)  HR: 83 (2020 04:15) (80 - 90)  BP: 129/76 (2020 04:15) (116/72 - 148/69)  BP(mean): --  RR: 18 (2020 04:15) (18 - 18)  SpO2: 98% (2020 04:15) (98% - 100%)    I&O's Summary  2020 07:01  -  2020 07:00  --------------------------------------------------------  IN: 700 mL / OUT: 1350 mL / NET: -650 mL    Physical Exam:  General: No distress. Comfortable  HEENT: EOMI	  Neck: JVP not elevated  Chest: Clear to auscultation bilaterally  CV: RRR. Normal S1 and S2. No murmurs, rubs, or gallops. No edema.  Abdomen: Soft, non-distended, non-tender  Skin: No rashes, ecchymoses, or skin breakdown  Extremities: Warm.  Neuro: Alert and oriented x 3. No focal deficits.  Psych: Mood and affect appropriate    Labs:                 9.4    7.23  )-----------( 189      ( 2020 06:44 )             30.5     02-05    131<L>  |  98  |  48<H>  ----------------------------<  186<H>  4.4   |  19<L>  |  2.30<H>    Ca    9.5      2020 06:35  Phos  3.9     02-04  Mg     2.4     02-04    Lipid Profile: Total Cholesterol: 91, LDL: 25, HDL: 23, T  HgA1c: Hemoglobin A1C, Whole Blood: 8.5 % ( @ 09:20)    CARDIOVASCULAR DIAGNOSTIC TESTING:  [] Echocardiogram:  < from: Limited Transthoracic Echo (20 @ 06:02) >  Limited TTE  Small pericardial effusion. Echogenic material seen within  the pericardial space. Late diastolic RA inversion is  present suggestive of raised intraperciardial pressure.  No  echocardiographic evidence of pericardial tamponade. HR  80s, /63    < from: TTE with Doppler (w/Cont) (20 @ 11:26) >  Conclusions:  1. Endocardial visualization enhanced with intravenous  injection of Ultrasonic Enhancing Agent (Definity).  Moderate segmental left ventricular systolic dysfunction.  The anterior wall, the anterior septum, and the apical cap  are hypokinetic.  2. Thickened pericardium with small pericardial effusion.    [] Catheterization:  < from: Cardiac Cath Lab - Adult (20 @ 10:35) >  CORONARY VESSELS: The coronary circulation is left dominant.  LM:   --  LM: Normal.  LAD:   --  Mid LAD: There was a 99 % stenosis.  --  Distal LAD: There was a 80 % stenosis.  --  D1: There was a 100 % stenosis.  CX:   --  Distal circumflex: Angiography showed mild atherosclerosis with  no flow limiting lesions.  RCA:   --  Proximal RCA: There was a 100 % stenosis.  COMPLICATIONS: There were no complications.  INTERVENTIONAL RECOMMENDATIONS: DAPT for 1 year. After balloon of the LAD  the D1 was occluded. diag was wired with miracle wire but after balloon of  the diag there was a small coronary perforation. This was covered with a  papyrus covered stent in viktor LAD. A second papyrus stent was placed in the  proximal LAD after the stent came off the balloon attempting to pass it.  That stent was progressively deployed with balloons and IVUS confirmed  proper deployment in the ostial and proximal LAD

## 2020-02-05 NOTE — PROGRESS NOTE ADULT - ASSESSMENT
84F with CAD, HTN, HLD, Hypothyroidism, AF, Dementia with abnormal stress with subsequent PCI LAD c/b perforation requiring covered stent. TTE with EF 40%, moderate segmental LV dysfunction, small pericardial effusion with echogenic material.    Recommendations:  1. ASA 81mg daily, Plavix 75mg daily, Atorvastatin 40mg qhs  2. Increase Coreg 6.25mg BID and discontinue Hydralazine.  3. No ACE/ARB for now as renal function not back to baseline.    Tex Ta M.D.  Cardiology Fellow  [phone]  For all Cardiology service contact information, go to [email or web address] and use "[employer]" to login. 84F with CAD, HTN, HLD, Hypothyroidism, AF, Dementia with abnormal stress with subsequent PCI LAD c/b perforation requiring covered stent. TTE with EF 40%, moderate segmental LV dysfunction, small pericardial effusion with echogenic material.    Recommendations:  1. ASA 81mg daily, Plavix 75mg daily, Atorvastatin 40mg qhs  2. Increase Coreg 6.25mg BID and discontinue Hydralazine.  3. No ACE/ARB for now as renal function not back to baseline.  4. Lasix 40mg PO daily on discharge    Tex Ta M.D.  Cardiology Fellow  [phone]  For all Cardiology service contact information, go to [email or web address] and use "[email or web address]" to login. 84F with CAD, HTN, HLD, Hypothyroidism, AF, Dementia with abnormal stress with subsequent PCI LAD c/b perforation requiring covered stent. TTE with EF 40%, moderate segmental LV dysfunction, stable small pericardial effusion with echogenic material.    Recommendations:  1. ASA 81mg daily, Plavix 75mg daily, Atorvastatin 40mg qhs  2. Increase Coreg 6.25mg BID and discontinue Hydralazine.  3. No ACE/ARB for now as renal function not back to baseline.  4. Lasix 40mg PO daily on discharge    Tex Ta M.D.  Cardiology Fellow  [phone]  For all Cardiology service contact information, go to [email or web address] and use "[email or web address]" to login.

## 2020-02-05 NOTE — PROGRESS NOTE ADULT - SUBJECTIVE AND OBJECTIVE BOX
Chief complaint  Patient is a 84y old  Female who presents with a chief complaint of chest pain (05 Feb 2020 13:31)   Review of systems  Patient in bed, looks comfortable, no hypoglycemia.    Labs and Fingersticks  CAPILLARY BLOOD GLUCOSE      POCT Blood Glucose.: 163 mg/dL (05 Feb 2020 11:42)  POCT Blood Glucose.: 213 mg/dL (05 Feb 2020 07:32)  POCT Blood Glucose.: 259 mg/dL (04 Feb 2020 21:05)  POCT Blood Glucose.: 212 mg/dL (04 Feb 2020 16:30)      Anion Gap, Serum: 14 (02-05 @ 06:35)  Anion Gap, Serum: 17 (02-04 @ 06:44)      Calcium, Total Serum: 9.5 (02-05 @ 06:35)  Calcium, Total Serum: 9.3 (02-04 @ 06:44)  Intact PTH: 42 (02-05 @ 12:37)          02-05    131<L>  |  98  |  48<H>  ----------------------------<  186<H>  4.4   |  19<L>  |  2.30<H>    Ca    9.5      05 Feb 2020 06:35  Phos  3.9     02-04  Mg     2.4     02-04                          9.0    7.25  )-----------( 197      ( 05 Feb 2020 06:35 )             28.0     Medications  MEDICATIONS  (STANDING):  artificial tears (preservative free) Ophthalmic Solution 1 Drop(s) Both EYES daily  aspirin enteric coated 81 milliGRAM(s) Oral daily  atorvastatin 40 milliGRAM(s) Oral at bedtime  carvedilol 6.25 milliGRAM(s) Oral every 12 hours  chlorhexidine 2% Cloths 1 Application(s) Topical <User Schedule>  clopidogrel Tablet 75 milliGRAM(s) Oral daily  dextrose 5%. 1000 milliLiter(s) (50 mL/Hr) IV Continuous <Continuous>  dextrose 50% Injectable 12.5 Gram(s) IV Push once  dextrose 50% Injectable 25 Gram(s) IV Push once  dextrose 50% Injectable 25 Gram(s) IV Push once  digoxin     Tablet 0.125 milliGRAM(s) Oral daily  donepezil 10 milliGRAM(s) Oral at bedtime  ergocalciferol 16145 Unit(s) Oral every week  folic acid 1 milliGRAM(s) Oral daily  heparin  Injectable 5000 Unit(s) SubCutaneous every 12 hours  influenza   Vaccine 0.5 milliLiter(s) IntraMuscular once  insulin glargine Injectable (LANTUS) 30 Unit(s) SubCutaneous at bedtime  insulin lispro (HumaLOG) corrective regimen sliding scale   SubCutaneous three times a day before meals  insulin lispro (HumaLOG) corrective regimen sliding scale   SubCutaneous at bedtime  insulin lispro Injectable (HumaLOG) 10 Unit(s) SubCutaneous three times a day before meals  levothyroxine 88 MICROGram(s) Oral daily  memantine 5 milliGRAM(s) Oral at bedtime  senna 2 Tablet(s) Oral at bedtime  sodium chloride 0.9% lock flush 3 milliLiter(s) IV Push every 8 hours      Physical Exam  General: Patient comfortable in bed  Vital Signs Last 12 Hrs  T(F): 98.5 (02-05-20 @ 11:10), Max: 98.8 (02-05-20 @ 04:15)  HR: 82 (02-05-20 @ 11:10) (82 - 83)  BP: 122/73 (02-05-20 @ 11:10) (122/73 - 129/76)  BP(mean): --  RR: 18 (02-05-20 @ 11:10) (18 - 18)  SpO2: 98% (02-05-20 @ 11:10) (98% - 98%)  Neck: No palpable thyroid nodules.  CVS: S1S2, No murmurs  Respiratory: No wheezing, no crepitations  GI: Abdomen soft, bowel sounds positive  Musculoskeletal:  edema lower extremities.   Skin: No skin rashes, no ecchymosis    Diagnostics Chief complaint  Patient is a 84y old  Female who presents with a chief complaint of chest pain (05 Feb 2020 13:31)   Review of systems  Patient in bed, looks comfortable, no hypoglycemia.    Labs and Fingersticks  CAPILLARY BLOOD GLUCOSE      POCT Blood Glucose.: 163 mg/dL (05 Feb 2020 11:42)  POCT Blood Glucose.: 213 mg/dL (05 Feb 2020 07:32)  POCT Blood Glucose.: 259 mg/dL (04 Feb 2020 21:05)  POCT Blood Glucose.: 212 mg/dL (04 Feb 2020 16:30)    Anion Gap, Serum: 14 (02-05 @ 06:35)  Anion Gap, Serum: 17 (02-04 @ 06:44)      Calcium, Total Serum: 9.5 (02-05 @ 06:35)  Calcium, Total Serum: 9.3 (02-04 @ 06:44)  Intact PTH: 42 (02-05 @ 12:37)          02-05    131<L>  |  98  |  48<H>  ----------------------------<  186<H>  4.4   |  19<L>  |  2.30<H>    Ca    9.5      05 Feb 2020 06:35  Phos  3.9     02-04  Mg     2.4     02-04                          9.0    7.25  )-----------( 197      ( 05 Feb 2020 06:35 )             28.0     Medications  MEDICATIONS  (STANDING):  artificial tears (preservative free) Ophthalmic Solution 1 Drop(s) Both EYES daily  aspirin enteric coated 81 milliGRAM(s) Oral daily  atorvastatin 40 milliGRAM(s) Oral at bedtime  carvedilol 6.25 milliGRAM(s) Oral every 12 hours  chlorhexidine 2% Cloths 1 Application(s) Topical <User Schedule>  clopidogrel Tablet 75 milliGRAM(s) Oral daily  dextrose 5%. 1000 milliLiter(s) (50 mL/Hr) IV Continuous <Continuous>  dextrose 50% Injectable 12.5 Gram(s) IV Push once  dextrose 50% Injectable 25 Gram(s) IV Push once  dextrose 50% Injectable 25 Gram(s) IV Push once  digoxin     Tablet 0.125 milliGRAM(s) Oral daily  donepezil 10 milliGRAM(s) Oral at bedtime  ergocalciferol 67883 Unit(s) Oral every week  folic acid 1 milliGRAM(s) Oral daily  heparin  Injectable 5000 Unit(s) SubCutaneous every 12 hours  influenza   Vaccine 0.5 milliLiter(s) IntraMuscular once  insulin glargine Injectable (LANTUS) 30 Unit(s) SubCutaneous at bedtime  insulin lispro (HumaLOG) corrective regimen sliding scale   SubCutaneous three times a day before meals  insulin lispro (HumaLOG) corrective regimen sliding scale   SubCutaneous at bedtime  insulin lispro Injectable (HumaLOG) 10 Unit(s) SubCutaneous three times a day before meals  levothyroxine 88 MICROGram(s) Oral daily  memantine 5 milliGRAM(s) Oral at bedtime  senna 2 Tablet(s) Oral at bedtime  sodium chloride 0.9% lock flush 3 milliLiter(s) IV Push every 8 hours      Physical Exam  General: Patient comfortable in bed  Vital Signs Last 12 Hrs  T(F): 98.5 (02-05-20 @ 11:10), Max: 98.8 (02-05-20 @ 04:15)  HR: 82 (02-05-20 @ 11:10) (82 - 83)  BP: 122/73 (02-05-20 @ 11:10) (122/73 - 129/76)  BP(mean): --  RR: 18 (02-05-20 @ 11:10) (18 - 18)  SpO2: 98% (02-05-20 @ 11:10) (98% - 98%)  Neck: No palpable thyroid nodules.  CVS: S1S2, No murmurs  Respiratory: No wheezing, no crepitations  GI: Abdomen soft, bowel sounds positive  Musculoskeletal:  edema lower extremities.   Skin: No skin rashes, no ecchymosis    Diagnostics

## 2020-02-05 NOTE — PROGRESS NOTE ADULT - SUBJECTIVE AND OBJECTIVE BOX
NEPHROLOGY-[facility] [phone]        Patient seen and examined         MEDICATIONS  (STANDING):  artificial tears (preservative free) Ophthalmic Solution 1 Drop(s) Both EYES daily  aspirin enteric coated 81 milliGRAM(s) Oral daily  atorvastatin 40 milliGRAM(s) Oral at bedtime  carvedilol 3.125 milliGRAM(s) Oral every 12 hours  chlorhexidine 2% Cloths 1 Application(s) Topical <User Schedule>  clopidogrel Tablet 75 milliGRAM(s) Oral daily  dextrose 5%. 1000 milliLiter(s) (50 mL/Hr) IV Continuous <Continuous>  dextrose 50% Injectable 12.5 Gram(s) IV Push once  dextrose 50% Injectable 25 Gram(s) IV Push once  dextrose 50% Injectable 25 Gram(s) IV Push once  digoxin     Tablet 0.125 milliGRAM(s) Oral daily  donepezil 10 milliGRAM(s) Oral at bedtime  ergocalciferol 37828 Unit(s) Oral every week  folic acid 1 milliGRAM(s) Oral daily  heparin  Injectable 5000 Unit(s) SubCutaneous every 12 hours  hydrALAZINE 50 milliGRAM(s) Oral three times a day  influenza   Vaccine 0.5 milliLiter(s) IntraMuscular once  insulin glargine Injectable (LANTUS) 30 Unit(s) SubCutaneous at bedtime  insulin lispro (HumaLOG) corrective regimen sliding scale   SubCutaneous three times a day before meals  insulin lispro (HumaLOG) corrective regimen sliding scale   SubCutaneous at bedtime  insulin lispro Injectable (HumaLOG) 10 Unit(s) SubCutaneous three times a day before meals  levothyroxine 88 MICROGram(s) Oral daily  memantine 5 milliGRAM(s) Oral at bedtime  senna 2 Tablet(s) Oral at bedtime  sodium chloride 0.9% lock flush 3 milliLiter(s) IV Push every 8 hours      VITAL:  T(C): , Max: 37.6 (02-04-20 @ 20:02)  T(F): , Max: 99.7 (02-04-20 @ 20:02)  HR: 83 (02-05-20 @ 04:15)  BP: 129/76 (02-05-20 @ 04:15)  BP(mean): --  RR: 18 (02-05-20 @ 04:15)  SpO2: 98% (02-05-20 @ 04:15)  Wt(kg): --    I and O's:    02-04 @ 07:01  -  02-05 @ 07:00  --------------------------------------------------------  IN: 700 mL / OUT: 1350 mL / NET: -650 mL    02-05 @ 07:01  -  02-05 @ 09:26  --------------------------------------------------------  IN: 240 mL / OUT: 0 mL / NET: 240 mL          PHYSICAL EXAM:    Constitutional: NAD  Neck:  No JVD  Respiratory: CTAB/L  Cardiovascular: S1 and S2  Gastrointestinal: BS+, soft, NT/ND  Extremities: No peripheral edema  Neurological: A/O x 3, no focal deficits  Psychiatric: Normal mood, normal affect  : No Naik  Skin: No rashes  Access: Not applicable    LABS:                        9.0    7.25  )-----------( 197      ( 05 Feb 2020 06:35 )             28.0     02-05    131<L>  |  98  |  48<H>  ----------------------------<  186<H>  4.4   |  19<L>  |  2.30<H>    Ca    9.5      05 Feb 2020 06:35  Phos  3.9     02-04  Mg     2.4     02-04            Urine Studies:          RADIOLOGY & ADDITIONAL STUDIES:

## 2020-02-05 NOTE — PROGRESS NOTE ADULT - ASSESSMENT
84 yr old [place]rit[place] female with PMH of dementia, hypothyroidism admitted for cardiac catheterization which was complicated by LAD perforation s/p stent, initially managed in the CCU, now transferred out

## 2020-02-05 NOTE — PROGRESS NOTE ADULT - ASSESSMENT
84 yr old [place] female with PMH of dementia (daughter will consent for pt), MI (remote), CAD with prior stent, HTN, HLD, HFrEF, suspected A fib   sp cardiac cath c stents and cb perforation  CKD stage 4 and acute on chronic renal failure that is now almost at baseline  Hyponatremia     1 Renal -No need for renal sono; Check PTH;  Check UA and quantify proteinuria(all re-ordered)  2 CVS-Maybe can entertain a ACE or ARB before d/c?  Seems euvolemic at present and off diuretics but may need on d/c?  3 Anemia-Check iron stores in am and assess for Procrit or iron (re-ordered)

## 2020-02-05 NOTE — PROGRESS NOTE ADULT - ASSESSMENT
Assessment  DMT2: 84y Female with DM T2 with hyperglycemia, A1C 8.5% , was on oral meds and insulin at home, now on basal bolus insulin, increased dose yesterday, blood sugars improving, no hypoglycemic episodes. Patient is eating full meals with good appetite, appears comfortable, family at the bedside.  CAD: on medications, no chest pain, stable, monitored.  Hypothyroidism: On Synthroid 88 mcg po daily, compliant with Synthroid intake, asymptomatic. TSH 2.66.  HTN: Controlled,  on antihypertensive medications.  HLD: Controlled, on statin.  CKD: Monitor labs/BMP.          Mayra Rocha MD  Cell: [phone]  Office: [phone] Assessment  DMT2: 84y Female with DM T2 with hyperglycemia, A1C 8.5% , was on oral meds and insulin at home,  now on basal bolus insulin, increased dose yesterday, blood sugars improving, no hypoglycemic episodes. Patient is eating full meals with good appetite, appears comfortable, family at the bedside.  CAD: on medications, no chest pain, stable, monitored.  Hypothyroidism: On Synthroid 88 mcg po daily, compliant with Synthroid intake, asymptomatic. TSH 2.66.  HTN: Controlled,  on antihypertensive medications.  HLD: Controlled, on statin.  CKD: Monitor labs/BMP.          Mayra Rocha MD  Cell: [phone]  Office: [phone]

## 2020-02-05 NOTE — PROGRESS NOTE ADULT - SUBJECTIVE AND OBJECTIVE BOX
Patient is a 84y old  Female who presents with a chief complaint of chest pain (04 Feb 2020 14:22)    SUBJECTIVE / OVERNIGHT EVENTS: overnight events noted    ROS:  Resp: No cough no sputum production  CVS: No chest pain no palpitations no orthopnea  GI: no N/V/D  : no dysuria, no hematuria  Neuro: no weakness no paresthesias  Heme: No petechiae no easy bruising  Msk: No joint pain no swelling  Skin: No rash no itching        MEDICATIONS  (STANDING):  artificial tears (preservative free) Ophthalmic Solution 1 Drop(s) Both EYES daily  aspirin enteric coated 81 milliGRAM(s) Oral daily  atorvastatin 40 milliGRAM(s) Oral at bedtime  carvedilol 6.25 milliGRAM(s) Oral every 12 hours  chlorhexidine 2% Cloths 1 Application(s) Topical <User Schedule>  clopidogrel Tablet 75 milliGRAM(s) Oral daily  dextrose 5%. 1000 milliLiter(s) (50 mL/Hr) IV Continuous <Continuous>  dextrose 50% Injectable 12.5 Gram(s) IV Push once  dextrose 50% Injectable 25 Gram(s) IV Push once  dextrose 50% Injectable 25 Gram(s) IV Push once  digoxin     Tablet 0.125 milliGRAM(s) Oral daily  donepezil 10 milliGRAM(s) Oral at bedtime  ergocalciferol 54926 Unit(s) Oral every week  folic acid 1 milliGRAM(s) Oral daily  heparin  Injectable 5000 Unit(s) SubCutaneous every 12 hours  influenza   Vaccine 0.5 milliLiter(s) IntraMuscular once  insulin glargine Injectable (LANTUS) 30 Unit(s) SubCutaneous at bedtime  insulin lispro (HumaLOG) corrective regimen sliding scale   SubCutaneous three times a day before meals  insulin lispro (HumaLOG) corrective regimen sliding scale   SubCutaneous at bedtime  insulin lispro Injectable (HumaLOG) 10 Unit(s) SubCutaneous three times a day before meals  levothyroxine 88 MICROGram(s) Oral daily  memantine 5 milliGRAM(s) Oral at bedtime  senna 2 Tablet(s) Oral at bedtime  sodium chloride 0.9% lock flush 3 milliLiter(s) IV Push every 8 hours    MEDICATIONS  (PRN):  bisacodyl Suppository 10 milliGRAM(s) Rectal daily PRN Constipation  dextrose 40% Gel 15 Gram(s) Oral once PRN Blood Glucose LESS THAN 70 milliGRAM(s)/deciliter  glucagon  Injectable 1 milliGRAM(s) IntraMuscular once PRN Glucose LESS THAN 70 milligrams/deciliter        CAPILLARY BLOOD GLUCOSE      POCT Blood Glucose.: 163 mg/dL (05 Feb 2020 11:42)  POCT Blood Glucose.: 213 mg/dL (05 Feb 2020 07:32)  POCT Blood Glucose.: 259 mg/dL (04 Feb 2020 21:05)  POCT Blood Glucose.: 212 mg/dL (04 Feb 2020 16:30)    I&O's Summary    04 Feb 2020 07:01  -  05 Feb 2020 07:00  --------------------------------------------------------  IN: 700 mL / OUT: 1350 mL / NET: -650 mL    05 Feb 2020 07:01  -  05 Feb 2020 13:31  --------------------------------------------------------  IN: 480 mL / OUT: 500 mL / NET: -20 mL        Vital Signs Last 24 Hrs  T(C): 36.9 (05 Feb 2020 11:10), Max: 37.6 (04 Feb 2020 20:02)  T(F): 98.5 (05 Feb 2020 11:10), Max: 99.7 (04 Feb 2020 20:02)  HR: 82 (05 Feb 2020 11:10) (82 - 90)  BP: 122/73 (05 Feb 2020 11:10) (122/73 - 148/69)  BP(mean): --  RR: 18 (05 Feb 2020 11:10) (18 - 18)  SpO2: 98% (05 Feb 2020 11:10) (98% - 100%)    PHYSICAL EXAM:  GENERAL: in no apparent distress  HEAD:  Atraumatic, Normocephalic  EYES: EOMI, PERRLA, conjunctiva and sclera clear  NECK: Supple, No JVD  CHEST/LUNG: no wheeze, decreased breath sounds at bases   HEART: S1 S2; soft ejection systolic murmur best heard at left sternal border   ABDOMEN: Soft, Nontender, Nondistended; Bowel sounds present  EXTREMITIES:  No clubbing or cyanosis, + Peripheral Pulses,  no edema  NEUROLOGY: alert, confused grossly non-focal  SKIN: No rashes or lesions    LABS:                        9.0    7.25  )-----------( 197      ( 05 Feb 2020 06:35 )             28.0     02-05    131<L>  |  98  |  48<H>  ----------------------------<  186<H>  4.4   |  19<L>  |  2.30<H>    Ca    9.5      05 Feb 2020 06:35  Phos  3.9     02-04  Mg     2.4     02-04                  All consultant(s) notes reviewed and care discussed with other providers        Contact Number, Dr Douglas [phone]

## 2020-02-06 ENCOUNTER — TRANSCRIPTION ENCOUNTER (OUTPATIENT)
Age: 85
End: 2020-02-06

## 2020-02-06 VITALS
RESPIRATION RATE: 18 BRPM | HEART RATE: 71 BPM | DIASTOLIC BLOOD PRESSURE: 75 MMHG | SYSTOLIC BLOOD PRESSURE: 127 MMHG | TEMPERATURE: 98 F | OXYGEN SATURATION: 100 %

## 2020-02-06 LAB
ANION GAP SERPL CALC-SCNC: 13 MMOL/L — SIGNIFICANT CHANGE UP (ref 5–17)
BUN SERPL-MCNC: 48 MG/DL — HIGH (ref 7–23)
CALCIUM SERPL-MCNC: 9.5 MG/DL — SIGNIFICANT CHANGE UP (ref 8.4–10.5)
CHLORIDE SERPL-SCNC: 99 MMOL/L — SIGNIFICANT CHANGE UP (ref 96–108)
CO2 SERPL-SCNC: 20 MMOL/L — LOW (ref 22–31)
CREAT SERPL-MCNC: 2.07 MG/DL — HIGH (ref 0.5–1.3)
GLUCOSE BLDC GLUCOMTR-MCNC: 200 MG/DL — HIGH (ref 70–99)
GLUCOSE BLDC GLUCOMTR-MCNC: 211 MG/DL — HIGH (ref 70–99)
GLUCOSE SERPL-MCNC: 220 MG/DL — HIGH (ref 70–99)
MAGNESIUM SERPL-MCNC: 2.2 MG/DL — SIGNIFICANT CHANGE UP (ref 1.6–2.6)
POTASSIUM SERPL-MCNC: 4.4 MMOL/L — SIGNIFICANT CHANGE UP (ref 3.5–5.3)
POTASSIUM SERPL-SCNC: 4.4 MMOL/L — SIGNIFICANT CHANGE UP (ref 3.5–5.3)
SODIUM SERPL-SCNC: 132 MMOL/L — LOW (ref 135–145)

## 2020-02-06 PROCEDURE — 93454 CORONARY ARTERY ANGIO S&I: CPT | Mod: 59

## 2020-02-06 PROCEDURE — 82728 ASSAY OF FERRITIN: CPT

## 2020-02-06 PROCEDURE — 80053 COMPREHEN METABOLIC PANEL: CPT

## 2020-02-06 PROCEDURE — 86901 BLOOD TYPING SEROLOGIC RH(D): CPT

## 2020-02-06 PROCEDURE — 85610 PROTHROMBIN TIME: CPT

## 2020-02-06 PROCEDURE — 83970 ASSAY OF PARATHORMONE: CPT

## 2020-02-06 PROCEDURE — 81001 URINALYSIS AUTO W/SCOPE: CPT

## 2020-02-06 PROCEDURE — 85027 COMPLETE CBC AUTOMATED: CPT

## 2020-02-06 PROCEDURE — 83540 ASSAY OF IRON: CPT

## 2020-02-06 PROCEDURE — C9602: CPT | Mod: LD

## 2020-02-06 PROCEDURE — 83605 ASSAY OF LACTIC ACID: CPT

## 2020-02-06 PROCEDURE — 83550 IRON BINDING TEST: CPT

## 2020-02-06 PROCEDURE — 86900 BLOOD TYPING SEROLOGIC ABO: CPT

## 2020-02-06 PROCEDURE — 84443 ASSAY THYROID STIM HORMONE: CPT

## 2020-02-06 PROCEDURE — C1753: CPT

## 2020-02-06 PROCEDURE — 82553 CREATINE MB FRACTION: CPT

## 2020-02-06 PROCEDURE — 82436 ASSAY OF URINE CHLORIDE: CPT

## 2020-02-06 PROCEDURE — 83735 ASSAY OF MAGNESIUM: CPT

## 2020-02-06 PROCEDURE — 92921: CPT | Mod: LD

## 2020-02-06 PROCEDURE — 33967 INSERT I-AORT PERCUT DEVICE: CPT

## 2020-02-06 PROCEDURE — C1889: CPT

## 2020-02-06 PROCEDURE — C1887: CPT

## 2020-02-06 PROCEDURE — 83880 ASSAY OF NATRIURETIC PEPTIDE: CPT

## 2020-02-06 PROCEDURE — 99238 HOSP IP/OBS DSCHRG MGMT 30/<: CPT

## 2020-02-06 PROCEDURE — 71045 X-RAY EXAM CHEST 1 VIEW: CPT

## 2020-02-06 PROCEDURE — 82607 VITAMIN B-12: CPT

## 2020-02-06 PROCEDURE — 93321 DOPPLER ECHO F-UP/LMTD STD: CPT

## 2020-02-06 PROCEDURE — 82570 ASSAY OF URINE CREATININE: CPT

## 2020-02-06 PROCEDURE — 84540 ASSAY OF URINE/UREA-N: CPT

## 2020-02-06 PROCEDURE — 99152 MOD SED SAME PHYS/QHP 5/>YRS: CPT

## 2020-02-06 PROCEDURE — 80162 ASSAY OF DIGOXIN TOTAL: CPT

## 2020-02-06 PROCEDURE — 83036 HEMOGLOBIN GLYCOSYLATED A1C: CPT

## 2020-02-06 PROCEDURE — 84133 ASSAY OF URINE POTASSIUM: CPT

## 2020-02-06 PROCEDURE — 80061 LIPID PANEL: CPT

## 2020-02-06 PROCEDURE — 84300 ASSAY OF URINE SODIUM: CPT

## 2020-02-06 PROCEDURE — C1769: CPT

## 2020-02-06 PROCEDURE — 80048 BASIC METABOLIC PNL TOTAL CA: CPT

## 2020-02-06 PROCEDURE — 93005 ELECTROCARDIOGRAM TRACING: CPT

## 2020-02-06 PROCEDURE — C1725: CPT

## 2020-02-06 PROCEDURE — 85730 THROMBOPLASTIN TIME PARTIAL: CPT

## 2020-02-06 PROCEDURE — C8929: CPT

## 2020-02-06 PROCEDURE — 82310 ASSAY OF CALCIUM: CPT

## 2020-02-06 PROCEDURE — 82550 ASSAY OF CK (CPK): CPT

## 2020-02-06 PROCEDURE — 99153 MOD SED SAME PHYS/QHP EA: CPT

## 2020-02-06 PROCEDURE — 84484 ASSAY OF TROPONIN QUANT: CPT

## 2020-02-06 PROCEDURE — 97162 PT EVAL MOD COMPLEX 30 MIN: CPT

## 2020-02-06 PROCEDURE — 93308 TTE F-UP OR LMTD: CPT

## 2020-02-06 PROCEDURE — C1894: CPT

## 2020-02-06 PROCEDURE — 86850 RBC ANTIBODY SCREEN: CPT

## 2020-02-06 PROCEDURE — 82962 GLUCOSE BLOOD TEST: CPT

## 2020-02-06 PROCEDURE — C1724: CPT

## 2020-02-06 PROCEDURE — 84100 ASSAY OF PHOSPHORUS: CPT

## 2020-02-06 PROCEDURE — 99232 SBSQ HOSP IP/OBS MODERATE 35: CPT | Mod: GC

## 2020-02-06 PROCEDURE — C1874: CPT

## 2020-02-06 PROCEDURE — 84156 ASSAY OF PROTEIN URINE: CPT

## 2020-02-06 RX ORDER — CARVEDILOL PHOSPHATE 80 MG/1
1 CAPSULE, EXTENDED RELEASE ORAL
Qty: 60 | Refills: 0
Start: 2020-02-06 | End: 2020-03-06

## 2020-02-06 RX ORDER — ASPIRIN/CALCIUM CARB/MAGNESIUM 324 MG
1 TABLET ORAL
Qty: 0 | Refills: 0 | DISCHARGE
Start: 2020-02-06

## 2020-02-06 RX ORDER — INSULIN GLARGINE 100 [IU]/ML
30 INJECTION, SOLUTION SUBCUTANEOUS
Qty: 1 | Refills: 0
Start: 2020-02-06 | End: 2020-03-06

## 2020-02-06 RX ORDER — ATORVASTATIN CALCIUM 80 MG/1
1 TABLET, FILM COATED ORAL
Qty: 0 | Refills: 0 | DISCHARGE
Start: 2020-02-06

## 2020-02-06 RX ORDER — IRON SUCROSE 20 MG/ML
200 INJECTION, SOLUTION INTRAVENOUS ONCE
Refills: 0 | Status: COMPLETED | OUTPATIENT
Start: 2020-02-06 | End: 2020-02-06

## 2020-02-06 RX ORDER — REPAGLINIDE 1 MG/1
1 TABLET ORAL
Qty: 90 | Refills: 0
Start: 2020-02-06 | End: 2020-03-06

## 2020-02-06 RX ORDER — DIGOXIN 250 MCG
1 TABLET ORAL
Qty: 0 | Refills: 0 | DISCHARGE
Start: 2020-02-06

## 2020-02-06 RX ORDER — SENNA PLUS 8.6 MG/1
2 TABLET ORAL
Qty: 0 | Refills: 0 | DISCHARGE
Start: 2020-02-06

## 2020-02-06 RX ADMIN — Medication 10 UNIT(S): at 11:51

## 2020-02-06 RX ADMIN — CARVEDILOL PHOSPHATE 6.25 MILLIGRAM(S): 80 CAPSULE, EXTENDED RELEASE ORAL at 05:12

## 2020-02-06 RX ADMIN — HEPARIN SODIUM 5000 UNIT(S): 5000 INJECTION INTRAVENOUS; SUBCUTANEOUS at 05:11

## 2020-02-06 RX ADMIN — CHLORHEXIDINE GLUCONATE 1 APPLICATION(S): 213 SOLUTION TOPICAL at 07:53

## 2020-02-06 RX ADMIN — PREGABALIN 1000 MICROGRAM(S): 225 CAPSULE ORAL at 11:27

## 2020-02-06 RX ADMIN — Medication 81 MILLIGRAM(S): at 11:28

## 2020-02-06 RX ADMIN — Medication 88 MICROGRAM(S): at 05:12

## 2020-02-06 RX ADMIN — Medication 2: at 07:53

## 2020-02-06 RX ADMIN — Medication 1: at 11:51

## 2020-02-06 RX ADMIN — CLOPIDOGREL BISULFATE 75 MILLIGRAM(S): 75 TABLET, FILM COATED ORAL at 11:28

## 2020-02-06 RX ADMIN — Medication 1 MILLIGRAM(S): at 11:31

## 2020-02-06 RX ADMIN — SODIUM CHLORIDE 3 MILLILITER(S): 9 INJECTION INTRAMUSCULAR; INTRAVENOUS; SUBCUTANEOUS at 05:13

## 2020-02-06 RX ADMIN — Medication 0.12 MILLIGRAM(S): at 05:12

## 2020-02-06 RX ADMIN — Medication 1 DROP(S): at 11:28

## 2020-02-06 RX ADMIN — Medication 10 UNIT(S): at 07:53

## 2020-02-06 RX ADMIN — SODIUM CHLORIDE 3 MILLILITER(S): 9 INJECTION INTRAMUSCULAR; INTRAVENOUS; SUBCUTANEOUS at 13:32

## 2020-02-06 RX ADMIN — IRON SUCROSE 110 MILLIGRAM(S): 20 INJECTION, SOLUTION INTRAVENOUS at 11:51

## 2020-02-06 NOTE — DISCHARGE NOTE NURSING/CASE MANAGEMENT/SOCIAL WORK - PATIENT PORTAL LINK FT
You can access the FollowMyHealth Patient Portal offered by [facility] by registering at the following website: [email or web address]. By joining [facility]’s FollowMyHealth portal, you will also be able to view your health information using other applications (apps) compatible with our system.

## 2020-02-06 NOTE — PROGRESS NOTE ADULT - ATTENDING COMMENTS
Patient interviewed and examined. I was physically present for the essential portions of the E/M service provided.  Chart reviewed and note edited where appropriate.  Case discussed with fellow.  Agree w/ Assessment and Plan as outlined.    Eric Guevara MD [facility]  Spectra:  [phone]  Office: [phone]
Patient interviewed and examined. I was physically present for the essential portions of the E/M service provided.  Chart reviewed and note edited where appropriate.  Case discussed with fellow.  Agree w/ Assessment and Plan as outlined.    Eric Guevara MD [facility]  Spectra:  [phone]  Office: [phone]
I have personally seen, examined and participated in the care of this patient. I have reviewed all pertinent clinical information, including history, physical exam, plan and the nurse practitioner's note. I agree with the nurse practitioner's note with the following additions:    PCI to LAD complicated by perforation requiring IABP  Uptitrating Hydralazine, adding Coreg for diastolic 100s  Remove IABP  ASA, Plavix, Lipitor  Baseline CKD, urine output adequate on Lasix (net negative 680)    The patient required critical care management and I personally provided 75 minutes of non-continuous care to the patient concurrently with the resident/fellow/nurse practitioner, excluding separate procedures and time spent teaching, in addition to discussing the patient and plan at length with the CCU staff and helping coordinate care.
Patient interviewed and examined. I was physically present for the essential portions of the E/M service provided.  Chart reviewed and note edited where appropriate.  Case discussed with fellow.  Agree w/ Assessment and Plan as outlined.    Eric Guevara MD [facility]  Spectra:  [phone]  Office: [phone]
I have personally seen, examined and participated in the care of this patient. I have reviewed all pertinent clinical information, including history, physical exam, plan and the nurse practitioner's note. I agree with the nurse practitioner's note with the following additions:    PCI to LAD complicated by perforation requiring IABP  Uptitrated Hydralazine, added Coreg for diastolic 100s  Removed IABP, stable hemodynamics  ASA, Plavix, Lipitor  Baseline CKD, urine output adequate on Lasix  Inconsistent labs, will re-check now - if stable, she may be discharged today
Patient is seen and examined with fellow, NP and the CCU house-staff. I agree with the history, physical and the assessment and plan.  on DAPt for the LAD intervention  plan for repeat TTE to reassess the effusion and LV function
discussed with daughter at bedside in detail   refusing Subacute Rehab  discharge home
discussed with patient's daughter in detail, expresses understanding of treatment plans.
discussed with patient's family at bedside in detail   at present family declining Subacute Rehab   has a 12 hr aide
Patient counseled for compliance with consistent low carb diet and exercise as tolerated outpatient.
Patient was on insulin and pills at home; A1C 8.5% Suggest to DC on the following DM regimen:  -Lantus 30u at bedtime  -Prandin 2mg before each meal  -FU endo 4 weeks  Discussed plan with patient and family, primary team. Patient counseled for compliance with consistent low carb diet and exercise as tolerated outpatient.
Will increase Lantus to 30u at bedtime.  Will increase Humalog to 10u before each meal and continue Humalog correction scale coverage. Will continue monitoring FS and FU.

## 2020-02-06 NOTE — PROGRESS NOTE ADULT - SUBJECTIVE AND OBJECTIVE BOX
NEPHROLOGY-[facility] [phone]        Patient seen and examined in bed.  She was in good spirits         MEDICATIONS  (STANDING):  artificial tears (preservative free) Ophthalmic Solution 1 Drop(s) Both EYES daily  aspirin enteric coated 81 milliGRAM(s) Oral daily  atorvastatin 40 milliGRAM(s) Oral at bedtime  carvedilol 6.25 milliGRAM(s) Oral every 12 hours  chlorhexidine 2% Cloths 1 Application(s) Topical <User Schedule>  clopidogrel Tablet 75 milliGRAM(s) Oral daily  cyanocobalamin Injectable 1000 MICROGram(s) IntraMuscular daily  dextrose 5%. 1000 milliLiter(s) (50 mL/Hr) IV Continuous <Continuous>  dextrose 50% Injectable 12.5 Gram(s) IV Push once  dextrose 50% Injectable 25 Gram(s) IV Push once  dextrose 50% Injectable 25 Gram(s) IV Push once  digoxin     Tablet 0.125 milliGRAM(s) Oral daily  donepezil 10 milliGRAM(s) Oral at bedtime  ergocalciferol 69713 Unit(s) Oral every week  folic acid 1 milliGRAM(s) Oral daily  heparin  Injectable 5000 Unit(s) SubCutaneous every 12 hours  influenza   Vaccine 0.5 milliLiter(s) IntraMuscular once  insulin glargine Injectable (LANTUS) 30 Unit(s) SubCutaneous at bedtime  insulin lispro (HumaLOG) corrective regimen sliding scale   SubCutaneous three times a day before meals  insulin lispro (HumaLOG) corrective regimen sliding scale   SubCutaneous at bedtime  insulin lispro Injectable (HumaLOG) 10 Unit(s) SubCutaneous three times a day before meals  iron sucrose IVPB 200 milliGRAM(s) IV Intermittent once  levothyroxine 88 MICROGram(s) Oral daily  memantine 5 milliGRAM(s) Oral at bedtime  senna 2 Tablet(s) Oral at bedtime  sodium chloride 0.9% lock flush 3 milliLiter(s) IV Push every 8 hours      VITAL:  T(C): , Max: 36.9 (20 @ 11:10)  T(F): , Max: 98.5 (20 @ 11:10)  HR: 75 (20 @ 08:16)  BP: 141/80 (20 @ 08:16)  BP(mean): --  RR: 18 (20 @ 08:16)  SpO2: 100% (20 @ 08:16)  Wt(kg): --    I and O's:     @ 07:01  -   @ 07:00  --------------------------------------------------------  IN: 920 mL / OUT: 1300 mL / NET: -380 mL     @ 07:01  -   @ 11:01  --------------------------------------------------------  IN: 240 mL / OUT: 0 mL / NET: 240 mL          PHYSICAL EXAM:    Constitutional: NAD  Neck:  No JVD  Respiratory: CTAB/L  Cardiovascular: S1 and S2  Gastrointestinal: BS+, soft, NT/ND  Extremities: No peripheral edema  Neurological: A/O x 3, no focal deficits  Psychiatric: Normal mood, normal affect  : No Naik  Skin: No rashes  Access: Not applicable    LABS:                        9.0    7.25  )-----------( 197      ( 2020 06:35 )             28.0     02-06    132<L>  |  99  |  48<H>  ----------------------------<  220<H>  4.4   |  20<L>  |  2.07<H>    Ca    9.5      2020 06:03  Mg     2.2     02-06            Urine Studies:  Urinalysis Basic - ( 2020 20:10 )    Color: Light Yellow / Appearance: Slightly Turbid / S.016 / pH: x  Gluc: x / Ketone: Negative  / Bili: Negative / Urobili: <2 mg/dL   Blood: x / Protein: Trace / Nitrite: Positive   Leuk Esterase: Large / RBC: 4 /HPF /  /HPF   Sq Epi: x / Non Sq Epi: 1 /HPF / Bacteria: TNTC      Creatinine, Random Urine: 76 mg/dL ( @ 16:47)  Protein/Creatinine Ratio Calculation: 0.3 Ratio ( @ 16:47)        RADIOLOGY & ADDITIONAL STUDIES:

## 2020-02-06 NOTE — PROGRESS NOTE ADULT - SUBJECTIVE AND OBJECTIVE BOX
Patient is a 84y old  Female who presents with a chief complaint of chest pain (2020 13:31)      SUBJECTIVE / OVERNIGHT EVENTS: overnight events noted    ROS:  Resp: No cough no sputum production  CVS: No chest pain no palpitations no orthopnea  GI: no N/V/D  : no dysuria, no hematuria  Neuro: no weakness no paresthesias  Heme: No petechiae no easy bruising  Msk: No joint pain no swelling  Skin: No rash no itching        MEDICATIONS  (STANDING):  artificial tears (preservative free) Ophthalmic Solution 1 Drop(s) Both EYES daily  aspirin enteric coated 81 milliGRAM(s) Oral daily  atorvastatin 40 milliGRAM(s) Oral at bedtime  carvedilol 6.25 milliGRAM(s) Oral every 12 hours  chlorhexidine 2% Cloths 1 Application(s) Topical <User Schedule>  clopidogrel Tablet 75 milliGRAM(s) Oral daily  dextrose 5%. 1000 milliLiter(s) (50 mL/Hr) IV Continuous <Continuous>  dextrose 50% Injectable 12.5 Gram(s) IV Push once  dextrose 50% Injectable 25 Gram(s) IV Push once  dextrose 50% Injectable 25 Gram(s) IV Push once  digoxin     Tablet 0.125 milliGRAM(s) Oral daily  donepezil 10 milliGRAM(s) Oral at bedtime  ergocalciferol 90649 Unit(s) Oral every week  folic acid 1 milliGRAM(s) Oral daily  heparin  Injectable 5000 Unit(s) SubCutaneous every 12 hours  influenza   Vaccine 0.5 milliLiter(s) IntraMuscular once  insulin glargine Injectable (LANTUS) 30 Unit(s) SubCutaneous at bedtime  insulin lispro (HumaLOG) corrective regimen sliding scale   SubCutaneous three times a day before meals  insulin lispro (HumaLOG) corrective regimen sliding scale   SubCutaneous at bedtime  insulin lispro Injectable (HumaLOG) 10 Unit(s) SubCutaneous three times a day before meals  levothyroxine 88 MICROGram(s) Oral daily  memantine 5 milliGRAM(s) Oral at bedtime  senna 2 Tablet(s) Oral at bedtime  sodium chloride 0.9% lock flush 3 milliLiter(s) IV Push every 8 hours    MEDICATIONS  (PRN):  bisacodyl Suppository 10 milliGRAM(s) Rectal daily PRN Constipation  dextrose 40% Gel 15 Gram(s) Oral once PRN Blood Glucose LESS THAN 70 milliGRAM(s)/deciliter  glucagon  Injectable 1 milliGRAM(s) IntraMuscular once PRN Glucose LESS THAN 70 milligrams/deciliter        CAPILLARY BLOOD GLUCOSE      POCT Blood Glucose.: 200 mg/dL (2020 11:41)  POCT Blood Glucose.: 211 mg/dL (2020 07:35)  POCT Blood Glucose.: 245 mg/dL (2020 20:59)  POCT Blood Glucose.: 139 mg/dL (2020 16:37)    I&O's Summary    2020 07:01  -  2020 07:00  --------------------------------------------------------  IN: 920 mL / OUT: 1300 mL / NET: -380 mL    2020 07:01  -  2020 15:13  --------------------------------------------------------  IN: 480 mL / OUT: 600 mL / NET: -120 mL        Vital Signs Last 24 Hrs  T(C): 36.5 (2020 11:19), Max: 36.9 (2020 19:37)  T(F): 97.7 (2020 11:19), Max: 98.4 (2020 19:37)  HR: 71 (2020 11:19) (71 - 78)  BP: 127/75 (2020 11:19) (116/56 - 152/71)  BP(mean): --  RR: 18 (2020 11:19) (18 - 18)  SpO2: 100% (2020 11:19) (98% - 100%)    PHYSICAL EXAM: vital signs as above  in no apparent distress  HEENT: KAYLEE EOMI  Neck: Supple, no JVD, no thyromegaly  Lungs: no wheeze, no crackles  CVS: S1 S2 no M/R/G  Abdomen: no tenderness, no organomegaly, BS present  Neuro: no focal weakness, no sensory abnormalities  Skin: warm, dry  Ext: no cyanosis or clubbing, no edema  Msk: no joint swelling or deformities  Back: no CVA tenderness, no kyphosis/scoliosis     LABS:                        9.0    7.25  )-----------( 197      ( 2020 06:35 )             28.0     02-06    132<L>  |  99  |  48<H>  ----------------------------<  220<H>  4.4   |  20<L>  |  2.07<H>    Ca    9.5      2020 06:03  Mg     2.2     02-06            Urinalysis Basic - ( 2020 20:10 )    Color: Light Yellow / Appearance: Slightly Turbid / S.016 / pH: x  Gluc: x / Ketone: Negative  / Bili: Negative / Urobili: <2 mg/dL   Blood: x / Protein: Trace / Nitrite: Positive   Leuk Esterase: Large / RBC: 4 /HPF /  /HPF   Sq Epi: x / Non Sq Epi: 1 /HPF / Bacteria: TNTC          All consultant(s) notes reviewed and care discussed with other providers        Contact Number, Dr Douglas [phone] Patient is a 84y old  Female who presents with a chief complaint of chest pain (2020 13:31)      SUBJECTIVE / OVERNIGHT EVENTS: overnight events noted    ROS:  Resp: No cough no sputum production  CVS: No chest pain no palpitations no orthopnea  GI: no N/V/D  : no dysuria, no hematuria  Neuro: no weakness no paresthesias  Heme: No petechiae no easy bruising  Msk: No joint pain no swelling  Skin: No rash no itching        MEDICATIONS  (STANDING):  artificial tears (preservative free) Ophthalmic Solution 1 Drop(s) Both EYES daily  aspirin enteric coated 81 milliGRAM(s) Oral daily  atorvastatin 40 milliGRAM(s) Oral at bedtime  carvedilol 6.25 milliGRAM(s) Oral every 12 hours  chlorhexidine 2% Cloths 1 Application(s) Topical <User Schedule>  clopidogrel Tablet 75 milliGRAM(s) Oral daily  dextrose 5%. 1000 milliLiter(s) (50 mL/Hr) IV Continuous <Continuous>  dextrose 50% Injectable 12.5 Gram(s) IV Push once  dextrose 50% Injectable 25 Gram(s) IV Push once  dextrose 50% Injectable 25 Gram(s) IV Push once  digoxin     Tablet 0.125 milliGRAM(s) Oral daily  donepezil 10 milliGRAM(s) Oral at bedtime  ergocalciferol 63270 Unit(s) Oral every week  folic acid 1 milliGRAM(s) Oral daily  heparin  Injectable 5000 Unit(s) SubCutaneous every 12 hours  influenza   Vaccine 0.5 milliLiter(s) IntraMuscular once  insulin glargine Injectable (LANTUS) 30 Unit(s) SubCutaneous at bedtime  insulin lispro (HumaLOG) corrective regimen sliding scale   SubCutaneous three times a day before meals  insulin lispro (HumaLOG) corrective regimen sliding scale   SubCutaneous at bedtime  insulin lispro Injectable (HumaLOG) 10 Unit(s) SubCutaneous three times a day before meals  levothyroxine 88 MICROGram(s) Oral daily  memantine 5 milliGRAM(s) Oral at bedtime  senna 2 Tablet(s) Oral at bedtime  sodium chloride 0.9% lock flush 3 milliLiter(s) IV Push every 8 hours    MEDICATIONS  (PRN):  bisacodyl Suppository 10 milliGRAM(s) Rectal daily PRN Constipation  dextrose 40% Gel 15 Gram(s) Oral once PRN Blood Glucose LESS THAN 70 milliGRAM(s)/deciliter  glucagon  Injectable 1 milliGRAM(s) IntraMuscular once PRN Glucose LESS THAN 70 milligrams/deciliter        CAPILLARY BLOOD GLUCOSE      POCT Blood Glucose.: 200 mg/dL (2020 11:41)  POCT Blood Glucose.: 211 mg/dL (2020 07:35)  POCT Blood Glucose.: 245 mg/dL (2020 20:59)  POCT Blood Glucose.: 139 mg/dL (2020 16:37)    I&O's Summary    2020 07:01  -  2020 07:00  --------------------------------------------------------  IN: 920 mL / OUT: 1300 mL / NET: -380 mL    2020 07:01  -  2020 15:13  --------------------------------------------------------  IN: 480 mL / OUT: 600 mL / NET: -120 mL        Vital Signs Last 24 Hrs  T(C): 36.5 (2020 11:19), Max: 36.9 (2020 19:37)  T(F): 97.7 (2020 11:19), Max: 98.4 (2020 19:37)  HR: 71 (2020 11:19) (71 - 78)  BP: 127/75 (2020 11:19) (116/56 - 152/71)  BP(mean): --  RR: 18 (2020 11:19) (18 - 18)  SpO2: 100% (2020 11:19) (98% - 100%)    PHYSICAL EXAM:  GENERAL: in no apparent distress  HEAD:  Atraumatic, Normocephalic  EYES: EOMI, PERRLA, conjunctiva and sclera clear  NECK: Supple, No JVD  CHEST/LUNG: no wheeze, decreased breath sounds at bases   HEART: S1 S2; soft ejection systolic murmur best heard at left sternal border   ABDOMEN: Soft, Nontender, Nondistended; Bowel sounds present  EXTREMITIES:  No clubbing or cyanosis, + Peripheral Pulses,  no edema  NEUROLOGY: alert, confused grossly non-focal  SKIN: No rashes or lesions    LABS:                        9.0    7.25  )-----------( 197      ( 2020 06:35 )             28.0     02-06    132<L>  |  99  |  48<H>  ----------------------------<  220<H>  4.4   |  20<L>  |  2.07<H>    Ca    9.5      2020 06:03  Mg     2.2     02-06            Urinalysis Basic - ( 2020 20:10 )    Color: Light Yellow / Appearance: Slightly Turbid / S.016 / pH: x  Gluc: x / Ketone: Negative  / Bili: Negative / Urobili: <2 mg/dL   Blood: x / Protein: Trace / Nitrite: Positive   Leuk Esterase: Large / RBC: 4 /HPF /  /HPF   Sq Epi: x / Non Sq Epi: 1 /HPF / Bacteria: TNTC          All consultant(s) notes reviewed and care discussed with other providers        Contact Number, Dr Douglas [phone]

## 2020-02-06 NOTE — PROGRESS NOTE ADULT - PROBLEM SELECTOR PLAN 1
Will continue current insulin regimen for now. Will continue monitoring FS, log, and FU.  Patient was on insulin and pills at home; A1C 8.5% Suggest to DC on the following DM regimen:  -Lantus 30u at bedtime  -Prandin 2mg before each meal  -FU endo 4 weeks  Discussed plan with patient and family, primary team. Patient counseled for compliance with consistent low carb diet and exercise as tolerated outpatient. Will continue current insulin regimen for now. Will continue monitoring FS, log, and FU.

## 2020-02-06 NOTE — PROGRESS NOTE ADULT - SUBJECTIVE AND OBJECTIVE BOX
Chief complaint  Patient is a 84y old  Female who presents with a chief complaint of chest pain (05 Feb 2020 13:31)   Review of systems  Patient in bed, looks comfortable, no hypoglycemia.    Labs and Fingersticks  CAPILLARY BLOOD GLUCOSE      POCT Blood Glucose.: 200 mg/dL (06 Feb 2020 11:41)  POCT Blood Glucose.: 211 mg/dL (06 Feb 2020 07:35)  POCT Blood Glucose.: 245 mg/dL (05 Feb 2020 20:59)  POCT Blood Glucose.: 139 mg/dL (05 Feb 2020 16:37)      Anion Gap, Serum: 13 (02-06 @ 06:03)  Anion Gap, Serum: 14 (02-05 @ 06:35)      Calcium, Total Serum: 9.5 (02-06 @ 06:03)  Calcium, Total Serum: 9.5 (02-05 @ 06:35)  Intact PTH: 42 (02-05 @ 12:37)          02-06    132<L>  |  99  |  48<H>  ----------------------------<  220<H>  4.4   |  20<L>  |  2.07<H>    Ca    9.5      06 Feb 2020 06:03  Mg     2.2     02-06                          9.0    7.25  )-----------( 197      ( 05 Feb 2020 06:35 )             28.0     Medications  MEDICATIONS  (STANDING):  artificial tears (preservative free) Ophthalmic Solution 1 Drop(s) Both EYES daily  aspirin enteric coated 81 milliGRAM(s) Oral daily  atorvastatin 40 milliGRAM(s) Oral at bedtime  carvedilol 6.25 milliGRAM(s) Oral every 12 hours  chlorhexidine 2% Cloths 1 Application(s) Topical <User Schedule>  clopidogrel Tablet 75 milliGRAM(s) Oral daily  dextrose 5%. 1000 milliLiter(s) (50 mL/Hr) IV Continuous <Continuous>  dextrose 50% Injectable 12.5 Gram(s) IV Push once  dextrose 50% Injectable 25 Gram(s) IV Push once  dextrose 50% Injectable 25 Gram(s) IV Push once  digoxin     Tablet 0.125 milliGRAM(s) Oral daily  donepezil 10 milliGRAM(s) Oral at bedtime  ergocalciferol 17494 Unit(s) Oral every week  folic acid 1 milliGRAM(s) Oral daily  heparin  Injectable 5000 Unit(s) SubCutaneous every 12 hours  influenza   Vaccine 0.5 milliLiter(s) IntraMuscular once  insulin glargine Injectable (LANTUS) 30 Unit(s) SubCutaneous at bedtime  insulin lispro (HumaLOG) corrective regimen sliding scale   SubCutaneous three times a day before meals  insulin lispro (HumaLOG) corrective regimen sliding scale   SubCutaneous at bedtime  insulin lispro Injectable (HumaLOG) 10 Unit(s) SubCutaneous three times a day before meals  levothyroxine 88 MICROGram(s) Oral daily  memantine 5 milliGRAM(s) Oral at bedtime  senna 2 Tablet(s) Oral at bedtime  sodium chloride 0.9% lock flush 3 milliLiter(s) IV Push every 8 hours      Physical Exam  General: Patient comfortable in bed  Vital Signs Last 12 Hrs  T(F): 97.7 (02-06-20 @ 11:19), Max: 98.2 (02-06-20 @ 04:06)  HR: 71 (02-06-20 @ 11:19) (71 - 76)  BP: 127/75 (02-06-20 @ 11:19) (127/75 - 152/71)  BP(mean): --  RR: 18 (02-06-20 @ 11:19) (18 - 18)  SpO2: 100% (02-06-20 @ 11:19) (100% - 100%)  Neck: No palpable thyroid nodules.  CVS: S1S2, No murmurs  Respiratory: No wheezing, no crepitations  GI: Abdomen soft, bowel sounds positive  Musculoskeletal:  edema lower extremities.   Skin: No skin rashes, no ecchymosis    Diagnostics Chief complaint  Patient is a 84y old  Female who presents with a chief complaint of chest pain (05 Feb 2020 13:31)   Review of systems  Patient in bed, looks comfortable,  no hypoglycemia.    Labs and Fingersticks  CAPILLARY BLOOD GLUCOSE      POCT Blood Glucose.: 200 mg/dL (06 Feb 2020 11:41)  POCT Blood Glucose.: 211 mg/dL (06 Feb 2020 07:35)  POCT Blood Glucose.: 245 mg/dL (05 Feb 2020 20:59)  POCT Blood Glucose.: 139 mg/dL (05 Feb 2020 16:37)      Anion Gap, Serum: 13 (02-06 @ 06:03)  Anion Gap, Serum: 14 (02-05 @ 06:35)      Calcium, Total Serum: 9.5 (02-06 @ 06:03)  Calcium, Total Serum: 9.5 (02-05 @ 06:35)  Intact PTH: 42 (02-05 @ 12:37)          02-06    132<L>  |  99  |  48<H>  ----------------------------<  220<H>  4.4   |  20<L>  |  2.07<H>    Ca    9.5      06 Feb 2020 06:03  Mg     2.2     02-06                          9.0    7.25  )-----------( 197      ( 05 Feb 2020 06:35 )             28.0     Medications  MEDICATIONS  (STANDING):  artificial tears (preservative free) Ophthalmic Solution 1 Drop(s) Both EYES daily  aspirin enteric coated 81 milliGRAM(s) Oral daily  atorvastatin 40 milliGRAM(s) Oral at bedtime  carvedilol 6.25 milliGRAM(s) Oral every 12 hours  chlorhexidine 2% Cloths 1 Application(s) Topical <User Schedule>  clopidogrel Tablet 75 milliGRAM(s) Oral daily  dextrose 5%. 1000 milliLiter(s) (50 mL/Hr) IV Continuous <Continuous>  dextrose 50% Injectable 12.5 Gram(s) IV Push once  dextrose 50% Injectable 25 Gram(s) IV Push once  dextrose 50% Injectable 25 Gram(s) IV Push once  digoxin     Tablet 0.125 milliGRAM(s) Oral daily  donepezil 10 milliGRAM(s) Oral at bedtime  ergocalciferol 08591 Unit(s) Oral every week  folic acid 1 milliGRAM(s) Oral daily  heparin  Injectable 5000 Unit(s) SubCutaneous every 12 hours  influenza   Vaccine 0.5 milliLiter(s) IntraMuscular once  insulin glargine Injectable (LANTUS) 30 Unit(s) SubCutaneous at bedtime  insulin lispro (HumaLOG) corrective regimen sliding scale   SubCutaneous three times a day before meals  insulin lispro (HumaLOG) corrective regimen sliding scale   SubCutaneous at bedtime  insulin lispro Injectable (HumaLOG) 10 Unit(s) SubCutaneous three times a day before meals  levothyroxine 88 MICROGram(s) Oral daily  memantine 5 milliGRAM(s) Oral at bedtime  senna 2 Tablet(s) Oral at bedtime  sodium chloride 0.9% lock flush 3 milliLiter(s) IV Push every 8 hours      Physical Exam  General: Patient comfortable in bed  Vital Signs Last 12 Hrs  T(F): 97.7 (02-06-20 @ 11:19), Max: 98.2 (02-06-20 @ 04:06)  HR: 71 (02-06-20 @ 11:19) (71 - 76)  BP: 127/75 (02-06-20 @ 11:19) (127/75 - 152/71)  BP(mean): --  RR: 18 (02-06-20 @ 11:19) (18 - 18)  SpO2: 100% (02-06-20 @ 11:19) (100% - 100%)  Neck: No palpable thyroid nodules.  CVS: S1S2, No murmurs  Respiratory: No wheezing, no crepitations  GI: Abdomen soft, bowel sounds positive  Musculoskeletal:  edema lower extremities.   Skin: No skin rashes, no ecchymosis    Diagnostics

## 2020-02-06 NOTE — DISCHARGE NOTE NURSING/CASE MANAGEMENT/SOCIAL WORK - NSSCNAMETXT_GEN_ALL_CORE
A nurse will contact you from [facility] to schedule a visit for SOC 02/07/2020

## 2020-02-06 NOTE — PROGRESS NOTE ADULT - ASSESSMENT
84 yr old [place] female with PMH of dementia (daughter will consent for pt), MI (remote), CAD with prior stent, HTN, HLD, HFrEF, suspected A fib   sp cardiac cath c stents and cb perforation  CKD stage 4 and acute on chronic renal failure that is now almost at baseline--Minimal proteinuria   Hyponatremia     1 Renal -No need for renal sono;    2 CVS-Maybe can entertain a ACE or ARB as outpt  3 Anemia-Venofer 200mg IVSS x 1    outpatient follow up

## 2020-02-06 NOTE — PROGRESS NOTE ADULT - ASSESSMENT
84F with CAD, HTN, HLD, Hypothyroidism, AF, Dementia with abnormal stress with subsequent PCI LAD c/b perforation requiring covered stent. TTE with EF 40%, moderate segmental LV dysfunction, stable small pericardial effusion with echogenic material.    Recommendations:  1. ASA 81mg daily, Plavix 75mg daily, Atorvastatin 40mg qhs  2. Continue Coreg 6.25mg BID   3. No ACE/ARB for now as renal function not back to baseline.  4. Lasix 40mg PO daily on discharge    5. Should have repeat TTE at follow-up cardiology visit in 1-2 weeks.  6. No need for further inpatient cardiac decision making.    For all cardiology related questions, please call the current cardiology fellow on service at this time.  ** Please go to [email or web address] ; login: [email or web address] (case-sensitive) **    Nelson Green MD  Department of Cardiovascular DiseaseTex Ta M.D.  Cardiology Fellow  [phone]  For all Cardiology service contact information, go to [email or web address] and use "[email or web address]" to login.

## 2020-02-06 NOTE — PROGRESS NOTE ADULT - PROVIDER SPECIALTY LIST ADULT
CCU
Cardiology
Endocrinology
Internal Medicine
Nephrology
Nephrology
CCU

## 2020-02-06 NOTE — PROGRESS NOTE ADULT - ASSESSMENT
84 yr old [place] female with PMH of dementia, hypothyroidism admitted for cardiac catheterization which was complicated by LAD perforation s/p stent, initially managed in the CCU, now transferred out

## 2020-02-06 NOTE — PROGRESS NOTE ADULT - ASSESSMENT
Assessment  DMT2: 84y Female with DM T2 with hyperglycemia, A1C 8.5% , was on oral meds and insulin at home (Lantus 15u at bedtime, Prandin 1mg daily), now on basal bolus insulin, blood sugars fluctuating, starting to trend up, no hypoglycemic episodes. Patient is eating full meals with good appetite, appears comfortable, family at the bedside.  CAD: on medications, no chest pain, stable, monitored.  Hypothyroidism: On Synthroid 88 mcg po daily, compliant with Synthroid intake, asymptomatic. TSH 2.66.  HTN: Controlled,  on antihypertensive medications.  HLD: Controlled, on statin.  CKD: Monitor labs/BMP.          Mayra Rocha MD  Cell: [phone]  Office: [phone] Assessment  DMT2: 84y Female with DM T2 with hyperglycemia, A1C 8.5% , was on oral meds and insulin at home (Lantus 15u at bedtime, Prandin 1mg daily), now on basal bolus insulin, blood sugars fluctuating, starting to trend up,  no hypoglycemic episodes. Patient is eating full meals with good appetite, appears comfortable, family at the bedside.  CAD: on medications, no chest pain, stable, monitored.  Hypothyroidism: On Synthroid 88 mcg po daily, compliant with Synthroid intake, asymptomatic. TSH 2.66.  HTN: Controlled,  on antihypertensive medications.  HLD: Controlled, on statin.  CKD: Monitor labs/BMP.          Mayra Rocha MD  Cell: [phone]  Office: [phone]

## 2020-02-06 NOTE — PROGRESS NOTE ADULT - SUBJECTIVE AND OBJECTIVE BOX
Patient seen and examined at bedside.    Overnight Events:   Doing well, still having mild groin pain.    REVIEW OF SYSTEMS:  CONSTITUTIONAL: No weakness, fevers or chills  EYES/ENT: No visual changes;  No dysphagia  NECK: No pain or stiffness  RESPIRATORY: No cough, wheezing, hemoptysis; No shortness of breath  CARDIOVASCULAR: No chest pain or palpitations; No lower extremity edema  GASTROINTESTINAL: No abdominal or epigastric pain. No nausea, vomiting, or hematemesis; No diarrhea or constipation. No melena or hematochezia.  BACK: No back pain  GENITOURINARY: No dysuria, frequency or hematuria  NEUROLOGICAL: No numbness or weakness  SKIN: No itching, burning, rashes, or lesions   All other review of systems is negative unless indicated above.            Current Meds:  artificial tears (preservative free) Ophthalmic Solution 1 Drop(s) Both EYES daily  aspirin enteric coated 81 milliGRAM(s) Oral daily  atorvastatin 40 milliGRAM(s) Oral at bedtime  bisacodyl Suppository 10 milliGRAM(s) Rectal daily PRN  carvedilol 6.25 milliGRAM(s) Oral every 12 hours  chlorhexidine 2% Cloths 1 Application(s) Topical <User Schedule>  clopidogrel Tablet 75 milliGRAM(s) Oral daily  cyanocobalamin Injectable 1000 MICROGram(s) IntraMuscular daily  dextrose 40% Gel 15 Gram(s) Oral once PRN  dextrose 5%. 1000 milliLiter(s) IV Continuous <Continuous>  dextrose 50% Injectable 12.5 Gram(s) IV Push once  dextrose 50% Injectable 25 Gram(s) IV Push once  dextrose 50% Injectable 25 Gram(s) IV Push once  digoxin     Tablet 0.125 milliGRAM(s) Oral daily  donepezil 10 milliGRAM(s) Oral at bedtime  ergocalciferol 94232 Unit(s) Oral every week  folic acid 1 milliGRAM(s) Oral daily  glucagon  Injectable 1 milliGRAM(s) IntraMuscular once PRN  heparin  Injectable 5000 Unit(s) SubCutaneous every 12 hours  influenza   Vaccine 0.5 milliLiter(s) IntraMuscular once  insulin glargine Injectable (LANTUS) 30 Unit(s) SubCutaneous at bedtime  insulin lispro (HumaLOG) corrective regimen sliding scale   SubCutaneous three times a day before meals  insulin lispro (HumaLOG) corrective regimen sliding scale   SubCutaneous at bedtime  insulin lispro Injectable (HumaLOG) 10 Unit(s) SubCutaneous three times a day before meals  levothyroxine 88 MICROGram(s) Oral daily  memantine 5 milliGRAM(s) Oral at bedtime  senna 2 Tablet(s) Oral at bedtime  sodium chloride 0.9% lock flush 3 milliLiter(s) IV Push every 8 hours      Vitals:  T(F): 97.6 (02-06), Max: 98.5 (02-05)  HR: 75 (02-06) (75 - 82)  BP: 141/80 (02-06) (116/56 - 152/71)  RR: 18 (02-06)  SpO2: 100% (02-06)  I&O's Summary    05 Feb 2020 07:01  -  06 Feb 2020 07:00  --------------------------------------------------------  IN: 920 mL / OUT: 1300 mL / NET: -380 mL    06 Feb 2020 07:01  -  06 Feb 2020 10:00  --------------------------------------------------------  IN: 240 mL / OUT: 0 mL / NET: 240 mL        Physical Exam:  General: No distress. Comfortable  HEENT: EOMI	  Neck: JVP not elevated  Chest: Clear to auscultation bilaterally  CV: RRR. Normal S1 and S2. No murmurs, rubs, or gallops. No edema.  Abdomen: Soft, non-distended, non-tender  Skin: No rashes, ecchymoses, or skin breakdown  Extremities: Warm.  Neuro: Alert and oriented x 3. No focal deficits.  Psych: Mood and affect appropriate                          9.0    7.25  )-----------( 197      ( 05 Feb 2020 06:35 )             28.0     02-06    132<L>  |  99  |  48<H>  ----------------------------<  220<H>  4.4   |  20<L>  |  2.07<H>    Ca    9.5      06 Feb 2020 06:03  Mg     2.2     02-06        Serum Pro-Brain Natriuretic Peptide: 2833 pg/mL (02-05 @ 06:35)

## 2020-02-08 ENCOUNTER — INPATIENT (INPATIENT)
Facility: HOSPITAL | Age: 85
LOS: 2 days | Discharge: HOME | DRG: 313 | End: 2020-02-11
Attending: INTERNAL MEDICINE | Admitting: INTERNAL MEDICINE
Payer: MEDICARE

## 2020-02-08 VITALS
HEART RATE: 84 BPM | HEIGHT: 63 IN | SYSTOLIC BLOOD PRESSURE: 143 MMHG | OXYGEN SATURATION: 100 % | TEMPERATURE: 99 F | DIASTOLIC BLOOD PRESSURE: 79 MMHG | RESPIRATION RATE: 18 BRPM | WEIGHT: 164.91 LBS

## 2020-02-08 LAB
ALBUMIN SERPL ELPH-MCNC: 3.3 G/DL — SIGNIFICANT CHANGE UP (ref 3.3–5)
ALP SERPL-CCNC: 70 U/L — SIGNIFICANT CHANGE UP (ref 40–120)
ALT FLD-CCNC: 24 U/L — SIGNIFICANT CHANGE UP (ref 10–45)
ANION GAP SERPL CALC-SCNC: 12 MMOL/L — SIGNIFICANT CHANGE UP (ref 5–17)
APTT BLD: 28.3 SEC — SIGNIFICANT CHANGE UP (ref 27.5–36.3)
AST SERPL-CCNC: 19 U/L — SIGNIFICANT CHANGE UP (ref 10–40)
BASOPHILS # BLD AUTO: 0.06 K/UL — SIGNIFICANT CHANGE UP (ref 0–0.2)
BASOPHILS NFR BLD AUTO: 0.8 % — SIGNIFICANT CHANGE UP (ref 0–2)
BILIRUB SERPL-MCNC: 0.1 MG/DL — LOW (ref 0.2–1.2)
BUN SERPL-MCNC: 41 MG/DL — HIGH (ref 7–23)
CALCIUM SERPL-MCNC: 9.3 MG/DL — SIGNIFICANT CHANGE UP (ref 8.4–10.5)
CHLORIDE SERPL-SCNC: 100 MMOL/L — SIGNIFICANT CHANGE UP (ref 96–108)
CK MB BLD-MCNC: 1.9 % — SIGNIFICANT CHANGE UP (ref 0–3.5)
CK MB BLD-MCNC: 2.1 % — SIGNIFICANT CHANGE UP (ref 0–3.5)
CK MB CFR SERPL CALC: 4.6 NG/ML — HIGH (ref 0–3.8)
CK MB CFR SERPL CALC: 4.8 NG/ML — HIGH (ref 0–3.8)
CK SERPL-CCNC: 231 U/L — HIGH (ref 25–170)
CK SERPL-CCNC: 238 U/L — HIGH (ref 25–170)
CO2 SERPL-SCNC: 20 MMOL/L — LOW (ref 22–31)
CREAT SERPL-MCNC: 1.85 MG/DL — HIGH (ref 0.5–1.3)
EOSINOPHIL # BLD AUTO: 0.17 K/UL — SIGNIFICANT CHANGE UP (ref 0–0.5)
EOSINOPHIL NFR BLD AUTO: 2.3 % — SIGNIFICANT CHANGE UP (ref 0–6)
ERYTHROCYTE [SEDIMENTATION RATE] IN BLOOD: 100 MM/HR — HIGH (ref 0–20)
GLUCOSE SERPL-MCNC: 231 MG/DL — HIGH (ref 70–99)
HCT VFR BLD CALC: 26.2 % — LOW (ref 34.5–45)
HGB BLD-MCNC: 8.3 G/DL — LOW (ref 11.5–15.5)
IMM GRANULOCYTES NFR BLD AUTO: 0.6 % — SIGNIFICANT CHANGE UP (ref 0–1.5)
INR BLD: 1.1 RATIO — SIGNIFICANT CHANGE UP (ref 0.88–1.16)
LYMPHOCYTES # BLD AUTO: 2.08 K/UL — SIGNIFICANT CHANGE UP (ref 1–3.3)
LYMPHOCYTES # BLD AUTO: 28.6 % — SIGNIFICANT CHANGE UP (ref 13–44)
MCHC RBC-ENTMCNC: 28 PG — SIGNIFICANT CHANGE UP (ref 27–34)
MCHC RBC-ENTMCNC: 31.7 GM/DL — LOW (ref 32–36)
MCV RBC AUTO: 88.5 FL — SIGNIFICANT CHANGE UP (ref 80–100)
MONOCYTES # BLD AUTO: 0.57 K/UL — SIGNIFICANT CHANGE UP (ref 0–0.9)
MONOCYTES NFR BLD AUTO: 7.8 % — SIGNIFICANT CHANGE UP (ref 2–14)
NEUTROPHILS # BLD AUTO: 4.35 K/UL — SIGNIFICANT CHANGE UP (ref 1.8–7.4)
NEUTROPHILS NFR BLD AUTO: 59.9 % — SIGNIFICANT CHANGE UP (ref 43–77)
NRBC # BLD: 0 /100 WBCS — SIGNIFICANT CHANGE UP (ref 0–0)
PLATELET # BLD AUTO: 275 K/UL — SIGNIFICANT CHANGE UP (ref 150–400)
POTASSIUM SERPL-MCNC: 4.9 MMOL/L — SIGNIFICANT CHANGE UP (ref 3.5–5.3)
POTASSIUM SERPL-SCNC: 4.9 MMOL/L — SIGNIFICANT CHANGE UP (ref 3.5–5.3)
PROT SERPL-MCNC: 7.4 G/DL — SIGNIFICANT CHANGE UP (ref 6–8.3)
PROTHROM AB SERPL-ACNC: 12.7 SEC — SIGNIFICANT CHANGE UP (ref 10–12.9)
RBC # BLD: 2.96 M/UL — LOW (ref 3.8–5.2)
RBC # FLD: 13.8 % — SIGNIFICANT CHANGE UP (ref 10.3–14.5)
SODIUM SERPL-SCNC: 132 MMOL/L — LOW (ref 135–145)
TROPONIN T, HIGH SENSITIVITY RESULT: 2038 NG/L — HIGH (ref 0–51)
TROPONIN T, HIGH SENSITIVITY RESULT: 2060 NG/L — HIGH (ref 0–51)
WBC # BLD: 7.27 K/UL — SIGNIFICANT CHANGE UP (ref 3.8–10.5)
WBC # FLD AUTO: 7.27 K/UL — SIGNIFICANT CHANGE UP (ref 3.8–10.5)

## 2020-02-08 PROCEDURE — 93010 ELECTROCARDIOGRAM REPORT: CPT

## 2020-02-08 PROCEDURE — 99285 EMERGENCY DEPT VISIT HI MDM: CPT | Mod: GC

## 2020-02-08 PROCEDURE — 71045 X-RAY EXAM CHEST 1 VIEW: CPT | Mod: 26

## 2020-02-08 NOTE — ED ADULT NURSE NOTE - CHPI ED NUR SYMPTOMS NEG
no shortness of breath/no diaphoresis/no congestion/no vomiting/no fever/no chills/no dizziness/no syncope/no nausea/no back pain

## 2020-02-08 NOTE — ED ADULT NURSE NOTE - OBJECTIVE STATEMENT
83y/o female aaox2 oriented to name, place, disoriented to date.  hx dementia,  cardiac stents, htn presents to the ED via EMS from home c/o Chest pressure and palpitations . As per her son she was at home and c/o chest pressure and palpitations to her aide, and she called her son to called EMS, . Denies fever, chills, n/v, weakness, abd pain, diarrhea/constipation, numbness/tingling, urinary s/s., in no respiratory distress, no CP at this time, , PT safety maintained with family at bedside, call bell within reach and bed in the lowest position.

## 2020-02-08 NOTE — CONSULT NOTE ADULT - ASSESSMENT
84F hx of dementia, CAD w/ recent PCI 1/31/20 to mLAD and dLAD c/b perf req CCU admission and IABP placement,  DM2, HLD, HTN here for ?chest pain for 1 hour.    #chest pain  -unclear symptoms as aide reported fluttering, pt denies symptoms, and family at bedside not with patient at onset of symptoms  -currently HDS, asx  -EKG non-ischemic, tele w/o events, trop 2k and ck-mb 4.7 however downtrending from 2/2  -would not initiate ACS treatment at this time  -trend CE x3 to negative delta  -Hgb 8.3, would recheck in 6 hours; if downtrending, would obtain repeat TTE to re-assess effusion  -c/w home ASa/Plavix, Coreg, digoxin, Lasix    Will discuss w/ attending  Stephen Palomares MD  Cardiology Fellow - PGY 4  Text or Call: [phone]  For all New Consults and Questions:  [email or web address]   Login: brandon 84F hx of dementia, CAD w/ recent PCI 1/31/20 to mLAD and dLAD c/b perf req CCU admission and IABP placement,  DM2, HLD, HTN here for ?chest pain for 1 hour.    #chest pain  -unclear symptoms as aide reported fluttering, pt denies symptoms, and family at bedside not with patient at onset of symptoms  -currently HDS, asx  -EKG non-ischemic, tele w/o events, trop 2k and ck-mb 4.7 however downtrending from 2/2  -would not initiate ACS treatment at this time  -trend CE x3 to negative delta  -Hgb 8.3, would recheck in 6 hours; would obtain repeat TTE to re-assess effusion  -c/w home ASa/Plavix, Coreg, digoxin, Lasix    Will discuss w/ attending  Stephen Palomares MD  Cardiology Fellow - PGY 4  Text or Call: [phone]  For all New Consults and Questions:  [email or web address]   Login: [email or web address]

## 2020-02-08 NOTE — ED PROVIDER NOTE - OBJECTIVE STATEMENT
HPI taken from prior chart on recent admission: 84 yr old [place] female with PMH of dementia (unclear baseline), MI (remote), CAD with prior stent, HTN, HLD, HFrEF, suspected A fib (on digoxin/ASA), no implantable cardiac monitoring device, CKD- followed by PCP Dr Nicole Staples at [facility], DMT2- well controlled, complicated by CKD, followed by PCP, hypothyroidism presented today for cardiac catheterization. Patient's daughter reports REYES for past month. Evaluated by cardiologist Dr Sanchez where NST was done revealing abnormality. Referred for cath. Cath c/b LAD perforation now transferred to the CCU with IABP. DILSHAD x2 to mLAD and dLAD. Was given asa 81mg and plavix load as well as 250cc NS bolus.  Today presents 83 y/o presenting with palpitation and chest pressure. Started upon waking up this AM. No fevers no chills no cough.  Cards: Dr. Tej Guevara

## 2020-02-08 NOTE — ED PROVIDER NOTE - CLINICAL SUMMARY MEDICAL DECISION MAKING FREE TEXT BOX
85 y/o F recently discharged from hospital after cardiac cath s/p 2 stents, cath c/b LAD perforation requiring CCU stay. Presenting today with CP and palpitations. PE is unremarkable. EKG is LBBB. Will obtain blood work, troponin, card consult, and re-eval.-ZR 85 y/o F recently discharged from hospital after cardiac cath s/p 2 stents, cath c/b LAD perforation requiring CCU stay. Presenting today with CP and palpitations. PE is unremarkable. EKG is LBBB. no evidence of ST elevation ,? Dressler syndrome  Will obtain blood work sed rate , troponin, card consult, and re-eval.-ZR

## 2020-02-08 NOTE — CONSULT NOTE ADULT - SUBJECTIVE AND OBJECTIVE BOX
Patient seen and evaluated at bedside    Chief Complaint:  chest fluttering    HPI:  84F hx of dementia, CAD w/ recent PCI 1/31/20 to mLAD and dLAD c/b perf req CCU admission and IABP placement,  DM2, HLD, HTN here for chest fluttering for 1 hour.  Hx is unclear as pt is demented, however from family, they were called by pt's aide who stated pt was complaining of chest fluttering for maybe 1 hour but duration is unclear.   Family then brought pt to ED.  When questioned in ED, pt states she never had chest pain and is feeling fine.  Has no other complaints.  Has been taking her medications as prescribed since discharge.  Cardiology was consulted for eval of chest pain.      PMHx:   Dementia  Hypothyroid  HLD (hyperlipidemia)  HTN (hypertension)  DM (diabetes mellitus)  CAD (coronary artery disease)  MI (myocardial infarction)      PSHx:   Stented coronary artery      Allergies:  No Known Allergies      Home Meds:    Current Medications:       FAMILY HISTORY:  Family history of heart disease (Sibling)      Social History:  Smoking History: denies  Alcohol Use: denies  Drug Use: denies    REVIEW OF SYSTEMS:  Constitutional:     [x ] negative [ ] fevers [ ] chills [ ] weight loss [ ] weight gain  HEENT:                  [x ] negative [ ] dry eyes [ ] eye irritation [ ] postnasal drip [ ] nasal congestion  CV:                         [ x] negative  [ ] chest pain [ ] orthopnea [ ] palpitations [ ] murmur  Resp:                     [x ] negative [ ] cough [ ] shortness of breath [ ] dyspnea [ ] wheezing [ ] sputum [ ]hemoptysis  GI:                          [ x] negative [ ] nausea [ ] vomiting [ ] diarrhea [ ] constipation [ ] abd pain [ ] dysphagia   :                        [ x] negative [ ] dysuria [ ] nocturia [ ] hematuria [ ] increased urinary frequency  Musculoskeletal: [x ] negative [ ] back pain [ ] myalgias [ ] arthralgias [ ] fracture  Skin:                       [ x] negative [ ] rash [ ] itch  Neurological:        [ x] negative [ ] headache [ ] dizziness [ ] syncope [ ] weakness [ ] numbness  Psychiatric:           [ x] negative [ ] anxiety [ ] depression  Endocrine:            [ x] negative [ ] diabetes [ ] thyroid problem  Heme/Lymph:      [ x] negative [ ] anemia [ ] bleeding problem  Allergic/Immune: [ x] negative [ ] itchy eyes [ ] nasal discharge [ ] hives [ ] angioedema    [ x] All other systems negative  [ ] Unable to assess ROS due to      Physical Exam:  T(F): 98.6 (02-08), Max: 98.7 (02-08)  HR: 76 (02-08) (76 - 84)  BP: 141/66 (02-08) (141/66 - 143/79)  RR: 18 (02-08)  SpO2: 100% (02-08)  GENERAL: No acute distress, well-developed  HEAD:  Atraumatic, Normocephalic  ENT: EOMI, PERRLA, conjunctiva and sclera clear, Neck supple, No JVD, moist mucosa  CHEST/LUNG: Clear to auscultation bilaterally; No wheeze, equal breath sounds bilaterally   BACK: No spinal tenderness  HEART: Regular rate and rhythm; No murmurs, rubs, or gallops  ABDOMEN: Soft, Nontender, Nondistended; Bowel sounds present  EXTREMITIES:  No clubbing, cyanosis, or edema      Cardiovascular Diagnostic Testing:    ECG: Personally reviewed:  NSR @84BPM,w/ LBBB, LAD, no ischemic changes, unchanged from prior    Echo: Personally reviewed:  2/3/20  Observations:  Mitral Valve: Tethered mitral valve leaflets with normal  opening. Mild mitral regurgitation.  Aortic Valve/Aorta: Sclerotic aortic valve leaflets with  normal opening.  Aortic Root: 2.9 cm.  LVOT diameter: 1.9 cm.  Left Atrium: Normal left atrium. LA volume index = 32  cc/m2.  Left Ventricle: Endocardial visualization enhanced with  intravenous injection of Ultrasonic Enhancing Agent  (Definity). Moderate segmental left ventricular systolic  dysfunction. The anterior wall, the anterior septum, and  the apical cap are hypokinetic.  Right Heart: The right ventricle is not well visualized;  grossly normal right ventricular systolic function.  Tricuspid valve not well visualized. Pulmonic valve not  well visualized.  Pericardium/Pleura: Thickened pericardium with small  pericardial effusion.  Hemodynamic: Estimated right atrial pressure is 8 mm Hg.  ------------------------------------------------------------------------    Imaging:    CXR: Personally reviewed    Labs: Personally reviewed                        8.3    7.27  )-----------( 275      ( 08 Feb 2020 19:47 )             26.2     02-08    132<L>  |  100  |  41<H>  ----------------------------<  231<H>  4.9   |  20<L>  |  1.85<H>    Ca    9.3      08 Feb 2020 19:47    TPro  7.4  /  Alb  3.3  /  TBili  0.1<L>  /  DBili  x   /  AST  19  /  ALT  24  /  AlkPhos  70  02-08    PT/INR - ( 08 Feb 2020 19:47 )   PT: 12.7 sec;   INR: 1.10 ratio         PTT - ( 08 Feb 2020 19:47 )  PTT:28.3 sec  Serum Pro-Brain Natriuretic Peptide: 2833 pg/mL (02-05 @ 06:35)      Hemoglobin A1C, Whole Blood: 8.5 % (02-02 @ 09:20)

## 2020-02-08 NOTE — CONSULT NOTE ADULT - ATTENDING COMMENTS
Patient seen and examined; agree with Cardiology Fellow assessment and plan.  --Currently no chest pain; patient resting comfortably.  --Troponin downtrending compared to recent admission values.  --Check TTE to re-evaluate effusion.  --Monitor closely hemodynamically and on Telemetry.  --Continue DAPT and CAD medical regimen.  --Will follow.

## 2020-02-08 NOTE — ED ADULT NURSE NOTE - NSIMPLEMENTINTERV_GEN_ALL_ED
Implemented All Fall with Harm Risk Interventions:  [place] to call system. Call bell, personal items and telephone within reach. Instruct patient to call for assistance. Room bathroom lighting operational. Non-slip footwear when patient is off stretcher. Physically safe environment: no spills, clutter or unnecessary equipment. Stretcher in lowest position, wheels locked, appropriate side rails in place. Provide visual cue, wrist band, yellow gown, etc. Monitor gait and stability. Monitor for mental status changes and reorient to person, place, and time. Review medications for side effects contributing to fall risk. Reinforce activity limits and safety measures with patient and family. Provide visual clues: red socks.

## 2020-02-08 NOTE — ED PROVIDER NOTE - PROGRESS NOTE DETAILS
Cardiology fellow called ,case discussed Dr. Zack Ibrahim, PGY-2: seen by cards. recommended 3rd set of enzymes. Given prior enzymes higher, likely no active ACS. If repeat enzymes not significantly changed, plan for DC. Dr. Zack Ibrahim, PGY-2: spoke w/ cards fellow. Enzymes down trending, however ESR significantly elevated at 100. After discussion, will start high dose aspirin and colchicine for possible pericarditis vs. dresslers. Plan for TTE in AM. Will admit to medicine on tele. Dr. Zack Ibrahim, PGY-2: spoke w/ cards fellow. Enzymes down trending, however ESR significantly elevated at 100. After discussion, will start high dose aspirin and colchicine for possible pericarditis vs. dresslers. Plan for TTE in AM. Plan for aspiring 650 mg TID and colchine 0.3 mg BID (reduced due to renal failure). Will admit to medicine on tele. Dr. Zack Ibrahim, PGY-2: spoke w/ cards fellow. Enzymes down trending, however ESR significantly elevated at 100. After discussion, will start high dose colchicine for possible pericarditis vs. dresslers. Plan for TTE in AM. May start ASA if TTE does not show effusion given hx of LAD perf. Plan for colchicine 0.3 mg (reduced due to renal failure). Will admit to medicine on tele.

## 2020-02-09 DIAGNOSIS — E78.5 HYPERLIPIDEMIA, UNSPECIFIED: ICD-10-CM

## 2020-02-09 DIAGNOSIS — I25.10 ATHEROSCLEROTIC HEART DISEASE OF NATIVE CORONARY ARTERY WITHOUT ANGINA PECTORIS: ICD-10-CM

## 2020-02-09 DIAGNOSIS — E11.9 TYPE 2 DIABETES MELLITUS WITHOUT COMPLICATIONS: ICD-10-CM

## 2020-02-09 DIAGNOSIS — R07.9 CHEST PAIN, UNSPECIFIED: ICD-10-CM

## 2020-02-09 DIAGNOSIS — I10 ESSENTIAL (PRIMARY) HYPERTENSION: ICD-10-CM

## 2020-02-09 DIAGNOSIS — F03.90 UNSPECIFIED DEMENTIA WITHOUT BEHAVIORAL DISTURBANCE: ICD-10-CM

## 2020-02-09 LAB
CK MB BLD-MCNC: 2 % — SIGNIFICANT CHANGE UP (ref 0–3.5)
CK MB CFR SERPL CALC: 4.8 NG/ML — HIGH (ref 0–3.8)
CK SERPL-CCNC: 242 U/L — HIGH (ref 25–170)
GLUCOSE BLDC GLUCOMTR-MCNC: 149 MG/DL — HIGH (ref 70–99)
GLUCOSE BLDC GLUCOMTR-MCNC: 215 MG/DL — HIGH (ref 70–99)
GLUCOSE BLDC GLUCOMTR-MCNC: 227 MG/DL — HIGH (ref 70–99)
GLUCOSE BLDC GLUCOMTR-MCNC: 232 MG/DL — HIGH (ref 70–99)
TROPONIN T, HIGH SENSITIVITY RESULT: 1931 NG/L — HIGH (ref 0–51)

## 2020-02-09 PROCEDURE — 93306 TTE W/DOPPLER COMPLETE: CPT | Mod: 26

## 2020-02-09 PROCEDURE — 99223 1ST HOSP IP/OBS HIGH 75: CPT | Mod: GC

## 2020-02-09 RX ORDER — DIGOXIN 250 MCG
0.12 TABLET ORAL DAILY
Refills: 0 | Status: DISCONTINUED | OUTPATIENT
Start: 2020-02-09 | End: 2020-02-11

## 2020-02-09 RX ORDER — MEMANTINE HYDROCHLORIDE 10 MG/1
5 TABLET ORAL AT BEDTIME
Refills: 0 | Status: DISCONTINUED | OUTPATIENT
Start: 2020-02-09 | End: 2020-02-11

## 2020-02-09 RX ORDER — INSULIN LISPRO 100/ML
VIAL (ML) SUBCUTANEOUS AT BEDTIME
Refills: 0 | Status: DISCONTINUED | OUTPATIENT
Start: 2020-02-09 | End: 2020-02-11

## 2020-02-09 RX ORDER — FOLIC ACID 0.8 MG
1 TABLET ORAL DAILY
Refills: 0 | Status: DISCONTINUED | OUTPATIENT
Start: 2020-02-09 | End: 2020-02-11

## 2020-02-09 RX ORDER — GLUCAGON INJECTION, SOLUTION 0.5 MG/.1ML
1 INJECTION, SOLUTION SUBCUTANEOUS ONCE
Refills: 0 | Status: DISCONTINUED | OUTPATIENT
Start: 2020-02-09 | End: 2020-02-11

## 2020-02-09 RX ORDER — ASPIRIN/CALCIUM CARB/MAGNESIUM 324 MG
81 TABLET ORAL DAILY
Refills: 0 | Status: DISCONTINUED | OUTPATIENT
Start: 2020-02-09 | End: 2020-02-11

## 2020-02-09 RX ORDER — DEXTROSE 50 % IN WATER 50 %
15 SYRINGE (ML) INTRAVENOUS ONCE
Refills: 0 | Status: DISCONTINUED | OUTPATIENT
Start: 2020-02-09 | End: 2020-02-11

## 2020-02-09 RX ORDER — LEVOTHYROXINE SODIUM 125 MCG
88 TABLET ORAL DAILY
Refills: 0 | Status: DISCONTINUED | OUTPATIENT
Start: 2020-02-09 | End: 2020-02-11

## 2020-02-09 RX ORDER — ASPIRIN/CALCIUM CARB/MAGNESIUM 324 MG
650 TABLET ORAL ONCE
Refills: 0 | Status: DISCONTINUED | OUTPATIENT
Start: 2020-02-09 | End: 2020-02-09

## 2020-02-09 RX ORDER — HEPARIN SODIUM 5000 [USP'U]/ML
5000 INJECTION INTRAVENOUS; SUBCUTANEOUS EVERY 12 HOURS
Refills: 0 | Status: DISCONTINUED | OUTPATIENT
Start: 2020-02-09 | End: 2020-02-11

## 2020-02-09 RX ORDER — LIDOCAINE 4 G/100G
1 CREAM TOPICAL DAILY
Refills: 0 | Status: DISCONTINUED | OUTPATIENT
Start: 2020-02-09 | End: 2020-02-11

## 2020-02-09 RX ORDER — SODIUM CHLORIDE 9 MG/ML
1000 INJECTION, SOLUTION INTRAVENOUS
Refills: 0 | Status: DISCONTINUED | OUTPATIENT
Start: 2020-02-09 | End: 2020-02-11

## 2020-02-09 RX ORDER — LOSARTAN POTASSIUM 100 MG/1
25 TABLET, FILM COATED ORAL DAILY
Refills: 0 | Status: DISCONTINUED | OUTPATIENT
Start: 2020-02-09 | End: 2020-02-11

## 2020-02-09 RX ORDER — SENNA PLUS 8.6 MG/1
2 TABLET ORAL AT BEDTIME
Refills: 0 | Status: DISCONTINUED | OUTPATIENT
Start: 2020-02-09 | End: 2020-02-11

## 2020-02-09 RX ORDER — ATORVASTATIN CALCIUM 80 MG/1
40 TABLET, FILM COATED ORAL AT BEDTIME
Refills: 0 | Status: DISCONTINUED | OUTPATIENT
Start: 2020-02-09 | End: 2020-02-11

## 2020-02-09 RX ORDER — ERGOCALCIFEROL 1.25 MG/1
50000 CAPSULE ORAL
Refills: 0 | Status: DISCONTINUED | OUTPATIENT
Start: 2020-02-09 | End: 2020-02-11

## 2020-02-09 RX ORDER — CLOPIDOGREL BISULFATE 75 MG/1
75 TABLET, FILM COATED ORAL DAILY
Refills: 0 | Status: DISCONTINUED | OUTPATIENT
Start: 2020-02-09 | End: 2020-02-11

## 2020-02-09 RX ORDER — DEXTROSE 50 % IN WATER 50 %
25 SYRINGE (ML) INTRAVENOUS ONCE
Refills: 0 | Status: DISCONTINUED | OUTPATIENT
Start: 2020-02-09 | End: 2020-02-11

## 2020-02-09 RX ORDER — DEXTROSE 50 % IN WATER 50 %
12.5 SYRINGE (ML) INTRAVENOUS ONCE
Refills: 0 | Status: DISCONTINUED | OUTPATIENT
Start: 2020-02-09 | End: 2020-02-11

## 2020-02-09 RX ORDER — INFLUENZA VIRUS VACCINE 15; 15; 15; 15 UG/.5ML; UG/.5ML; UG/.5ML; UG/.5ML
0.5 SUSPENSION INTRAMUSCULAR ONCE
Refills: 0 | Status: DISCONTINUED | OUTPATIENT
Start: 2020-02-09 | End: 2020-02-11

## 2020-02-09 RX ORDER — DONEPEZIL HYDROCHLORIDE 10 MG/1
10 TABLET, FILM COATED ORAL AT BEDTIME
Refills: 0 | Status: DISCONTINUED | OUTPATIENT
Start: 2020-02-09 | End: 2020-02-11

## 2020-02-09 RX ORDER — INSULIN GLARGINE 100 [IU]/ML
30 INJECTION, SOLUTION SUBCUTANEOUS AT BEDTIME
Refills: 0 | Status: DISCONTINUED | OUTPATIENT
Start: 2020-02-09 | End: 2020-02-11

## 2020-02-09 RX ORDER — CARVEDILOL PHOSPHATE 80 MG/1
6.25 CAPSULE, EXTENDED RELEASE ORAL EVERY 12 HOURS
Refills: 0 | Status: DISCONTINUED | OUTPATIENT
Start: 2020-02-09 | End: 2020-02-11

## 2020-02-09 RX ORDER — INSULIN LISPRO 100/ML
VIAL (ML) SUBCUTANEOUS
Refills: 0 | Status: DISCONTINUED | OUTPATIENT
Start: 2020-02-09 | End: 2020-02-11

## 2020-02-09 RX ORDER — COLCHICINE 0.6 MG
0.3 TABLET ORAL ONCE
Refills: 0 | Status: COMPLETED | OUTPATIENT
Start: 2020-02-09 | End: 2020-02-09

## 2020-02-09 RX ADMIN — DONEPEZIL HYDROCHLORIDE 10 MILLIGRAM(S): 10 TABLET, FILM COATED ORAL at 21:34

## 2020-02-09 RX ADMIN — SENNA PLUS 2 TABLET(S): 8.6 TABLET ORAL at 21:34

## 2020-02-09 RX ADMIN — Medication 0.3 MILLIGRAM(S): at 02:39

## 2020-02-09 RX ADMIN — Medication 81 MILLIGRAM(S): at 11:41

## 2020-02-09 RX ADMIN — CARVEDILOL PHOSPHATE 6.25 MILLIGRAM(S): 80 CAPSULE, EXTENDED RELEASE ORAL at 16:42

## 2020-02-09 RX ADMIN — MEMANTINE HYDROCHLORIDE 5 MILLIGRAM(S): 10 TABLET ORAL at 21:35

## 2020-02-09 RX ADMIN — Medication 88 MICROGRAM(S): at 07:05

## 2020-02-09 RX ADMIN — ATORVASTATIN CALCIUM 40 MILLIGRAM(S): 80 TABLET, FILM COATED ORAL at 21:34

## 2020-02-09 RX ADMIN — CLOPIDOGREL BISULFATE 75 MILLIGRAM(S): 75 TABLET, FILM COATED ORAL at 11:41

## 2020-02-09 RX ADMIN — HEPARIN SODIUM 5000 UNIT(S): 5000 INJECTION INTRAVENOUS; SUBCUTANEOUS at 16:42

## 2020-02-09 RX ADMIN — INSULIN GLARGINE 30 UNIT(S): 100 INJECTION, SOLUTION SUBCUTANEOUS at 21:35

## 2020-02-09 RX ADMIN — Medication 1 MILLIGRAM(S): at 11:41

## 2020-02-09 RX ADMIN — LOSARTAN POTASSIUM 25 MILLIGRAM(S): 100 TABLET, FILM COATED ORAL at 11:41

## 2020-02-09 RX ADMIN — Medication 0.12 MILLIGRAM(S): at 07:05

## 2020-02-09 RX ADMIN — ERGOCALCIFEROL 50000 UNIT(S): 1.25 CAPSULE ORAL at 12:35

## 2020-02-09 RX ADMIN — Medication 2: at 16:41

## 2020-02-09 RX ADMIN — LIDOCAINE 1 PATCH: 4 CREAM TOPICAL at 19:30

## 2020-02-09 RX ADMIN — Medication 1 DROP(S): at 21:44

## 2020-02-09 RX ADMIN — LIDOCAINE 1 PATCH: 4 CREAM TOPICAL at 16:41

## 2020-02-09 RX ADMIN — Medication 2: at 11:42

## 2020-02-09 NOTE — H&P ADULT - NSHPREVIEWOFSYSTEMS_GEN_ALL_CORE
Gen: no loss of wt no loss of appetite  ENT: no dizziness no hearing loss  Ophth: no blurring of vision no loss of vision  Resp: No cough no sputum production  CVS: see above HPI   GI: no nausea, vomiting or diarrhea   : no dysuria, hematuria  Endo: no polyuria no excessive sweating  Neuro: no weakness no paresthesias  Psych: No suicidal no depressive ideation  Heme: No petechiae no easy bruising  Msk: No joint pain no swelling  Skin: No rash no itching  All other ROS negative

## 2020-02-09 NOTE — H&P ADULT - NSICDXPASTMEDICALHX_GEN_ALL_CORE_FT
PAST MEDICAL HISTORY:  CAD (coronary artery disease)     Dementia     DM (diabetes mellitus)     HLD (hyperlipidemia)     HTN (hypertension)     Hypothyroid     MI (myocardial infarction)

## 2020-02-09 NOTE — H&P ADULT - ASSESSMENT
84 yr old [place]i female with PMH of dementia (unclear baseline), MI (remote), CAD with prior stent, HTN, HLD, HFrEF, suspected A fib (on digoxin/ASA), no implantable cardiac monitoring device, CKD,  DMT2- well controlled, complicated by CKD, followed by PCP, hypothyroidism presented today with palpitations and chest pressure. Started upon waking up this AM. No fevers no chills no cough. Recent discharge after stent and coronary perforation after cardiology had cleared

## 2020-02-09 NOTE — H&P ADULT - PROBLEM SELECTOR PLAN 1
unclear etiology  possible pericarditis  cardiology help appreciated  repeat echocardiogram to r/o significant change in effusion  no ischemia work up

## 2020-02-09 NOTE — H&P ADULT - NSHPPHYSICALEXAM_GEN_ALL_CORE
PHYSICAL EXAM:  GENERAL: in no apparent distress  HEAD:  Atraumatic, Normocephalic  EYES: EOMI, PERRLA, conjunctiva and sclera clear  NECK: Supple, No JVD  CHEST/LUNG: no wheeze, decreased breath sounds at bases   HEART: S1 S2; soft ejection systolic murmur best heard at left sternal border   ABDOMEN: Soft, Nontender, Nondistended; Bowel sounds present  EXTREMITIES:  No clubbing or cyanosis, + Peripheral Pulses,  no edema  NEUROLOGY: alert, confused grossly non-focal  SKIN: No rashes or lesions

## 2020-02-09 NOTE — H&P ADULT - HISTORY OF PRESENT ILLNESS
84 yr old [place] female with PMH of dementia (unclear baseline), MI (remote), CAD with prior stent, HTN, HLD, HFrEF, suspected A fib (on digoxin/ASA), no implantable cardiac monitoring device, CKD- followed by PCP Dr Nicole Staples at [facility], DMT2- well controlled, complicated by CKD, followed by PCP, hypothyroidism presented today for cardiac catheterization. Patient's daughter reports REYES for past month. Evaluated by cardiologist Dr Sanchez where NST was done revealing abnormality. Referred for cath. Cath c/b LAD perforation now transferred to the CCU with IABP. DILSHAD x2 to mLAD and dLAD. Was given asa 81mg and plavix load as well as 250cc NS bolus. Today presents 83 y/o presenting with palpitation and chest pressure. Started upon waking up this AM. No fevers no chills no cough

## 2020-02-10 LAB
ANION GAP SERPL CALC-SCNC: 13 MMOL/L — SIGNIFICANT CHANGE UP (ref 5–17)
BUN SERPL-MCNC: 37 MG/DL — HIGH (ref 7–23)
CALCIUM SERPL-MCNC: 9.6 MG/DL — SIGNIFICANT CHANGE UP (ref 8.4–10.5)
CHLORIDE SERPL-SCNC: 101 MMOL/L — SIGNIFICANT CHANGE UP (ref 96–108)
CO2 SERPL-SCNC: 21 MMOL/L — LOW (ref 22–31)
CREAT SERPL-MCNC: 1.96 MG/DL — HIGH (ref 0.5–1.3)
GLUCOSE BLDC GLUCOMTR-MCNC: 193 MG/DL — HIGH (ref 70–99)
GLUCOSE BLDC GLUCOMTR-MCNC: 197 MG/DL — HIGH (ref 70–99)
GLUCOSE BLDC GLUCOMTR-MCNC: 284 MG/DL — HIGH (ref 70–99)
GLUCOSE BLDC GLUCOMTR-MCNC: 333 MG/DL — HIGH (ref 70–99)
GLUCOSE SERPL-MCNC: 163 MG/DL — HIGH (ref 70–99)
HCT VFR BLD CALC: 30 % — LOW (ref 34.5–45)
HGB BLD-MCNC: 9.2 G/DL — LOW (ref 11.5–15.5)
MCHC RBC-ENTMCNC: 27.4 PG — SIGNIFICANT CHANGE UP (ref 27–34)
MCHC RBC-ENTMCNC: 30.7 GM/DL — LOW (ref 32–36)
MCV RBC AUTO: 89.3 FL — SIGNIFICANT CHANGE UP (ref 80–100)
NRBC # BLD: 0 /100 WBCS — SIGNIFICANT CHANGE UP (ref 0–0)
PLATELET # BLD AUTO: 315 K/UL — SIGNIFICANT CHANGE UP (ref 150–400)
POTASSIUM SERPL-MCNC: 4.7 MMOL/L — SIGNIFICANT CHANGE UP (ref 3.5–5.3)
POTASSIUM SERPL-SCNC: 4.7 MMOL/L — SIGNIFICANT CHANGE UP (ref 3.5–5.3)
RBC # BLD: 3.36 M/UL — LOW (ref 3.8–5.2)
RBC # FLD: 14 % — SIGNIFICANT CHANGE UP (ref 10.3–14.5)
SODIUM SERPL-SCNC: 135 MMOL/L — SIGNIFICANT CHANGE UP (ref 135–145)
WBC # BLD: 7.29 K/UL — SIGNIFICANT CHANGE UP (ref 3.8–10.5)
WBC # FLD AUTO: 7.29 K/UL — SIGNIFICANT CHANGE UP (ref 3.8–10.5)

## 2020-02-10 PROCEDURE — 74176 CT ABD & PELVIS W/O CONTRAST: CPT | Mod: 26

## 2020-02-10 PROCEDURE — 99233 SBSQ HOSP IP/OBS HIGH 50: CPT | Mod: GC

## 2020-02-10 PROCEDURE — 71250 CT THORAX DX C-: CPT | Mod: 26

## 2020-02-10 RX ORDER — LANOLIN ALCOHOL/MO/W.PET/CERES
3 CREAM (GRAM) TOPICAL AT BEDTIME
Refills: 0 | Status: DISCONTINUED | OUTPATIENT
Start: 2020-02-10 | End: 2020-02-11

## 2020-02-10 RX ADMIN — DONEPEZIL HYDROCHLORIDE 10 MILLIGRAM(S): 10 TABLET, FILM COATED ORAL at 23:39

## 2020-02-10 RX ADMIN — CARVEDILOL PHOSPHATE 6.25 MILLIGRAM(S): 80 CAPSULE, EXTENDED RELEASE ORAL at 06:33

## 2020-02-10 RX ADMIN — Medication 2: at 21:13

## 2020-02-10 RX ADMIN — Medication 0.12 MILLIGRAM(S): at 06:33

## 2020-02-10 RX ADMIN — MEMANTINE HYDROCHLORIDE 5 MILLIGRAM(S): 10 TABLET ORAL at 23:39

## 2020-02-10 RX ADMIN — SENNA PLUS 2 TABLET(S): 8.6 TABLET ORAL at 23:39

## 2020-02-10 RX ADMIN — INSULIN GLARGINE 30 UNIT(S): 100 INJECTION, SOLUTION SUBCUTANEOUS at 21:18

## 2020-02-10 RX ADMIN — LIDOCAINE 1 PATCH: 4 CREAM TOPICAL at 04:57

## 2020-02-10 RX ADMIN — CARVEDILOL PHOSPHATE 6.25 MILLIGRAM(S): 80 CAPSULE, EXTENDED RELEASE ORAL at 17:08

## 2020-02-10 RX ADMIN — Medication 3: at 11:49

## 2020-02-10 RX ADMIN — Medication 1: at 17:08

## 2020-02-10 RX ADMIN — CLOPIDOGREL BISULFATE 75 MILLIGRAM(S): 75 TABLET, FILM COATED ORAL at 11:48

## 2020-02-10 RX ADMIN — Medication 1 MILLIGRAM(S): at 11:48

## 2020-02-10 RX ADMIN — Medication 10 MILLIGRAM(S): at 06:40

## 2020-02-10 RX ADMIN — Medication 81 MILLIGRAM(S): at 11:48

## 2020-02-10 RX ADMIN — LIDOCAINE 1 PATCH: 4 CREAM TOPICAL at 23:42

## 2020-02-10 RX ADMIN — LOSARTAN POTASSIUM 25 MILLIGRAM(S): 100 TABLET, FILM COATED ORAL at 06:33

## 2020-02-10 RX ADMIN — Medication 88 MICROGRAM(S): at 06:33

## 2020-02-10 RX ADMIN — ATORVASTATIN CALCIUM 40 MILLIGRAM(S): 80 TABLET, FILM COATED ORAL at 23:39

## 2020-02-10 RX ADMIN — Medication 1 DROP(S): at 11:48

## 2020-02-10 RX ADMIN — Medication 1: at 08:02

## 2020-02-10 RX ADMIN — LIDOCAINE 1 PATCH: 4 CREAM TOPICAL at 19:35

## 2020-02-10 RX ADMIN — HEPARIN SODIUM 5000 UNIT(S): 5000 INJECTION INTRAVENOUS; SUBCUTANEOUS at 17:08

## 2020-02-10 RX ADMIN — HEPARIN SODIUM 5000 UNIT(S): 5000 INJECTION INTRAVENOUS; SUBCUTANEOUS at 06:33

## 2020-02-10 RX ADMIN — LIDOCAINE 1 PATCH: 4 CREAM TOPICAL at 11:54

## 2020-02-10 NOTE — PROGRESS NOTE ADULT - SUBJECTIVE AND OBJECTIVE BOX
Patient is a 84y old  Female who presents with a chief complaint of chest pain and palpitations (10 Feb 2020 07:03)      SUBJECTIVE / OVERNIGHT EVENTS: overnight events noted    ROS:  Resp: No cough no sputum production  CVS: No chest pain no palpitations no orthopnea  GI: no N/V/D  : no dysuria, no hematuria  Neuro: no weakness no paresthesias  Heme: No petechiae no easy bruising  Msk: No joint pain no swelling  Skin: No rash no itching        MEDICATIONS  (STANDING):  artificial tears (preservative free) Ophthalmic Solution 1 Drop(s) Both EYES daily  aspirin enteric coated 81 milliGRAM(s) Oral daily  atorvastatin 40 milliGRAM(s) Oral at bedtime  carvedilol 6.25 milliGRAM(s) Oral every 12 hours  clopidogrel Tablet 75 milliGRAM(s) Oral daily  dextrose 5%. 1000 milliLiter(s) (50 mL/Hr) IV Continuous <Continuous>  dextrose 50% Injectable 12.5 Gram(s) IV Push once  dextrose 50% Injectable 25 Gram(s) IV Push once  dextrose 50% Injectable 25 Gram(s) IV Push once  digoxin     Tablet 0.125 milliGRAM(s) Oral daily  donepezil 10 milliGRAM(s) Oral at bedtime  ergocalciferol 59107 Unit(s) Oral every week  folic acid 1 milliGRAM(s) Oral daily  heparin  Injectable 5000 Unit(s) SubCutaneous every 12 hours  influenza   Vaccine 0.5 milliLiter(s) IntraMuscular once  insulin glargine Injectable (LANTUS) 30 Unit(s) SubCutaneous at bedtime  insulin lispro (HumaLOG) corrective regimen sliding scale   SubCutaneous three times a day before meals  insulin lispro (HumaLOG) corrective regimen sliding scale   SubCutaneous at bedtime  levothyroxine 88 MICROGram(s) Oral daily  lidocaine   Patch 1 Patch Transdermal daily  losartan 25 milliGRAM(s) Oral daily  memantine 5 milliGRAM(s) Oral at bedtime  senna 2 Tablet(s) Oral at bedtime    MEDICATIONS  (PRN):  bisacodyl Suppository 10 milliGRAM(s) Rectal daily PRN Constipation  dextrose 40% Gel 15 Gram(s) Oral once PRN Blood Glucose LESS THAN 70 milliGRAM(s)/deciliter  glucagon  Injectable 1 milliGRAM(s) IntraMuscular once PRN Glucose LESS THAN 70 milligrams/deciliter        CAPILLARY BLOOD GLUCOSE      POCT Blood Glucose.: 284 mg/dL (10 Feb 2020 11:32)  POCT Blood Glucose.: 197 mg/dL (10 Feb 2020 07:47)  POCT Blood Glucose.: 215 mg/dL (09 Feb 2020 21:16)    I&O's Summary    09 Feb 2020 07:01  -  10 Feb 2020 07:00  --------------------------------------------------------  IN: 1090 mL / OUT: 1100 mL / NET: -10 mL    10 Feb 2020 07:01  -  10 Feb 2020 16:33  --------------------------------------------------------  IN: 440 mL / OUT: 400 mL / NET: 40 mL        Vital Signs Last 24 Hrs  T(C): 36.5 (10 Feb 2020 11:20), Max: 37.2 (09 Feb 2020 20:35)  T(F): 97.7 (10 Feb 2020 11:20), Max: 98.9 (09 Feb 2020 20:35)  HR: 70 (10 Feb 2020 11:20) (70 - 78)  BP: 124/70 (10 Feb 2020 11:20) (124/70 - 147/75)  BP(mean): --  RR: 18 (10 Feb 2020 11:20) (18 - 18)  SpO2: 100% (10 Feb 2020 11:20) (97% - 100%)    PHYSICAL EXAM:  GENERAL: in no apparent distress  HEAD:  Atraumatic, Normocephalic  EYES: EOMI, PERRLA, conjunctiva and sclera clear  NECK: Supple, No JVD  CHEST/LUNG: no wheeze, decreased breath sounds at bases   HEART: S1 S2; soft ejection systolic murmur best heard at left sternal border   ABDOMEN: Soft, Nontender, Nondistended; Bowel sounds present  EXTREMITIES:  No clubbing or cyanosis, + Peripheral Pulses,  no edema  NEUROLOGY: alert, confused grossly non-focal  SKIN: No rashes or lesions    LABS:                        9.2    7.29  )-----------( 315      ( 10 Feb 2020 06:56 )             30.0     02-10    135  |  101  |  37<H>  ----------------------------<  163<H>  4.7   |  21<L>  |  1.96<H>    Ca    9.6      10 Feb 2020 06:56    TPro  7.4  /  Alb  3.3  /  TBili  0.1<L>  /  DBili  x   /  AST  19  /  ALT  24  /  AlkPhos  70  02-08    PT/INR - ( 08 Feb 2020 19:47 )   PT: 12.7 sec;   INR: 1.10 ratio         PTT - ( 08 Feb 2020 19:47 )  PTT:28.3 sec  CARDIAC MARKERS ( 09 Feb 2020 00:54 )  x     / x     / 242 U/L / x     / 4.8 ng/mL  CARDIAC MARKERS ( 08 Feb 2020 21:29 )  x     / x     / 231 U/L / x     / 4.8 ng/mL  CARDIAC MARKERS ( 08 Feb 2020 19:47 )  x     / x     / 238 U/L / x     / 4.6 ng/mL            All consultant(s) notes reviewed and care discussed with other providers        Contact Number, Dr Douglas [phone]

## 2020-02-10 NOTE — PROGRESS NOTE ADULT - PROBLEM SELECTOR PLAN 1
unclear etiology  echocardiogram unchanged  cleared by cardiology attending   ESR however is high  will obtain CT chest/abdomen/pelvis to r/o other etiologies  cardiology help appreciated

## 2020-02-11 ENCOUNTER — TRANSCRIPTION ENCOUNTER (OUTPATIENT)
Age: 85
End: 2020-02-11

## 2020-02-11 VITALS
OXYGEN SATURATION: 100 % | TEMPERATURE: 99 F | RESPIRATION RATE: 18 BRPM | HEART RATE: 68 BPM | SYSTOLIC BLOOD PRESSURE: 146 MMHG | DIASTOLIC BLOOD PRESSURE: 77 MMHG

## 2020-02-11 LAB
GLUCOSE BLDC GLUCOMTR-MCNC: 163 MG/DL — HIGH (ref 70–99)
GLUCOSE BLDC GLUCOMTR-MCNC: 268 MG/DL — HIGH (ref 70–99)

## 2020-02-11 PROCEDURE — 80053 COMPREHEN METABOLIC PANEL: CPT

## 2020-02-11 PROCEDURE — 99285 EMERGENCY DEPT VISIT HI MDM: CPT

## 2020-02-11 PROCEDURE — 82550 ASSAY OF CK (CPK): CPT

## 2020-02-11 PROCEDURE — 80048 BASIC METABOLIC PNL TOTAL CA: CPT

## 2020-02-11 PROCEDURE — 99238 HOSP IP/OBS DSCHRG MGMT 30/<: CPT

## 2020-02-11 PROCEDURE — 71045 X-RAY EXAM CHEST 1 VIEW: CPT

## 2020-02-11 PROCEDURE — 93005 ELECTROCARDIOGRAM TRACING: CPT

## 2020-02-11 PROCEDURE — 82553 CREATINE MB FRACTION: CPT

## 2020-02-11 PROCEDURE — 85027 COMPLETE CBC AUTOMATED: CPT

## 2020-02-11 PROCEDURE — 71250 CT THORAX DX C-: CPT

## 2020-02-11 PROCEDURE — 85652 RBC SED RATE AUTOMATED: CPT

## 2020-02-11 PROCEDURE — 74176 CT ABD & PELVIS W/O CONTRAST: CPT

## 2020-02-11 PROCEDURE — 85730 THROMBOPLASTIN TIME PARTIAL: CPT

## 2020-02-11 PROCEDURE — 93306 TTE W/DOPPLER COMPLETE: CPT

## 2020-02-11 PROCEDURE — 84484 ASSAY OF TROPONIN QUANT: CPT

## 2020-02-11 PROCEDURE — 85610 PROTHROMBIN TIME: CPT

## 2020-02-11 PROCEDURE — 82962 GLUCOSE BLOOD TEST: CPT

## 2020-02-11 RX ORDER — LOSARTAN POTASSIUM 100 MG/1
1 TABLET, FILM COATED ORAL
Qty: 30 | Refills: 0
Start: 2020-02-11 | End: 2020-03-11

## 2020-02-11 RX ORDER — LANOLIN ALCOHOL/MO/W.PET/CERES
1 CREAM (GRAM) TOPICAL
Qty: 0 | Refills: 0 | DISCHARGE
Start: 2020-02-11

## 2020-02-11 RX ADMIN — LOSARTAN POTASSIUM 25 MILLIGRAM(S): 100 TABLET, FILM COATED ORAL at 05:01

## 2020-02-11 RX ADMIN — Medication 1 DROP(S): at 11:43

## 2020-02-11 RX ADMIN — HEPARIN SODIUM 5000 UNIT(S): 5000 INJECTION INTRAVENOUS; SUBCUTANEOUS at 05:01

## 2020-02-11 RX ADMIN — Medication 0.12 MILLIGRAM(S): at 05:01

## 2020-02-11 RX ADMIN — LIDOCAINE 1 PATCH: 4 CREAM TOPICAL at 11:44

## 2020-02-11 RX ADMIN — CLOPIDOGREL BISULFATE 75 MILLIGRAM(S): 75 TABLET, FILM COATED ORAL at 11:43

## 2020-02-11 RX ADMIN — Medication 3: at 11:44

## 2020-02-11 RX ADMIN — Medication 1: at 07:48

## 2020-02-11 RX ADMIN — Medication 3 MILLIGRAM(S): at 01:11

## 2020-02-11 RX ADMIN — Medication 88 MICROGRAM(S): at 05:01

## 2020-02-11 RX ADMIN — Medication 81 MILLIGRAM(S): at 11:43

## 2020-02-11 RX ADMIN — Medication 1 MILLIGRAM(S): at 11:43

## 2020-02-11 RX ADMIN — CARVEDILOL PHOSPHATE 6.25 MILLIGRAM(S): 80 CAPSULE, EXTENDED RELEASE ORAL at 05:01

## 2020-02-11 NOTE — PROGRESS NOTE ADULT - ATTENDING COMMENTS
84 year old woman returns with atypical symptoms which she cannot recall and history of recent complicated coronary intervention. No further testing or treatment indicated at this time.
discussed with patient's family at bedside in detail   cleared for discharge by all services

## 2020-02-11 NOTE — DISCHARGE NOTE PROVIDER - CARE PROVIDERS DIRECT ADDRESSES
[email or web address],DirectAddress_Unknown

## 2020-02-11 NOTE — DISCHARGE NOTE PROVIDER - NSDCCPCAREPLAN_GEN_ALL_CORE_FT
PRINCIPAL DISCHARGE DIAGNOSIS  Diagnosis: Chest pain  Assessment and Plan of Treatment:   HOME CARE INSTRUCTIONS  For the next few days, avoid physical activities that bring on chest pain. Continue physical activities as directed.  Do not Informed pt of the various negative side effects of smoking including risk of COPD, Lung Ca etc  Strongly recommended that pt stops smoking and pt given various options of smoking cessasion tools such as NRT's and other pharmacotherapies.  Avoid drinking alcohol.   Only take over-the-counter or prescription medicine for pain, discomfort, or fever as directed by your caregiver.  Follow your caregiver's suggestions for further testing if your chest pain does not go away.  Keep any follow-up appointments you made. If you do not go to an appointment, you could develop lasting (chronic) problems with pain. If there is any problem keeping an appointment, you must call to reschedule.   SEEK MEDICAL CARE IF:  You think you are having problems from the medicine you are taking. Read your medicine instructions carefully.  Your chest pain does not go away, even after treatment.  You develop a rash with blisters on your chest.  SEEK IMMEDIATE MEDICAL CARE IF:  You have increased chest pain or pain that spreads to your arm, neck, jaw, back, or abdomen.   You develop shortness of breath, an increasing cough, or you are coughing up blood.  You have severe back or abdominal pain, feel nauseous, or vomit.  You develop severe weakness, fainting, or chills.  You have a fever.  THIS IS AN EMERGENCY. Do not wait to see if the pain will go away. Get medical help at once. Call your local emergency services (_____________________). Do not drive yourself to the hospital.      SECONDARY DISCHARGE DIAGNOSES  Diagnosis: HLD (hyperlipidemia)  Assessment and Plan of Treatment: Low salt, low fat, low cholesterol, diabetic diet if appropriate  Continue medication as prescribed  Exercise, increase your activity level  Pt. verbalized an understanding of all instructions.    Diagnosis: DM (diabetes mellitus)  Assessment and Plan of Treatment: HgA1C this admission.  Make sure you get your HgA1c checked every three months.  If you take oral diabetes medications, check your blood glucose two times a day.  If you take insulin, check your blood glucose before meals and at bedtime.  It's important not to skip any meals.  Keep a log of your blood glucose results and always take it with you to your doctor appointments.  Keep a list of your current medications including injectables and over the counter medications and bring this medication list with you to all your doctor appointments.  If you have not seen your ophthalmologist this year call for appointment.  Check your feet daily for redness, sores, or openings. Do not self treat. If no improvement in two days call your primary care physician for an appointment.  Low blood sugar (hypoglycemia) is a blood sugar below 70mg/dl. Check your blood sugar if you feel signs/symptoms of hypoglycemia. If your blood sugar is below 70 take 15 grams of carbohydrates (ex 4 oz of apple juice, 3-4 glucose tablets, or 4-6 oz of regular soda) wait 15 minutes and repeat blood sugar to make sure it comes up above 70.  If your blood sugar is above 70 and you are due for a meal, have a meal.  If you are not due for a meal have a snack.  This snack helps keeps your blood sugar at a safe range.    Diagnosis: CAD (coronary artery disease)  Assessment and Plan of Treatment: Coronary artery disease is a condition where the arteries the supply the heart muscle get clogges with fatty deposits & puts you at risk for a heart attack  Call your doctor if you have any new pain, pressure, or discomfort in the center of your chest, pain, tingling or discomfort in arms, back, neck, jaw, or stomach, shortness of breath, nausea, vomiting, burping or heartburn, sweating, cold and clammy skin, racing or abnormal heartbeat for more than 10 minutes or if they keep coming & going.  Call 911 and do not tr to get to hospital by care  You can help yourself with lefestyle changes (quitting smoking if you Informed pt of the various negative side effects of smoking including risk of COPD, Lung Ca etc  Strongly recommended that pt stops smoking and pt given various options of smoking cessasion tools such as NRT's and other pharmacotherapies), eat lots of fruits & vegetables & low fat dairy products, not a lot of meat & fatty foods, walk or some form of physical activity most days of the week, lose weight if you are overweight  Take your cardiac medication as prescribed to lower cholesterol, to lower blood pressure, aspirin to prevent blood clots, and diabetes control  Make sure to keep appointments with doctor for cardiac follow up care

## 2020-02-11 NOTE — DISCHARGE NOTE PROVIDER - CARE PROVIDER_API CALL
Eric Guevara (MD)  Cardiovascular Disease; Internal Medicine  [address] [facility] [facility] [address]  [place] [address]  Phone: [phone]  Fax: [phone]  Follow Up Time:     Casey Sanchez)  Internal Medicine  [address] [address] [place], [place] [address]  Phone: [phone]  Fax: [phone]  Follow Up Time:

## 2020-02-11 NOTE — DISCHARGE NOTE PROVIDER - HOSPITAL COURSE
84 yr old Citizen of [place] female with PMH of dementia (unclear baseline), MI (remote), CAD with prior stent, HTN, HLD, HFrEF, suspected A fib (on digoxin/ASA), no implantable cardiac monitoring device, CKD,  DMT2- well controlled, complicated by CKD, followed by PCP, hypothyroidism presented today with palpitations and chest pressure. Started upon waking up this AM. No fevers no chills no cough. Recent discharge after stent and coronary perforation after cardiology had cleared    ·  Problem: Chest pain.  Plan: unclear etiology    echocardiogram unchanged    cleared by cardiology attending     ESR however is high    will obtain CT chest/abdomen/pelvis to r/o other etiologies    cardiology help appreciated.         ·  Problem: DM (diabetes mellitus).  Plan: continue Lantus    finger sticks with short acting insulin sliding scale    no oral meds.         ·  Problem: Dementia.  Plan: supportive care    fall precautions     DVT prophylaxis.         ·  Problem: HTN (hypertension).  Plan: continue home meds with hold parameters     will continue to monitor.         ·  Problem: HLD (hyperlipidemia).  Plan: continue statin     Lipitor 40 po qd.         Problem: CAD (coronary artery disease). Plan: cardiology attending following    will continue to follow recommendations.    Work up negative.  D/C to home.

## 2020-02-11 NOTE — PROGRESS NOTE ADULT - SUBJECTIVE AND OBJECTIVE BOX
Patient is a 84y old  Female who presents with a chief complaint of chest pain and palpitations (11 Feb 2020 10:04)      SUBJECTIVE / OVERNIGHT EVENTS: overnight events noted  lying in bed no apparent distress     ROS:  Resp: No cough no sputum production  CVS: No chest pain no palpitations no orthopnea  GI: no N/V/D  : no dysuria, no hematuria  Neuro: no weakness no paresthesias  Heme: No petechiae no easy bruising  Msk: No joint pain no swelling  Skin: No rash no itching        MEDICATIONS  (STANDING):  artificial tears (preservative free) Ophthalmic Solution 1 Drop(s) Both EYES daily  aspirin enteric coated 81 milliGRAM(s) Oral daily  atorvastatin 40 milliGRAM(s) Oral at bedtime  carvedilol 6.25 milliGRAM(s) Oral every 12 hours  clopidogrel Tablet 75 milliGRAM(s) Oral daily  dextrose 5%. 1000 milliLiter(s) (50 mL/Hr) IV Continuous <Continuous>  dextrose 50% Injectable 12.5 Gram(s) IV Push once  dextrose 50% Injectable 25 Gram(s) IV Push once  dextrose 50% Injectable 25 Gram(s) IV Push once  digoxin     Tablet 0.125 milliGRAM(s) Oral daily  donepezil 10 milliGRAM(s) Oral at bedtime  ergocalciferol 95557 Unit(s) Oral every week  folic acid 1 milliGRAM(s) Oral daily  heparin  Injectable 5000 Unit(s) SubCutaneous every 12 hours  influenza   Vaccine 0.5 milliLiter(s) IntraMuscular once  insulin glargine Injectable (LANTUS) 30 Unit(s) SubCutaneous at bedtime  insulin lispro (HumaLOG) corrective regimen sliding scale   SubCutaneous three times a day before meals  insulin lispro (HumaLOG) corrective regimen sliding scale   SubCutaneous at bedtime  levothyroxine 88 MICROGram(s) Oral daily  lidocaine   Patch 1 Patch Transdermal daily  losartan 25 milliGRAM(s) Oral daily  memantine 5 milliGRAM(s) Oral at bedtime  senna 2 Tablet(s) Oral at bedtime    MEDICATIONS  (PRN):  bisacodyl Suppository 10 milliGRAM(s) Rectal daily PRN Constipation  dextrose 40% Gel 15 Gram(s) Oral once PRN Blood Glucose LESS THAN 70 milliGRAM(s)/deciliter  glucagon  Injectable 1 milliGRAM(s) IntraMuscular once PRN Glucose LESS THAN 70 milligrams/deciliter  melatonin 3 milliGRAM(s) Oral at bedtime PRN Insomnia        CAPILLARY BLOOD GLUCOSE      POCT Blood Glucose.: 163 mg/dL (11 Feb 2020 07:18)  POCT Blood Glucose.: 333 mg/dL (10 Feb 2020 21:11)  POCT Blood Glucose.: 193 mg/dL (10 Feb 2020 16:34)  POCT Blood Glucose.: 284 mg/dL (10 Feb 2020 11:32)    I&O's Summary    10 Feb 2020 07:01  -  11 Feb 2020 07:00  --------------------------------------------------------  IN: 980 mL / OUT: 1900 mL / NET: -920 mL    11 Feb 2020 07:01  -  11 Feb 2020 11:18  --------------------------------------------------------  IN: 240 mL / OUT: 500 mL / NET: -260 mL        Vital Signs Last 24 Hrs  T(C): 37 (11 Feb 2020 11:06), Max: 37.3 (10 Feb 2020 20:03)  T(F): 98.6 (11 Feb 2020 11:06), Max: 99.1 (10 Feb 2020 20:03)  HR: 68 (11 Feb 2020 11:06) (68 - 75)  BP: 146/77 (11 Feb 2020 11:06) (124/70 - 156/73)  BP(mean): --  RR: 18 (11 Feb 2020 11:06) (18 - 18)  SpO2: 100% (11 Feb 2020 11:06) (99% - 100%)    PHYSICAL EXAM:  GENERAL: in no apparent distress  HEAD:  Atraumatic, Normocephalic  EYES: EOMI, PERRLA, conjunctiva and sclera clear  NECK: Supple, No JVD  CHEST/LUNG: no wheeze, decreased breath sounds at bases   HEART: S1 S2; soft ejection systolic murmur best heard at left sternal border   ABDOMEN: Soft, Nontender, Nondistended; Bowel sounds present  EXTREMITIES:  No clubbing or cyanosis, + Peripheral Pulses,  no edema  NEUROLOGY: alert, confused grossly non-focal  SKIN: No rashes or lesions    LABS:                        9.2    7.29  )-----------( 315      ( 10 Feb 2020 06:56 )             30.0     02-10    135  |  101  |  37<H>  ----------------------------<  163<H>  4.7   |  21<L>  |  1.96<H>    Ca    9.6      10 Feb 2020 06:56                  All consultant(s) notes reviewed and care discussed with other providers        Contact Number, Dr Douglas [phone]

## 2020-02-11 NOTE — DISCHARGE NOTE PROVIDER - NSDCMRMEDTOKEN_GEN_ALL_CORE_FT
aspirin 81 mg oral delayed release tablet: 1 tab(s) orally once a day  atorvastatin 40 mg oral tablet: 1 tab(s) orally once a day (at bedtime)  bisacodyl 10 mg rectal suppository: 1 suppository(ies) rectal once a day, As needed, Constipation  carvedilol 6.25 mg oral tablet: 1 tab(s) orally every 12 hours  clopidogrel 75 mg oral tablet: 1 tab(s) orally once a day  digoxin 125 mcg (0.125 mg) oral tablet: 1 tab(s) orally once a day  donepezil 10 mg oral tablet: 1 tab(s) orally once a day (at bedtime)  folic acid 1 mg oral tablet: 1 tab(s) orally once a day  insulin glargine 100 units/mL subcutaneous solution: 30 unit(s) subcutaneous once a day (at bedtime)   levothyroxine 88 mcg (0.088 mg) oral tablet: 1 tab(s) orally once a day  losartan 25 mg oral tablet: 1 tab(s) orally once a day  melatonin 3 mg oral tablet: 1 tab(s) orally once a day (at bedtime), As needed, Insomnia  memantine 5 mg oral tablet: 1 tab(s) orally once a day (at bedtime)  ocular lubricant ophthalmic solution: 1 drop(s) to each affected eye once a day  Prandin 2 mg oral tablet: 1 tab(s) orally 3 times a day (before meals)  senna oral tablet: 2 tab(s) orally once a day (at bedtime)  Vitamin D2 50,000 intl units (1.25 mg) oral capsule: 1 cap(s) orally once a week

## 2020-02-11 NOTE — DISCHARGE NOTE NURSING/CASE MANAGEMENT/SOCIAL WORK - PATIENT PORTAL LINK FT
You can access the FollowMyHealth Patient Portal offered by [facility] by registering at the following website: [email or web address]. By joining [employer]’s FollowMyHealth portal, you will also be able to view your health information using other applications (apps) compatible with our system.

## 2020-02-11 NOTE — DISCHARGE NOTE NURSING/CASE MANAGEMENT/SOCIAL WORK - NSDCFUADDAPPT_GEN_ALL_CORE_FT
Patient stated she was referred to Dr. Ferreira and was told he would be checking the tests she had done for Lymphoma. The patient would like to know if those results are in.    Follow up with cardiology and your PCP in 1 week

## 2020-04-02 PROBLEM — N32.9 LESION OF BLADDER: Status: ACTIVE | Noted: 2019-08-21

## 2020-06-01 NOTE — H&P ADULT - EJECTION FRACTION >40 %
Render Note In Bullet Format When Appropriate: No Post-Care Instructions: I reviewed with the patient in detail post-care instructions. Patient is to wear sunprotection, and avoid picking at any of the treated lesions. Pt may apply Vaseline to crusted or scabbing areas. Consent: The patient's consent was obtained including but not limited to risks of crusting, scabbing, blistering, scarring, darker or lighter pigmentary change, recurrence, incomplete removal and infection. Detail Level: Zone Duration Of Freeze Thaw-Cycle (Seconds): 5 Medical Necessity Clause: This procedure was medically necessary because the lesions that were treated were: Medical Necessity Information: It is in your best interest to select a reason for this procedure from the list below. All of these items fulfill various CMS LCD requirements except the new and changing color options. Detail Level: Detailed no

## 2020-10-14 NOTE — ED PROVIDER NOTE - INPATIENT RESIDENT/ACP NOTIFIED DATE
NURSING ADMISSION NOTE      Patient admitted via cart from ED at 2220  Oriented to room. Safety precautions initiated. Bed in low position. Call light in reach.     Admission navigator completed with wife at bedside  Pt with poor memory, able to stat 31-Jul-2019 22:20

## 2021-03-12 NOTE — ED PROVIDER NOTE - CPE EDP GASTRO NORM
Render Risk Assessment In Note?: no Detail Level: Detailed Additional Notes: Punch biopsy handout was provided. normal... Additional Notes: Sensitive skin care handout was provided.

## 2021-06-30 NOTE — ED PROVIDER NOTE - MUSCULOSKELETAL, MLM
Please advise patient to continue taking the Diltiazem 240 mg one po once a day and Diovan--25 mg one po once a day.   Recommend that he discontinue taking the Norvasc and switch it to Toprol XL 50 mg one po once a day #30 1 RF.  Repeat a blood pressure check in 1 week.    Spine appears normal, range of motion is not limited, no muscle or joint tenderness

## 2021-09-03 NOTE — H&P ADULT - HISTORY OF PRESENT ILLNESS
82 y/o female with history of CAD s/p stents 2013, hypothyroidism, HLD, HTN, HLD, dementia (baseline AOx1) but will follow commands presents to the ED via family members for worsening urinary frequency as well as more frequent falls over the last 2 weeks. Pt at baseline is intermittently confused and is completely dependent on ADLs from family. History obtained mostly from family. She is essentially nonambulatory now for a few months and requires assistance with walking to the bathroom. She has been complaining of urinary frequency for 2 weeks, was put on macrobid by her PCP but symptoms have not improved. No fevers, chills reported, but she does have suprapubic pain. Per family she has been more lethargic since these symptoms, however over the course of a few months now she has also been losing weight with poor appetite. She has been falling intermittently while unsupervised as she gets confused and forgets that she has difficulty walking. In the past two weeks she has fallen multiple times all unwitnessed The last episode happened two nights ago when she was found in the bathroom on the floor. She reportedly was awake, did not remember falling, but denied having chest pain, shortness of breath, headache or bowel incontinence. She was noted to have ecchymosis around her eye and so was brought to her PCP today who referred her to ED. In the ED she was pan scanned for trauma eval, found to have UTI on UA and electrolyte abn to be admitted.
Detail Level: Zone
Detail Level: Simple
Bleaching Agents Recommendations: Revitalize pads BID, SPF QD, Eventone for anti-aging and sunspots

## 2021-11-08 NOTE — PROGRESS NOTE ADULT - SUBJECTIVE AND OBJECTIVE BOX
Physical Therapy Note    PT orders received and acknowledged. Chart reviewed. Patient evaluated. Ambulates short distances with CGA and RW. Recommend D/C with RW use and HH PT.  Full documentation to follow    Estephania Smith DPT Patient seen and examined at bedside.    Overnight Events:     Review Of Systems: No chest pain, shortness of breath, or palpitations            Medications:  artificial tears (preservative free) Ophthalmic Solution 1 Drop(s) Both EYES daily  aspirin enteric coated 81 milliGRAM(s) Oral daily  atorvastatin 40 milliGRAM(s) Oral at bedtime  bisacodyl Suppository 10 milliGRAM(s) Rectal daily PRN  carvedilol 6.25 milliGRAM(s) Oral every 12 hours  clopidogrel Tablet 75 milliGRAM(s) Oral daily  dextrose 40% Gel 15 Gram(s) Oral once PRN  dextrose 5%. 1000 milliLiter(s) IV Continuous <Continuous>  dextrose 50% Injectable 12.5 Gram(s) IV Push once  dextrose 50% Injectable 25 Gram(s) IV Push once  dextrose 50% Injectable 25 Gram(s) IV Push once  digoxin     Tablet 0.125 milliGRAM(s) Oral daily  donepezil 10 milliGRAM(s) Oral at bedtime  ergocalciferol 46877 Unit(s) Oral every week  folic acid 1 milliGRAM(s) Oral daily  glucagon  Injectable 1 milliGRAM(s) IntraMuscular once PRN  heparin  Injectable 5000 Unit(s) SubCutaneous every 12 hours  influenza   Vaccine 0.5 milliLiter(s) IntraMuscular once  insulin glargine Injectable (LANTUS) 30 Unit(s) SubCutaneous at bedtime  insulin lispro (HumaLOG) corrective regimen sliding scale   SubCutaneous three times a day before meals  insulin lispro (HumaLOG) corrective regimen sliding scale   SubCutaneous at bedtime  levothyroxine 88 MICROGram(s) Oral daily  lidocaine   Patch 1 Patch Transdermal daily  losartan 25 milliGRAM(s) Oral daily  memantine 5 milliGRAM(s) Oral at bedtime  senna 2 Tablet(s) Oral at bedtime      PAST MEDICAL & SURGICAL HISTORY:  Dementia  Hypothyroid  HLD (hyperlipidemia)  HTN (hypertension)  DM (diabetes mellitus)  CAD (coronary artery disease)  MI (myocardial infarction)  Stented coronary artery      Vitals:  T(F): 98.5 (02-10), Max: 98.9 (02-09)  HR: 72 (02-10) (72 - 78)  BP: 147/75 (02-10) (138/73 - 147/75)  RR: 18 (02-10)  SpO2: 97% (02-10)  I&O's Summary    09 Feb 2020 07:01  -  10 Feb 2020 07:00  --------------------------------------------------------  IN: 610 mL / OUT: 800 mL / NET: -190 mL        Physical Exam:  Appearance: No acute distress; well appearing  Eyes: PERRL, EOMI, pink conjunctiva  HENT: Normal oral muscosa  Cardiovascular: RRR, S1, S2, no murmurs, rubs, or gallops; no edema; no JVD  Respiratory: Clear to auscultation bilaterally  Gastrointestinal: soft, non-tender, non-distended with normal bowel sounds  Musculoskeletal: No clubbing; no joint deformity   Neurologic: Non-focal  Lymphatic: No lymphadenopathy  Psychiatry: AAOx3, mood & affect appropriate  Skin: No rashes, ecchymoses, or cyanosis                          8.3    7.27  )-----------( 275      ( 08 Feb 2020 19:47 )             26.2     02-08    132<L>  |  100  |  41<H>  ----------------------------<  231<H>  4.9   |  20<L>  |  1.85<H>    Ca    9.3      08 Feb 2020 19:47    TPro  7.4  /  Alb  3.3  /  TBili  0.1<L>  /  DBili  x   /  AST  19  /  ALT  24  /  AlkPhos  70  02-08    PT/INR - ( 08 Feb 2020 19:47 )   PT: 12.7 sec;   INR: 1.10 ratio         PTT - ( 08 Feb 2020 19:47 )  PTT:28.3 sec  CARDIAC MARKERS ( 09 Feb 2020 00:54 )  x     / x     / 242 U/L / x     / 4.8 ng/mL  CARDIAC MARKERS ( 08 Feb 2020 21:29 )  x     / x     / 231 U/L / x     / 4.8 ng/mL  CARDIAC MARKERS ( 08 Feb 2020 19:47 )  x     / x     / 238 U/L / x     / 4.6 ng/mL      Serum Pro-Brain Natriuretic Peptide: 2833 pg/mL (02-05 @ 06:35)          New ECG(s): Personally reviewed  NSR @84BPM,w/ LBBB, LAD, no ischemic changes, unchanged from prior      Echo:  Limited TTE 2/4/20  CONCLUSIONS:    Small pericardial effusion. Echogenic material seen within  the pericardial space. Late diastolic RA inversion is  present suggestive of raised intraperciardial pressure.  No  echocardiographic evidence of pericardial tamponade. HR  80s, /63  ------------------------------------------------------------------------  Confirmed on  2/4/2020 - 17:04:13 by Karley Lares M.D.  ------------------------------------------------------------------------    Dimensions:    Normal Values:  LA:     4.0    2.0 - 4.0 cm  Ao:     2.9    2.0 - 3.8 cm  SEPTUM: 1.0    0.6 - 1.2 cm  PWT:    1.0    0.6 - 1.1 cm  LVIDd:  4.6    3.0 - 5.6 cm  LVIDs:  3.1    1.8 - 4.0 cm  Derived variables:  LVMI: 89 g/m2  RWT: 0.43  Fractional short: 32 %  EF (Randhawa Rule): 43 %Doppler Peak Velocity (m/sec):  MV=1.5 AoV=1.8  ------------------------------------------------------------------------  Observations:  Mitral Valve: Mitral annular calcification and calcified  mitral leaflets. Mild-moderate mitral regurgitation.  Aortic Valve/Aorta: Calcified aortic valve with decreased  opening. Peak transaortic valve gradient equals 14 mm Hg,  mean transaortic valve gradient equals 7 mm Hg, estimated  aortic valve area equals 1.6 sqcm (by continuity equation),  aortic valve velocity time integral equals 37 cm,  consistent with mild aortic stenosis. Minimal aortic  regurgitation.  Peak left ventricular outflow tract  gradient equals 4 mm Hg, mean gradient is equal to 3 mm Hg,  LVOT velocity time integral equals 20 cm.  Aortic Root: 2.9 cm.  Left Atrium: Normal left atrium.  LA volume index = 24  cc/m2.  Left Ventricle: Mild to moderate segmental left ventricular  systolic dysfunction.  Severe hypokinesis of the mid to  apical anteroseptum, anterior wal and apex.   Endocardial  visualization enhanced with intravenous injection of  Ultrasonic Enhancing Agent (Definity). No left ventricular  thrombus. Normal left ventricular internal dimensions and  wall thicknesses. Mild diastolic dysfunction (Stage I).  Right Heart: Intermittant right atrial free wall diastolic  inversion.  The IVC is not imaged. Normal right ventricular  size and function. Normal tricuspid valve. Minimal  tricuspid regurgitation. Normal pulmonic valve.  Pericardium/Pleura: Small pericardial effusion with  echogenic material  seen anterior to the right ventricle  Hemodynamic: Estimated right atrial pressure is 8 mm Hg.  ------------------------------------------------------------------------  Conclusions:  1. Normal left ventricular internal dimensions and wall  thicknesses.  2. Mild to moderate segmental left ventricular systolic  dysfunction.  Severe hypokinesis of the mid to apical  anteroseptum, anterior wal and apex.   Endocardial  visualization enhanced with intravenous injection of  Ultrasonic Enhancing Agent (Definity). No left ventricular  thrombus.  3. Mild diastolicdysfunction (Stage I).  4. Intermittant right atrial free wall diastolic inversion.   The IVC is not imaged.  5. Normal right ventricular size and function.  6. Small pericardial effusion with echogenic material  seen  anterior to the right ventricle  *** Compared with echocardiogram of 2/4/2020, no  significant changes noted.  ------------------------------------------------------------------------  Confirmed on  2/9/2020 - 16:03:14 by Elgin Dewitt M.D.  ------------------------------------------------------------------------    Cath:  [facility] 1/31/20  CORONARY VESSELS: The coronary circulation is left dominant.  LM:   --  LM: Normal.  LAD:   --  Mid LAD: There was a 99 % stenosis.  --  Distal LAD: There was a 80 % stenosis.  --  D1: There was a 100 % stenosis.  CX:   --  Distal circumflex: Angiography showed mild atherosclerosis with  no flow limiting lesions.  RCA:   --  Proximal RCA: There was a 100 % stenosis.  COMPLICATIONS: There were no complications.  INTERVENTIONAL RECOMMENDATIONS: DAPT for 1 year. After balloon of the LAD  the D1 was occluded. diag was wired with miracle wire but after balloon of  the diag there was a small coronary perforation. This was covered with a  papyrus covered stent in viktor LAD. A second papyrus stent was placed in the  proximal LAD after the stent came off the balloon attempting to pass it.  That stent was progressively deployed with balloons and IVUS confirmed  proper deployment in the ostial and proximal LAD  Prepared and signed by  Wiley Willis M.D.  Signed 02/03/2020 10:13:28    Interpretation of Telemetry: Sinus 60-80s

## 2021-12-13 NOTE — PATIENT PROFILE ADULT - NSPROEDAABILITYLEARN_GEN_A_NUR
Recommend [employer] lid/face cleanser qhs OU (gentle formula). pt with baseline dementia/cognitive limitations

## 2022-01-12 ENCOUNTER — TRANSCRIPTION ENCOUNTER (OUTPATIENT)
Age: 87
End: 2022-01-12

## 2022-01-13 ENCOUNTER — INPATIENT (INPATIENT)
Facility: HOSPITAL | Age: 87
LOS: 8 days | Discharge: HOME HEALTH | End: 2022-01-22
Attending: INTERNAL MEDICINE | Admitting: INTERNAL MEDICINE
Payer: MEDICARE

## 2022-01-13 VITALS
RESPIRATION RATE: 41 BRPM | OXYGEN SATURATION: 99 % | HEART RATE: 97 BPM | TEMPERATURE: 103 F | SYSTOLIC BLOOD PRESSURE: 158 MMHG | DIASTOLIC BLOOD PRESSURE: 98 MMHG | HEIGHT: 63 IN | WEIGHT: 125 LBS

## 2022-01-13 LAB
ALBUMIN SERPL ELPH-MCNC: 2.4 G/DL — LOW (ref 3.3–5)
ALP SERPL-CCNC: 65 U/L — SIGNIFICANT CHANGE UP (ref 40–120)
ALT FLD-CCNC: 42 U/L — SIGNIFICANT CHANGE UP (ref 12–78)
ANION GAP SERPL CALC-SCNC: 9 MMOL/L — SIGNIFICANT CHANGE UP (ref 5–17)
APPEARANCE UR: CLEAR — SIGNIFICANT CHANGE UP
APTT BLD: 28.2 SEC — SIGNIFICANT CHANGE UP (ref 27.5–35.5)
AST SERPL-CCNC: 54 U/L — HIGH (ref 15–37)
BACTERIA # UR AUTO: ABNORMAL
BASOPHILS # BLD AUTO: 0 K/UL — SIGNIFICANT CHANGE UP (ref 0–0.2)
BASOPHILS NFR BLD AUTO: 0 % — SIGNIFICANT CHANGE UP (ref 0–2)
BILIRUB SERPL-MCNC: 0.3 MG/DL — SIGNIFICANT CHANGE UP (ref 0.2–1.2)
BILIRUB UR-MCNC: NEGATIVE — SIGNIFICANT CHANGE UP
BLD GP AB SCN SERPL QL: SIGNIFICANT CHANGE UP
BUN SERPL-MCNC: 50 MG/DL — HIGH (ref 7–23)
CALCIUM SERPL-MCNC: 8.7 MG/DL — SIGNIFICANT CHANGE UP (ref 8.5–10.1)
CHLORIDE SERPL-SCNC: 104 MMOL/L — SIGNIFICANT CHANGE UP (ref 96–108)
CK MB BLD-MCNC: 2.2 % — SIGNIFICANT CHANGE UP (ref 0–3.5)
CK MB CFR SERPL CALC: 8.5 NG/ML — HIGH (ref 0.5–3.6)
CK SERPL-CCNC: 389 U/L — HIGH (ref 26–192)
CK SERPL-CCNC: 440 U/L — HIGH (ref 26–192)
CO2 SERPL-SCNC: 23 MMOL/L — SIGNIFICANT CHANGE UP (ref 22–31)
COLOR SPEC: YELLOW — SIGNIFICANT CHANGE UP
CREAT SERPL-MCNC: 2.46 MG/DL — HIGH (ref 0.5–1.3)
DIFF PNL FLD: ABNORMAL
DIGOXIN SERPL-MCNC: 0.13 NG/ML — LOW (ref 0.8–2)
EOSINOPHIL # BLD AUTO: 0 K/UL — SIGNIFICANT CHANGE UP (ref 0–0.5)
EOSINOPHIL NFR BLD AUTO: 0 % — SIGNIFICANT CHANGE UP (ref 0–6)
EPI CELLS # UR: SIGNIFICANT CHANGE UP
ERYTHROCYTE [SEDIMENTATION RATE] IN BLOOD: 94 MM/HR — HIGH (ref 0–20)
FLUAV AG NPH QL: SIGNIFICANT CHANGE UP
FLUBV AG NPH QL: SIGNIFICANT CHANGE UP
GAS PNL BLDA: SIGNIFICANT CHANGE UP
GAS PNL BLDA: SIGNIFICANT CHANGE UP
GLUCOSE BLDC GLUCOMTR-MCNC: 113 MG/DL — HIGH (ref 70–99)
GLUCOSE BLDC GLUCOMTR-MCNC: 228 MG/DL — HIGH (ref 70–99)
GLUCOSE BLDC GLUCOMTR-MCNC: 246 MG/DL — HIGH (ref 70–99)
GLUCOSE BLDC GLUCOMTR-MCNC: 314 MG/DL — HIGH (ref 70–99)
GLUCOSE BLDC GLUCOMTR-MCNC: 315 MG/DL — HIGH (ref 70–99)
GLUCOSE BLDC GLUCOMTR-MCNC: 325 MG/DL — HIGH (ref 70–99)
GLUCOSE SERPL-MCNC: 319 MG/DL — HIGH (ref 70–99)
GLUCOSE UR QL: NEGATIVE MG/DL — SIGNIFICANT CHANGE UP
GRAM STN FLD: SIGNIFICANT CHANGE UP
HCT VFR BLD CALC: 25.8 % — LOW (ref 34.5–45)
HCT VFR BLD CALC: 31.6 % — LOW (ref 34.5–45)
HGB BLD-MCNC: 10 G/DL — LOW (ref 11.5–15.5)
HGB BLD-MCNC: 8.2 G/DL — LOW (ref 11.5–15.5)
INR BLD: 1.11 RATIO — SIGNIFICANT CHANGE UP (ref 0.88–1.16)
KETONES UR-MCNC: NEGATIVE — SIGNIFICANT CHANGE UP
LACTATE SERPL-SCNC: 1.6 MMOL/L — SIGNIFICANT CHANGE UP (ref 0.7–2)
LACTATE SERPL-SCNC: 4.1 MMOL/L — CRITICAL HIGH (ref 0.7–2)
LEGIONELLA AG UR QL: NEGATIVE — SIGNIFICANT CHANGE UP
LEUKOCYTE ESTERASE UR-ACNC: NEGATIVE — SIGNIFICANT CHANGE UP
LG PLATELETS BLD QL AUTO: SLIGHT — SIGNIFICANT CHANGE UP
LIDOCAIN IGE QN: 280 U/L — SIGNIFICANT CHANGE UP (ref 73–393)
LYMPHOCYTES # BLD AUTO: 0.92 K/UL — LOW (ref 1–3.3)
LYMPHOCYTES # BLD AUTO: 17 % — SIGNIFICANT CHANGE UP (ref 13–44)
MAGNESIUM SERPL-MCNC: 2.9 MG/DL — HIGH (ref 1.6–2.6)
MANUAL SMEAR VERIFICATION: SIGNIFICANT CHANGE UP
MCHC RBC-ENTMCNC: 27.5 PG — SIGNIFICANT CHANGE UP (ref 27–34)
MCHC RBC-ENTMCNC: 28.5 PG — SIGNIFICANT CHANGE UP (ref 27–34)
MCHC RBC-ENTMCNC: 31.6 GM/DL — LOW (ref 32–36)
MCHC RBC-ENTMCNC: 31.8 GM/DL — LOW (ref 32–36)
MCV RBC AUTO: 86.6 FL — SIGNIFICANT CHANGE UP (ref 80–100)
MCV RBC AUTO: 90 FL — SIGNIFICANT CHANGE UP (ref 80–100)
MONOCYTES # BLD AUTO: 0.27 K/UL — SIGNIFICANT CHANGE UP (ref 0–0.9)
MONOCYTES NFR BLD AUTO: 5 % — SIGNIFICANT CHANGE UP (ref 2–14)
NEUTROPHILS # BLD AUTO: 4.13 K/UL — SIGNIFICANT CHANGE UP (ref 1.8–7.4)
NEUTROPHILS NFR BLD AUTO: 76 % — SIGNIFICANT CHANGE UP (ref 43–77)
NITRITE UR-MCNC: POSITIVE
NRBC # BLD: 0 /100 WBCS — SIGNIFICANT CHANGE UP (ref 0–0)
NRBC # BLD: 0 /100 — SIGNIFICANT CHANGE UP (ref 0–0)
NRBC # BLD: SIGNIFICANT CHANGE UP /100 WBCS (ref 0–0)
NT-PROBNP SERPL-SCNC: 9935 PG/ML — HIGH (ref 0–450)
PH UR: 6 — SIGNIFICANT CHANGE UP (ref 5–8)
PHOSPHATE SERPL-MCNC: 3.5 MG/DL — SIGNIFICANT CHANGE UP (ref 2.5–4.5)
PLAT MORPH BLD: NORMAL — SIGNIFICANT CHANGE UP
PLATELET # BLD AUTO: 115 K/UL — LOW (ref 150–400)
PLATELET # BLD AUTO: 148 K/UL — LOW (ref 150–400)
POTASSIUM SERPL-MCNC: 5.2 MMOL/L — SIGNIFICANT CHANGE UP (ref 3.5–5.3)
POTASSIUM SERPL-SCNC: 5.2 MMOL/L — SIGNIFICANT CHANGE UP (ref 3.5–5.3)
PROCALCITONIN SERPL-MCNC: 0.5 NG/ML — HIGH (ref 0.02–0.1)
PROT SERPL-MCNC: 7.8 GM/DL — SIGNIFICANT CHANGE UP (ref 6–8.3)
PROT UR-MCNC: 100 MG/DL
PROTHROM AB SERPL-ACNC: 12.8 SEC — SIGNIFICANT CHANGE UP (ref 10.6–13.6)
RBC # BLD: 2.98 M/UL — LOW (ref 3.8–5.2)
RBC # BLD: 3.51 M/UL — LOW (ref 3.8–5.2)
RBC # FLD: 13.5 % — SIGNIFICANT CHANGE UP (ref 10.3–14.5)
RBC # FLD: 13.9 % — SIGNIFICANT CHANGE UP (ref 10.3–14.5)
RBC BLD AUTO: SIGNIFICANT CHANGE UP
RBC CASTS # UR COMP ASSIST: SIGNIFICANT CHANGE UP /HPF (ref 0–4)
SARS-COV-2 RNA SPEC QL NAA+PROBE: DETECTED
SODIUM SERPL-SCNC: 136 MMOL/L — SIGNIFICANT CHANGE UP (ref 135–145)
SP GR SPEC: 1.02 — SIGNIFICANT CHANGE UP (ref 1.01–1.02)
SPECIMEN SOURCE: SIGNIFICANT CHANGE UP
TROPONIN I, HIGH SENSITIVITY RESULT: 346.7 NG/L — HIGH
TROPONIN I, HIGH SENSITIVITY RESULT: 4045.6 NG/L — HIGH
TROPONIN I, HIGH SENSITIVITY RESULT: 4490 NG/L — HIGH
TSH SERPL-MCNC: 1.07 UIU/ML — SIGNIFICANT CHANGE UP (ref 0.36–3.74)
UROBILINOGEN FLD QL: NEGATIVE MG/DL — SIGNIFICANT CHANGE UP
VARIANT LYMPHS # BLD: 2 % — SIGNIFICANT CHANGE UP (ref 0–6)
WBC # BLD: 4.05 K/UL — SIGNIFICANT CHANGE UP (ref 3.8–10.5)
WBC # BLD: 5.44 K/UL — SIGNIFICANT CHANGE UP (ref 3.8–10.5)
WBC # FLD AUTO: 4.05 K/UL — SIGNIFICANT CHANGE UP (ref 3.8–10.5)
WBC # FLD AUTO: 5.44 K/UL — SIGNIFICANT CHANGE UP (ref 3.8–10.5)
WBC UR QL: SIGNIFICANT CHANGE UP

## 2022-01-13 PROCEDURE — 93306 TTE W/DOPPLER COMPLETE: CPT | Mod: 26

## 2022-01-13 PROCEDURE — 71250 CT THORAX DX C-: CPT | Mod: 26

## 2022-01-13 PROCEDURE — 31500 INSERT EMERGENCY AIRWAY: CPT

## 2022-01-13 PROCEDURE — 71045 X-RAY EXAM CHEST 1 VIEW: CPT | Mod: 26

## 2022-01-13 PROCEDURE — 93010 ELECTROCARDIOGRAM REPORT: CPT

## 2022-01-13 PROCEDURE — 76937 US GUIDE VASCULAR ACCESS: CPT | Mod: 26

## 2022-01-13 PROCEDURE — 74176 CT ABD & PELVIS W/O CONTRAST: CPT | Mod: 26

## 2022-01-13 PROCEDURE — 99291 CRITICAL CARE FIRST HOUR: CPT | Mod: CS,25

## 2022-01-13 PROCEDURE — 36620 INSERTION CATHETER ARTERY: CPT

## 2022-01-13 RX ORDER — ACETAMINOPHEN 500 MG
1000 TABLET ORAL ONCE
Refills: 0 | Status: COMPLETED | OUTPATIENT
Start: 2022-01-13 | End: 2022-01-13

## 2022-01-13 RX ORDER — PROPOFOL 10 MG/ML
10 INJECTION, EMULSION INTRAVENOUS
Qty: 1000 | Refills: 0 | Status: DISCONTINUED | OUTPATIENT
Start: 2022-01-13 | End: 2022-01-15

## 2022-01-13 RX ORDER — DEXAMETHASONE 0.5 MG/5ML
6 ELIXIR ORAL DAILY
Refills: 0 | Status: DISCONTINUED | OUTPATIENT
Start: 2022-01-13 | End: 2022-01-22

## 2022-01-13 RX ORDER — FENTANYL CITRATE 50 UG/ML
1 INJECTION INTRAVENOUS
Qty: 2500 | Refills: 0 | Status: DISCONTINUED | OUTPATIENT
Start: 2022-01-13 | End: 2022-01-14

## 2022-01-13 RX ORDER — AZITHROMYCIN 500 MG/1
500 TABLET, FILM COATED ORAL EVERY 24 HOURS
Refills: 0 | Status: DISCONTINUED | OUTPATIENT
Start: 2022-01-14 | End: 2022-01-14

## 2022-01-13 RX ORDER — LEVOTHYROXINE SODIUM 125 MCG
44 TABLET ORAL AT BEDTIME
Refills: 0 | Status: DISCONTINUED | OUTPATIENT
Start: 2022-01-13 | End: 2022-01-22

## 2022-01-13 RX ORDER — DEXTROSE 50 % IN WATER 50 %
15 SYRINGE (ML) INTRAVENOUS ONCE
Refills: 0 | Status: DISCONTINUED | OUTPATIENT
Start: 2022-01-13 | End: 2022-01-16

## 2022-01-13 RX ORDER — CHLORHEXIDINE GLUCONATE 213 G/1000ML
15 SOLUTION TOPICAL EVERY 12 HOURS
Refills: 0 | Status: DISCONTINUED | OUTPATIENT
Start: 2022-01-13 | End: 2022-01-15

## 2022-01-13 RX ORDER — DEXTROSE 50 % IN WATER 50 %
12.5 SYRINGE (ML) INTRAVENOUS ONCE
Refills: 0 | Status: DISCONTINUED | OUTPATIENT
Start: 2022-01-13 | End: 2022-01-16

## 2022-01-13 RX ORDER — PIPERACILLIN AND TAZOBACTAM 4; .5 G/20ML; G/20ML
3.38 INJECTION, POWDER, LYOPHILIZED, FOR SOLUTION INTRAVENOUS ONCE
Refills: 0 | Status: COMPLETED | OUTPATIENT
Start: 2022-01-13 | End: 2022-01-13

## 2022-01-13 RX ORDER — PANTOPRAZOLE SODIUM 20 MG/1
40 TABLET, DELAYED RELEASE ORAL EVERY 12 HOURS
Refills: 0 | Status: DISCONTINUED | OUTPATIENT
Start: 2022-01-13 | End: 2022-01-17

## 2022-01-13 RX ORDER — DEXAMETHASONE 0.5 MG/5ML
6 ELIXIR ORAL DAILY
Refills: 0 | Status: DISCONTINUED | OUTPATIENT
Start: 2022-01-13 | End: 2022-01-13

## 2022-01-13 RX ORDER — SODIUM CHLORIDE 9 MG/ML
1750 INJECTION, SOLUTION INTRAVENOUS ONCE
Refills: 0 | Status: COMPLETED | OUTPATIENT
Start: 2022-01-13 | End: 2022-01-13

## 2022-01-13 RX ORDER — INSULIN LISPRO 100/ML
VIAL (ML) SUBCUTANEOUS EVERY 6 HOURS
Refills: 0 | Status: DISCONTINUED | OUTPATIENT
Start: 2022-01-13 | End: 2022-01-18

## 2022-01-13 RX ORDER — CEFTRIAXONE 500 MG/1
1000 INJECTION, POWDER, FOR SOLUTION INTRAMUSCULAR; INTRAVENOUS EVERY 24 HOURS
Refills: 0 | Status: COMPLETED | OUTPATIENT
Start: 2022-01-13 | End: 2022-01-17

## 2022-01-13 RX ORDER — VANCOMYCIN HCL 1 G
1000 VIAL (EA) INTRAVENOUS ONCE
Refills: 0 | Status: COMPLETED | OUTPATIENT
Start: 2022-01-13 | End: 2022-01-13

## 2022-01-13 RX ORDER — FENTANYL CITRATE 50 UG/ML
0.5 INJECTION INTRAVENOUS
Qty: 2500 | Refills: 0 | Status: DISCONTINUED | OUTPATIENT
Start: 2022-01-13 | End: 2022-01-13

## 2022-01-13 RX ORDER — AMPICILLIN SODIUM AND SULBACTAM SODIUM 250; 125 MG/ML; MG/ML
3 INJECTION, POWDER, FOR SUSPENSION INTRAMUSCULAR; INTRAVENOUS EVERY 12 HOURS
Refills: 0 | Status: DISCONTINUED | OUTPATIENT
Start: 2022-01-13 | End: 2022-01-13

## 2022-01-13 RX ORDER — ETOMIDATE 2 MG/ML
20 INJECTION INTRAVENOUS ONCE
Refills: 0 | Status: COMPLETED | OUTPATIENT
Start: 2022-01-13 | End: 2022-01-13

## 2022-01-13 RX ORDER — DEXTROSE 50 % IN WATER 50 %
25 SYRINGE (ML) INTRAVENOUS ONCE
Refills: 0 | Status: DISCONTINUED | OUTPATIENT
Start: 2022-01-13 | End: 2022-01-16

## 2022-01-13 RX ORDER — ROCURONIUM BROMIDE 10 MG/ML
70 VIAL (ML) INTRAVENOUS ONCE
Refills: 0 | Status: COMPLETED | OUTPATIENT
Start: 2022-01-13 | End: 2022-01-13

## 2022-01-13 RX ORDER — PANTOPRAZOLE SODIUM 20 MG/1
80 TABLET, DELAYED RELEASE ORAL ONCE
Refills: 0 | Status: COMPLETED | OUTPATIENT
Start: 2022-01-13 | End: 2022-01-13

## 2022-01-13 RX ORDER — INFLUENZA VIRUS VACCINE 15; 15; 15; 15 UG/.5ML; UG/.5ML; UG/.5ML; UG/.5ML
0.7 SUSPENSION INTRAMUSCULAR ONCE
Refills: 0 | Status: DISCONTINUED | OUTPATIENT
Start: 2022-01-13 | End: 2022-01-13

## 2022-01-13 RX ORDER — GLUCAGON INJECTION, SOLUTION 0.5 MG/.1ML
1 INJECTION, SOLUTION SUBCUTANEOUS ONCE
Refills: 0 | Status: DISCONTINUED | OUTPATIENT
Start: 2022-01-13 | End: 2022-01-16

## 2022-01-13 RX ORDER — AZITHROMYCIN 500 MG/1
TABLET, FILM COATED ORAL
Refills: 0 | Status: DISCONTINUED | OUTPATIENT
Start: 2022-01-13 | End: 2022-01-14

## 2022-01-13 RX ORDER — AZITHROMYCIN 500 MG/1
500 TABLET, FILM COATED ORAL ONCE
Refills: 0 | Status: COMPLETED | OUTPATIENT
Start: 2022-01-13 | End: 2022-01-13

## 2022-01-13 RX ORDER — PANTOPRAZOLE SODIUM 20 MG/1
8 TABLET, DELAYED RELEASE ORAL
Qty: 80 | Refills: 0 | Status: DISCONTINUED | OUTPATIENT
Start: 2022-01-13 | End: 2022-01-13

## 2022-01-13 RX ORDER — SODIUM CHLORIDE 9 MG/ML
1000 INJECTION, SOLUTION INTRAVENOUS
Refills: 0 | Status: DISCONTINUED | OUTPATIENT
Start: 2022-01-13 | End: 2022-01-16

## 2022-01-13 RX ORDER — CHLORHEXIDINE GLUCONATE 213 G/1000ML
1 SOLUTION TOPICAL
Refills: 0 | Status: DISCONTINUED | OUTPATIENT
Start: 2022-01-13 | End: 2022-01-16

## 2022-01-13 RX ADMIN — PANTOPRAZOLE SODIUM 10 MG/HR: 20 TABLET, DELAYED RELEASE ORAL at 03:14

## 2022-01-13 RX ADMIN — PIPERACILLIN AND TAZOBACTAM 3.38 GRAM(S): 4; .5 INJECTION, POWDER, LYOPHILIZED, FOR SOLUTION INTRAVENOUS at 07:44

## 2022-01-13 RX ADMIN — SODIUM CHLORIDE 1750 MILLILITER(S): 9 INJECTION, SOLUTION INTRAVENOUS at 07:44

## 2022-01-13 RX ADMIN — FENTANYL CITRATE 2.83 MICROGRAM(S)/KG/HR: 50 INJECTION INTRAVENOUS at 08:50

## 2022-01-13 RX ADMIN — CHLORHEXIDINE GLUCONATE 15 MILLILITER(S): 213 SOLUTION TOPICAL at 17:35

## 2022-01-13 RX ADMIN — PANTOPRAZOLE SODIUM 40 MILLIGRAM(S): 20 TABLET, DELAYED RELEASE ORAL at 17:35

## 2022-01-13 RX ADMIN — Medication 2: at 12:20

## 2022-01-13 RX ADMIN — Medication 400 MILLIGRAM(S): at 03:44

## 2022-01-13 RX ADMIN — Medication 1000 MILLIGRAM(S): at 06:49

## 2022-01-13 RX ADMIN — Medication 70 MILLIGRAM(S): at 03:16

## 2022-01-13 RX ADMIN — Medication 1000 MILLIGRAM(S): at 04:43

## 2022-01-13 RX ADMIN — Medication 4: at 07:42

## 2022-01-13 RX ADMIN — PROPOFOL 3.4 MICROGRAM(S)/KG/MIN: 10 INJECTION, EMULSION INTRAVENOUS at 08:48

## 2022-01-13 RX ADMIN — AZITHROMYCIN 255 MILLIGRAM(S): 500 TABLET, FILM COATED ORAL at 10:37

## 2022-01-13 RX ADMIN — Medication 6 MILLIGRAM(S): at 07:26

## 2022-01-13 RX ADMIN — CEFTRIAXONE 100 MILLIGRAM(S): 500 INJECTION, POWDER, FOR SOLUTION INTRAMUSCULAR; INTRAVENOUS at 13:43

## 2022-01-13 RX ADMIN — FENTANYL CITRATE 2.83 MICROGRAM(S)/KG/HR: 50 INJECTION INTRAVENOUS at 03:45

## 2022-01-13 RX ADMIN — PANTOPRAZOLE SODIUM 40 MILLIGRAM(S): 20 TABLET, DELAYED RELEASE ORAL at 08:03

## 2022-01-13 RX ADMIN — Medication 44 MICROGRAM(S): at 21:31

## 2022-01-13 RX ADMIN — ETOMIDATE 20 MILLIGRAM(S): 2 INJECTION INTRAVENOUS at 03:15

## 2022-01-13 RX ADMIN — FENTANYL CITRATE 5.85 MICROGRAM(S)/KG/HR: 50 INJECTION INTRAVENOUS at 11:00

## 2022-01-13 RX ADMIN — Medication 250 MILLIGRAM(S): at 06:52

## 2022-01-13 RX ADMIN — SODIUM CHLORIDE 1750 MILLILITER(S): 9 INJECTION, SOLUTION INTRAVENOUS at 03:46

## 2022-01-13 RX ADMIN — PANTOPRAZOLE SODIUM 80 MILLIGRAM(S): 20 TABLET, DELAYED RELEASE ORAL at 02:48

## 2022-01-13 RX ADMIN — PROPOFOL 3.4 MICROGRAM(S)/KG/MIN: 10 INJECTION, EMULSION INTRAVENOUS at 03:15

## 2022-01-13 RX ADMIN — Medication 2: at 17:34

## 2022-01-13 RX ADMIN — CHLORHEXIDINE GLUCONATE 1 APPLICATION(S): 213 SOLUTION TOPICAL at 08:48

## 2022-01-13 RX ADMIN — PIPERACILLIN AND TAZOBACTAM 200 GRAM(S): 4; .5 INJECTION, POWDER, LYOPHILIZED, FOR SOLUTION INTRAVENOUS at 04:45

## 2022-01-13 NOTE — PATIENT PROFILE ADULT - FALL HARM RISK - RISK INTERVENTIONS

## 2022-01-13 NOTE — ED ADULT TRIAGE NOTE - CHIEF COMPLAINT QUOTE
resp distress, ams, covid + resp distress, ams, covid +, noticed blood coming out of patient's mouth

## 2022-01-13 NOTE — PROVIDER CONTACT NOTE (EICU) - RECOMMENDATIONS
Admit to ICU  Pulm: post intubation abg    CT chest/A/P for further evaluation planned    dexamethasone 6 mg->10 mg daily   ID consultation, to consider remdesivir   CVS: trend troponin, EKG   trend lactic acid, s/p 2 L IVF bolus   EndoI: RISS

## 2022-01-13 NOTE — H&P ADULT - NSHPPHYSICALEXAM_GEN_ALL_CORE
PHYSICAL EXAM  GENERAL: Pt lying in stretcher in NAD, well-groomed, well-developed. Intubated, sedated.   HEENT: NCAT, pupils pinpoint, dry mucous membranes, dry blood on lips. Neck supple, no JVD.   NERVOUS SYSTEM: Sedated, withdraws to pain  CHEST/LUNG: RLL crackles, left lung clear to auscultation  HEART: +S1S2, RRR, no m/r/g  ABDOMEN: Soft, nontender, nondistended; +BS x 4 quadrants  EXTREMITIES:  2+ peripheral pulses; no c/c/e  SKIN: No rashes or lesions

## 2022-01-13 NOTE — ED PROVIDER NOTE - OBJECTIVE STATEMENT
86F PMHx of dementia, CAD s/p stent, HTN, HLD, CHF, AFIB on digoxin and ASA, CKD, presenting with respiratory distress. Seen with dark coffee ground emesis in mouth, patient somnolent, not following commands, with tachypneic in 40s and (+) b/l rales/rhonchi throughout, abdominal breathing/retractions.

## 2022-01-13 NOTE — ED PROVIDER NOTE - DISPOSITION TYPE
- continue Depakote and Zoloft  -pt on PRN Seroquel  for agitation  -functional quadriplegia : supportive care / assistance with ADLs  - Glucerna for dietary supplement   -skin care as per protocol  - aspiration precautions ADMIT

## 2022-01-13 NOTE — ED ADULT NURSE NOTE - NSIMPLEMENTINTERV_GEN_ALL_ED
Implemented All Fall with Harm Risk Interventions:  [place] to call system. Call bell, personal items and telephone within reach. Instruct patient to call for assistance. Room bathroom lighting operational. Non-slip footwear when patient is off stretcher. Physically safe environment: no spills, clutter or unnecessary equipment. Stretcher in lowest position, wheels locked, appropriate side rails in place. Provide visual cue, wrist band, yellow gown, etc. Monitor gait and stability. Monitor for mental status changes and reorient to person, place, and time. Review medications for side effects contributing to fall risk. Reinforce activity limits and safety measures with patient and family. Provide visual clues: red socks.

## 2022-01-13 NOTE — PROVIDER CONTACT NOTE (EICU) - BACKGROUND
87 y/o female with h/o DM, HLD, CAD h/o PCI on ASA/plavix, a.fib, CHF, CKD, hypothyroidism and dementia who was brought to the ED after passing out after a vomiting episode. She was in respiratory distress with hypoxia. to 70s with coffee ground emesis evident on clothes. Hgb 10. Pt intubated in the ED.  Found to be Covid +ve.

## 2022-01-13 NOTE — PATIENT PROFILE ADULT - FALL HARM RISK - CONCLUSION
Head , normocephalic , atraumatic , Face , Face within normal limits , Ears , External ears within normal limits , Nose/Nasopharynx , External nose  normal appearance , Mouth and Throat , Oral cavity appearance normal Fall Risk

## 2022-01-13 NOTE — ED PROVIDER NOTE - CARE PLAN
1 Principal Discharge DX:	Acute respiratory failure with hypoxia  Secondary Diagnosis:	GI bleed  Secondary Diagnosis:	Acute pulmonary edema

## 2022-01-13 NOTE — H&P ADULT - ATTENDING COMMENTS
Agree with above  Intubated 20/380/40%/+5, target –420  86F HFrEF dementia, single vax (Moderna one month prior) p/w respiratory distress after vomiting. Possible aspiration, found COVID+ but does not appear to be COVID pneumonia, but rather aspiration pneumonia.  Will treat with dexamethasone for COVID  Sedation with fent / prop  SCDs for DVT ppx given possible GIB  Empiric antibiotics with ceftriaxone / azithromycin (DRIP score 1), F/U culture results  Attempt SAT / SBT starting tomorrow  Currently NPO for GIB, trend CBC    I have personally provided 45 minutes of critical care time.

## 2022-01-13 NOTE — H&P ADULT - NSHPLABSRESULTS_GEN_ALL_CORE
10.0   5.44  )-----------( 148      ( 13 Jan 2022 03:32 )             31.6     01-13    136  |  104  |  50<H>  ----------------------------<  319<H>  5.2   |  23  |  2.46<H>    Ca    8.7      13 Jan 2022 03:32  Phos  3.5     01-13  Mg     2.9     01-13    TPro  7.8  /  Alb  2.4<L>  /  TBili  0.3  /  DBili  x   /  AST  54<H>  /  ALT  42  /  AlkPhos  65  01-13    Troponin I, High Sensitivity (01.13.22 @ 03:34)    Troponin I, High Sensitivity Result: 346.7    Lactate, Blood (01.13.22 @ 03:32)    Lactate, Blood: 4.1    Blood Gas Profile - Arterial (01.13.22 @ 04:14)    pH, Arterial: 7.31    pCO2, Arterial: 41 mmHg    pO2, Arterial: 108 mmHg    HCO3, Arterial: 21 mmol/L    Base Excess, Arterial: -5.4 mmol/L    Oxygen Saturation, Arterial: 97.8 %    Total CO2, Arterial: 22 mmol/L    FIO2, Arterial: 50    Serum Pro-Brain Natriuretic Peptide (01.13.22 @ 03:34)    Serum Pro-Brain Natriuretic Peptide: 9935 pg/mL    Flu With COVID-19 By MAICOL (01.13.22 @ 03:32)    SARS-CoV-2 Result: Detected: EUA/IVD    Influenza A Result: NotDetec: EUA/IVD    Influenza B Result: NotDetec: EUA/IVD

## 2022-01-13 NOTE — ED PROVIDER NOTE - NSICDXFAMILYHX_GEN_ALL_CORE_FT
FAMILY HISTORY:  Sibling  Still living? No  Family history of heart disease, Age at diagnosis: Age Unknown

## 2022-01-13 NOTE — ED ADULT NURSE REASSESSMENT NOTE - NS ED NURSE REASSESS COMMENT FT1
Dr. Santamaria spoke with daughter Shasha,  wants pt intubated
Pt to ED for acute respiratory distress, GI bleed, blood dripping from corner  of mouth, MD and RT to bedside preparing to intubate pt, 20 of Etomidate, 70 of Rocuronium.
Patient with one or more new problems requiring additional work-up/treatment.

## 2022-01-13 NOTE — ED PROVIDER NOTE - ST/T WAVE
EKG negative for [place] Criteria: (-) Concordant JAKE = 1 mm in any lead, (-) Concordant STD = 1 mm in V1, V2, or V3, (-) Excessively discordant JAKE = 5 mm, or (-) absolute measurement of JAKE = 5 mm with a JAKE/S wave ratio > 25%. Ref: Laci et al. (1996) and Sachin et al. (2012).

## 2022-01-13 NOTE — PATIENT PROFILE ADULT - FUNCTIONAL ASSESSMENT - DAILY ACTIVITY 4.
Pt requesting refill of pantoprazole (PROTONIX) 40 MG tablet    Last refill 8/3/2016 #30 with 5 refills  Last office visit with PAOLO Nixon 8/12/2016    Refilled for four months until one year follow up due August 2017. Pt informed. Scheduled for follow up visit 8/11/2017 1550. To call if questions/symptoms prior.  
1 = Total assistance

## 2022-01-13 NOTE — ED PROVIDER NOTE - CLINICAL SUMMARY MEDICAL DECISION MAKING FREE TEXT BOX
Pt p/w respiratory distress, somnolent, hypoxic to 70% on RA, 95% on NRB, tachypnea, retractions w/ diffuse abdominal pain + mixed coffee ground emesis and dried vomitus on stool.    Given history and exam patient’s presentation most consistent with upper GI bleed possibly secondary to peptic ulcer disease c/w COVID (+) and pulmonary edema? PNA? . I have low suspicion for aortoenteric fistula, ENT bleeding mimic, Boerhaave’s, Pulmonary bleeding mimic.  - CBC, CMP, Lipase, PT/INR/PTT, Type and Screen, blood cx, covid r/o  - Analgesia and antiemetic medications PRN  - Protonix 80 mg IVP w/ drip.  - PRBC transfusion PRN

## 2022-01-13 NOTE — PROVIDER CONTACT NOTE (EICU) - ASSESSMENT
GI bleed with coffee ground emesis  Acute hypoxic respiratory distress intubated 1/13/21  Covid 19   PNA RLL - aspiration possible   Elevated trononins  Lactic acidosis   Uncontrolled hyperglycemia   Underlying cardiomyopathy EF 35%

## 2022-01-13 NOTE — ED ADULT NURSE NOTE - OBJECTIVE STATEMENT
Pt to ED for hypoxia and possible GI bleed. Per report pt covid positive, while daughter noticed stool was jonelle red, pt became short of breath. During initial encounter pt was in respiratory distress, on non breather 15L satting 100%, decreased air entry to base of bilateral lobes, tachycardic at 120, coughed up some dark blood, possible aspiration, pt was contraindicated for bipap for the possible GI bleed and aspiration and as a result was intubated. ET 7.5, settings 380, RR 20, 50% oxygen, PEEP 5, 23 on right lip, oral gastric tube insitu on right lip with light brown drainage, indwelling catheter, urine clear yellow no hematuria. Pt had elevated troponin at 346.7, will repeat EKG, and elevated initial lactate of 4.1 pt was receiving LR bolus. Blood cultures sent prior to administration of antibiotics. Plan is for ICU admit. Cardiac monitor scoping sinus tachycardia. Skin warm dry and intact.  Pt tolerating ET. Ongoing monitoring.

## 2022-01-13 NOTE — H&P ADULT - ASSESSMENT
87yo F with PMH of DM, HTN, CAD s/p PCI (on ASA and plavix), Afib (on digoxin), CHF (EF 35-40% in Feb '20), CKD, HLD, hypothyroid, dementia, presented to ED BIBEMS this morning in respiratory distress and with coffee-ground emesis. Intubated in ED for hypoxic respiratory failure. Found to be Covid+. ICU consulted for further management. Admitting dx: acute hypoxic respiratory failure, aspiration PNA/pneumonitis, GIB, GILBERTO on CKD, lactic acidosis, troponinemia, COVID-19 infection.     -Neuro: Continue sedation with propofol & fentanyl while on mechanical ventilation, goal RASS 0 to -1. Daily SAT, monitor for delirium.  -Cardio: Hemodynamically stable at present. Maintain MAP>65. Will hold home antihypertensives, statin and digoxin given NPO status for GIB. Hx CAD s/p PCI; hold ASA/plavix now due to GIB. Troponin elevated; requested 12-lead EKG, will f/u and trend Yara. Known heart failure, EF ~35-40% per prior TTEs, latest from Feb '20.   -Resp: Intubated in ED for acute hypoxic respiratory failure, likely 2/2 aspiration. CXR with bibasilar infiltrates, R>L, consistent with aspiration PNA/pneumonitis. Also found to be positive for Covid-19. Continue mechanical ventilation; titrate down FiO2 as tolerated, SBT as tolerated.   -GI: GIB, presented with coffee-ground emesis, likely UGIB. Pending CT abd/pelvis. NPO, PPI BID, serial CBCs. Hold ASA/plavix. GI consult. Trend lactate; s/p 1.7L IVF bolus in ED.   -Renal: GILBERTO on CKD, Cr 2.46, baseline appears to be ~1.8-2.4. Naik placed in ED for urine output monitoring. Trend BUN/CR, monitor UO, avoid nephrotoxic agents.   -ID: Covid positive. Febrile to 102F in ED. S/p vanc x1, zosyn x1 in ED. Will continue abx to cover for aspiration. Dexamethasone for Covid. Will hold off on remdesivir for now given GILBERTO on CKD. Monitor temp curve, trend WBC count. F/u blood cx, UA/urine cx.   -Heme: Hold ASA/plavix for now in setting of GIB. SCDs for DVT ppx. Trend H/H; transfuse prn to maintain Hgb>7.   -Endocrine: Glycemic control with TIM; FS q6h. F/u A1C. Levothyroxine IV for hypothyroid; f/u TSH.   -Dispo: Admit to ICU for management of mechanically ventilated pt.    Discussed with eICU attending Dr. Bearden.

## 2022-01-13 NOTE — H&P ADULT - HISTORY OF PRESENT ILLNESS
87yo F with PMH of DM, HTN, CAD s/p PCI (on ASA and plavix), Afib (on digoxin), CHF (EF 35-40% in Feb '20), CKD, HLD, hypothyroid, dementia, presented to ED BIBEMS this morning in respiratory distress. Per hx provided by EMS, pt was at home having BM on toilet when she passed out after possibly having vomited. Pt brought in to ED in respiratory distress, hypoxic to 75% spo2, with coffee-ground emesis on clothes. Intubated in ED for hypoxic respiratory failure likely 2/2 aspiration. CXR with RML/RLL and LLL opacities. Found to be Covid+. Hgb 10.0, around pt's baseline. Labs otherwise significant for lactate 4.1, troponin-I 346, proBNP ~10k. Hemodynamically stable.   ICU consulted for further management of mechanically ventilated pt.   Pt is s/p Moderna vaccination x1; first dose in December, due for 2nd dose on 1/19.

## 2022-01-14 LAB
24R-OH-CALCIDIOL SERPL-MCNC: 37 NG/ML — SIGNIFICANT CHANGE UP (ref 30–80)
A1C WITH ESTIMATED AVERAGE GLUCOSE RESULT: 7.4 % — HIGH (ref 4–5.6)
A1C WITH ESTIMATED AVERAGE GLUCOSE RESULT: 7.5 % — HIGH (ref 4–5.6)
ALBUMIN SERPL ELPH-MCNC: 1.9 G/DL — LOW (ref 3.3–5)
ALP SERPL-CCNC: 53 U/L — SIGNIFICANT CHANGE UP (ref 40–120)
ALT FLD-CCNC: 44 U/L — SIGNIFICANT CHANGE UP (ref 12–78)
ANION GAP SERPL CALC-SCNC: 8 MMOL/L — SIGNIFICANT CHANGE UP (ref 5–17)
AST SERPL-CCNC: 65 U/L — HIGH (ref 15–37)
BASOPHILS # BLD AUTO: 0 K/UL — SIGNIFICANT CHANGE UP (ref 0–0.2)
BASOPHILS NFR BLD AUTO: 0 % — SIGNIFICANT CHANGE UP (ref 0–2)
BILIRUB SERPL-MCNC: 0.2 MG/DL — SIGNIFICANT CHANGE UP (ref 0.2–1.2)
BUN SERPL-MCNC: 47 MG/DL — HIGH (ref 7–23)
CALCIUM SERPL-MCNC: 8.5 MG/DL — SIGNIFICANT CHANGE UP (ref 8.5–10.1)
CHLORIDE SERPL-SCNC: 107 MMOL/L — SIGNIFICANT CHANGE UP (ref 96–108)
CHOLEST SERPL-MCNC: 73 MG/DL — SIGNIFICANT CHANGE UP
CO2 SERPL-SCNC: 22 MMOL/L — SIGNIFICANT CHANGE UP (ref 22–31)
CREAT SERPL-MCNC: 2.1 MG/DL — HIGH (ref 0.5–1.3)
CRP SERPL-MCNC: 85 MG/L — HIGH
EOSINOPHIL # BLD AUTO: 0 K/UL — SIGNIFICANT CHANGE UP (ref 0–0.5)
EOSINOPHIL NFR BLD AUTO: 0 % — SIGNIFICANT CHANGE UP (ref 0–6)
ESTIMATED AVERAGE GLUCOSE: 166 MG/DL — HIGH (ref 68–114)
ESTIMATED AVERAGE GLUCOSE: 169 MG/DL — HIGH (ref 68–114)
FERRITIN SERPL-MCNC: 648 NG/ML — HIGH (ref 15–150)
GLUCOSE BLDC GLUCOMTR-MCNC: 104 MG/DL — HIGH (ref 70–99)
GLUCOSE BLDC GLUCOMTR-MCNC: 108 MG/DL — HIGH (ref 70–99)
GLUCOSE BLDC GLUCOMTR-MCNC: 168 MG/DL — HIGH (ref 70–99)
GLUCOSE BLDC GLUCOMTR-MCNC: 238 MG/DL — HIGH (ref 70–99)
GLUCOSE BLDC GLUCOMTR-MCNC: 76 MG/DL — SIGNIFICANT CHANGE UP (ref 70–99)
GLUCOSE SERPL-MCNC: 97 MG/DL — SIGNIFICANT CHANGE UP (ref 70–99)
HCT VFR BLD CALC: 27.6 % — LOW (ref 34.5–45)
HDLC SERPL-MCNC: 31 MG/DL — LOW
HGB BLD-MCNC: 8.9 G/DL — LOW (ref 11.5–15.5)
IMM GRANULOCYTES NFR BLD AUTO: 0.4 % — SIGNIFICANT CHANGE UP (ref 0–1.5)
LDH SERPL L TO P-CCNC: 378 U/L — HIGH (ref 50–242)
LIPID PNL WITH DIRECT LDL SERPL: 22 MG/DL — SIGNIFICANT CHANGE UP
LYMPHOCYTES # BLD AUTO: 0.75 K/UL — LOW (ref 1–3.3)
LYMPHOCYTES # BLD AUTO: 11.1 % — LOW (ref 13–44)
MAGNESIUM SERPL-MCNC: 2.6 MG/DL — SIGNIFICANT CHANGE UP (ref 1.6–2.6)
MCHC RBC-ENTMCNC: 27.8 PG — SIGNIFICANT CHANGE UP (ref 27–34)
MCHC RBC-ENTMCNC: 32.2 GM/DL — SIGNIFICANT CHANGE UP (ref 32–36)
MCV RBC AUTO: 86.3 FL — SIGNIFICANT CHANGE UP (ref 80–100)
MONOCYTES # BLD AUTO: 0.31 K/UL — SIGNIFICANT CHANGE UP (ref 0–0.9)
MONOCYTES NFR BLD AUTO: 4.6 % — SIGNIFICANT CHANGE UP (ref 2–14)
MRSA PCR RESULT.: SIGNIFICANT CHANGE UP
NEUTROPHILS # BLD AUTO: 5.66 K/UL — SIGNIFICANT CHANGE UP (ref 1.8–7.4)
NEUTROPHILS NFR BLD AUTO: 83.9 % — HIGH (ref 43–77)
NON HDL CHOLESTEROL: 42 MG/DL — SIGNIFICANT CHANGE UP
NRBC # BLD: 0 /100 WBCS — SIGNIFICANT CHANGE UP (ref 0–0)
PHOSPHATE SERPL-MCNC: 4.3 MG/DL — SIGNIFICANT CHANGE UP (ref 2.5–4.5)
PLATELET # BLD AUTO: 130 K/UL — LOW (ref 150–400)
POTASSIUM SERPL-MCNC: 4.9 MMOL/L — SIGNIFICANT CHANGE UP (ref 3.5–5.3)
POTASSIUM SERPL-SCNC: 4.9 MMOL/L — SIGNIFICANT CHANGE UP (ref 3.5–5.3)
PROT SERPL-MCNC: 6.7 GM/DL — SIGNIFICANT CHANGE UP (ref 6–8.3)
RBC # BLD: 3.2 M/UL — LOW (ref 3.8–5.2)
RBC # FLD: 13.5 % — SIGNIFICANT CHANGE UP (ref 10.3–14.5)
S AUREUS DNA NOSE QL NAA+PROBE: SIGNIFICANT CHANGE UP
SODIUM SERPL-SCNC: 137 MMOL/L — SIGNIFICANT CHANGE UP (ref 135–145)
TRIGL SERPL-MCNC: 100 MG/DL — SIGNIFICANT CHANGE UP
WBC # BLD: 6.75 K/UL — SIGNIFICANT CHANGE UP (ref 3.8–10.5)
WBC # FLD AUTO: 6.75 K/UL — SIGNIFICANT CHANGE UP (ref 3.8–10.5)

## 2022-01-14 PROCEDURE — 99291 CRITICAL CARE FIRST HOUR: CPT

## 2022-01-14 RX ORDER — HEPARIN SODIUM 5000 [USP'U]/ML
5000 INJECTION INTRAVENOUS; SUBCUTANEOUS EVERY 12 HOURS
Refills: 0 | Status: DISCONTINUED | OUTPATIENT
Start: 2022-01-14 | End: 2022-01-22

## 2022-01-14 RX ORDER — DIGOXIN 250 MCG
125 TABLET ORAL DAILY
Refills: 0 | Status: COMPLETED | OUTPATIENT
Start: 2022-01-14 | End: 2022-01-16

## 2022-01-14 RX ORDER — ACETAMINOPHEN 500 MG
650 TABLET ORAL EVERY 6 HOURS
Refills: 0 | Status: DISCONTINUED | OUTPATIENT
Start: 2022-01-14 | End: 2022-01-22

## 2022-01-14 RX ORDER — METOPROLOL TARTRATE 50 MG
5 TABLET ORAL ONCE
Refills: 0 | Status: COMPLETED | OUTPATIENT
Start: 2022-01-14 | End: 2022-01-14

## 2022-01-14 RX ORDER — CARVEDILOL PHOSPHATE 80 MG/1
3.12 CAPSULE, EXTENDED RELEASE ORAL EVERY 12 HOURS
Refills: 0 | Status: DISCONTINUED | OUTPATIENT
Start: 2022-01-14 | End: 2022-01-19

## 2022-01-14 RX ADMIN — CARVEDILOL PHOSPHATE 3.12 MILLIGRAM(S): 80 CAPSULE, EXTENDED RELEASE ORAL at 17:10

## 2022-01-14 RX ADMIN — PANTOPRAZOLE SODIUM 40 MILLIGRAM(S): 20 TABLET, DELAYED RELEASE ORAL at 17:09

## 2022-01-14 RX ADMIN — Medication 650 MILLIGRAM(S): at 12:40

## 2022-01-14 RX ADMIN — CHLORHEXIDINE GLUCONATE 15 MILLILITER(S): 213 SOLUTION TOPICAL at 17:09

## 2022-01-14 RX ADMIN — Medication 2: at 23:19

## 2022-01-14 RX ADMIN — PROPOFOL 3.4 MICROGRAM(S)/KG/MIN: 10 INJECTION, EMULSION INTRAVENOUS at 04:55

## 2022-01-14 RX ADMIN — PANTOPRAZOLE SODIUM 40 MILLIGRAM(S): 20 TABLET, DELAYED RELEASE ORAL at 05:08

## 2022-01-14 RX ADMIN — Medication 6 MILLIGRAM(S): at 05:08

## 2022-01-14 RX ADMIN — CHLORHEXIDINE GLUCONATE 15 MILLILITER(S): 213 SOLUTION TOPICAL at 05:07

## 2022-01-14 RX ADMIN — Medication 1: at 17:08

## 2022-01-14 RX ADMIN — HEPARIN SODIUM 5000 UNIT(S): 5000 INJECTION INTRAVENOUS; SUBCUTANEOUS at 17:09

## 2022-01-14 RX ADMIN — CHLORHEXIDINE GLUCONATE 1 APPLICATION(S): 213 SOLUTION TOPICAL at 05:08

## 2022-01-14 RX ADMIN — Medication 44 MICROGRAM(S): at 22:10

## 2022-01-14 RX ADMIN — Medication 5 MILLIGRAM(S): at 12:40

## 2022-01-14 RX ADMIN — PROPOFOL 3.4 MICROGRAM(S)/KG/MIN: 10 INJECTION, EMULSION INTRAVENOUS at 22:42

## 2022-01-14 RX ADMIN — Medication 650 MILLIGRAM(S): at 13:30

## 2022-01-14 RX ADMIN — Medication 125 MICROGRAM(S): at 22:10

## 2022-01-14 RX ADMIN — CEFTRIAXONE 100 MILLIGRAM(S): 500 INJECTION, POWDER, FOR SOLUTION INTRAMUSCULAR; INTRAVENOUS at 13:41

## 2022-01-14 RX ADMIN — FENTANYL CITRATE 5.85 MICROGRAM(S)/KG/HR: 50 INJECTION INTRAVENOUS at 03:20

## 2022-01-14 NOTE — PROGRESS NOTE ADULT - ASSESSMENT
85yo F with PMH of DM, HTN, CAD s/p PCI (on ASA and plavix), Afib (on digoxin), CHF (EF 35-40% in Feb '20), CKD, HLD, hypothyroid, dementia, a/w respiratory distress and coffee ground emesis.  Intubated in ED for hypoxic respiratory failure. Found to be Covid+. ICU consulted for further management.     Admitting dx: acute hypoxic respiratory failure, aspiration PNA/pneumonitis, GIB, GILBERTO on CKD, lactic acidosis, troponinemia, COVID-19 infection.     Neuro: Intubated and sedated, on fentanyl and propofol.  Attempted SBT this am noted to be apenic with SBT this am.    CV - HD stable. Maintain MAP>65. held htn yesterday for possible GIB, though h/h stable, restart coreg and digoxin.  ; hold aspirin and plavix currently.    - EF ~35-40% per prior TTEs, latest from Feb '20.   Pulm - Intubated for acute hypoxic respiratory failure, likely 2/2 aspiration with possible pneumonitis vs pna.    - Continue with 20 / 380 / 5 /30%; attempted to wean became apneic, will continue to wean sedation and attempt today if meets criteria.   GI - GIB, presented with coffee-ground emesis, likely UGIB. Hgb remains stable, c/w PPI BID and hold aspirin and plavix for now.     Renal -  GILBERTO on CKD, Cr 2.46 now 2.2 close to baseline (~1.8-2.4). Naik placed in ED for urine output monitoring. Trend BUN/CR, monitor UO, avoid nephrotoxic agents.   ID - Covid positive. Febrile to 102F in ED. S/p vanc x1, zosyn x1 in ED. Will continue abx to cover for aspiration. Dexamethasone for Covid. Will hold off on remdesivir for now given GILBERTO on CKD. Monitor temp curve, trend WBC count. F/u blood cx, UA/urine cx.   Heme - Hold ASA/plavix for now in setting of GIB. H/H now stable; transfuse prn to maintain Hgb>7.   Endocrine - Glycemic control with TIM; FS q6h. F/u A1C. Levothyroxine IV for hypothyroid; f/u TSH.   Dispo - Continue ICU Management for respiratory failure and mechanically ventilated pt.

## 2022-01-15 LAB
ALBUMIN SERPL ELPH-MCNC: 1.8 G/DL — LOW (ref 3.3–5)
ALP SERPL-CCNC: 60 U/L — SIGNIFICANT CHANGE UP (ref 40–120)
ALT FLD-CCNC: 44 U/L — SIGNIFICANT CHANGE UP (ref 12–78)
ANION GAP SERPL CALC-SCNC: 10 MMOL/L — SIGNIFICANT CHANGE UP (ref 5–17)
AST SERPL-CCNC: 59 U/L — HIGH (ref 15–37)
BILIRUB SERPL-MCNC: 0.2 MG/DL — SIGNIFICANT CHANGE UP (ref 0.2–1.2)
BUN SERPL-MCNC: 63 MG/DL — HIGH (ref 7–23)
CALCIUM SERPL-MCNC: 8.5 MG/DL — SIGNIFICANT CHANGE UP (ref 8.5–10.1)
CHLORIDE SERPL-SCNC: 106 MMOL/L — SIGNIFICANT CHANGE UP (ref 96–108)
CO2 SERPL-SCNC: 21 MMOL/L — LOW (ref 22–31)
CREAT SERPL-MCNC: 2.45 MG/DL — HIGH (ref 0.5–1.3)
CULTURE RESULTS: SIGNIFICANT CHANGE UP
GLUCOSE BLDC GLUCOMTR-MCNC: 179 MG/DL — HIGH (ref 70–99)
GLUCOSE BLDC GLUCOMTR-MCNC: 180 MG/DL — HIGH (ref 70–99)
GLUCOSE BLDC GLUCOMTR-MCNC: 239 MG/DL — HIGH (ref 70–99)
GLUCOSE BLDC GLUCOMTR-MCNC: 270 MG/DL — HIGH (ref 70–99)
GLUCOSE SERPL-MCNC: 184 MG/DL — HIGH (ref 70–99)
GRAM STN FLD: SIGNIFICANT CHANGE UP
HCT VFR BLD CALC: 32.2 % — LOW (ref 34.5–45)
HGB BLD-MCNC: 10 G/DL — LOW (ref 11.5–15.5)
MAGNESIUM SERPL-MCNC: 2.8 MG/DL — HIGH (ref 1.6–2.6)
MCHC RBC-ENTMCNC: 27.6 PG — SIGNIFICANT CHANGE UP (ref 27–34)
MCHC RBC-ENTMCNC: 31.1 GM/DL — LOW (ref 32–36)
MCV RBC AUTO: 89 FL — SIGNIFICANT CHANGE UP (ref 80–100)
NRBC # BLD: 0 /100 WBCS — SIGNIFICANT CHANGE UP (ref 0–0)
PHOSPHATE SERPL-MCNC: 4.9 MG/DL — HIGH (ref 2.5–4.5)
PLATELET # BLD AUTO: 148 K/UL — LOW (ref 150–400)
POTASSIUM SERPL-MCNC: 4.9 MMOL/L — SIGNIFICANT CHANGE UP (ref 3.5–5.3)
POTASSIUM SERPL-SCNC: 4.9 MMOL/L — SIGNIFICANT CHANGE UP (ref 3.5–5.3)
PROT SERPL-MCNC: 7 GM/DL — SIGNIFICANT CHANGE UP (ref 6–8.3)
RBC # BLD: 3.62 M/UL — LOW (ref 3.8–5.2)
RBC # FLD: 13.6 % — SIGNIFICANT CHANGE UP (ref 10.3–14.5)
SODIUM SERPL-SCNC: 137 MMOL/L — SIGNIFICANT CHANGE UP (ref 135–145)
SPECIMEN SOURCE: SIGNIFICANT CHANGE UP
WBC # BLD: 7.97 K/UL — SIGNIFICANT CHANGE UP (ref 3.8–10.5)
WBC # FLD AUTO: 7.97 K/UL — SIGNIFICANT CHANGE UP (ref 3.8–10.5)

## 2022-01-15 PROCEDURE — 99291 CRITICAL CARE FIRST HOUR: CPT

## 2022-01-15 RX ORDER — HYDRALAZINE HCL 50 MG
5 TABLET ORAL ONCE
Refills: 0 | Status: COMPLETED | OUTPATIENT
Start: 2022-01-15 | End: 2022-01-15

## 2022-01-15 RX ADMIN — Medication 3: at 17:16

## 2022-01-15 RX ADMIN — Medication 650 MILLIGRAM(S): at 08:05

## 2022-01-15 RX ADMIN — CARVEDILOL PHOSPHATE 3.12 MILLIGRAM(S): 80 CAPSULE, EXTENDED RELEASE ORAL at 17:16

## 2022-01-15 RX ADMIN — CHLORHEXIDINE GLUCONATE 1 APPLICATION(S): 213 SOLUTION TOPICAL at 05:22

## 2022-01-15 RX ADMIN — Medication 2: at 11:29

## 2022-01-15 RX ADMIN — CARVEDILOL PHOSPHATE 3.12 MILLIGRAM(S): 80 CAPSULE, EXTENDED RELEASE ORAL at 06:43

## 2022-01-15 RX ADMIN — HEPARIN SODIUM 5000 UNIT(S): 5000 INJECTION INTRAVENOUS; SUBCUTANEOUS at 05:21

## 2022-01-15 RX ADMIN — Medication 1: at 06:03

## 2022-01-15 RX ADMIN — PANTOPRAZOLE SODIUM 40 MILLIGRAM(S): 20 TABLET, DELAYED RELEASE ORAL at 05:21

## 2022-01-15 RX ADMIN — Medication 1: at 23:21

## 2022-01-15 RX ADMIN — Medication 6 MILLIGRAM(S): at 05:21

## 2022-01-15 RX ADMIN — CHLORHEXIDINE GLUCONATE 15 MILLILITER(S): 213 SOLUTION TOPICAL at 05:21

## 2022-01-15 RX ADMIN — HEPARIN SODIUM 5000 UNIT(S): 5000 INJECTION INTRAVENOUS; SUBCUTANEOUS at 17:15

## 2022-01-15 RX ADMIN — Medication 5 MILLIGRAM(S): at 14:43

## 2022-01-15 RX ADMIN — PANTOPRAZOLE SODIUM 40 MILLIGRAM(S): 20 TABLET, DELAYED RELEASE ORAL at 17:15

## 2022-01-15 RX ADMIN — Medication 44 MICROGRAM(S): at 21:08

## 2022-01-15 RX ADMIN — CEFTRIAXONE 100 MILLIGRAM(S): 500 INJECTION, POWDER, FOR SOLUTION INTRAMUSCULAR; INTRAVENOUS at 12:59

## 2022-01-15 RX ADMIN — Medication 125 MICROGRAM(S): at 11:29

## 2022-01-15 RX ADMIN — Medication 650 MILLIGRAM(S): at 10:35

## 2022-01-15 NOTE — AIRWAY REMOVAL NOTE  ADULT & PEDS - ARTIFICAL AIRWAY REMOVAL COMMENTS
in line and oral suction prior to extubation. no stridor heard. BS heard bilaterally. placed on 3L nasal cannula spo2 99% tolerating well

## 2022-01-15 NOTE — PROGRESS NOTE ADULT - ASSESSMENT
87yo F with PMH of DM, HTN, CAD s/p PCI (on ASA and plavix), Afib (on digoxin), CHF (EF 35-40% in Feb '20), CKD, HLD, hypothyroid, dementia, a/w respiratory distress and coffee ground emesis.  Intubated in ED for hypoxic respiratory failure. Found to be Covid+. ICU consulted for further management.     Admitting dx: acute hypoxic respiratory failure, aspiration PNA/pneumonitis, GIB, GILBERTO on CKD, lactic acidosis, troponinemia, COVID-19 infection.     Neuro: Intubated and sedated, on fentanyl and propofol.  Attempted SBT this am noted to be apenic with SBT this am.    CV - HD stable. Maintain MAP>65. held htn yesterday for possible GIB, though h/h stable, restart coreg and digoxin.  ; hold aspirin and plavix currently.    - EF ~35-40% per prior TTEs, latest from Feb '20.   Pulm - Intubated for acute hypoxic respiratory failure, likely 2/2 aspiration with possible pneumonitis vs pna.    - Continue with 20 / 380 / 5 /30%; attempted to wean became apneic, will continue to wean sedation and attempt today if meets criteria.   - Extubated this afternoon.    GI - GIB, presented with coffee-ground emesis, likely UGIB. Hgb remains stable, c/w PPI BID and hold aspirin and plavix for now.     Renal -  GILBERTO on CKD, Cr 2.46 now 2.2 close to baseline (~1.8-2.4). Naik placed in ED for urine output monitoring. Trend BUN/CR, monitor UO, avoid nephrotoxic agents.   ID - Covid positive. Febrile to 102F in ED. S/p vanc x1, zosyn x1 in ED. Will continue abx to cover for aspiration. Dexamethasone for Covid. Will hold off on remdesivir for now given GILBERTO on CKD. Monitor temp curve, trend WBC count. F/u blood cx, UA/urine cx.   Heme - Hold ASA/plavix for now in setting of GIB. H/H now stable; transfuse prn to maintain Hgb>7.   Endocrine - Glycemic control with TIM; FS q6h. F/u A1C. Levothyroxine IV for hypothyroid; f/u TSH.   Dispo - Continue ICU Management for respiratory failure and mechanically ventilated pt.  Extubated today, will observe in the ICU.

## 2022-01-16 LAB
-  AMIKACIN: SIGNIFICANT CHANGE UP
-  AMOXICILLIN/CLAVULANIC ACID: SIGNIFICANT CHANGE UP
-  AMPICILLIN/SULBACTAM: SIGNIFICANT CHANGE UP
-  AMPICILLIN: SIGNIFICANT CHANGE UP
-  AZTREONAM: SIGNIFICANT CHANGE UP
-  CEFAZOLIN: SIGNIFICANT CHANGE UP
-  CEFEPIME: SIGNIFICANT CHANGE UP
-  CEFOXITIN: SIGNIFICANT CHANGE UP
-  CEFTRIAXONE: SIGNIFICANT CHANGE UP
-  CIPROFLOXACIN: SIGNIFICANT CHANGE UP
-  ERTAPENEM: SIGNIFICANT CHANGE UP
-  GENTAMICIN: SIGNIFICANT CHANGE UP
-  IMIPENEM: SIGNIFICANT CHANGE UP
-  LEVOFLOXACIN: SIGNIFICANT CHANGE UP
-  MEROPENEM: SIGNIFICANT CHANGE UP
-  NITROFURANTOIN: SIGNIFICANT CHANGE UP
-  PIPERACILLIN/TAZOBACTAM: SIGNIFICANT CHANGE UP
-  TIGECYCLINE: SIGNIFICANT CHANGE UP
-  TOBRAMYCIN: SIGNIFICANT CHANGE UP
-  TRIMETHOPRIM/SULFAMETHOXAZOLE: SIGNIFICANT CHANGE UP
ALBUMIN SERPL ELPH-MCNC: 1.8 G/DL — LOW (ref 3.3–5)
ALP SERPL-CCNC: 51 U/L — SIGNIFICANT CHANGE UP (ref 40–120)
ALT FLD-CCNC: 41 U/L — SIGNIFICANT CHANGE UP (ref 12–78)
ANION GAP SERPL CALC-SCNC: 10 MMOL/L — SIGNIFICANT CHANGE UP (ref 5–17)
AST SERPL-CCNC: 40 U/L — HIGH (ref 15–37)
BILIRUB SERPL-MCNC: 0.2 MG/DL — SIGNIFICANT CHANGE UP (ref 0.2–1.2)
BUN SERPL-MCNC: 66 MG/DL — HIGH (ref 7–23)
CALCIUM SERPL-MCNC: 8.8 MG/DL — SIGNIFICANT CHANGE UP (ref 8.5–10.1)
CHLORIDE SERPL-SCNC: 107 MMOL/L — SIGNIFICANT CHANGE UP (ref 96–108)
CO2 SERPL-SCNC: 21 MMOL/L — LOW (ref 22–31)
CREAT SERPL-MCNC: 2.12 MG/DL — HIGH (ref 0.5–1.3)
CULTURE RESULTS: SIGNIFICANT CHANGE UP
DIGOXIN SERPL-MCNC: 1.27 NG/ML — SIGNIFICANT CHANGE UP (ref 0.8–2)
GLUCOSE BLDC GLUCOMTR-MCNC: 160 MG/DL — HIGH (ref 70–99)
GLUCOSE BLDC GLUCOMTR-MCNC: 225 MG/DL — HIGH (ref 70–99)
GLUCOSE BLDC GLUCOMTR-MCNC: 289 MG/DL — HIGH (ref 70–99)
GLUCOSE BLDC GLUCOMTR-MCNC: 297 MG/DL — HIGH (ref 70–99)
GLUCOSE BLDC GLUCOMTR-MCNC: 324 MG/DL — HIGH (ref 70–99)
GLUCOSE SERPL-MCNC: 160 MG/DL — HIGH (ref 70–99)
HCT VFR BLD CALC: 27.8 % — LOW (ref 34.5–45)
HGB BLD-MCNC: 9 G/DL — LOW (ref 11.5–15.5)
MAGNESIUM SERPL-MCNC: 3 MG/DL — HIGH (ref 1.6–2.6)
MCHC RBC-ENTMCNC: 27.8 PG — SIGNIFICANT CHANGE UP (ref 27–34)
MCHC RBC-ENTMCNC: 32.4 GM/DL — SIGNIFICANT CHANGE UP (ref 32–36)
MCV RBC AUTO: 85.8 FL — SIGNIFICANT CHANGE UP (ref 80–100)
METHOD TYPE: SIGNIFICANT CHANGE UP
NRBC # BLD: 0 /100 WBCS — SIGNIFICANT CHANGE UP (ref 0–0)
ORGANISM # SPEC MICROSCOPIC CNT: SIGNIFICANT CHANGE UP
ORGANISM # SPEC MICROSCOPIC CNT: SIGNIFICANT CHANGE UP
PHOSPHATE SERPL-MCNC: 3.4 MG/DL — SIGNIFICANT CHANGE UP (ref 2.5–4.5)
PLATELET # BLD AUTO: 181 K/UL — SIGNIFICANT CHANGE UP (ref 150–400)
POTASSIUM SERPL-MCNC: 4.5 MMOL/L — SIGNIFICANT CHANGE UP (ref 3.5–5.3)
POTASSIUM SERPL-SCNC: 4.5 MMOL/L — SIGNIFICANT CHANGE UP (ref 3.5–5.3)
PROT SERPL-MCNC: 6.9 GM/DL — SIGNIFICANT CHANGE UP (ref 6–8.3)
RBC # BLD: 3.24 M/UL — LOW (ref 3.8–5.2)
RBC # FLD: 13.6 % — SIGNIFICANT CHANGE UP (ref 10.3–14.5)
SODIUM SERPL-SCNC: 138 MMOL/L — SIGNIFICANT CHANGE UP (ref 135–145)
SPECIMEN SOURCE: SIGNIFICANT CHANGE UP
WBC # BLD: 10.3 K/UL — SIGNIFICANT CHANGE UP (ref 3.8–10.5)
WBC # FLD AUTO: 10.3 K/UL — SIGNIFICANT CHANGE UP (ref 3.8–10.5)

## 2022-01-16 PROCEDURE — 99233 SBSQ HOSP IP/OBS HIGH 50: CPT

## 2022-01-16 RX ORDER — AMLODIPINE BESYLATE 2.5 MG/1
5 TABLET ORAL DAILY
Refills: 0 | Status: DISCONTINUED | OUTPATIENT
Start: 2022-01-16 | End: 2022-01-18

## 2022-01-16 RX ADMIN — CHLORHEXIDINE GLUCONATE 1 APPLICATION(S): 213 SOLUTION TOPICAL at 05:25

## 2022-01-16 RX ADMIN — Medication 44 MICROGRAM(S): at 23:47

## 2022-01-16 RX ADMIN — CARVEDILOL PHOSPHATE 3.12 MILLIGRAM(S): 80 CAPSULE, EXTENDED RELEASE ORAL at 05:25

## 2022-01-16 RX ADMIN — Medication 3: at 17:30

## 2022-01-16 RX ADMIN — HEPARIN SODIUM 5000 UNIT(S): 5000 INJECTION INTRAVENOUS; SUBCUTANEOUS at 05:24

## 2022-01-16 RX ADMIN — Medication 125 MICROGRAM(S): at 13:12

## 2022-01-16 RX ADMIN — CEFTRIAXONE 100 MILLIGRAM(S): 500 INJECTION, POWDER, FOR SOLUTION INTRAMUSCULAR; INTRAVENOUS at 13:11

## 2022-01-16 RX ADMIN — Medication 1: at 05:34

## 2022-01-16 RX ADMIN — PANTOPRAZOLE SODIUM 40 MILLIGRAM(S): 20 TABLET, DELAYED RELEASE ORAL at 05:24

## 2022-01-16 RX ADMIN — Medication 2: at 13:28

## 2022-01-16 RX ADMIN — HEPARIN SODIUM 5000 UNIT(S): 5000 INJECTION INTRAVENOUS; SUBCUTANEOUS at 17:30

## 2022-01-16 RX ADMIN — PANTOPRAZOLE SODIUM 40 MILLIGRAM(S): 20 TABLET, DELAYED RELEASE ORAL at 17:30

## 2022-01-16 RX ADMIN — AMLODIPINE BESYLATE 5 MILLIGRAM(S): 2.5 TABLET ORAL at 13:11

## 2022-01-16 RX ADMIN — Medication 6 MILLIGRAM(S): at 05:24

## 2022-01-16 RX ADMIN — CARVEDILOL PHOSPHATE 3.12 MILLIGRAM(S): 80 CAPSULE, EXTENDED RELEASE ORAL at 17:30

## 2022-01-16 NOTE — DIETITIAN INITIAL EVALUATION ADULT. - PERTINENT MEDS FT
MEDICATIONS  (STANDING):  carvedilol 3.125 milliGRAM(s) Oral every 12 hours  cefTRIAXone   IVPB 1000 milliGRAM(s) IV Intermittent every 24 hours  chlorhexidine 2% Cloths 1 Application(s) Topical <User Schedule>  dexAMETHasone  Injectable 6 milliGRAM(s) IV Push daily  dextrose 40% Gel 15 Gram(s) Oral once  dextrose 5%. 1000 milliLiter(s) (50 mL/Hr) IV Continuous <Continuous>  dextrose 5%. 1000 milliLiter(s) (100 mL/Hr) IV Continuous <Continuous>  dextrose 50% Injectable 25 Gram(s) IV Push once  dextrose 50% Injectable 12.5 Gram(s) IV Push once  dextrose 50% Injectable 25 Gram(s) IV Push once  digoxin  Injectable 125 MICROGram(s) IV Push daily  glucagon  Injectable 1 milliGRAM(s) IntraMuscular once  heparin   Injectable 5000 Unit(s) SubCutaneous every 12 hours  insulin lispro (ADMELOG) corrective regimen sliding scale   SubCutaneous every 6 hours  levothyroxine Injectable 44 MICROGram(s) IV Push at bedtime  pantoprazole  Injectable 40 milliGRAM(s) IV Push every 12 hours    MEDICATIONS  (PRN):  acetaminophen     Tablet .. 650 milliGRAM(s) Oral every 6 hours PRN Temp greater or equal to 38C (100.4F), Mild Pain (1 - 3)

## 2022-01-16 NOTE — DIETITIAN INITIAL EVALUATION ADULT. - PERTINENT LABORATORY DATA
01-16 Na138 mmol/L Glu 160 mg/dL<H> K+ 4.5 mmol/L Cr  2.12 mg/dL<H> BUN 66 mg/dL<H> 01-16 Phos 3.4 mg/dL 01-16 Alb 1.8 g/dL<L> 01-13 Chol 73 mg/dL LDL --    HDL 31 mg/dL<L> Trig 100 mg/dL01-16 ALT 41 U/L AST 40 U/L<H> Alkaline Phosphatase 51 [id number] @ 09:26 A1C 7.4  01-14-22 @ 02:01 A1C 7.5

## 2022-01-16 NOTE — DIETITIAN INITIAL EVALUATION ADULT. - OTHER INFO
Pt adm w acute hypoxic respiratory failure ; Aspiration PNA / Pneumonitis ; GIB ( presented w/ coffee ground emesis) ; GILBERTO on CKD ; Lactic acidosos ; Troponinemia ; COVID 19 Infection. S/P intubation 1/15. Diet advanced today -> CCHO clear liquid. PMH DM ; HTN ; CAD ; A - Fib ; CHF.

## 2022-01-16 NOTE — CHART NOTE - NSCHARTNOTEFT_GEN_A_CORE
Ms. Munoz is an 86 year old female with a PMH of dementia, HTN, CAD s/p PCI (on ASA and plavix), Afib (on digoxin), CHF (EF 35-40% in Feb '20), CKD, HLD, DM, and hypothyroid who presented to ICU s/p intubation in ED for respiratory distress 2/2 ?aspiration pneumonia/pneumonitis, GIB, GILBERTO on CKD, LA, Troponinemia, and COVID-19. Pt was successfully extubated in the afternoon of 1/15 to 3L NC without issue and oxygen is being weaned as tolerated. Her h/h has remained stable and she is on PPI BID. Pt has been seen / examined by ICU attending / team and is deemed stable for transfer to med/surg.

## 2022-01-16 NOTE — DIETITIAN INITIAL EVALUATION ADULT. - DIET TYPE
Alfreda [place] 8 twice per day (440 michelle, 20 gm pro)  as medically feasible/fiber/residue restricted/consistent carbohydrate (evening snack)/supplement (specify)

## 2022-01-17 LAB
ALBUMIN SERPL ELPH-MCNC: 2.1 G/DL — LOW (ref 3.3–5)
ALP SERPL-CCNC: 59 U/L — SIGNIFICANT CHANGE UP (ref 40–120)
ALT FLD-CCNC: 67 U/L — SIGNIFICANT CHANGE UP (ref 12–78)
ANION GAP SERPL CALC-SCNC: 8 MMOL/L — SIGNIFICANT CHANGE UP (ref 5–17)
AST SERPL-CCNC: 37 U/L — SIGNIFICANT CHANGE UP (ref 15–37)
BILIRUB SERPL-MCNC: 0.3 MG/DL — SIGNIFICANT CHANGE UP (ref 0.2–1.2)
BUN SERPL-MCNC: 60 MG/DL — HIGH (ref 7–23)
CALCIUM SERPL-MCNC: 9.5 MG/DL — SIGNIFICANT CHANGE UP (ref 8.5–10.1)
CHLORIDE SERPL-SCNC: 106 MMOL/L — SIGNIFICANT CHANGE UP (ref 96–108)
CO2 SERPL-SCNC: 22 MMOL/L — SIGNIFICANT CHANGE UP (ref 22–31)
CREAT SERPL-MCNC: 1.96 MG/DL — HIGH (ref 0.5–1.3)
GLUCOSE BLDC GLUCOMTR-MCNC: 229 MG/DL — HIGH (ref 70–99)
GLUCOSE BLDC GLUCOMTR-MCNC: 240 MG/DL — HIGH (ref 70–99)
GLUCOSE BLDC GLUCOMTR-MCNC: 258 MG/DL — HIGH (ref 70–99)
GLUCOSE BLDC GLUCOMTR-MCNC: 316 MG/DL — HIGH (ref 70–99)
GLUCOSE SERPL-MCNC: 265 MG/DL — HIGH (ref 70–99)
HCT VFR BLD CALC: 34 % — LOW (ref 34.5–45)
HGB BLD-MCNC: 10.8 G/DL — LOW (ref 11.5–15.5)
MAGNESIUM SERPL-MCNC: 3.1 MG/DL — HIGH (ref 1.6–2.6)
MCHC RBC-ENTMCNC: 27.4 PG — SIGNIFICANT CHANGE UP (ref 27–34)
MCHC RBC-ENTMCNC: 31.8 GM/DL — LOW (ref 32–36)
MCV RBC AUTO: 86.3 FL — SIGNIFICANT CHANGE UP (ref 80–100)
NRBC # BLD: 0 /100 WBCS — SIGNIFICANT CHANGE UP (ref 0–0)
PHOSPHATE SERPL-MCNC: 3.2 MG/DL — SIGNIFICANT CHANGE UP (ref 2.5–4.5)
PLATELET # BLD AUTO: 254 K/UL — SIGNIFICANT CHANGE UP (ref 150–400)
POTASSIUM SERPL-MCNC: 4.6 MMOL/L — SIGNIFICANT CHANGE UP (ref 3.5–5.3)
POTASSIUM SERPL-SCNC: 4.6 MMOL/L — SIGNIFICANT CHANGE UP (ref 3.5–5.3)
PROT SERPL-MCNC: 8 GM/DL — SIGNIFICANT CHANGE UP (ref 6–8.3)
RBC # BLD: 3.94 M/UL — SIGNIFICANT CHANGE UP (ref 3.8–5.2)
RBC # FLD: 13.4 % — SIGNIFICANT CHANGE UP (ref 10.3–14.5)
SODIUM SERPL-SCNC: 136 MMOL/L — SIGNIFICANT CHANGE UP (ref 135–145)
WBC # BLD: 7.89 K/UL — SIGNIFICANT CHANGE UP (ref 3.8–10.5)
WBC # FLD AUTO: 7.89 K/UL — SIGNIFICANT CHANGE UP (ref 3.8–10.5)

## 2022-01-17 PROCEDURE — 99233 SBSQ HOSP IP/OBS HIGH 50: CPT

## 2022-01-17 RX ORDER — INSULIN GLARGINE 100 [IU]/ML
10 INJECTION, SOLUTION SUBCUTANEOUS ONCE
Refills: 0 | Status: COMPLETED | OUTPATIENT
Start: 2022-01-17 | End: 2022-01-17

## 2022-01-17 RX ORDER — DEXTROSE 50 % IN WATER 50 %
25 SYRINGE (ML) INTRAVENOUS ONCE
Refills: 0 | Status: DISCONTINUED | OUTPATIENT
Start: 2022-01-17 | End: 2022-01-22

## 2022-01-17 RX ORDER — GLUCAGON INJECTION, SOLUTION 0.5 MG/.1ML
1 INJECTION, SOLUTION SUBCUTANEOUS ONCE
Refills: 0 | Status: DISCONTINUED | OUTPATIENT
Start: 2022-01-17 | End: 2022-01-22

## 2022-01-17 RX ORDER — DEXTROSE 50 % IN WATER 50 %
15 SYRINGE (ML) INTRAVENOUS ONCE
Refills: 0 | Status: DISCONTINUED | OUTPATIENT
Start: 2022-01-17 | End: 2022-01-22

## 2022-01-17 RX ORDER — PANTOPRAZOLE SODIUM 20 MG/1
40 TABLET, DELAYED RELEASE ORAL
Refills: 0 | Status: DISCONTINUED | OUTPATIENT
Start: 2022-01-17 | End: 2022-01-22

## 2022-01-17 RX ORDER — SODIUM CHLORIDE 9 MG/ML
1000 INJECTION, SOLUTION INTRAVENOUS
Refills: 0 | Status: DISCONTINUED | OUTPATIENT
Start: 2022-01-17 | End: 2022-01-22

## 2022-01-17 RX ORDER — DEXTROSE 50 % IN WATER 50 %
12.5 SYRINGE (ML) INTRAVENOUS ONCE
Refills: 0 | Status: DISCONTINUED | OUTPATIENT
Start: 2022-01-17 | End: 2022-01-22

## 2022-01-17 RX ADMIN — PANTOPRAZOLE SODIUM 40 MILLIGRAM(S): 20 TABLET, DELAYED RELEASE ORAL at 17:27

## 2022-01-17 RX ADMIN — INSULIN GLARGINE 10 UNIT(S): 100 INJECTION, SOLUTION SUBCUTANEOUS at 12:41

## 2022-01-17 RX ADMIN — CEFTRIAXONE 100 MILLIGRAM(S): 500 INJECTION, POWDER, FOR SOLUTION INTRAMUSCULAR; INTRAVENOUS at 13:42

## 2022-01-17 RX ADMIN — Medication 2: at 23:37

## 2022-01-17 RX ADMIN — Medication 2: at 07:50

## 2022-01-17 RX ADMIN — Medication 4: at 00:01

## 2022-01-17 RX ADMIN — CARVEDILOL PHOSPHATE 3.12 MILLIGRAM(S): 80 CAPSULE, EXTENDED RELEASE ORAL at 05:50

## 2022-01-17 RX ADMIN — HEPARIN SODIUM 5000 UNIT(S): 5000 INJECTION INTRAVENOUS; SUBCUTANEOUS at 05:53

## 2022-01-17 RX ADMIN — CARVEDILOL PHOSPHATE 3.12 MILLIGRAM(S): 80 CAPSULE, EXTENDED RELEASE ORAL at 17:26

## 2022-01-17 RX ADMIN — Medication 6 MILLIGRAM(S): at 05:50

## 2022-01-17 RX ADMIN — Medication 3: at 17:22

## 2022-01-17 RX ADMIN — Medication 44 MICROGRAM(S): at 22:09

## 2022-01-17 RX ADMIN — Medication 4: at 11:48

## 2022-01-17 RX ADMIN — HEPARIN SODIUM 5000 UNIT(S): 5000 INJECTION INTRAVENOUS; SUBCUTANEOUS at 17:26

## 2022-01-17 RX ADMIN — PANTOPRAZOLE SODIUM 40 MILLIGRAM(S): 20 TABLET, DELAYED RELEASE ORAL at 05:53

## 2022-01-17 RX ADMIN — AMLODIPINE BESYLATE 5 MILLIGRAM(S): 2.5 TABLET ORAL at 01:47

## 2022-01-17 NOTE — PROGRESS NOTE ADULT - ASSESSMENT
87yo F with PMH of DM, HTN, CAD s/p PCI (on ASA and plavix), Afib (on digoxin), CHF (EF 35-40% in Feb '20), CKD, HLD, hypothyroid, dementia, a/w respiratory distress and coffee ground emesis.  Intubated in ED for hypoxic respiratory failure. Found to be Covid+. Was admitted to ICU and now downgraded to medical floors currently stable.     Admitting dx: acute hypoxic respiratory failure, aspiration PNA/pneumonitis, GIB, GILBERTO on CKD, lactic acidosis, troponinemia, COVID-19 infection. E coli UTI    ID - Covid positive and now E coli UTI. Will continue abx to cover for aspiration. Dexamethasone for Covid. C/W Ceftriaxone. monitor temp curve, trend WBC count. F/u blood cx, UA/urine cx.     Neuro: Metabolic encephalopathy Appears confused. AAOX2.     CV - HD stable. Maintain MAP>65. held htn yesterday for possible GIB, though h/h stable, restart coreg and digoxin.  ; hold aspirin and plavix currently.    - EF ~35-40% per prior TTEs, latest from Feb '20.     Pulm - Now extubated after acute hypoxic respiratory failure, likely 2/2 aspiration with possible pneumonitis and COVID PNA. Now on room air. COVID supportive care      GI - Hgb remains stable, c/w PPI BID and hold aspirin and plavix for now. Tolerating carb liquid diet.  Renal -  GILBERTO on CKD, Cr improving to baseline (~1.8-2.4). Naik placed in ED for urine output monitoring. Trend BUN/CR, monitor UO, avoid nephrotoxic agents.     Heme - Hold ASA/plavix for now in setting of GIB. H/H now stable; transfuse prn to maintain Hgb>7.     Endocrine - Hyperglycemia. Will start lantus 10 mg QHS. Glycemic control with TIM; FS q6h.Levothyroxine PO for hypothyroid.  Dispo - PT eval pending

## 2022-01-18 ENCOUNTER — TRANSCRIPTION ENCOUNTER (OUTPATIENT)
Age: 87
End: 2022-01-18

## 2022-01-18 LAB
ANION GAP SERPL CALC-SCNC: 7 MMOL/L — SIGNIFICANT CHANGE UP (ref 5–17)
BUN SERPL-MCNC: 59 MG/DL — HIGH (ref 7–23)
CALCIUM SERPL-MCNC: 10.1 MG/DL — SIGNIFICANT CHANGE UP (ref 8.5–10.1)
CHLORIDE SERPL-SCNC: 107 MMOL/L — SIGNIFICANT CHANGE UP (ref 96–108)
CO2 SERPL-SCNC: 24 MMOL/L — SIGNIFICANT CHANGE UP (ref 22–31)
CREAT SERPL-MCNC: 1.55 MG/DL — HIGH (ref 0.5–1.3)
CULTURE RESULTS: SIGNIFICANT CHANGE UP
CULTURE RESULTS: SIGNIFICANT CHANGE UP
GLUCOSE BLDC GLUCOMTR-MCNC: 220 MG/DL — HIGH (ref 70–99)
GLUCOSE BLDC GLUCOMTR-MCNC: 280 MG/DL — HIGH (ref 70–99)
GLUCOSE BLDC GLUCOMTR-MCNC: 287 MG/DL — HIGH (ref 70–99)
GLUCOSE BLDC GLUCOMTR-MCNC: 293 MG/DL — HIGH (ref 70–99)
GLUCOSE SERPL-MCNC: 278 MG/DL — HIGH (ref 70–99)
HCT VFR BLD CALC: 31.4 % — LOW (ref 34.5–45)
HGB BLD-MCNC: 10.1 G/DL — LOW (ref 11.5–15.5)
MCHC RBC-ENTMCNC: 27.5 PG — SIGNIFICANT CHANGE UP (ref 27–34)
MCHC RBC-ENTMCNC: 32.2 GM/DL — SIGNIFICANT CHANGE UP (ref 32–36)
MCV RBC AUTO: 85.6 FL — SIGNIFICANT CHANGE UP (ref 80–100)
NRBC # BLD: 0 /100 WBCS — SIGNIFICANT CHANGE UP (ref 0–0)
PLATELET # BLD AUTO: 264 K/UL — SIGNIFICANT CHANGE UP (ref 150–400)
POTASSIUM SERPL-MCNC: 4.2 MMOL/L — SIGNIFICANT CHANGE UP (ref 3.5–5.3)
POTASSIUM SERPL-SCNC: 4.2 MMOL/L — SIGNIFICANT CHANGE UP (ref 3.5–5.3)
RAPID RVP RESULT: DETECTED
RBC # BLD: 3.67 M/UL — LOW (ref 3.8–5.2)
RBC # FLD: 13.3 % — SIGNIFICANT CHANGE UP (ref 10.3–14.5)
SARS-COV-2 RNA SPEC QL NAA+PROBE: DETECTED
SODIUM SERPL-SCNC: 138 MMOL/L — SIGNIFICANT CHANGE UP (ref 135–145)
SPECIMEN SOURCE: SIGNIFICANT CHANGE UP
SPECIMEN SOURCE: SIGNIFICANT CHANGE UP
WBC # BLD: 7.73 K/UL — SIGNIFICANT CHANGE UP (ref 3.8–10.5)
WBC # FLD AUTO: 7.73 K/UL — SIGNIFICANT CHANGE UP (ref 3.8–10.5)

## 2022-01-18 PROCEDURE — 99233 SBSQ HOSP IP/OBS HIGH 50: CPT

## 2022-01-18 RX ORDER — INSULIN LISPRO 100/ML
VIAL (ML) SUBCUTANEOUS AT BEDTIME
Refills: 0 | Status: DISCONTINUED | OUTPATIENT
Start: 2022-01-18 | End: 2022-01-18

## 2022-01-18 RX ORDER — HYDRALAZINE HCL 50 MG
5 TABLET ORAL ONCE
Refills: 0 | Status: COMPLETED | OUTPATIENT
Start: 2022-01-18 | End: 2022-01-18

## 2022-01-18 RX ORDER — AMLODIPINE BESYLATE 2.5 MG/1
5 TABLET ORAL ONCE
Refills: 0 | Status: COMPLETED | OUTPATIENT
Start: 2022-01-18 | End: 2022-01-18

## 2022-01-18 RX ORDER — INSULIN LISPRO 100/ML
VIAL (ML) SUBCUTANEOUS
Refills: 0 | Status: DISCONTINUED | OUTPATIENT
Start: 2022-01-18 | End: 2022-01-22

## 2022-01-18 RX ADMIN — Medication 2: at 05:52

## 2022-01-18 RX ADMIN — AMLODIPINE BESYLATE 5 MILLIGRAM(S): 2.5 TABLET ORAL at 05:48

## 2022-01-18 RX ADMIN — CARVEDILOL PHOSPHATE 3.12 MILLIGRAM(S): 80 CAPSULE, EXTENDED RELEASE ORAL at 05:48

## 2022-01-18 RX ADMIN — Medication 6 MILLIGRAM(S): at 05:50

## 2022-01-18 RX ADMIN — Medication 6: at 12:04

## 2022-01-18 RX ADMIN — CARVEDILOL PHOSPHATE 3.12 MILLIGRAM(S): 80 CAPSULE, EXTENDED RELEASE ORAL at 17:59

## 2022-01-18 RX ADMIN — AMLODIPINE BESYLATE 5 MILLIGRAM(S): 2.5 TABLET ORAL at 13:28

## 2022-01-18 RX ADMIN — HEPARIN SODIUM 5000 UNIT(S): 5000 INJECTION INTRAVENOUS; SUBCUTANEOUS at 17:57

## 2022-01-18 RX ADMIN — HEPARIN SODIUM 5000 UNIT(S): 5000 INJECTION INTRAVENOUS; SUBCUTANEOUS at 05:49

## 2022-01-18 RX ADMIN — PANTOPRAZOLE SODIUM 40 MILLIGRAM(S): 20 TABLET, DELAYED RELEASE ORAL at 17:58

## 2022-01-18 RX ADMIN — PANTOPRAZOLE SODIUM 40 MILLIGRAM(S): 20 TABLET, DELAYED RELEASE ORAL at 05:52

## 2022-01-18 RX ADMIN — Medication 44 MICROGRAM(S): at 22:24

## 2022-01-18 RX ADMIN — Medication 6: at 17:39

## 2022-01-18 NOTE — DISCHARGE NOTE PROVIDER - NSDCFUADDINST_GEN_ALL_CORE_FT
It is important to see your primary physician as well as the physicians noted below within the next week to perform a comprehensive medical review.  Call their offices for an appointment as soon as you leave the hospital.  You will also need to see them for renewal of your medications.  If you do not have a primary physician, contact the [facility] Physician Referral Service at [phone].  To obtain your results, you can access the [facility] Patient Portal at [email or web address] Software and Services.  Your medical issues appear to be stable at this time, but if your symptoms recur or worsen, contact your physicians and/or return to the hospital if necessary.  If you encounter any issues or questions with your medication, call your physicians before stopping the medication.  Do not drive.  Limit your diet to 2 grams of sodium daily.

## 2022-01-18 NOTE — DISCHARGE NOTE PROVIDER - NSDCMRMEDTOKEN_GEN_ALL_CORE_FT
aspirin 81 mg oral delayed release tablet: 1 tab(s) orally once a day  atorvastatin 40 mg oral tablet: 1 tab(s) orally once a day (at bedtime)  bisacodyl 10 mg rectal suppository: 1 suppository(ies) rectal once a day, As needed, Constipation  carvedilol 6.25 mg oral tablet: 1 tab(s) orally every 12 hours  clopidogrel 75 mg oral tablet: 1 tab(s) orally once a day  digoxin 125 mcg (0.125 mg) oral tablet: 1 tab(s) orally once a day  donepezil 10 mg oral tablet: 1 tab(s) orally once a day (at bedtime)  folic acid 1 mg oral tablet: 1 tab(s) orally once a day  insulin glargine 100 units/mL subcutaneous solution: 30 unit(s) subcutaneous once a day (at bedtime)   levothyroxine 88 mcg (0.088 mg) oral tablet: 1 tab(s) orally once a day  losartan 25 mg oral tablet: 1 tab(s) orally once a day  melatonin 3 mg oral tablet: 1 tab(s) orally once a day (at bedtime), As needed, Insomnia  memantine 5 mg oral tablet: 1 tab(s) orally once a day (at bedtime)  ocular lubricant ophthalmic solution: 1 drop(s) to each affected eye once a day  Prandin 2 mg oral tablet: 1 tab(s) orally 3 times a day (before meals)  senna oral tablet: 2 tab(s) orally once a day (at bedtime)  Vitamin D2 50,000 intl units (1.25 mg) oral capsule: 1 cap(s) orally once a week   acetaminophen 325 mg oral tablet: 2 tab(s) orally every 6 hours, As needed, Temp greater or equal to 38C (100.4F), Mild Pain (1 - 3)  atorvastatin 40 mg oral tablet: 1 tab(s) orally once a day (at bedtime)  bisacodyl 10 mg rectal suppository: 1 suppository(ies) rectal once a day, As needed, Constipation  carvedilol 6.25 mg oral tablet: 1 tab(s) orally every 12 hours  Cozaar 25 mg oral tablet: 1 tab(s) orally once a day  dexamethasone 6 mg oral tablet: 1 tab(s) orally once a day   folic acid 1 mg oral tablet: 1 tab(s) orally once a day  levothyroxine 88 mcg (0.088 mg) oral tablet: 1 tab(s) orally once a day  melatonin 3 mg oral tablet: 1 tab(s) orally once a day (at bedtime), As needed, Insomnia  memantine 5 mg oral tablet: 1 tab(s) orally once a day (at bedtime)  ocular lubricant ophthalmic solution: 1 drop(s) to each affected eye once a day  pantoprazole 40 mg oral delayed release tablet: 1 tab(s) orally 2 times a day  senna oral tablet: 2 tab(s) orally once a day (at bedtime)  Vitamin D2 50,000 intl units (1.25 mg) oral capsule: 1 cap(s) orally once a week   acetaminophen 325 mg oral tablet: 2 tab(s) orally every 6 hours, As needed, Temp greater or equal to 38C (100.4F), Mild Pain (1 - 3)  amLODIPine 10 mg oral tablet: 1 tab(s) orally once a day  atorvastatin 40 mg oral tablet: 1 tab(s) orally once a day (at bedtime)  bisacodyl 10 mg rectal suppository: 1 suppository(ies) rectal once a day, As needed, Constipation  carvedilol 6.25 mg oral tablet: 1 tab(s) orally every 12 hours  digoxin 125 mcg (0.125 mg) oral tablet: 1 tab(s) orally once a day  donepezil 10 mg oral tablet: 1 tab(s) orally once a day (at bedtime)  folic acid 1 mg oral tablet: 1 tab(s) orally once a day  hydrALAZINE 50 mg oral tablet: 1 tab(s) orally every 8 hours  insulin glargine: 10 unit(s) subcutaneous once a day  levothyroxine 88 mcg (0.088 mg) oral tablet: 1 tab(s) orally once a day  melatonin 3 mg oral tablet: 1 tab(s) orally once a day (at bedtime), As needed, Insomnia  memantine 5 mg oral tablet: 1 tab(s) orally once a day (at bedtime)  ocular lubricant ophthalmic solution: 1 drop(s) to each affected eye once a day  pantoprazole 40 mg oral delayed release tablet: 1 tab(s) orally 2 times a day  Plavix 75 mg oral tablet: 1 tab(s) orally once a day  senna oral tablet: 2 tab(s) orally once a day (at bedtime)  Vitamin D2 50,000 intl units (1.25 mg) oral capsule: 1 cap(s) orally once a week

## 2022-01-18 NOTE — CONSULT NOTE ADULT - SUBJECTIVE AND OBJECTIVE BOX
Patient is a 86y old  Female who presents with a chief complaint of Acute hypoxic respiratory failure, GIB, COVID-19 (18 Jan 2022 12:22)    HPI:  87yo F with DM, HTN, CAD s/p PCI (on ASA and plavix), Afib (on digoxin), CHF (EF 35-40% in Feb '20), CKD, HLD, Hypothyroid, Dementia,  Brought to ED with Respiratory distress after vomiting.  Found hypoxic to 75% spo2, with coffee-ground emesis on clothes. Intubated in ED for hypoxic respiratory failure likely 2/2 aspiration. CXR with RML/RLL and LLL opacities. Found to be Covid+. Hgb 10.0, around pt's baseline. Labs otherwise significant for lactate 4.1, troponin-I 346, proBNP ~10k.    Admitted to ICU with hypoxic Respiratory failure, aspiration pneumonia, +ve COVID.   Also with high troponin, E Coli UTI.  Was successfully extubated, then transferred to medical floor.  Not able to provide any details due to Dementia.      PAST MEDICAL & SURGICAL HISTORY:  MI (myocardial infarction)    CAD (coronary artery disease)    DM (diabetes mellitus)    HTN (hypertension)    HLD (hyperlipidemia)    Hypothyroid    Dementia    Stented coronary artery    FAMILY HISTORY:  Family history of heart disease (Sibling)    SOCIAL HISTORY:  not known    Allergies  No Known Allergies    MEDICATIONS  (STANDING):  carvedilol 3.125 milliGRAM(s) Oral every 12 hours  dexAMETHasone  Injectable 6 milliGRAM(s) IV Push daily  dextrose 40% Gel 15 Gram(s) Oral once  dextrose 5%. 1000 milliLiter(s) (50 mL/Hr) IV Continuous <Continuous>  dextrose 5%. 1000 milliLiter(s) (100 mL/Hr) IV Continuous <Continuous>  dextrose 50% Injectable 25 Gram(s) IV Push once  dextrose 50% Injectable 12.5 Gram(s) IV Push once  dextrose 50% Injectable 25 Gram(s) IV Push once  glucagon  Injectable 1 milliGRAM(s) IntraMuscular once  heparin   Injectable 5000 Unit(s) SubCutaneous every 12 hours  insulin lispro (ADMELOG) corrective regimen sliding scale   SubCutaneous three times a day before meals  levothyroxine Injectable 44 MICROGram(s) IV Push at bedtime  pantoprazole    Tablet 40 milliGRAM(s) Oral two times a day    MEDICATIONS  (PRN):  acetaminophen     Tablet .. 650 milliGRAM(s) Oral every 6 hours PRN Temp greater or equal to 38C (100.4F), Mild Pain (1 - 3)    REVIEW OF SYSTEMS: not able to provide    Vital Signs Last 24 Hrs  T(C): 36.4 (18 Jan 2022 11:30), Max: 36.9 (18 Jan 2022 05:00)  T(F): 97.6 (18 Jan 2022 11:30), Max: 98.5 (18 Jan 2022 05:00)  HR: 75 (18 Jan 2022 11:30) (68 - 85)  BP: 163/74 (18 Jan 2022 11:30) (154/82 - 171/66)  BP(mean): --  RR: 18 (18 Jan 2022 11:30) (17 - 18)  SpO2: 97% (18 Jan 2022 11:30) (93% - 98%)    PHYSICAL EXAM:  GEN:         Awake, responsive and comfortable.  HEENT:    Normal.    RESP:       no distress  CVS:          Regular rate and rhythm.   ABD:         Soft, non-tender, non-distended;   SKIN:           Warm and dry.  EXTR:            No clubbing, cyanosis or edema  CNS:          confused  PSYCH:        confused    LABS:                        10.1   7.73  )-----------( 264      ( 18 Jan 2022 12:00 )             31.4     01-18    138  |  107  |  59<H>  ----------------------------<  278<H>  4.2   |  24  |  1.55<H>    Ca    10.1      18 Jan 2022 12:00  Phos  3.2     01-17  Mg     3.1     01-17    TPro  8.0  /  Alb  2.1<L>  /  TBili  0.3  /  DBili  x   /  AST  37  /  ALT  67  /  AlkPhos  59  01-17 01-13 @ 12:28  pH: --  pCO2: 32  pO2: 149  SaO2: 99.7  01-13 @ 04:14  pH: --  pCO2: 41  pO2: 108  SaO2: 97.8    Culture - Sputum (collected 01-13-22 @ 16:22)  Source: .Sputum Sputum  Gram Stain (01-15-22 @ 20:17):    No polymorphonuclear leukocytes per low power field    No Squamous epithelial cells per low power field    Rare Gram positive cocci in pairs per oil power field    Rare Gram Negative Rods per oil power field  Final Report (01-15-22 @ 20:17):    Normal Respiratory Alicia present    Culture - Urine (collected 01-13-22 @ 11:05)  Source: Clean Catch Clean Catch (Midstream)  Final Report (01-16-22 @ 11:30):    >100,000 CFU/ml Escherichia coli  Organism: Escherichia coli (01-16-22 @ 11:30)  Organism: Escherichia coli (01-16-22 @ 11:30)      -  Amikacin: S <=16      -  Amoxicillin/Clavulanic Acid: S <=8/4      -  Ampicillin: S <=8 These ampicillin results predict results for amoxicillin      -  Ampicillin/Sulbactam: S <=4/2 Enterobacter, Klebsiella aerogenes, Citrobacter, and Serratia may develop resistance during prolonged therapy (3-4 days)      -  Aztreonam: S <=4      -  Cefazolin: S <=2 (MIC_CL_COM_ENTERIC_CEFAZU) For uncomplicated UTI with K. pneumoniae, E. coli, or P. mirablis: TIM <=16 is sensitive and TIM >=32 is resistant. This also predicts results for oral agents cefaclor, cefdinir, cefpodoxime, cefprozil, cefuroxime axetil, cephalexin and locarbef for uncomplicated UTI. Note that some isolates may be susceptible to these agents while testing resistant to cefazolin.      -  Cefepime: S <=2      -  Cefoxitin: S <=8      -  Ceftriaxone: S <=1 Enterobacter, Klebsiella aerogenes, Citrobacter, and Serratia may develop resistance during prolonged therapy      -  Ciprofloxacin: S <=0.25      -  Ertapenem: S <=0.5      -  Gentamicin: S <=2      -  Imipenem: S <=1      -  Levofloxacin: S <=0.5      -  Meropenem: S <=1      -  Nitrofurantoin: S <=32 Should not be used to treat pyelonephritis      -  Piperacillin/Tazobactam: S <=8      -  Tigecycline: S <=2      -  Tobramycin: S <=2      -  Trimethoprim/Sulfamethoxazole: S <=0.5/9.5      Method Type: TIM    Culture - Blood (collected 01-13-22 @ 09:14)  Source: .Blood Blood-Peripheral  Final Report (01-18-22 @ 10:00):    No Growth Final    Culture - Blood (collected 01-13-22 @ 09:14)  Source: .Blood Blood-Peripheral  Final Report (01-18-22 @ 10:00):    No Growth Final    EKG: sinus    RADIOLOGY & ADDITIONAL STUDIES:  < from: CT Chest No Cont (01.13.22 @ 05:43) >  ACC: [id number] EXAM:  CT ABDOMEN AND PELVIS                        ACC: [id number] EXAM:  CT CHEST                          PROCEDURE DATE:  01/13/2022      INTERPRETATION:  CLINICAL INFORMATION: Altered mental status.  Aspiration   pneumonia.  GIbleed.    COMPARISON: CT chest, abdomen and pelvis from 02/10/2020.    CONTRAST/COMPLICATIONS:  IV Contrast: None  Oral Contrast: None  Complications: None reported at time of study completion    PROCEDURE:  CT of the Chest, Abdomen and Pelvis was performed.  Sagittal and coronal reformats were performed.    FINDINGS:  CHEST:  LUNGS AND LARGE AIRWAYS: Endotracheal tube terminates above the melo.    Small amount of secretions in the distal trachea and right mainstem   bronchus without airway occlusion. Extensive patchy groundglass and more   dense airspace opacity in both lungs.  PLEURA: Small pleural effusions bilaterally.  VESSELS: Mild atherosclerotic calcification of the thoracic aorta and   arch vessels.  HEART: Cardiomegaly with left greater than right heart enlargement.    Severe atherosclerotic calcification of the coronary arteries. No   pericardial effusion.  MEDIASTINUM AND AIDA: Nonspecific mediastinal lymph nodes measure up to 7   mm short access in the right upper paratracheal region (2:124), 8 mm   short axis in the right lower paratracheal region (2:75), 7 mm short axis   in the left lower paratracheal region (2:170), and 1.1 cm short axis in   subcarina region (2:197).  Hilar lymph nodes are not well evaluated on   this noncontrast CT scan.  CHEST WALL AND LOWER NECK: The thyroid is diminutive in size.    ABDOMEN AND PELVIS:  LIVER: Within normal limits.  BILE DUCTS: Normal caliber.  GALLBLADDER: Within normal limits.  SPLEEN: Within normal limits.  PANCREAS: Within normal limits.  ADRENALS: Nonspecific adrenal gland thickening, left greater than right..  KIDNEYS/URETERS: The left kidney is smaller than the right kidney.  No   hydronephrosis or renal stones.    BLADDER: Partially collapsed around a Naik catheter.  Nonspecific gas in   the bladder lumen.  REPRODUCTIVE ORGANS: Uterus and adnexa appear unremarkable.    BOWEL: Enteric tube terminates in the gastric body.  Small hiatal hernia.    No bowel obstruction.  Normal appendix.  Mild diverticulosis ofthe   transverse and descending colon.  PERITONEUM: No ascites, free air or abscess.  VESSELS: Mild atherosclerotic calcification of the aortoiliac tree.  RETROPERITONEUM/LYMPH NODES: No retroperitoneal hemorrhage.  No   lymphadenopathy.  ABDOMINAL WALL: Two adjacent fat-containing infraumbilical ventral   abdominal wall hernias.  BONES: Degenerative changes in the spine and hips.  No acute fracture or   suspicious osseous lesion.    IMPRESSION:  CT CHEST:  1.  Extensive patchy groundglass and more dense airspace opacity in both   lungs.  The primary consideration is COVID-19 pneumonia.  Superimposed   bacterial pneumonia and/or aspiration pneumonia is not excluded.  2.  Endotracheal tube terminates above the melo.  Small amount of   secretions in the distal trachea and right mainstem bronchus without   airway occlusion.  3.  Small pleural effusions bilaterally.    CT ABDOMEN/PELVIS: No evidence of bowel obstruction, active inflammatory   process or intra-abdominal source for infection,within limits of a   noncontrast CT scan.  Mild diverticulosis of the transverse and   descending colon without evidence for acute diverticulitis.      ART CUELLO MD; Attending Radiologist  This document has been electronically signed. Jan 13 2022  6:07AM    ASSESSMENT AND PLAN:  ·	S/P hypoxic Respiratory failure.  ·	Aspiration Pneumonia.  ·	COVID Pneumonia.  ·	Suspected GI bleed.  ·	Anemia.  ·	Acute on chronic CKD.  ·	Elevated troponin.  ·	CAD.  ·	Systolic dysfunction.  ·	A Fib.  ·	E Coli UTI  ·	HTN.  ·	DM.  ·	HLD.    Successfully extubated. SPO2 97% on room air.  Completed antibiotic course.  Complete 10 days of Dexamethasone.  Aspiration precautions.

## 2022-01-18 NOTE — DISCHARGE NOTE PROVIDER - NSDCCPCAREPLAN_GEN_ALL_CORE_FT
PRINCIPAL DISCHARGE DIAGNOSIS  Diagnosis: Acute respiratory failure with hypoxia  Assessment and Plan of Treatment:       SECONDARY DISCHARGE DIAGNOSES  Diagnosis: GI bleed  Assessment and Plan of Treatment:     Diagnosis: Acute pulmonary edema  Assessment and Plan of Treatment:      PRINCIPAL DISCHARGE DIAGNOSIS  Diagnosis: 2019 novel coronavirus disease (COVID-19)  Assessment and Plan of Treatment:       SECONDARY DISCHARGE DIAGNOSES  Diagnosis: GI bleed  Assessment and Plan of Treatment:     Diagnosis: Stage 3 chronic kidney disease  Assessment and Plan of Treatment:     Diagnosis: Acute respiratory failure with hypoxia  Assessment and Plan of Treatment:     Diagnosis: Acute pulmonary edema  Assessment and Plan of Treatment:     Diagnosis: GILBERTO (acute kidney injury)  Assessment and Plan of Treatment:     Diagnosis: E-coli UTI  Assessment and Plan of Treatment:

## 2022-01-18 NOTE — DISCHARGE NOTE PROVIDER - HOSPITAL COURSE
87yo F with PMH of DM, HTN, CAD s/p PCI (on ASA and plavix), Afib (on digoxin), CHF (EF 35-40% in Feb '20), CKD, HLD, hypothyroid, dementia, a/w respiratory distress and coffee ground emesis.  Intubated in ED for hypoxic respiratory failure. Found to be Covid+. Was admitted to ICU and now downgraded to medical floors currently stable.     Admitting dx: acute hypoxic respiratory failure, aspiration PNA/pneumonitis, GIB, GILBERTO on CKD, lactic acidosis, troponinemia, COVID-19 infection. E coli UTI    ID - Covid positive and now E coli UTI. Will continue abx to cover for aspiration. Dexamethasone for Covid. C/W Ceftriaxone. monitor temp curve, trend WBC count. blood cx neg, UA/urine cx.     Neuro: Metabolic encephalopathy Appears confused. AAOX2.     CV - HD stable. Maintain MAP>65. held htn yesterday for possible GIB, though h/h stable, restart coreg and digoxin.  ; hold aspirin and plavix currently.    - EF ~35-40% per prior TTEs, latest from Feb '20.     Pulm - Now extubated after acute hypoxic respiratory failure, likely 2/2 aspiration with possible pneumonitis and COVID PNA. Now on room air. COVID supportive care      GI - Hgb remains stable, c/w PPI BID and hold aspirin and plavix for now. Tolerating carb liquid diet.  Renal -  GILBERTO on CKD, Cr improving to baseline (~1.8-2.4). Naik placed in ED for urine output monitoring. Trend BUN/CR, monitor UO, avoid nephrotoxic agents.     Heme - Hold ASA/plavix for now in setting of GIB. H/H now stable; transfuse prn to maintain Hgb>7.     Endocrine - Hyperglycemia. Will start lantus 10 mg QHS. Glycemic control with TIM; FS q6h.Levothyroxine PO for hypothyroid.  Dispo - PT: DILLON   85yo F with PMH of DM, HTN, CAD s/p PCI (on ASA and plavix), Afib (on digoxin), CHF (EF 35-40% in Feb '20), CKD, HLD, hypothyroid, dementia, a/w respiratory distress and coffee ground emesis.  Intubated in ED for hypoxic respiratory failure. Found to be Covid+. Was admitted to ICU and now downgraded to medical floors currently stable. Now she is medically stable for discharge home with [facility].    Admitting dx: acute hypoxic respiratory failure, aspiration PNA/pneumonitis, GIB, GILBERTO on CKD, lactic acidosis, troponinemia, COVID-19 infection. E coli UTI    ID - Covid positive and now E coli UTI. Will continue abx to cover for aspiration. Dexamethasone for Covid. C/W Ceftriaxone. monitor temp curve, trend WBC count. blood cx neg.    Neuro: Metabolic encephalopathy Appears confused. AAOX2.     CV - HD stable. Maintain MAP>65. held htn yesterday for possible GIB, though h/h stable, restart coreg and losartan. hold aspirin and plavix currently.    - EF ~35-40% per prior TTEs, latest from Feb '20.     Pulm - Now extubated after acute hypoxic respiratory failure, likely 2/2 aspiration with possible pneumonitis and COVID PNA. Now on room air. COVID supportive care. Complete steroids to PO at home.    GI - Hgb remains stable, c/w PPI BID and hold aspirin and plavix for now. Tolerating carb liquid diet.    Renal -  GILBERTO on CKD, Cr improving to baseline (~1.8-2.4). Naik placed in ED for urine output monitoring. Trend BUN/CR, monitor UO, avoid nephrotoxic agents.     Heme - Hold ASA/plavix for now in setting of GIB. H/H now stable; transfuse prn to maintain Hgb>7.     Endocrine - Hyperglycemia. HgA1c appropriate for age. Glycemic control with TIM; FS ACHS. Levothyroxine PO for hypothyroid.    Dispo - PT: Home with home care with [facility].   87yo F with PMH of DM, HTN, CAD s/p PCI (on ASA and plavix), Afib (on digoxin), CHF (EF 35-40% in Feb '20), CKD, HLD, hypothyroid, dementia, a/w respiratory distress and coffee ground emesis.  Intubated in ED for hypoxic respiratory failure. Found to be Covid+. Was admitted to ICU and now downgraded to medical floors currently stable. Now she is medically stable for discharge home with [facility].    Admitting dx: acute hypoxic respiratory failure, aspiration PNA/pneumonitis, GIB, GILBERTO on CKD, lactic acidosis, troponinemia, COVID-19 infection. E coli UTI    ID - Covid positive and now E coli UTI. Will continue abx to cover for aspiration. Dexamethasone for Covid. C/W Ceftriaxone. monitor temp curve, trend WBC count. blood cx neg.    Neuro: Metabolic encephalopathy Appears confused. AAOX2.     CV - HD stable. Maintain MAP>65. held htn yesterday for possible GIB, though h/h stable, restart coreg amlodipine, and losartan. hold aspirin and plavix currently.    - EF ~35-40% per prior TTEs, latest from Feb '20.     Pulm - Now extubated after acute hypoxic respiratory failure, likely 2/2 aspiration with possible pneumonitis and COVID PNA. Now on room air. COVID supportive care. Complete steroids to PO at home.    GI - Hgb remains stable, c/w PPI BID and hold aspirin and plavix for now. Tolerating carb liquid diet.    Renal -  GILBERTO on CKD, Cr improving to baseline (~1.8-2.4). Naik placed in ED for urine output monitoring. Trend BUN/CR, monitor UO, avoid nephrotoxic agents.     Heme - Hold ASA/plavix for now in setting of GIB. H/H now stable; transfuse prn to maintain Hgb>7.     Endocrine - Hyperglycemia. HgA1c appropriate for age. Lantus 10 U QHS Glycemic control with TIM; FS ACHS. Levothyroxine PO for hypothyroid.    Dispo - PT: Home with home care with [facility].   85yo F with PMH of DM, HTN, CAD s/p PCI (on plavix), Afib (on digoxin), CHF (EF 35-40% in Feb '20), CKD, HLD, hypothyroid, dementia, a/w respiratory distress and coffee ground emesis.  Intubated in ED for hypoxic respiratory failure. Found to be Covid+.  Initially admitted to ICU and downgraded to medical floors.  Awaiting d/c home with [facility] aides.    # COVID infection - monitor SaO2.  Decadron.  Supplemental O2.  Monitor inflammatory markers - D-dimer, CRP, Ferritin.  Encourage proning, incentive spirometry, chest PT.    # Acute Hypoxic Respiratory Failure - resolved, on RA.  # Essential HTN - Monitor BP.  Continue antihypertensives.  Increased Hydralazine.   # Ecoli UTI - s/p abx.  # CAD - continue BBLocker, statin.  Antiplatelets held due to concern of GI Bleed.  H/H stable.  Resume antiplatelets - confirmed with daughter that pt is on Plavix at home.   # Chronic Afib - rate stable.  I confirmed with daughter that pt is not on AC at home, stopped by her cardiologist.    # GILBERTO, CKD Stage III - Cr stable.  Hold ARB  # GI Bleed?  - H/H has been stable.  No s/s of bleeding, on Heparin for DVT Proph.  PPI, outpatient f/u.   # Metabolic Encephalopathy - mental status improving.  # Diabetes Type II - monitor fingersticks.  Insulin coverage for hyperglycemia.  D/c Decadron on d/c.  Expect FS to improve off steroids.   # Acquired Hypothyroidism - continue Synthroid.  # Inpatient DVT prophylaxis - subcutaneous Heparin.  Antiplatelets on discharge.     I reviewed the risks, benefits and alternatives to AC with pt's daughter Shasha [phone].  Ddimer also elevated at 621, however, pt on RA without SOB.  She acknowledges above, agrees with antiplatlet therapy.   F/u with Cardiology, GI as outpatient for further evaluation.  CDC guidelines regarding self - isolation discussed.

## 2022-01-18 NOTE — DISCHARGE NOTE PROVIDER - NSDCFUADDAPPT_GEN_ALL_CORE_FT
You have been treated for COVID-19, a contagious disease.  It has been determined that you no longer need hospitalization and can recover while remaining in self-isolation at home for at least 5-10 days.  These CDC guidelines can be reviewed at https://www.cdc.gov/coronavirus/2019-ncov/hcp/duration-isolation.html.  Wear a high quality mask, especially when in common areas with other household contacts.  Separate yourself from other people and animals in your home.  Avoid sharing personal household items. Monitor your symptoms, and if you have a medical emergency call 911.  Further detailed instructions can be obtained on the CDC website at https://www.cdc.gov/coronavirus/2019-nCoV/index.html.     Vaccinations for COVID infection are available.  If you have not completed a vaccination series including booster vaccination, discuss with your primary doctor.

## 2022-01-18 NOTE — PROGRESS NOTE ADULT - ASSESSMENT
85yo F with PMH of DM, HTN, CAD s/p PCI (on ASA and plavix), Afib (on digoxin), CHF (EF 35-40% in Feb '20), CKD, HLD, hypothyroid, dementia, a/w respiratory distress and coffee ground emesis.  Intubated in ED for hypoxic respiratory failure. Found to be Covid+. Was admitted to ICU and now downgraded to medical floors currently stable.     Admitting dx: acute hypoxic respiratory failure, aspiration PNA/pneumonitis, GIB, GILBERTO on CKD, lactic acidosis, troponinemia, COVID-19 infection. E coli UTI    ID - Covid positive and now E coli UTI. Will continue abx to cover for aspiration. Dexamethasone for Covid. C/W Ceftriaxone. monitor temp curve, trend WBC count. blood cx neg, UA/urine cx.     Neuro: Metabolic encephalopathy Appears confused. AAOX2.     CV - HD stable. Maintain MAP>65. held htn yesterday for possible GIB, though h/h stable, restart coreg and digoxin.  ; hold aspirin and plavix currently.    - EF ~35-40% per prior TTEs, latest from Feb '20.     Pulm - Now extubated after acute hypoxic respiratory failure, likely 2/2 aspiration with possible pneumonitis and COVID PNA. Now on room air. COVID supportive care      GI - Hgb remains stable, c/w PPI BID and hold aspirin and plavix for now. Tolerating carb liquid diet.  Renal -  GILBERTO on CKD, Cr improving to baseline (~1.8-2.4). Naik placed in ED for urine output monitoring. Trend BUN/CR, monitor UO, avoid nephrotoxic agents.     Heme - Hold ASA/plavix for now in setting of GIB. H/H now stable; transfuse prn to maintain Hgb>7.     Endocrine - Hyperglycemia. Will start lantus 10 mg QHS. Glycemic control with TIM; FS q6h.Levothyroxine PO for hypothyroid.  Dispo - PT: DILLON

## 2022-01-18 NOTE — PHYSICAL THERAPY INITIAL EVALUATION ADULT - BED MOBILITY LIMITATIONS, REHAB EVAL
leans backwards/decreased ability to use arms for pushing/pulling/decreased ability to use legs for bridging/pushing/impaired ability to control trunk for mobility

## 2022-01-18 NOTE — PHYSICAL THERAPY INITIAL EVALUATION ADULT - ADDITIONAL COMMENTS
PT contacted daughter Shasha for history. Pt is a poor historian.  Pt lives with son in a house with no steps to enter and a chair lift inside to 2nd floor. Pt has a [facility] for 10hr/7days a week. Pt was able to transfer from bed to chair with 1 person assist. DME: hospital bed, commode.

## 2022-01-18 NOTE — PHYSICAL THERAPY INITIAL EVALUATION ADULT - PERTINENT HX OF CURRENT PROBLEM, REHAB EVAL
87yo F with PMH of DM, HTN, CAD s/p PCI (on ASA and plavix), Afib (on digoxin), CHF (EF 35-40% in Feb '20), CKD, HLD, hypothyroid, dementia, presented to ED BIBEMS this morning in respiratory distress.

## 2022-01-18 NOTE — DISCHARGE NOTE PROVIDER - ATTENDING DISCHARGE PHYSICAL EXAMINATION:
PHYSICAL EXAM:  GENERAL: NAD, well-groomed, well-developed  HEAD:  Atraumatic, Normocephalic  EYES: EOMI, PERRLA, conjunctiva and sclera clear  ENMT: No tonsillar erythema, exudates, or enlargement; Moist mucous membranes, Good dentition, No lesions  NECK: Supple, No JVD, Normal thyroid  NERVOUS SYSTEM:  Alert & Oriented X2, Good concentration; Motor Strength 5/5 B/L upper and lower extremities; DTRs 2+ intact and symmetric  CHEST/LUNG: Clear to percussion bilaterally; No rales, rhonchi, wheezing, or rubs  HEART: Regular rate and rhythm; No murmurs, rubs, or gallops  ABDOMEN: Soft, Nontender, Nondistended; Bowel sounds present  EXTREMITIES:  2+ Peripheral Pulses, No clubbing, cyanosis, or edema  LYMPH: No lymphadenopathy noted  SKIN: No rashes or lesions

## 2022-01-19 LAB
ANION GAP SERPL CALC-SCNC: 8 MMOL/L — SIGNIFICANT CHANGE UP (ref 5–17)
BUN SERPL-MCNC: 55 MG/DL — HIGH (ref 7–23)
CALCIUM SERPL-MCNC: 9.6 MG/DL — SIGNIFICANT CHANGE UP (ref 8.5–10.1)
CHLORIDE SERPL-SCNC: 106 MMOL/L — SIGNIFICANT CHANGE UP (ref 96–108)
CO2 SERPL-SCNC: 23 MMOL/L — SIGNIFICANT CHANGE UP (ref 22–31)
CREAT SERPL-MCNC: 1.62 MG/DL — HIGH (ref 0.5–1.3)
GLUCOSE BLDC GLUCOMTR-MCNC: 242 MG/DL — HIGH (ref 70–99)
GLUCOSE BLDC GLUCOMTR-MCNC: 280 MG/DL — HIGH (ref 70–99)
GLUCOSE BLDC GLUCOMTR-MCNC: 290 MG/DL — HIGH (ref 70–99)
GLUCOSE BLDC GLUCOMTR-MCNC: 303 MG/DL — HIGH (ref 70–99)
GLUCOSE BLDC GLUCOMTR-MCNC: 304 MG/DL — HIGH (ref 70–99)
GLUCOSE BLDC GLUCOMTR-MCNC: 329 MG/DL — HIGH (ref 70–99)
GLUCOSE SERPL-MCNC: 291 MG/DL — HIGH (ref 70–99)
HCT VFR BLD CALC: 34.7 % — SIGNIFICANT CHANGE UP (ref 34.5–45)
HGB BLD-MCNC: 10.9 G/DL — LOW (ref 11.5–15.5)
MCHC RBC-ENTMCNC: 27.2 PG — SIGNIFICANT CHANGE UP (ref 27–34)
MCHC RBC-ENTMCNC: 31.4 GM/DL — LOW (ref 32–36)
MCV RBC AUTO: 86.5 FL — SIGNIFICANT CHANGE UP (ref 80–100)
NRBC # BLD: 0 /100 WBCS — SIGNIFICANT CHANGE UP (ref 0–0)
PLATELET # BLD AUTO: 256 K/UL — SIGNIFICANT CHANGE UP (ref 150–400)
POTASSIUM SERPL-MCNC: 4.2 MMOL/L — SIGNIFICANT CHANGE UP (ref 3.5–5.3)
POTASSIUM SERPL-SCNC: 4.2 MMOL/L — SIGNIFICANT CHANGE UP (ref 3.5–5.3)
RBC # BLD: 4.01 M/UL — SIGNIFICANT CHANGE UP (ref 3.8–5.2)
RBC # FLD: 13.4 % — SIGNIFICANT CHANGE UP (ref 10.3–14.5)
SODIUM SERPL-SCNC: 137 MMOL/L — SIGNIFICANT CHANGE UP (ref 135–145)
WBC # BLD: 7.79 K/UL — SIGNIFICANT CHANGE UP (ref 3.8–10.5)
WBC # FLD AUTO: 7.79 K/UL — SIGNIFICANT CHANGE UP (ref 3.8–10.5)

## 2022-01-19 PROCEDURE — 99233 SBSQ HOSP IP/OBS HIGH 50: CPT

## 2022-01-19 RX ORDER — LOSARTAN POTASSIUM 100 MG/1
1 TABLET, FILM COATED ORAL
Qty: 30 | Refills: 0
Start: 2022-01-19 | End: 2022-02-17

## 2022-01-19 RX ORDER — LEVOTHYROXINE SODIUM 125 MCG
1 TABLET ORAL
Qty: 30 | Refills: 0
Start: 2022-01-19 | End: 2022-02-17

## 2022-01-19 RX ORDER — INSULIN GLARGINE 100 [IU]/ML
10 INJECTION, SOLUTION SUBCUTANEOUS EVERY MORNING
Refills: 0 | Status: DISCONTINUED | OUTPATIENT
Start: 2022-01-19 | End: 2022-01-22

## 2022-01-19 RX ORDER — LOSARTAN POTASSIUM 100 MG/1
25 TABLET, FILM COATED ORAL DAILY
Refills: 0 | Status: DISCONTINUED | OUTPATIENT
Start: 2022-01-19 | End: 2022-01-20

## 2022-01-19 RX ORDER — INSULIN LISPRO 100/ML
8 VIAL (ML) SUBCUTANEOUS ONCE
Refills: 0 | Status: COMPLETED | OUTPATIENT
Start: 2022-01-19 | End: 2022-01-19

## 2022-01-19 RX ORDER — ACETAMINOPHEN 500 MG
2 TABLET ORAL
Qty: 0 | Refills: 0 | DISCHARGE
Start: 2022-01-19

## 2022-01-19 RX ORDER — ATORVASTATIN CALCIUM 80 MG/1
1 TABLET, FILM COATED ORAL
Qty: 30 | Refills: 0
Start: 2022-01-19 | End: 2022-02-17

## 2022-01-19 RX ORDER — CARVEDILOL PHOSPHATE 80 MG/1
6.25 CAPSULE, EXTENDED RELEASE ORAL EVERY 12 HOURS
Refills: 0 | Status: DISCONTINUED | OUTPATIENT
Start: 2022-01-19 | End: 2022-01-22

## 2022-01-19 RX ORDER — DEXAMETHASONE 0.5 MG/5ML
1 ELIXIR ORAL
Qty: 5 | Refills: 0
Start: 2022-01-19 | End: 2022-01-23

## 2022-01-19 RX ORDER — AMLODIPINE BESYLATE 2.5 MG/1
10 TABLET ORAL DAILY
Refills: 0 | Status: DISCONTINUED | OUTPATIENT
Start: 2022-01-19 | End: 2022-01-22

## 2022-01-19 RX ORDER — CARVEDILOL PHOSPHATE 80 MG/1
1 CAPSULE, EXTENDED RELEASE ORAL
Qty: 60 | Refills: 0
Start: 2022-01-19 | End: 2022-02-17

## 2022-01-19 RX ORDER — PANTOPRAZOLE SODIUM 20 MG/1
1 TABLET, DELAYED RELEASE ORAL
Qty: 60 | Refills: 0
Start: 2022-01-19 | End: 2022-02-17

## 2022-01-19 RX ADMIN — PANTOPRAZOLE SODIUM 40 MILLIGRAM(S): 20 TABLET, DELAYED RELEASE ORAL at 05:43

## 2022-01-19 RX ADMIN — Medication 6: at 08:12

## 2022-01-19 RX ADMIN — Medication 4: at 18:22

## 2022-01-19 RX ADMIN — LOSARTAN POTASSIUM 25 MILLIGRAM(S): 100 TABLET, FILM COATED ORAL at 09:20

## 2022-01-19 RX ADMIN — Medication 8 UNIT(S): at 21:49

## 2022-01-19 RX ADMIN — HEPARIN SODIUM 5000 UNIT(S): 5000 INJECTION INTRAVENOUS; SUBCUTANEOUS at 05:42

## 2022-01-19 RX ADMIN — PANTOPRAZOLE SODIUM 40 MILLIGRAM(S): 20 TABLET, DELAYED RELEASE ORAL at 18:23

## 2022-01-19 RX ADMIN — CARVEDILOL PHOSPHATE 3.12 MILLIGRAM(S): 80 CAPSULE, EXTENDED RELEASE ORAL at 05:43

## 2022-01-19 RX ADMIN — AMLODIPINE BESYLATE 10 MILLIGRAM(S): 2.5 TABLET ORAL at 12:57

## 2022-01-19 RX ADMIN — INSULIN GLARGINE 10 UNIT(S): 100 INJECTION, SOLUTION SUBCUTANEOUS at 13:00

## 2022-01-19 RX ADMIN — Medication 6 MILLIGRAM(S): at 05:42

## 2022-01-19 RX ADMIN — CARVEDILOL PHOSPHATE 6.25 MILLIGRAM(S): 80 CAPSULE, EXTENDED RELEASE ORAL at 18:23

## 2022-01-19 RX ADMIN — Medication 44 MICROGRAM(S): at 21:06

## 2022-01-19 RX ADMIN — Medication 5 MILLIGRAM(S): at 00:15

## 2022-01-19 RX ADMIN — HEPARIN SODIUM 5000 UNIT(S): 5000 INJECTION INTRAVENOUS; SUBCUTANEOUS at 18:23

## 2022-01-19 RX ADMIN — Medication 8: at 11:24

## 2022-01-19 NOTE — PROGRESS NOTE ADULT - ASSESSMENT
85yo F with PMH of DM, HTN, CAD s/p PCI (on ASA and plavix), Afib (on digoxin), CHF (EF 35-40% in Feb '20), CKD, HLD, hypothyroid, dementia, a/w respiratory distress and coffee ground emesis.  Intubated in ED for hypoxic respiratory failure. Found to be Covid+. Was admitted to ICU and now downgraded to medical floors currently stable. Now she is medically stable for discharge home with [facility].    Admitting dx: acute hypoxic respiratory failure, aspiration PNA/pneumonitis, GIB, GILBERTO on CKD, lactic acidosis, troponinemia, COVID-19 infection. E coli UTI    ID - Covid positive and now E coli UTI. Will continue abx to cover for aspiration. Dexamethasone for Covid. C/W Ceftriaxone. monitor temp curve, trend WBC count. blood cx neg.    Neuro: Metabolic encephalopathy Appears confused. AAOX2.     CV - HD stable. Maintain MAP>65. held htn yesterday for possible GIB, though h/h stable, restart coreg amlodipine, and losartan. hold aspirin and plavix currently.    - EF ~35-40% per prior TTEs, latest from Feb '20.     Pulm - Now extubated after acute hypoxic respiratory failure, likely 2/2 aspiration with possible pneumonitis and COVID PNA. Now on room air. COVID supportive care. Complete steroids to PO at home.    GI - Hgb remains stable, c/w PPI BID and hold aspirin and plavix for now. Tolerating carb liquid diet.    Renal -  GILBERTO on CKD, Cr improving to baseline (~1.8-2.4). Naik placed in ED for urine output monitoring. Trend BUN/CR, monitor UO, avoid nephrotoxic agents.     Heme - Hold ASA/plavix for now in setting of GIB. H/H now stable; transfuse prn to maintain Hgb>7.     Endocrine - Hyperglycemia. HgA1c appropriate for age. Lantus 10 U QHS Glycemic control with TIM; FS ACHS. Levothyroxine PO for hypothyroid.    Dispo - PT: Home with home care with [facility].

## 2022-01-20 ENCOUNTER — TRANSCRIPTION ENCOUNTER (OUTPATIENT)
Age: 87
End: 2022-01-20

## 2022-01-20 LAB
CRP SERPL-MCNC: 10 MG/L — HIGH
D DIMER BLD IA.RAPID-MCNC: 621 NG/ML DDU — HIGH
FERRITIN SERPL-MCNC: 251 NG/ML — HIGH (ref 15–150)
GLUCOSE BLDC GLUCOMTR-MCNC: 222 MG/DL — HIGH (ref 70–99)
GLUCOSE BLDC GLUCOMTR-MCNC: 226 MG/DL — HIGH (ref 70–99)
GLUCOSE BLDC GLUCOMTR-MCNC: 282 MG/DL — HIGH (ref 70–99)
GLUCOSE BLDC GLUCOMTR-MCNC: 283 MG/DL — HIGH (ref 70–99)
INR BLD: 1.08 RATIO — SIGNIFICANT CHANGE UP (ref 0.88–1.16)
PROTHROM AB SERPL-ACNC: 12.5 SEC — SIGNIFICANT CHANGE UP (ref 10.6–13.6)

## 2022-01-20 PROCEDURE — 36481 INSERTION OF CATHETER VEIN: CPT | Mod: GC

## 2022-01-20 PROCEDURE — 36000 PLACE NEEDLE IN VEIN: CPT | Mod: GC,59

## 2022-01-20 PROCEDURE — 99232 SBSQ HOSP IP/OBS MODERATE 35: CPT

## 2022-01-20 RX ORDER — HYDRALAZINE HCL 50 MG
5 TABLET ORAL ONCE
Refills: 0 | Status: COMPLETED | OUTPATIENT
Start: 2022-01-20 | End: 2022-01-20

## 2022-01-20 RX ORDER — HYDRALAZINE HCL 50 MG
25 TABLET ORAL EVERY 8 HOURS
Refills: 0 | Status: DISCONTINUED | OUTPATIENT
Start: 2022-01-20 | End: 2022-01-21

## 2022-01-20 RX ORDER — DIGOXIN 250 MCG
1 TABLET ORAL
Qty: 0 | Refills: 0 | DISCHARGE
Start: 2022-01-20

## 2022-01-20 RX ORDER — ASPIRIN/CALCIUM CARB/MAGNESIUM 324 MG
1 TABLET ORAL
Qty: 0 | Refills: 0 | DISCHARGE

## 2022-01-20 RX ORDER — INSULIN GLARGINE 100 [IU]/ML
10 INJECTION, SOLUTION SUBCUTANEOUS
Qty: 0 | Refills: 0 | DISCHARGE
Start: 2022-01-20

## 2022-01-20 RX ORDER — HYDRALAZINE HCL 50 MG
10 TABLET ORAL EVERY 6 HOURS
Refills: 0 | Status: DISCONTINUED | OUTPATIENT
Start: 2022-01-20 | End: 2022-01-22

## 2022-01-20 RX ORDER — LOSARTAN POTASSIUM 100 MG/1
50 TABLET, FILM COATED ORAL DAILY
Refills: 0 | Status: DISCONTINUED | OUTPATIENT
Start: 2022-01-21 | End: 2022-01-21

## 2022-01-20 RX ORDER — LOSARTAN POTASSIUM 100 MG/1
1 TABLET, FILM COATED ORAL
Qty: 30 | Refills: 0
Start: 2022-01-20

## 2022-01-20 RX ORDER — AMLODIPINE BESYLATE 2.5 MG/1
1 TABLET ORAL
Qty: 30 | Refills: 0
Start: 2022-01-20

## 2022-01-20 RX ORDER — LOSARTAN POTASSIUM 100 MG/1
50 TABLET, FILM COATED ORAL ONCE
Refills: 0 | Status: COMPLETED | OUTPATIENT
Start: 2022-01-20 | End: 2022-01-20

## 2022-01-20 RX ORDER — CLOPIDOGREL BISULFATE 75 MG/1
1 TABLET, FILM COATED ORAL
Qty: 0 | Refills: 0 | DISCHARGE
Start: 2022-01-20

## 2022-01-20 RX ORDER — DONEPEZIL HYDROCHLORIDE 10 MG/1
1 TABLET, FILM COATED ORAL
Qty: 0 | Refills: 0 | DISCHARGE
Start: 2022-01-20

## 2022-01-20 RX ADMIN — Medication 4: at 07:48

## 2022-01-20 RX ADMIN — CARVEDILOL PHOSPHATE 6.25 MILLIGRAM(S): 80 CAPSULE, EXTENDED RELEASE ORAL at 05:27

## 2022-01-20 RX ADMIN — Medication 5 MILLIGRAM(S): at 01:20

## 2022-01-20 RX ADMIN — Medication 25 MILLIGRAM(S): at 21:57

## 2022-01-20 RX ADMIN — Medication 6: at 17:27

## 2022-01-20 RX ADMIN — HEPARIN SODIUM 5000 UNIT(S): 5000 INJECTION INTRAVENOUS; SUBCUTANEOUS at 17:34

## 2022-01-20 RX ADMIN — LOSARTAN POTASSIUM 50 MILLIGRAM(S): 100 TABLET, FILM COATED ORAL at 11:37

## 2022-01-20 RX ADMIN — LOSARTAN POTASSIUM 25 MILLIGRAM(S): 100 TABLET, FILM COATED ORAL at 05:26

## 2022-01-20 RX ADMIN — Medication 6 MILLIGRAM(S): at 05:27

## 2022-01-20 RX ADMIN — AMLODIPINE BESYLATE 10 MILLIGRAM(S): 2.5 TABLET ORAL at 05:26

## 2022-01-20 RX ADMIN — PANTOPRAZOLE SODIUM 40 MILLIGRAM(S): 20 TABLET, DELAYED RELEASE ORAL at 17:34

## 2022-01-20 RX ADMIN — HEPARIN SODIUM 5000 UNIT(S): 5000 INJECTION INTRAVENOUS; SUBCUTANEOUS at 05:27

## 2022-01-20 RX ADMIN — CARVEDILOL PHOSPHATE 6.25 MILLIGRAM(S): 80 CAPSULE, EXTENDED RELEASE ORAL at 17:33

## 2022-01-20 RX ADMIN — PANTOPRAZOLE SODIUM 40 MILLIGRAM(S): 20 TABLET, DELAYED RELEASE ORAL at 05:27

## 2022-01-20 RX ADMIN — Medication 6: at 11:37

## 2022-01-20 RX ADMIN — Medication 44 MICROGRAM(S): at 21:55

## 2022-01-20 RX ADMIN — INSULIN GLARGINE 10 UNIT(S): 100 INJECTION, SOLUTION SUBCUTANEOUS at 17:33

## 2022-01-20 RX ADMIN — Medication 25 MILLIGRAM(S): at 17:33

## 2022-01-20 NOTE — CHART NOTE - NSCHARTNOTEFT_GEN_A_CORE
Hospitalist Medicine PA Note.    Patient requering Peripheral IV access for medical management. Identified Left UE vein, prox  to AC and successfully placed 22g x 1.88 inch Angiocath into vessel. Placement confirmed with blood return and good flow. Pt tolerated well w/o complication.    Mary Fraga PA-C

## 2022-01-20 NOTE — PROGRESS NOTE ADULT - ASSESSMENT
85yo F with PMH of DM, HTN, CAD s/p PCI (on ASA and plavix), Afib (on digoxin), CHF (EF 35-40% in Feb '20), CKD, HLD, hypothyroid, dementia, a/w respiratory distress and coffee ground emesis.  Intubated in ED for hypoxic respiratory failure. Found to be Covid+.  Initially admitted to ICU and downgraded to medical floors.  Awaiting d/c home with [facility] aides.    # COVID infection - monitor SaO2.  Decadron.  Supplemental O2.  Monitor inflammatory markers - D-dimer, CRP, Ferritin.  Encourage proning, incentive spirometry, chest PT.    # Acute Hypoxic Respiratory Failure - resolved, on RA.  # Ecoli UTI - s/p abx.  # CAD - continue BBLocker, statin.  Antiplatelets held due to concern of GI Bleed.  H/H stable.  Resume antiplatelets.  # GILBERTO, CKD Stage III - Cr improved.  Monitor BMP.    # GI Bleed?  - H/H has been stable.  No s/s of bleeding, on Heparin for DVT Proph.  PPI, outpatient f/u.   # Metabolic Encephalopathy - mental status improving.  # Diabetes Type II - monitor fingersticks.  Insulin coverage for hyperglycemia.  # Acquired Hypothyroidism - continue Synthroid.  # Inpatient DVT prophylaxis - subcutaneous Heparin.  Antiplatelets on discharge.    87yo F with PMH of DM, HTN, CAD s/p PCI (on plavix), Afib (on digoxin), CHF (EF 35-40% in Feb '20), CKD, HLD, hypothyroid, dementia, a/w respiratory distress and coffee ground emesis.  Intubated in ED for hypoxic respiratory failure. Found to be Covid+.  Initially admitted to ICU and downgraded to medical floors.  Awaiting d/c home with [facility] aides.    # COVID infection - monitor SaO2.  Decadron.  Supplemental O2.  Monitor inflammatory markers - D-dimer, CRP, Ferritin.  Encourage proning, incentive spirometry, chest PT.    # Acute Hypoxic Respiratory Failure - resolved, on RA.  # Ecoli UTI - s/p abx.  # CAD - continue BBLocker, statin.  Antiplatelets held due to concern of GI Bleed.  H/H stable.  Resume antiplatelets - confirmed with daughter that pt is on Plavix at home.   # Chronic Afib - rate stable.  I confirmed with daughter that pt is not on AC at home, stopped by her cardiologist.    # GILBERTO, CKD Stage III - Cr improved.  Monitor BMP.    # GI Bleed?  - H/H has been stable.  No s/s of bleeding, on Heparin for DVT Proph.  PPI, outpatient f/u.   # Metabolic Encephalopathy - mental status improving.  # Diabetes Type II - monitor fingersticks.  Insulin coverage for hyperglycemia.  D/c Decadron on d/c.  Expect FS to improve off steroids.   # Essential HTN - Monitor BP.  Continue antihypertensives.  Escalated ARB.    # Acquired Hypothyroidism - continue Synthroid.  # Inpatient DVT prophylaxis - subcutaneous Heparin.  Antiplatelets on discharge.     I reviewed the risks, benefits and alternatives to AC with pt's daughter Shasha [phone].  Ddimer also elevated at 621, however, pt on RA without SOB.  She acknowledges above, agrees with antiplatlet therapy.   F/u with Cardiology, GI as outpatient for further evaluation.  CDC guidelines regarding self - isolation discussed.

## 2022-01-20 NOTE — DISCHARGE NOTE NURSING/CASE MANAGEMENT/SOCIAL WORK - NSDCPEFALRISK_GEN_ALL_CORE
For information on Fall & Injury Prevention, visit: [email or web address] OR  [email or web address] OR  https://www.cdc.gov/steadi/patient.html

## 2022-01-20 NOTE — DISCHARGE NOTE NURSING/CASE MANAGEMENT/SOCIAL WORK - PATIENT PORTAL LINK FT
You can access the FollowMyHealth Patient Portal offered by [facility] by registering at the following website: [email or web address]. By joining [facility]’s FollowMyHealth portal, you will also be able to view your health information using other applications (apps) compatible with our system.

## 2022-01-21 LAB
ANION GAP SERPL CALC-SCNC: 6 MMOL/L — SIGNIFICANT CHANGE UP (ref 5–17)
BUN SERPL-MCNC: 59 MG/DL — HIGH (ref 7–23)
CALCIUM SERPL-MCNC: 9.2 MG/DL — SIGNIFICANT CHANGE UP (ref 8.5–10.1)
CHLORIDE SERPL-SCNC: 109 MMOL/L — HIGH (ref 96–108)
CO2 SERPL-SCNC: 25 MMOL/L — SIGNIFICANT CHANGE UP (ref 22–31)
CREAT SERPL-MCNC: 1.83 MG/DL — HIGH (ref 0.5–1.3)
GLUCOSE BLDC GLUCOMTR-MCNC: 178 MG/DL — HIGH (ref 70–99)
GLUCOSE BLDC GLUCOMTR-MCNC: 214 MG/DL — HIGH (ref 70–99)
GLUCOSE BLDC GLUCOMTR-MCNC: 229 MG/DL — HIGH (ref 70–99)
GLUCOSE SERPL-MCNC: 231 MG/DL — HIGH (ref 70–99)
HCT VFR BLD CALC: 33.3 % — LOW (ref 34.5–45)
HGB BLD-MCNC: 10.5 G/DL — LOW (ref 11.5–15.5)
MCHC RBC-ENTMCNC: 27.6 PG — SIGNIFICANT CHANGE UP (ref 27–34)
MCHC RBC-ENTMCNC: 31.5 GM/DL — LOW (ref 32–36)
MCV RBC AUTO: 87.6 FL — SIGNIFICANT CHANGE UP (ref 80–100)
NRBC # BLD: 0 /100 WBCS — SIGNIFICANT CHANGE UP (ref 0–0)
PLATELET # BLD AUTO: 298 K/UL — SIGNIFICANT CHANGE UP (ref 150–400)
POTASSIUM SERPL-MCNC: 4.3 MMOL/L — SIGNIFICANT CHANGE UP (ref 3.5–5.3)
POTASSIUM SERPL-SCNC: 4.3 MMOL/L — SIGNIFICANT CHANGE UP (ref 3.5–5.3)
RBC # BLD: 3.8 M/UL — SIGNIFICANT CHANGE UP (ref 3.8–5.2)
RBC # FLD: 13.6 % — SIGNIFICANT CHANGE UP (ref 10.3–14.5)
SODIUM SERPL-SCNC: 140 MMOL/L — SIGNIFICANT CHANGE UP (ref 135–145)
WBC # BLD: 4.36 K/UL — SIGNIFICANT CHANGE UP (ref 3.8–10.5)
WBC # FLD AUTO: 4.36 K/UL — SIGNIFICANT CHANGE UP (ref 3.8–10.5)

## 2022-01-21 PROCEDURE — 99232 SBSQ HOSP IP/OBS MODERATE 35: CPT

## 2022-01-21 RX ORDER — LOSARTAN POTASSIUM 100 MG/1
100 TABLET, FILM COATED ORAL DAILY
Refills: 0 | Status: DISCONTINUED | OUTPATIENT
Start: 2022-01-21 | End: 2022-01-21

## 2022-01-21 RX ORDER — LOSARTAN POTASSIUM 100 MG/1
50 TABLET, FILM COATED ORAL ONCE
Refills: 0 | Status: COMPLETED | OUTPATIENT
Start: 2022-01-21 | End: 2022-01-21

## 2022-01-21 RX ORDER — HYDRALAZINE HCL 50 MG
50 TABLET ORAL EVERY 8 HOURS
Refills: 0 | Status: DISCONTINUED | OUTPATIENT
Start: 2022-01-21 | End: 2022-01-22

## 2022-01-21 RX ADMIN — HEPARIN SODIUM 5000 UNIT(S): 5000 INJECTION INTRAVENOUS; SUBCUTANEOUS at 17:48

## 2022-01-21 RX ADMIN — Medication 4: at 17:15

## 2022-01-21 RX ADMIN — PANTOPRAZOLE SODIUM 40 MILLIGRAM(S): 20 TABLET, DELAYED RELEASE ORAL at 17:48

## 2022-01-21 RX ADMIN — Medication 6 MILLIGRAM(S): at 05:20

## 2022-01-21 RX ADMIN — CARVEDILOL PHOSPHATE 6.25 MILLIGRAM(S): 80 CAPSULE, EXTENDED RELEASE ORAL at 17:48

## 2022-01-21 RX ADMIN — Medication 25 MILLIGRAM(S): at 05:21

## 2022-01-21 RX ADMIN — Medication 2: at 12:24

## 2022-01-21 RX ADMIN — Medication 50 MILLIGRAM(S): at 22:24

## 2022-01-21 RX ADMIN — Medication 44 MICROGRAM(S): at 22:38

## 2022-01-21 RX ADMIN — INSULIN GLARGINE 10 UNIT(S): 100 INJECTION, SOLUTION SUBCUTANEOUS at 12:24

## 2022-01-21 RX ADMIN — Medication 50 MILLIGRAM(S): at 17:48

## 2022-01-21 RX ADMIN — PANTOPRAZOLE SODIUM 40 MILLIGRAM(S): 20 TABLET, DELAYED RELEASE ORAL at 05:20

## 2022-01-21 RX ADMIN — HEPARIN SODIUM 5000 UNIT(S): 5000 INJECTION INTRAVENOUS; SUBCUTANEOUS at 05:21

## 2022-01-21 RX ADMIN — CARVEDILOL PHOSPHATE 6.25 MILLIGRAM(S): 80 CAPSULE, EXTENDED RELEASE ORAL at 05:20

## 2022-01-21 RX ADMIN — LOSARTAN POTASSIUM 50 MILLIGRAM(S): 100 TABLET, FILM COATED ORAL at 10:00

## 2022-01-21 RX ADMIN — AMLODIPINE BESYLATE 10 MILLIGRAM(S): 2.5 TABLET ORAL at 05:20

## 2022-01-21 RX ADMIN — LOSARTAN POTASSIUM 50 MILLIGRAM(S): 100 TABLET, FILM COATED ORAL at 05:20

## 2022-01-21 NOTE — PROGRESS NOTE ADULT - ASSESSMENT
85yo F with PMH of DM, HTN, CAD s/p PCI (on plavix), Afib (on digoxin), CHF (EF 35-40% in Feb '20), CKD, HLD, hypothyroid, dementia, a/w respiratory distress and coffee ground emesis.  Intubated in ED for hypoxic respiratory failure. Found to be Covid+.  Initially admitted to ICU and downgraded to medical floors.  Awaiting d/c home with [facility] aides.    # COVID infection - monitor SaO2.  Decadron.  Supplemental O2.  Monitor inflammatory markers - D-dimer, CRP, Ferritin.  Encourage proning, incentive spirometry, chest PT.    # Acute Hypoxic Respiratory Failure - resolved, on RA.  # Essential HTN - Monitor BP.  Continue antihypertensives.  Increased Hydralazine.   # Ecoli UTI - s/p abx.  # CAD - continue BBLocker, statin.  Antiplatelets held due to concern of GI Bleed.  H/H stable.  Resume antiplatelets - confirmed with daughter that pt is on Plavix at home.   # Chronic Afib - rate stable.  I confirmed with daughter that pt is not on AC at home, stopped by her cardiologist.    # GILBERTO, CKD Stage III - Cr stable.  Monitor BMP.  Will hold ARB  # GI Bleed?  - H/H has been stable.  No s/s of bleeding, on Heparin for DVT Proph.  PPI, outpatient f/u.   # Metabolic Encephalopathy - mental status improving.  # Diabetes Type II - monitor fingersticks.  Insulin coverage for hyperglycemia.  D/c Decadron on d/c.  Expect FS to improve off steroids.   # Acquired Hypothyroidism - continue Synthroid.  # Inpatient DVT prophylaxis - subcutaneous Heparin.  Antiplatelets on discharge.     I reviewed the risks, benefits and alternatives to AC with pt's daughter Shasha [phone].  Ddimer also elevated at 621, however, pt on RA without SOB.  She acknowledges above, agrees with antiplatlet therapy.   F/u with Cardiology, GI as outpatient for further evaluation.  CDC guidelines regarding self - isolation discussed.

## 2022-01-22 VITALS
TEMPERATURE: 97 F | RESPIRATION RATE: 18 BRPM | OXYGEN SATURATION: 100 % | HEART RATE: 72 BPM | SYSTOLIC BLOOD PRESSURE: 146 MMHG | DIASTOLIC BLOOD PRESSURE: 72 MMHG

## 2022-01-22 LAB
GLUCOSE BLDC GLUCOMTR-MCNC: 160 MG/DL — HIGH (ref 70–99)
GLUCOSE BLDC GLUCOMTR-MCNC: 162 MG/DL — HIGH (ref 70–99)
GLUCOSE BLDC GLUCOMTR-MCNC: 198 MG/DL — HIGH (ref 70–99)

## 2022-01-22 PROCEDURE — 99232 SBSQ HOSP IP/OBS MODERATE 35: CPT

## 2022-01-22 RX ORDER — HYDRALAZINE HCL 50 MG
1 TABLET ORAL
Qty: 90 | Refills: 0
Start: 2022-01-22

## 2022-01-22 RX ADMIN — Medication 2: at 07:58

## 2022-01-22 RX ADMIN — Medication 2: at 11:55

## 2022-01-22 RX ADMIN — Medication 50 MILLIGRAM(S): at 13:38

## 2022-01-22 RX ADMIN — CARVEDILOL PHOSPHATE 6.25 MILLIGRAM(S): 80 CAPSULE, EXTENDED RELEASE ORAL at 05:23

## 2022-01-22 RX ADMIN — CARVEDILOL PHOSPHATE 6.25 MILLIGRAM(S): 80 CAPSULE, EXTENDED RELEASE ORAL at 17:25

## 2022-01-22 RX ADMIN — HEPARIN SODIUM 5000 UNIT(S): 5000 INJECTION INTRAVENOUS; SUBCUTANEOUS at 17:26

## 2022-01-22 RX ADMIN — PANTOPRAZOLE SODIUM 40 MILLIGRAM(S): 20 TABLET, DELAYED RELEASE ORAL at 05:23

## 2022-01-22 RX ADMIN — HEPARIN SODIUM 5000 UNIT(S): 5000 INJECTION INTRAVENOUS; SUBCUTANEOUS at 05:23

## 2022-01-22 RX ADMIN — Medication 6 MILLIGRAM(S): at 05:23

## 2022-01-22 RX ADMIN — Medication 50 MILLIGRAM(S): at 05:22

## 2022-01-22 RX ADMIN — AMLODIPINE BESYLATE 10 MILLIGRAM(S): 2.5 TABLET ORAL at 05:23

## 2022-01-22 RX ADMIN — Medication 2: at 17:24

## 2022-01-22 RX ADMIN — INSULIN GLARGINE 10 UNIT(S): 100 INJECTION, SOLUTION SUBCUTANEOUS at 08:00

## 2022-01-22 NOTE — PROGRESS NOTE ADULT - PROVIDER SPECIALTY LIST ADULT
Critical Care
Critical Care
Hospitalist
Hospitalist
Pulmonology
Pulmonology
Critical Care
Hospitalist
Hospitalist
Pulmonology
Hospitalist
Pulmonology
Critical Care
Hospitalist

## 2022-01-22 NOTE — PROGRESS NOTE ADULT - ASSESSMENT
87yo F with PMH of DM, HTN, CAD s/p PCI (on plavix), Afib (on digoxin), CHF (EF 35-40% in Feb '20), CKD, HLD, hypothyroid, dementia, a/w respiratory distress and coffee ground emesis.  Intubated in ED for hypoxic respiratory failure. Found to be Covid+.  Initially admitted to ICU and downgraded to medical floors.  Awaiting d/c home with [facility] aides.    # COVID infection - monitor SaO2.  Decadron.  Supplemental O2.  Monitor inflammatory markers - D-dimer, CRP, Ferritin.  Encourage proning, incentive spirometry, chest PT.    # Acute Hypoxic Respiratory Failure - resolved, on RA.  # Essential HTN - Monitor BP.  Continue antihypertensives.  Increased Hydralazine.   # Ecoli UTI - s/p abx.  # CAD - continue BBLocker, statin.  Antiplatelets held due to concern of GI Bleed.  H/H stable.  Resume antiplatelets - confirmed with daughter that pt is on Plavix at home.   # Chronic Afib - rate stable.  I confirmed with daughter that pt is not on AC at home, stopped by her cardiologist.    # GILBERTO, CKD Stage III - Cr stable.  Hold ARB  # GI Bleed?  - H/H has been stable.  No s/s of bleeding, on Heparin for DVT Proph.  PPI, outpatient f/u.   # Metabolic Encephalopathy - mental status improving.  # Diabetes Type II - monitor fingersticks.  Insulin coverage for hyperglycemia.  D/c Decadron on d/c.  Expect FS to improve off steroids.   # Acquired Hypothyroidism - continue Synthroid.  # Inpatient DVT prophylaxis - subcutaneous Heparin.  Antiplatelets on discharge.     I reviewed the risks, benefits and alternatives to AC with pt's daughter Shasha [phone].  Ddimer also elevated at 621, however, pt on RA without SOB.  She acknowledges above, agrees with antiplatlet therapy.   F/u with Cardiology, GI as outpatient for further evaluation.  CDC guidelines regarding self - isolation discussed.

## 2022-01-22 NOTE — PROGRESS NOTE ADULT - REASON FOR ADMISSION
Acute hypoxic respiratory failure, GIB, COVID-19

## 2022-01-22 NOTE — PROGRESS NOTE ADULT - SUBJECTIVE AND OBJECTIVE BOX
INTERVAL HPI:  87yo F with DM, HTN, CAD s/p PCI (on ASA and plavix), Afib (on digoxin), CHF (EF 35-40% in Feb '20), CKD, HLD, Hypothyroid, Dementia,  Brought to ED with Respiratory distress after vomiting.  Found hypoxic to 75% spo2, with coffee-ground emesis on clothes. Intubated in ED for hypoxic respiratory failure likely 2/2 aspiration. CXR with RML/RLL and LLL opacities. Found to be Covid+. Hgb 10.0, around pt's baseline. Labs otherwise significant for lactate 4.1, troponin-I 346, proBNP ~10k.    Admitted to ICU with hypoxic Respiratory failure, aspiration pneumonia, +ve COVID.   Also with high troponin, E Coli UTI.  Was successfully extubated, then transferred to medical floor.  Not able to provide any details due to Dementia.    OVERNIGHT EVENTS:  Comfortable in bed.    Vital Signs Last 24 Hrs  T(C): 36.2 (22 Jan 2022 17:08), Max: 37.1 (22 Jan 2022 05:23)  T(F): 97.2 (22 Jan 2022 17:08), Max: 98.8 (22 Jan 2022 05:23)  HR: 72 (22 Jan 2022 17:08) (72 - 94)  BP: 146/72 (22 Jan 2022 17:08) (134/75 - 170/81)  BP(mean): --  RR: 18 (22 Jan 2022 17:08) (16 - 19)  SpO2: 100% (22 Jan 2022 17:08) (96% - 100%)    PHYSICAL EXAM:  GEN:         responsive and comfortable.  HEENT:    Normal.    RESP:        no distress  CVS:          Regular rate and rhythm.     MEDICATIONS  (STANDING):  amLODIPine   Tablet 10 milliGRAM(s) Oral daily  carvedilol 6.25 milliGRAM(s) Oral every 12 hours  dexAMETHasone  Injectable 6 milliGRAM(s) IV Push daily  dextrose 40% Gel 15 Gram(s) Oral once  dextrose 5%. 1000 milliLiter(s) (50 mL/Hr) IV Continuous <Continuous>  dextrose 5%. 1000 milliLiter(s) (100 mL/Hr) IV Continuous <Continuous>  dextrose 50% Injectable 25 Gram(s) IV Push once  dextrose 50% Injectable 12.5 Gram(s) IV Push once  dextrose 50% Injectable 25 Gram(s) IV Push once  glucagon  Injectable 1 milliGRAM(s) IntraMuscular once  heparin   Injectable 5000 Unit(s) SubCutaneous every 12 hours  hydrALAZINE 50 milliGRAM(s) Oral every 8 hours  insulin glargine Injectable (LANTUS) 10 Unit(s) SubCutaneous every morning  insulin lispro (ADMELOG) corrective regimen sliding scale   SubCutaneous three times a day before meals  levothyroxine Injectable 44 MICROGram(s) IV Push at bedtime  pantoprazole    Tablet 40 milliGRAM(s) Oral two times a day    MEDICATIONS  (PRN):  acetaminophen     Tablet .. 650 milliGRAM(s) Oral every 6 hours PRN Temp greater or equal to 38C (100.4F), Mild Pain (1 - 3)  hydrALAZINE Injectable 10 milliGRAM(s) IV Push every 6 hours PRN SBP > 180 or DBP > 110    LABS:                        10.5   4.36  )-----------( 298      ( 21 Jan 2022 15:10 )             33.3     01-21    140  |  109<H>  |  59<H>  ----------------------------<  231<H>  4.3   |  25  |  1.83<H>    Ca    9.2      21 Jan 2022 15:10       ASSESSMENT AND PLAN:  ·	S/P hypoxic Respiratory failure.  ·	Aspiration Pneumonia.  ·	COVID Pneumonia.  ·	Suspected GI bleed.  ·	Anemia.  ·	Acute on chronic CKD.  ·	Elevated troponin.  ·	CAD.  ·	Systolic dysfunction.  ·	A Fib.  ·	E Coli UTI  ·	HTN.  ·	DM.  ·	HLD.    Pulmonary improved and stable  Complete 10 days of Dexamethasone.  For discharge  
INTERVAL HPI/OVERNIGHT EVENTS:    Pt extubated yesterday.  Noted to have no acute issues overnight, passed dysphagia screen.  Stable for downgrade to floors.      CENTRAL LINE: [ ] YES [x ] NO  LOCATION:     KRAUS: [ x] YES [ ] NO      A-LINE:  [x ] YES [ ] NO  LOCATION:   L radial     GLOBAL ISSUE/BEST PRACTICE:  Analgesia: Fentanyl   Sedation:   HOB elevation: yes  Stress ulcer prophylaxis: pantoprazole  Injectable  VTE prophylaxis: HSQ  Oral Care: Chlorhexidine  Glycemic control:   Nutrition: liquid diet    REVIEW OF SYSTEMS: [x] Unable to obtain because:  PHYSICAL EXAM:  Gen: intubated and sedated  HEENT: Intubated with ETT  CARD -s1s2, RRR, no M,G,R;   PULM - Coarse vented breath sounds, symmetric breath sounds;   ABD -  +BS, ND, NT, soft, no guarding, no rebound, no masses;   EXT - symmetric pulses, no edema;   NEURO - no focal neuro deficits    ICU Vital Signs Last 24 Hrs  T(C): 37.3 (16 Jan 2022 03:44), Max: 37.6 (15 Roman 2022 16:45)  T(F): 99.2 (16 Jan 2022 03:44), Max: 99.7 (15 Roman 2022 16:45)  HR: 60 (16 Jan 2022 09:00) (51 - 104)  BP: 159/57 (16 Jan 2022 09:00) (135/60 - 180/84)  BP(mean): 82 (16 Jan 2022 09:00) (79 - 120)  ABP: --  ABP(mean): --  RR: 17 (16 Jan 2022 09:00) (13 - 21)  SpO2: 97% (16 Jan 2022 09:00) (96% - 100%)    I&O's Detail    15 Roman 2022 07:01  -  16 Jan 2022 07:00  --------------------------------------------------------  IN:    IV PiggyBack: 50 mL    Propofol: 19.4 mL    Vital1.5: 70 mL  Total IN: 139.4 mL    OUT:    Voided (mL): 1250 mL  Total OUT: 1250 mL    Total NET: -1110.6 mL        MEDICATIONS  NEURO  Meds: acetaminophen     Tablet .. 650 milliGRAM(s) Oral every 6 hours PRN Temp greater or equal to 38C (100.4F), Mild Pain (1 - 3)    RESPIRATORY    Meds:   CARDIOVASCULAR  Meds: carvedilol 3.125 milliGRAM(s) Oral every 12 hours  digoxin  Injectable 125 MICROGram(s) IV Push daily    GI/NUTRITION  Meds: pantoprazole  Injectable 40 milliGRAM(s) IV Push every 12 hours    GENITOURINARY  Meds: dextrose 5%. 1000 milliLiter(s) IV Continuous <Continuous>  dextrose 5%. 1000 milliLiter(s) IV Continuous <Continuous>    HEMATOLOGIC  Meds: heparin   Injectable 5000 Unit(s) SubCutaneous every 12 hours    [x] VTE Prophylaxis  INFECTIOUS DISEASES  Meds: cefTRIAXone   IVPB 1000 milliGRAM(s) IV Intermittent every 24 hours    ENDOCRINE  CAPILLARY BLOOD GLUCOSE      POCT Blood Glucose.: 160 mg/dL (16 Jan 2022 05:26)  POCT Blood Glucose.: 180 mg/dL (15 Roman 2022 23:18)  POCT Blood Glucose.: 270 mg/dL (15 Roman 2022 17:13)  POCT Blood Glucose.: 239 mg/dL (15 Roman 2022 11:24)    Meds: insulin lispro (ADMELOG) corrective regimen sliding scale   SubCutaneous every 6 hours    OTHER MEDICATIONS:  chlorhexidine 2% Cloths 1 Application(s) Topical <User Schedule>  :    LABS:                        9.0    10.30 )-----------( 181      ( 16 Jan 2022 02:32 )             27.8      01-16    138  |  107  |  66<H>  ----------------------------<  160<H>  4.5   |  21<L>  |  2.12<H>    Ca    8.8      16 Jan 2022 02:32  Phos  3.4     01-16  Mg     3.0     01-16    TPro  6.9  /  Alb  1.8<L>  /  TBili  0.2  /  DBili  x   /  AST  40<H>  /  ALT  41  /  AlkPhos  51  01-16        Culture Results:   Normal Respiratory Alicia present (01-13 @ 16:22)  Culture Results:   >100,000 CFU/ml Gram Negative Rods (01-13 @ 11:05)            RADIOLOGY & ADDITIONAL STUDIES:    
Patient is a 86y old  Female who presents with a chief complaint of Acute hypoxic respiratory failure, GIB, COVID-19 (2022 12:18)    INTERVAL HPI/OVERNIGHT EVENTS: Patients seen and examined at bedside this morning. No acute events overnight. Pt reports no concerns. Does not report dysuria. Appears less confused than yesterday.     MEDICATIONS  (STANDING):  amLODIPine   Tablet 5 milliGRAM(s) Oral daily  carvedilol 3.125 milliGRAM(s) Oral every 12 hours  dexAMETHasone  Injectable 6 milliGRAM(s) IV Push daily  dextrose 40% Gel 15 Gram(s) Oral once  dextrose 5%. 1000 milliLiter(s) (50 mL/Hr) IV Continuous <Continuous>  dextrose 5%. 1000 milliLiter(s) (100 mL/Hr) IV Continuous <Continuous>  dextrose 50% Injectable 25 Gram(s) IV Push once  dextrose 50% Injectable 12.5 Gram(s) IV Push once  dextrose 50% Injectable 25 Gram(s) IV Push once  glucagon  Injectable 1 milliGRAM(s) IntraMuscular once  heparin   Injectable 5000 Unit(s) SubCutaneous every 12 hours  insulin lispro (ADMELOG) corrective regimen sliding scale   SubCutaneous three times a day before meals  insulin lispro (ADMELOG) corrective regimen sliding scale   SubCutaneous at bedtime  levothyroxine Injectable 44 MICROGram(s) IV Push at bedtime  pantoprazole    Tablet 40 milliGRAM(s) Oral two times a day    MEDICATIONS  (PRN):  acetaminophen     Tablet .. 650 milliGRAM(s) Oral every 6 hours PRN Temp greater or equal to 38C (100.4F), Mild Pain (1 - 3)    Allergies    No Known Allergies    Intolerances      REVIEW OF SYSTEMS:  All other systems reviewed and are negative    Vital Signs Last 24 Hrs  T(C): 36.4 (2022 11:30), Max: 36.9 (2022 05:00)  T(F): 97.6 (2022 11:30), Max: 98.5 (2022 05:00)  HR: 75 (2022 11:30) (68 - 85)  BP: 163/74 (2022 11:30) (154/82 - 171/66)  BP(mean): --  RR: 18 (2022 11:30) (17 - 18)  SpO2: 97% (2022 11:30) (93% - 98%)  Daily     Daily Weight in k (2022 05:00)  I&O's Summary    2022 07:01  -  2022 07:00  --------------------------------------------------------  IN: 380 mL / OUT: 800 mL / NET: -420 mL      CAPILLARY BLOOD GLUCOSE      POCT Blood Glucose.: 280 mg/dL (2022 12:00)  POCT Blood Glucose.: 220 mg/dL (2022 05:42)  POCT Blood Glucose.: 229 mg/dL (2022 23:33)  POCT Blood Glucose.: 258 mg/dL (2022 17:14)    PHYSICAL EXAM:  GENERAL: NAD, well-groomed, well-developed  HEAD:  Atraumatic, Normocephalic  EYES: EOMI, PERRLA, conjunctiva and sclera clear  ENMT: No tonsillar erythema, exudates, or enlargement; Moist mucous membranes, Good dentition, No lesions  NECK: Supple, No JVD, Normal thyroid  NERVOUS SYSTEM:  Alert & Oriented X2, Good concentration; Motor Strength 5/5 B/L upper and lower extremities; DTRs 2+ intact and symmetric  CHEST/LUNG: Clear to percussion bilaterally; No rales, rhonchi, wheezing, or rubs  HEART: Regular rate and rhythm; No murmurs, rubs, or gallops  ABDOMEN: Soft, Nontender, Nondistended; Bowel sounds present  EXTREMITIES:  2+ Peripheral Pulses, No clubbing, cyanosis, or edema  LYMPH: No lymphadenopathy noted  SKIN: No rashes or lesions    Labs                          10.8   7.89  )-----------( 254      ( 2022 15:45 )             34.0     17    136  |  106  |  60<H>  ----------------------------<  265<H>  4.6   |  22  |  1.96<H>    Ca    9.5      2022 15:45  Phos  3.2       Mg     3.1         TPro  8.0  /  Alb  2.1<L>  /  TBili  0.3  /  DBili  x   /  AST  37  /  ALT  67  /  AlkPhos  59                          
  INTERVAL HPI:   85yo F with DM, HTN, CAD s/p PCI (on ASA and plavix), Afib (on digoxin), CHF (EF 35-40% in Feb '20), CKD, HLD, Hypothyroid, Dementia,  Brought to ED with Respiratory distress after vomiting.  Found hypoxic to 75% spo2, with coffee-ground emesis on clothes. Intubated in ED for hypoxic respiratory failure likely 2/2 aspiration. CXR with RML/RLL and LLL opacities. Found to be Covid+. Hgb 10.0, around pt's baseline. Labs otherwise significant for lactate 4.1, troponin-I 346, proBNP ~10k.    Admitted to ICU with hypoxic Respiratory failure, aspiration pneumonia, +ve COVID.   Also with high troponin, E Coli UTI.  Was successfully extubated, then transferred to medical floor.  Not able to provide any details due to Dementia.    OVERNIGHT EVENTS:  Awake, comfortable.    Vital Signs Last 24 Hrs  T(C): 36.6 (21 Jan 2022 17:05), Max: 36.6 (20 Jan 2022 23:04)  T(F): 97.9 (21 Jan 2022 17:05), Max: 97.9 (20 Jan 2022 23:04)  HR: 87 (21 Jan 2022 17:05) (64 - 87)  BP: 128/67 (21 Jan 2022 17:05) (119/64 - 181/79)  BP(mean): --  RR: 18 (21 Jan 2022 17:05) (17 - 18)  SpO2: 95% (21 Jan 2022 19:56) (95% - 100%)    PHYSICAL EXAM:  GEN:         Awake, responsive and comfortable.  HEENT:    Normal.    RESP:         no distress  CVS:           Regular rate and rhythm.     MEDICATIONS  (STANDING):  amLODIPine   Tablet 10 milliGRAM(s) Oral daily  carvedilol 6.25 milliGRAM(s) Oral every 12 hours  dexAMETHasone  Injectable 6 milliGRAM(s) IV Push daily  dextrose 40% Gel 15 Gram(s) Oral once  dextrose 5%. 1000 milliLiter(s) (50 mL/Hr) IV Continuous <Continuous>  dextrose 5%. 1000 milliLiter(s) (100 mL/Hr) IV Continuous <Continuous>  dextrose 50% Injectable 25 Gram(s) IV Push once  dextrose 50% Injectable 12.5 Gram(s) IV Push once  dextrose 50% Injectable 25 Gram(s) IV Push once  glucagon  Injectable 1 milliGRAM(s) IntraMuscular once  heparin   Injectable 5000 Unit(s) SubCutaneous every 12 hours  hydrALAZINE 50 milliGRAM(s) Oral every 8 hours  insulin glargine Injectable (LANTUS) 10 Unit(s) SubCutaneous every morning  insulin lispro (ADMELOG) corrective regimen sliding scale   SubCutaneous three times a day before meals  levothyroxine Injectable 44 MICROGram(s) IV Push at bedtime  pantoprazole    Tablet 40 milliGRAM(s) Oral two times a day    MEDICATIONS  (PRN):  acetaminophen     Tablet .. 650 milliGRAM(s) Oral every 6 hours PRN Temp greater or equal to 38C (100.4F), Mild Pain (1 - 3)  hydrALAZINE Injectable 10 milliGRAM(s) IV Push every 6 hours PRN SBP > 180 or DBP > 110    LABS:                        10.5   4.36  )-----------( 298      ( 21 Jan 2022 15:10 )             33.3     01-21    140  |  109<H>  |  59<H>  ----------------------------<  231<H>  4.3   |  25  |  1.83<H>    Ca    9.2      21 Jan 2022 15:10    PT/INR - ( 20 Jan 2022 11:50 )   PT: 12.5 sec;   INR: 1.08 ratio          ASSESSMENT AND PLAN:  ·	S/P hypoxic Respiratory failure.  ·	Aspiration Pneumonia.  ·	COVID Pneumonia.  ·	Suspected GI bleed.  ·	Anemia.  ·	Acute on chronic CKD.  ·	Elevated troponin.  ·	CAD.  ·	Systolic dysfunction.  ·	A Fib.  ·	E Coli UTI  ·	HTN.  ·	DM.  ·	HLD.    SPO2 95% on room air.  Complete 10 days of Dexamethasone.  BP has been running high.      
# CC: Patient is a 86y old  Female who presents with a chief complaint of Acute hypoxic respiratory failure, GIB, COVID-19 (13 Jan 2022 05:19)      ## HPI:  87yo F with PMH of DM, HTN, CAD s/p PCI (on ASA and plavix), Afib (on digoxin), CHF (EF 35-40% in Feb '20), CKD, HLD, hypothyroid, dementia, presented to ED BIBEMS this morning in respiratory distress. Per hx provided by EMS, pt was at home having BM on toilet when she passed out after possibly having vomited. Pt brought in to ED in respiratory distress, hypoxic to 75% spo2, with coffee-ground emesis on clothes. Intubated in ED for hypoxic respiratory failure likely 2/2 aspiration. CXR with RML/RLL and LLL opacities. Found to be Covid+. Hgb 10.0, around pt's baseline. Labs otherwise significant for lactate 4.1, troponin-I 346, proBNP ~10k. Hemodynamically stable.   ICU consulted for further management of mechanically ventilated pt.   Pt is s/p Moderna vaccination x1; first dose in December, due for 2nd dose on 1/19.  (13 Jan 2022 05:19)      **O/N:**    ## ROS:    ## Labs:  ** CBC: **                        8.2    4.05  )-----------( 115      ( 13 Jan 2022 17:58 )             25.8     ** Chem:  **  01-13    136  |  104  |  50<H>  ----------------------------<  319<H>  5.2   |  23  |  2.46<H>    Ca    8.7      13 Jan 2022 03:32  Phos  3.5     01-13  Mg     2.9     01-13    TPro  7.8  /  Alb  2.4<L>  /  TBili  0.3  /  DBili  x   /  AST  54<H>  /  ALT  42  /  AlkPhos  65  01-13    ** Coags: **  PT/INR - ( 13 Jan 2022 03:32 )   PT: 12.8 sec;   INR: 1.11 ratio         PTT - ( 13 Jan 2022 03:32 )  PTT:28.2 sec    CAPILLARY BLOOD GLUCOSE      POCT Blood Glucose.: 228 mg/dL (13 Jan 2022 17:21)  POCT Blood Glucose.: 246 mg/dL (13 Jan 2022 11:09)  POCT Blood Glucose.: 315 mg/dL (13 Jan 2022 07:04)  POCT Blood Glucose.: 325 mg/dL (13 Jan 2022 02:33)  POCT Blood Glucose.: 314 mg/dL (13 Jan 2022 02:31)          ## Imaging:    ## Medications:    azithromycin  IVPB      cefTRIAXone   IVPB 1000 milliGRAM(s) IV Intermittent every 24 hours      fentaNYL   Infusion. 1 MICROgram(s)/kG/Hr IV Continuous <Continuous>  propofol Infusion 10 MICROgram(s)/kG/Min IV Continuous <Continuous>        pantoprazole  Injectable 40 milliGRAM(s) IV Push every 12 hours      dexAMETHasone  Injectable 6 milliGRAM(s) IV Push daily  dextrose 40% Gel 15 Gram(s) Oral once  dextrose 50% Injectable 25 Gram(s) IV Push once  dextrose 50% Injectable 12.5 Gram(s) IV Push once  dextrose 50% Injectable 25 Gram(s) IV Push once  glucagon  Injectable 1 milliGRAM(s) IntraMuscular once  insulin lispro (ADMELOG) corrective regimen sliding scale   SubCutaneous every 6 hours  levothyroxine Injectable 44 MICROGram(s) IV Push at bedtime    influenza  Vaccine (HIGH DOSE) 0.7 milliLiter(s) IntraMuscular once        ## O/E:  ICU Vital Signs Last 24 Hrs  T(C): 37.2 (13 Jan 2022 15:30), Max: 39.4 (13 Jan 2022 02:32)  T(F): 98.9 (13 Jan 2022 15:30), Max: 102.9 (13 Jan 2022 02:32)  HR: 53 (13 Jan 2022 19:00) (45 - 123)  BP: 114/57 (13 Jan 2022 16:30) (93/46 - 161/90)  BP(mean): 71 (13 Jan 2022 16:30) (58 - 85)  ABP: 140/66 (13 Jan 2022 19:00) (127/66 - 140/66)  ABP(mean): 90 (13 Jan 2022 19:00) (81 - 90)  RR: 20 (13 Jan 2022 19:00) (19 - 41)  SpO2: 100% (13 Jan 2022 19:00) (99% - 100%)    I&O's Summary    13 Jan 2022 07:01  -  13 Jan 2022 19:25  --------------------------------------------------------  IN: 482 mL / OUT: 800 mL / NET: -318 mL      Mode: AC/ CMV (Assist Control/ Continuous Mandatory Ventilation), RR (machine): 20, TV (machine): 380, FiO2: 30, PEEP: 5, ITime: 1, MAP: 10, PIP: 24  Gen: lying comfortably in bed in no apparent distress  HEENT: PERRL, EOMI  Resp: CTA B/L no c/r/w  CVS: S1S2 no m/r/g  Abd: soft NT/ND +BS  Ext: no c/c/e  Neuro: A&Ox3    ## Code status:  Goals of care discussion: [x] yes [ ] no  [x] full code  [ ] DNR  [ ] DNI  [ ] PASTOR
INTERVAL HPI/OVERNIGHT EVENTS:    Intubated in the ED, on propofol weaned off 2/2 bradycardia, now on fentanyl; apneic with SBT this AM.  Started on HSQ    CENTRAL LINE: [ ] YES [x ] NO  LOCATION:     KRAUS: [ x] YES [ ] NO      A-LINE:  [x ] YES [ ] NO  LOCATION:   L radial     GLOBAL ISSUE/BEST PRACTICE:  Analgesia: Fentanyl   Sedation:   HOB elevation: yes  Stress ulcer prophylaxis: pantoprazole  Injectable  VTE prophylaxis: HSQ  Oral Care: Chlorhexidine  Glycemic control:   Nutrition:Diet, NPO (22 @ 05:07) [Active]    REVIEW OF SYSTEMS: [x] Unable to obtain because:  PHYSICAL EXAM:  Gen: intubated and sedated  HEENT: Intubated with ETT  CARD -s1s2, RRR, no M,G,R;   PULM - Coarse vented breath sounds, symmetric breath sounds;   ABD -  +BS, ND, NT, soft, no guarding, no rebound, no masses;   EXT - symmetric pulses, no edema;   NEURO - no focal neuro deficits    ICU Vital Signs Last 24 Hrs  T(C): 37.4 (2022 07:30), Max: 37.9 (2022 04:00)  T(F): 99.4 (2022 07:30), Max: 100.2 (2022 04:00)  HR: 79 (2022 08:45) (45 - 93)  BP: 114/57 (2022 16:30) (93/46 - 119/76)  BP(mean): 71 (2022 16:30) (58 - 85)  ABP: 188/90 (2022 08:45) (121/56 - 188/90)  ABP(mean): 128 (2022 08:45) (78 - 128)  RR: 20 (2022 08:45) (19 - 21)  SpO2: 100% (2022 08:45) (100% - 100%)    I&O's Detail    2022 07:01  -  2022 07:00  --------------------------------------------------------  IN:    FentaNYL: 11.8 mL    FentaNYL: 251.5 mL    IV PiggyBack: 350 mL    Propofol: 48.1 mL  Total IN: 661.4 mL    OUT:    Indwelling Catheter - Urethral (mL): 1250 mL  Total OUT: 1250 mL    Total NET: -588.6 mL    MEDICATIONS  NEURO  Meds: fentaNYL   Infusion. 1 MICROgram(s)/kG/Hr (5.85 mL/Hr) IV Continuous <Continuous>  propofol Infusion 10 MICROgram(s)/kG/Min (3.4 mL/Hr) IV Continuous <Continuous>    RESPIRATORY  ABG - ( 2022 12:28 )  pH: 7.39  /  pCO2: 32    /  pO2: 149   / HCO3: 19    / Base Excess: -4.9  /  SaO2: 99.7    Lactate: x      Meds:   CARDIOVASCULAR  Meds:   GI/NUTRITION  Meds: pantoprazole  Injectable 40 milliGRAM(s) IV Push every 12 hours    GENITOURINARY  Meds: dextrose 5%. 1000 milliLiter(s) IV Continuous <Continuous>  dextrose 5%. 1000 milliLiter(s) IV Continuous <Continuous>    HEMATOLOGIC  Meds:   [x] VTE Prophylaxis  INFECTIOUS DISEASES  Meds: azithromycin  IVPB      azithromycin  IVPB 500 milliGRAM(s) IV Intermittent every 24 hours  cefTRIAXone   IVPB 1000 milliGRAM(s) IV Intermittent every 24 hours    ENDOCRINE  CAPILLARY BLOOD GLUCOSE      POCT Blood Glucose.: 76 mg/dL (2022 05:14)  POCT Blood Glucose.: 113 mg/dL (2022 23:21)  POCT Blood Glucose.: 228 mg/dL (2022 17:21)  POCT Blood Glucose.: 246 mg/dL (2022 11:09)    Meds: insulin lispro (ADMELOG) corrective regimen sliding scale   SubCutaneous every 6 hours    OTHER MEDICATIONS:  chlorhexidine 0.12% Liquid 15 milliLiter(s) Oral Mucosa every 12 hours  chlorhexidine 2% Cloths 1 Application(s) Topical <User Schedule>  :    LABS:                        8.9    6.75  )-----------( 130      ( 2022 02:14 )             27.6      01-14    137  |  107  |  47<H>  ----------------------------<  97  4.9   |  22  |  2.10<H>  Cr Trend: 2.10 (22 @ 02:14) 2.46 (22 @ 03:32)    Ca    8.5      2022 02:14  Phos  4.3       Mg     2.6         TPro  6.7  /  Alb  1.9<L>  /  TBili  0.2  /  DBili  x   /  AST  65<H>  /  ALT  44  /  AlkPhos  53      PT/INR - ( 2022 03:32 )   PT: 12.8 sec;   INR: 1.11 ratio         PTT - ( 2022 03:32 )  PTT:28.2 sec  Urinalysis Basic - ( 2022 07:21 )    Color: Yellow / Appearance: Clear / S.020 / pH: x  Gluc: x / Ketone: Negative  / Bili: Negative / Urobili: Negative mg/dL   Blood: x / Protein: 100 mg/dL / Nitrite: Positive   Leuk Esterase: Negative / RBC: 0-2 /HPF / WBC 3-5   Sq Epi: x / Non Sq Epi: Few / Bacteria: Many    Culture - Sputum (collected 22 @ 16:22)  Source: .Sputum Sputum  Gram Stain (22 @ 21:03):    No polymorphonuclear leukocytes per low power field    No Squamous epithelial cells per low power field    Rare Gram positive cocci in pairs per oil power field    Rare Gram Negative Rods per oil power field    Mode: AC/ CMV (Assist Control/ Continuous Mandatory Ventilation), RR (machine): 20, TV (machine): 380, FiO2: 30, PEEP: 5, ITime: 1, MAP: 10, PIP: 25    RADIOLOGY & ADDITIONAL STUDIES:    
INTERVAL HPI/OVERNIGHT EVENTS:    Weaning well today.  Extubated after passing CPAP trial, following commands.    Updated daughter this afternoon of patient doing well on CPAP trial with plans for possible extubation today.     CENTRAL LINE: [ ] YES [x ] NO  LOCATION:     KRAUS: [ x] YES [ ] NO      A-LINE:  [x ] YES [ ] NO  LOCATION:   L radial     GLOBAL ISSUE/BEST PRACTICE:  Analgesia: Fentanyl   Sedation:   HOB elevation: yes  Stress ulcer prophylaxis: pantoprazole  Injectable  VTE prophylaxis: HSQ  Oral Care: Chlorhexidine  Glycemic control:   Nutrition:Diet, NPO (01-13-22 @ 05:07) [Active]    REVIEW OF SYSTEMS: [x] Unable to obtain because:  PHYSICAL EXAM:  Gen: intubated and sedated  HEENT: Intubated with ETT  CARD -s1s2, RRR, no M,G,R;   PULM - Coarse vented breath sounds, symmetric breath sounds;   ABD -  +BS, ND, NT, soft, no guarding, no rebound, no masses;   EXT - symmetric pulses, no edema;   NEURO - no focal neuro deficits    ICU Vital Signs Last 24 Hrs  T(C): 36.3 (15 Roman 2022 11:38), Max: 38.1 (15 Roman 2022 08:03)  T(F): 97.3 (15 Roman 2022 11:38), Max: 100.5 (15 Roman 2022 08:03)  HR: 93 (15 Roman 2022 15:00) (49 - 111)  BP: 172/66 (15 Roman 2022 15:00) (123/56 - 178/76)  BP(mean): 93 (15 Roman 2022 15:00) (72 - 120)  ABP: 101/95 (14 Jan 2022 23:00) (74/62 - 151/107)  ABP(mean): 99 (14 Jan 2022 23:00) (68 - 133)  RR: 16 (15 Roman 2022 15:00) (13 - 28)  SpO2: 100% (15 Roman 2022 15:00) (86% - 100%)    I&O's Detail    14 Jan 2022 07:01  -  15 Roman 2022 07:00  --------------------------------------------------------  IN:    Propofol: 51.1 mL    Vital1.5: 200 mL  Total IN: 251.1 mL    OUT:    Indwelling Catheter - Urethral (mL): 450 mL    Nasogastric/Oral tube (mL): 30 mL    Voided (mL): 400 mL  Total OUT: 880 mL    Total NET: -628.9 mL      15 Roman 2022 07:01  -  15 Roman 2022 15:37  --------------------------------------------------------  IN:    IV PiggyBack: 50 mL    Propofol: 19.4 mL    Vital1.5: 60 mL  Total IN: 129.4 mL    OUT:  Total OUT: 0 mL    Total NET: 129.4 mL        MEDICATIONS  NEURO  Meds: acetaminophen     Tablet .. 650 milliGRAM(s) Oral every 6 hours PRN Temp greater or equal to 38C (100.4F), Mild Pain (1 - 3)    RESPIRATORY    Meds:   CARDIOVASCULAR  Meds: carvedilol 3.125 milliGRAM(s) Oral every 12 hours  digoxin  Injectable 125 MICROGram(s) IV Push daily    GI/NUTRITION  Meds: pantoprazole  Injectable 40 milliGRAM(s) IV Push every 12 hours    GENITOURINARY  Meds: dextrose 5%. 1000 milliLiter(s) IV Continuous <Continuous>  dextrose 5%. 1000 milliLiter(s) IV Continuous <Continuous>    HEMATOLOGIC  Meds: heparin   Injectable 5000 Unit(s) SubCutaneous every 12 hours    [x] VTE Prophylaxis  INFECTIOUS DISEASES  Meds: cefTRIAXone   IVPB 1000 milliGRAM(s) IV Intermittent every 24 hours    ENDOCRINE  CAPILLARY BLOOD GLUCOSE      POCT Blood Glucose.: 239 mg/dL (15 Roman 2022 11:24)  POCT Blood Glucose.: 179 mg/dL (15 Orman 2022 05:29)  POCT Blood Glucose.: 238 mg/dL (14 Jan 2022 23:17)  POCT Blood Glucose.: 168 mg/dL (14 Jan 2022 17:03)    Meds: insulin lispro (ADMELOG) corrective regimen sliding scale   SubCutaneous every 6 hours    OTHER MEDICATIONS:  chlorhexidine 2% Cloths 1 Application(s) Topical <User Schedule>  :    LABS:                        10.0   7.97  )-----------( 148      ( 15 Roman 2022 02:45 )             32.2      01-15    137  |  106  |  63<H>  ----------------------------<  184<H>  4.9   |  21<L>  |  2.45<H>    Ca    8.5      15 Roman 2022 02:45  Phos  4.9     01-15  Mg     2.8     01-15    TPro  7.0  /  Alb  1.8<L>  /  TBili  0.2  /  DBili  x   /  AST  59<H>  /  ALT  44  /  AlkPhos  60  01-15        Culture Results:   Normal Respiratory Alicia present (01-13 @ 16:22)  Culture Results:   >100,000 CFU/ml Gram Negative Rods (01-13 @ 11:05)  Culture Results:   No growth to date. (01-13 @ 09:14)  Culture Results:   No growth to date. (01-13 @ 09:14)      Mode: CPAP with PS, FiO2: 30, PEEP: 5, PS: 5, ITime: 1, MAP: 7, PIP: 10      RADIOLOGY & ADDITIONAL STUDIES:    
Patient is a 86y old  Female who presents with a chief complaint of Acute hypoxic respiratory failure, GIB, COVID-19 (16 Jan 2022 09:30)    INTERVAL HPI/OVERNIGHT EVENTS: Patients seen and examined at bedside this morning. No acute events overnight. Pt reports she is not SOB or having CP. On room air. Tolerating diet. Appears to be confused.     MEDICATIONS  (STANDING):  amLODIPine   Tablet 5 milliGRAM(s) Oral daily  carvedilol 3.125 milliGRAM(s) Oral every 12 hours  cefTRIAXone   IVPB 1000 milliGRAM(s) IV Intermittent every 24 hours  dexAMETHasone  Injectable 6 milliGRAM(s) IV Push daily  dextrose 40% Gel 15 Gram(s) Oral once  dextrose 5%. 1000 milliLiter(s) (50 mL/Hr) IV Continuous <Continuous>  dextrose 5%. 1000 milliLiter(s) (100 mL/Hr) IV Continuous <Continuous>  dextrose 50% Injectable 25 Gram(s) IV Push once  dextrose 50% Injectable 12.5 Gram(s) IV Push once  dextrose 50% Injectable 25 Gram(s) IV Push once  glucagon  Injectable 1 milliGRAM(s) IntraMuscular once  heparin   Injectable 5000 Unit(s) SubCutaneous every 12 hours  insulin glargine Injectable (LANTUS) 10 Unit(s) SubCutaneous once  insulin lispro (ADMELOG) corrective regimen sliding scale   SubCutaneous every 6 hours  levothyroxine Injectable 44 MICROGram(s) IV Push at bedtime  pantoprazole  Injectable 40 milliGRAM(s) IV Push every 12 hours    MEDICATIONS  (PRN):  acetaminophen     Tablet .. 650 milliGRAM(s) Oral every 6 hours PRN Temp greater or equal to 38C (100.4F), Mild Pain (1 - 3)    Allergies    No Known Allergies    Intolerances      REVIEW OF SYSTEMS:  All other systems reviewed and are negative    Vital Signs Last 24 Hrs  T(C): 36.4 (17 Jan 2022 11:12), Max: 36.8 (16 Jan 2022 21:00)  T(F): 97.6 (17 Jan 2022 11:12), Max: 98.2 (16 Jan 2022 21:00)  HR: 70 (17 Jan 2022 11:12) (58 - 86)  BP: 153/67 (17 Jan 2022 11:12) (134/42 - 176/61)  BP(mean): 74 (16 Jan 2022 18:20) (66 - 92)  RR: 19 (17 Jan 2022 11:12) (14 - 20)  SpO2: 97% (17 Jan 2022 11:12) (91% - 99%)  Daily     Daily   I&O's Summary    16 Jan 2022 07:01  -  17 Jan 2022 07:00  --------------------------------------------------------  IN: 480 mL / OUT: 670 mL / NET: -190 mL      CAPILLARY BLOOD GLUCOSE      POCT Blood Glucose.: 316 mg/dL (17 Jan 2022 11:32)  POCT Blood Glucose.: 240 mg/dL (17 Jan 2022 07:48)  POCT Blood Glucose.: 324 mg/dL (16 Jan 2022 23:51)  POCT Blood Glucose.: 289 mg/dL (16 Jan 2022 21:24)  POCT Blood Glucose.: 297 mg/dL (16 Jan 2022 17:26)  POCT Blood Glucose.: 225 mg/dL (16 Jan 2022 13:15)    PHYSICAL EXAM:  GENERAL: NAD, well-groomed, well-developed  HEAD:  Atraumatic, Normocephalic  EYES: EOMI, PERRLA, conjunctiva and sclera clear  ENMT: No tonsillar erythema, exudates, or enlargement; Moist mucous membranes, Good dentition, No lesions  NECK: Supple, No JVD, Normal thyroid  NERVOUS SYSTEM:  Alert & Oriented X2, Poor concentration; Motor Strength 5/5 B/L upper and lower extremities; DTRs 2+ intact and symmetric  CHEST/LUNG: Clear to percussion bilaterally; mild ronchi b/l bases. diminished throughout.   HEART: Regular rate and rhythm; No murmurs, rubs, or gallops  ABDOMEN: Soft, Nontender, Nondistended; Bowel sounds present  EXTREMITIES:  2+ Peripheral Pulses, No clubbing, cyanosis, or edema  LYMPH: No lymphadenopathy noted  SKIN: No rashes or lesions    Labs                          9.0    10.30 )-----------( 181      ( 16 Jan 2022 02:32 )             27.8     01-16    138  |  107  |  66<H>  ----------------------------<  160<H>  4.5   |  21<L>  |  2.12<H>    Ca    8.8      16 Jan 2022 02:32  Phos  3.4     01-16  Mg     3.0     01-16    TPro  6.9  /  Alb  1.8<L>  /  TBili  0.2  /  DBili  x   /  AST  40<H>  /  ALT  41  /  AlkPhos  51  01-16                        
                          Patient: DAPHNE PARNELL [id number] 86y Female                            Hospitalist Attending Note    BP elevated this am.  Remains on RA.   Denies complaints.     ____________________PHYSICAL EXAM:  GENERAL:  NAD, arousable, confused.   HEENT: NCAT  CARDIOVASCULAR:  S1, S2  LUNGS: CTAB  ABDOMEN:  soft, (-) tenderness, (-) distension, (+) bowel sounds, (-) guarding, (-) rebound (-) rigidity  EXTREMITIES:  no cyanosis / clubbing / edema.   ____________________    VITALS:  Vital Signs Last 24 Hrs  T(C): 36.4 (2022 11:35), Max: 36.7 (2022 17:01)  T(F): 97.6 (2022 11:35), Max: 98 (2022 17:01)  HR: 64 (2022 11:35) (64 - 88)  BP: 119/64 (2022 11:35) (119/64 - 190/94)  BP(mean): --  RR: 17 (2022 11:35) (17 - 19)  SpO2: 99% (2022 11:35) (98% - 100%) Daily     Daily Weight in k.2 (2022 05:02)  CAPILLARY BLOOD GLUCOSE      POCT Blood Glucose.: 178 mg/dL (2022 11:56)  POCT Blood Glucose.: 186 mg/dL (2022 07:51)  POCT Blood Glucose.: 222 mg/dL (2022 21:20)  POCT Blood Glucose.: 283 mg/dL (2022 16:28)    I&O's Summary    2022 07:01  -  2022 07:00  --------------------------------------------------------  IN: 360 mL / OUT: 1300 mL / NET: -940 mL        LABS:        140  |  109<H>  |  59<H>  ----------------------------<  231<H>  4.3   |  25  |  1.83<H>    Ca    9.2      2022 15:10      PT/INR - ( 2022 11:50 )   PT: 12.5 sec;   INR: 1.08 ratio                       MEDICATIONS:  acetaminophen     Tablet .. 650 milliGRAM(s) Oral every 6 hours PRN  amLODIPine   Tablet 10 milliGRAM(s) Oral daily  carvedilol 6.25 milliGRAM(s) Oral every 12 hours  dexAMETHasone  Injectable 6 milliGRAM(s) IV Push daily  dextrose 40% Gel 15 Gram(s) Oral once  dextrose 5%. 1000 milliLiter(s) IV Continuous <Continuous>  dextrose 5%. 1000 milliLiter(s) IV Continuous <Continuous>  dextrose 50% Injectable 25 Gram(s) IV Push once  dextrose 50% Injectable 12.5 Gram(s) IV Push once  dextrose 50% Injectable 25 Gram(s) IV Push once  glucagon  Injectable 1 milliGRAM(s) IntraMuscular once  heparin   Injectable 5000 Unit(s) SubCutaneous every 12 hours  hydrALAZINE 50 milliGRAM(s) Oral every 8 hours  hydrALAZINE Injectable 10 milliGRAM(s) IV Push every 6 hours PRN  insulin glargine Injectable (LANTUS) 10 Unit(s) SubCutaneous every morning  insulin lispro (ADMELOG) corrective regimen sliding scale   SubCutaneous three times a day before meals  levothyroxine Injectable 44 MICROGram(s) IV Push at bedtime  pantoprazole    Tablet 40 milliGRAM(s) Oral two times a day    
                          Patient: DAPHNE PARNELL [id number] 86y Female                            Hospitalist Attending Note    Denies complaints.   ____________________PHYSICAL EXAM:  GENERAL:  NAD, arousable, confused.   HEENT: NCAT  CARDIOVASCULAR:  S1, S2  LUNGS: CTAB  ABDOMEN:  soft, (-) tenderness, (-) distension, (+) bowel sounds, (-) guarding, (-) rebound (-) rigidity  EXTREMITIES:  no cyanosis / clubbing / edema.   ____________________    VITALS:  Vital Signs Last 24 Hrs  T(C): 36.9 (22 Jan 2022 11:14), Max: 37.1 (22 Jan 2022 05:23)  T(F): 98.5 (22 Jan 2022 11:14), Max: 98.8 (22 Jan 2022 05:23)  HR: 78 (22 Jan 2022 11:14) (78 - 94)  BP: 153/82 (22 Jan 2022 11:14) (128/67 - 170/81)  BP(mean): --  RR: 19 (22 Jan 2022 11:14) (16 - 19)  SpO2: 100% (22 Jan 2022 11:14) (95% - 100%) Daily     Daily   CAPILLARY BLOOD GLUCOSE      POCT Blood Glucose.: 198 mg/dL (22 Jan 2022 11:33)  POCT Blood Glucose.: 160 mg/dL (22 Jan 2022 07:44)  POCT Blood Glucose.: 229 mg/dL (21 Jan 2022 21:27)  POCT Blood Glucose.: 214 mg/dL (21 Jan 2022 16:18)    I&O's Summary    21 Jan 2022 07:01  -  22 Jan 2022 07:00  --------------------------------------------------------  IN: 0 mL / OUT: 1050 mL / NET: -1050 mL        LABS:                        10.5   4.36  )-----------( 298      ( 21 Jan 2022 15:10 )             33.3     01-21    140  |  109<H>  |  59<H>  ----------------------------<  231<H>  4.3   |  25  |  1.83<H>    Ca    9.2      21 Jan 2022 15:10                    MEDICATIONS:  acetaminophen     Tablet .. 650 milliGRAM(s) Oral every 6 hours PRN  amLODIPine   Tablet 10 milliGRAM(s) Oral daily  carvedilol 6.25 milliGRAM(s) Oral every 12 hours  dexAMETHasone  Injectable 6 milliGRAM(s) IV Push daily  dextrose 40% Gel 15 Gram(s) Oral once  dextrose 5%. 1000 milliLiter(s) IV Continuous <Continuous>  dextrose 5%. 1000 milliLiter(s) IV Continuous <Continuous>  dextrose 50% Injectable 25 Gram(s) IV Push once  dextrose 50% Injectable 12.5 Gram(s) IV Push once  dextrose 50% Injectable 25 Gram(s) IV Push once  glucagon  Injectable 1 milliGRAM(s) IntraMuscular once  heparin   Injectable 5000 Unit(s) SubCutaneous every 12 hours  hydrALAZINE 50 milliGRAM(s) Oral every 8 hours  hydrALAZINE Injectable 10 milliGRAM(s) IV Push every 6 hours PRN  insulin glargine Injectable (LANTUS) 10 Unit(s) SubCutaneous every morning  insulin lispro (ADMELOG) corrective regimen sliding scale   SubCutaneous three times a day before meals  levothyroxine Injectable 44 MICROGram(s) IV Push at bedtime  pantoprazole    Tablet 40 milliGRAM(s) Oral two times a day    
                          Patient: DAPHNE PARNELL [id number] 86y Female                            Hospitalist Attending Note    BP elevated this am.  Cozaar adjusted.  Labs drawn.   Remains on RA.     ____________________PHYSICAL EXAM:  GENERAL:  NAD, arousable, confused.   HEENT: NCAT  CARDIOVASCULAR:  S1, S2  LUNGS: CTAB  ABDOMEN:  soft, (-) tenderness, (-) distension, (+) bowel sounds, (-) guarding, (-) rebound (-) rigidity  EXTREMITIES:  no cyanosis / clubbing / edema.   ____________________     VITALS:  Vital Signs Last 24 Hrs  T(C): 36.6 (20 Jan 2022 11:04), Max: 37 (19 Jan 2022 23:46)  T(F): 97.9 (20 Jan 2022 11:04), Max: 98.6 (19 Jan 2022 23:46)  HR: 82 (20 Jan 2022 11:04) (81 - 87)  BP: 162/79 (20 Jan 2022 11:04) (154/93 - 176/83)  BP(mean): --  RR: 19 (20 Jan 2022 11:04) (16 - 19)  SpO2: 100% (20 Jan 2022 11:04) (92% - 100%) Daily     Daily   CAPILLARY BLOOD GLUCOSE      POCT Blood Glucose.: 282 mg/dL (20 Jan 2022 11:15)  POCT Blood Glucose.: 226 mg/dL (20 Jan 2022 07:41)  POCT Blood Glucose.: 304 mg/dL (19 Jan 2022 23:02)  POCT Blood Glucose.: 329 mg/dL (19 Jan 2022 21:10)  POCT Blood Glucose.: 242 mg/dL (19 Jan 2022 18:18)  POCT Blood Glucose.: 280 mg/dL (19 Jan 2022 13:02)    I&O's Summary    19 Jan 2022 07:01  -  20 Jan 2022 07:00  --------------------------------------------------------  IN: 440 mL / OUT: 600 mL / NET: -160 mL        HISTORY:  PAST MEDICAL & SURGICAL HISTORY:  MI (myocardial infarction)    CAD (coronary artery disease)    DM (diabetes mellitus)    HTN (hypertension)    HLD (hyperlipidemia)    Hypothyroid    Dementia    Stented coronary artery    Allergies    No Known Allergies    Intolerances       LABS:                        10.9   7.79  )-----------( 256      ( 19 Jan 2022 07:18 )             34.7     01-19    137  |  106  |  55<H>  ----------------------------<  291<H>  4.2   |  23  |  1.62<H>    Ca    9.6      19 Jan 2022 07:18      PT/INR - ( 20 Jan 2022 11:50 )   PT: 12.5 sec;   INR: 1.08 ratio                       MEDICATIONS:  MEDICATIONS  (STANDING):  amLODIPine   Tablet 10 milliGRAM(s) Oral daily  carvedilol 6.25 milliGRAM(s) Oral every 12 hours  dexAMETHasone  Injectable 6 milliGRAM(s) IV Push daily  dextrose 40% Gel 15 Gram(s) Oral once  dextrose 5%. 1000 milliLiter(s) (50 mL/Hr) IV Continuous <Continuous>  dextrose 5%. 1000 milliLiter(s) (100 mL/Hr) IV Continuous <Continuous>  dextrose 50% Injectable 25 Gram(s) IV Push once  dextrose 50% Injectable 12.5 Gram(s) IV Push once  dextrose 50% Injectable 25 Gram(s) IV Push once  glucagon  Injectable 1 milliGRAM(s) IntraMuscular once  heparin   Injectable 5000 Unit(s) SubCutaneous every 12 hours  insulin glargine Injectable (LANTUS) 10 Unit(s) SubCutaneous every morning  insulin lispro (ADMELOG) corrective regimen sliding scale   SubCutaneous three times a day before meals  levothyroxine Injectable 44 MICROGram(s) IV Push at bedtime  pantoprazole    Tablet 40 milliGRAM(s) Oral two times a day    MEDICATIONS  (PRN):  acetaminophen     Tablet .. 650 milliGRAM(s) Oral every 6 hours PRN Temp greater or equal to 38C (100.4F), Mild Pain (1 - 3)  
  INTERVAL HPI:  85yo F with DM, HTN, CAD s/p PCI (on ASA and plavix), Afib (on digoxin), CHF (EF 35-40% in Feb '20), CKD, HLD, Hypothyroid, Dementia,  Brought to ED with Respiratory distress after vomiting.  Found hypoxic to 75% spo2, with coffee-ground emesis on clothes. Intubated in ED for hypoxic respiratory failure likely 2/2 aspiration. CXR with RML/RLL and LLL opacities. Found to be Covid+. Hgb 10.0, around pt's baseline. Labs otherwise significant for lactate 4.1, troponin-I 346, proBNP ~10k.    Admitted to ICU with hypoxic Respiratory failure, aspiration pneumonia, +ve COVID.   Also with high troponin, E Coli UTI.  Was successfully extubated, then transferred to medical floor.  Not able to provide any details due to Dementia.    OVERNIGHT EVENTS:  Comfortable on room air    Vital Signs Last 24 Hrs  T(C): 36.6 (20 Jan 2022 11:04), Max: 37 (19 Jan 2022 23:46)  T(F): 97.9 (20 Jan 2022 11:04), Max: 98.6 (19 Jan 2022 23:46)  HR: 82 (20 Jan 2022 11:04) (81 - 87)  BP: 162/79 (20 Jan 2022 11:04) (156/68 - 176/83)  BP(mean): --  RR: 19 (20 Jan 2022 11:04) (16 - 19)  SpO2: 100% (20 Jan 2022 11:04) (92% - 100    PHYSICAL EXAM:  GEN:         comfortable.  HEENT:    Normal.    RESP:        no distress  CVS:          Regular rate and rhythm.   ABD:         Soft, non-tender, non-distended;     MEDICATIONS  (STANDING):  amLODIPine   Tablet 10 milliGRAM(s) Oral daily  carvedilol 6.25 milliGRAM(s) Oral every 12 hours  dexAMETHasone  Injectable 6 milliGRAM(s) IV Push daily  dextrose 40% Gel 15 Gram(s) Oral once  dextrose 5%. 1000 milliLiter(s) (50 mL/Hr) IV Continuous <Continuous>  dextrose 5%. 1000 milliLiter(s) (100 mL/Hr) IV Continuous <Continuous>  dextrose 50% Injectable 25 Gram(s) IV Push once  dextrose 50% Injectable 12.5 Gram(s) IV Push once  dextrose 50% Injectable 25 Gram(s) IV Push once  glucagon  Injectable 1 milliGRAM(s) IntraMuscular once  heparin   Injectable 5000 Unit(s) SubCutaneous every 12 hours  insulin glargine Injectable (LANTUS) 10 Unit(s) SubCutaneous every morning  insulin lispro (ADMELOG) corrective regimen sliding scale   SubCutaneous three times a day before meals  levothyroxine Injectable 44 MICROGram(s) IV Push at bedtime  pantoprazole    Tablet 40 milliGRAM(s) Oral two times a day    MEDICATIONS  (PRN):  acetaminophen     Tablet .. 650 milliGRAM(s) Oral every 6 hours PRN Temp greater or equal to 38C (100.4F), Mild Pain (1 - 3)    LABS:                        10.9   7.79  )-----------( 256      ( 19 Jan 2022 07:18 )             34.7     01-19    137  |  106  |  55<H>  ----------------------------<  291<H>  4.2   |  23  |  1.62<H>    Ca    9.6      19 Jan 2022 07:18    PT/INR - ( 20 Jan 2022 11:50 )   PT: 12.5 sec;   INR: 1.08 ratio       ASSESSMENT AND PLAN:  ·	S/P hypoxic Respiratory failure.  ·	Aspiration Pneumonia.  ·	COVID Pneumonia.  ·	Suspected GI bleed.  ·	Anemia.  ·	Acute on chronic CKD.  ·	Elevated troponin.  ·	CAD.  ·	Systolic dysfunction.  ·	A Fib.  ·	E Coli UTI  ·	HTN.  ·	DM.  ·	HLD.    SPO2 stable on room air.  Discharge planning.        RADIOLOGY & ADDITIONAL STUDIES:    ASSESSMENT AND PLAN:
  INTERVAL HPI:  87yo F with DM, HTN, CAD s/p PCI (on ASA and plavix), Afib (on digoxin), CHF (EF 35-40% in Feb '20), CKD, HLD, Hypothyroid, Dementia,  Brought to ED with Respiratory distress after vomiting.  Found hypoxic to 75% spo2, with coffee-ground emesis on clothes. Intubated in ED for hypoxic respiratory failure likely 2/2 aspiration. CXR with RML/RLL and LLL opacities. Found to be Covid+. Hgb 10.0, around pt's baseline. Labs otherwise significant for lactate 4.1, troponin-I 346, proBNP ~10k.    Admitted to ICU with hypoxic Respiratory failure, aspiration pneumonia, +ve COVID.   Also with high troponin, E Coli UTI.  Was successfully extubated, then transferred to medical floor.  Not able to provide any details due to Dementia.    OVERNIGHT EVENTS:  Resting arouse able    Vital Signs Last 24 Hrs  T(C): 36.7 (19 Jan 2022 17:11), Max: 37.1 (19 Jan 2022 05:02)  T(F): 98 (19 Jan 2022 17:11), Max: 98.8 (19 Jan 2022 05:02)  HR: 87 (19 Jan 2022 17:11) (68 - 89)  BP: 166/74 (19 Jan 2022 20:30) (154/93 - 196/82)  BP(mean): --  RR: 17 (19 Jan 2022 17:11) (17 - 19)  SpO2: 92% (19 Jan 2022 17:11) (92% - 100%)    PHYSICAL EXAM:  GEN:         Awake, responsive and comfortable.  HEENT:    Normal.    RESP:      no wheezing.  CVS:         Regular rate and rhythm.     MEDICATIONS  (STANDING):  amLODIPine   Tablet 10 milliGRAM(s) Oral daily  carvedilol 6.25 milliGRAM(s) Oral every 12 hours  dexAMETHasone  Injectable 6 milliGRAM(s) IV Push daily  dextrose 40% Gel 15 Gram(s) Oral once  dextrose 5%. 1000 milliLiter(s) (50 mL/Hr) IV Continuous <Continuous>  dextrose 5%. 1000 milliLiter(s) (100 mL/Hr) IV Continuous <Continuous>  dextrose 50% Injectable 25 Gram(s) IV Push once  dextrose 50% Injectable 12.5 Gram(s) IV Push once  dextrose 50% Injectable 25 Gram(s) IV Push once  glucagon  Injectable 1 milliGRAM(s) IntraMuscular once  heparin   Injectable 5000 Unit(s) SubCutaneous every 12 hours  insulin glargine Injectable (LANTUS) 10 Unit(s) SubCutaneous every morning  insulin lispro (ADMELOG) corrective regimen sliding scale   SubCutaneous three times a day before meals  levothyroxine Injectable 44 MICROGram(s) IV Push at bedtime  losartan 25 milliGRAM(s) Oral daily  pantoprazole    Tablet 40 milliGRAM(s) Oral two times a day    MEDICATIONS  (PRN):  acetaminophen     Tablet .. 650 milliGRAM(s) Oral every 6 hours PRN Temp greater or equal to 38C (100.4F), Mild Pain (1 - 3)    LABS:                        10.9   7.79  )-----------( 256      ( 19 Jan 2022 07:18 )             34.7     01-19    137  |  106  |  55<H>  ----------------------------<  291<H>  4.2   |  23  |  1.62<H>    Ca    9.6      19 Jan 2022 07:18    01-13 @ 12:28  pH: --  pCO2: 32  pO2: 149  SaO2: 99.7  01-13 @ 04:14  pH: --  pCO2: 41  pO2: 108  SaO2: 97.8    ASSESSMENT AND PLAN:  ·	S/P hypoxic Respiratory failure.  ·	Aspiration Pneumonia.  ·	COVID Pneumonia.  ·	Suspected GI bleed.  ·	Anemia.  ·	Acute on chronic CKD.  ·	Elevated troponin.  ·	CAD.  ·	Systolic dysfunction.  ·	A Fib.  ·	E Coli UTI  ·	HTN.  ·	DM.  ·	HLD.    SPO2 92% on room air.  Completed antibiotics.  On Dexamethasone.
Patient is a 86y old  Female who presents with a chief complaint of Acute hypoxic respiratory failure, GIB, COVID-19 (2022 14:11)    INTERVAL HPI/OVERNIGHT EVENTS: Patients seen and examined at bedside this morning. No acute events overnight. Pt reports no headache, SOB, or CP.    MEDICATIONS  (STANDING):  amLODIPine   Tablet 10 milliGRAM(s) Oral daily  carvedilol 6.25 milliGRAM(s) Oral every 12 hours  dexAMETHasone  Injectable 6 milliGRAM(s) IV Push daily  dextrose 40% Gel 15 Gram(s) Oral once  dextrose 5%. 1000 milliLiter(s) (50 mL/Hr) IV Continuous <Continuous>  dextrose 5%. 1000 milliLiter(s) (100 mL/Hr) IV Continuous <Continuous>  dextrose 50% Injectable 25 Gram(s) IV Push once  dextrose 50% Injectable 25 Gram(s) IV Push once  dextrose 50% Injectable 12.5 Gram(s) IV Push once  glucagon  Injectable 1 milliGRAM(s) IntraMuscular once  heparin   Injectable 5000 Unit(s) SubCutaneous every 12 hours  insulin glargine Injectable (LANTUS) 10 Unit(s) SubCutaneous every morning  insulin lispro (ADMELOG) corrective regimen sliding scale   SubCutaneous three times a day before meals  levothyroxine Injectable 44 MICROGram(s) IV Push at bedtime  losartan 25 milliGRAM(s) Oral daily  pantoprazole    Tablet 40 milliGRAM(s) Oral two times a day    MEDICATIONS  (PRN):  acetaminophen     Tablet .. 650 milliGRAM(s) Oral every 6 hours PRN Temp greater or equal to 38C (100.4F), Mild Pain (1 - 3)    Allergies    No Known Allergies    Intolerances      REVIEW OF SYSTEMS:  All other systems reviewed and are negative    Vital Signs Last 24 Hrs  T(C): 36.7 (2022 10:35), Max: 37.1 (2022 05:02)  T(F): 98.1 (2022 10:35), Max: 98.8 (2022 05:02)  HR: 80 (2022 10:35) (68 - 89)  BP: 177/83 (2022 10:35) (155/67 - 196/82)  BP(mean): --  RR: 19 (2022 10:35) (17 - 19)  SpO2: 100% (2022 10:35) (96% - 100%)  Daily     Daily Weight in k.8 (2022 05:02)  I&O's Summary    2022 07:01  -  2022 07:00  --------------------------------------------------------  IN: 320 mL / OUT: 800 mL / NET: -480 mL      CAPILLARY BLOOD GLUCOSE      POCT Blood Glucose.: 303 mg/dL (2022 11:20)  POCT Blood Glucose.: 290 mg/dL (2022 07:57)  POCT Blood Glucose.: 293 mg/dL (2022 21:05)  POCT Blood Glucose.: 287 mg/dL (2022 17:00)    PHYSICAL EXAM:  GENERAL: NAD, well-groomed, well-developed  HEAD:  Atraumatic, Normocephalic  EYES: EOMI, PERRLA, conjunctiva and sclera clear  ENMT: No tonsillar erythema, exudates, or enlargement; Moist mucous membranes, Good dentition, No lesions  NECK: Supple, No JVD, Normal thyroid  NERVOUS SYSTEM:  Alert & Oriented X1, Poor concentration; Motor Strength 5/5 B/L upper and lower extremities; DTRs 2+ intact and symmetric  CHEST/LUNG: Clear to percussion bilaterally; No rales, rhonchi, wheezing, or rubs  HEART: Regular rate and rhythm; No murmurs, rubs, or gallops  ABDOMEN: Soft, Nontender, Nondistended; Bowel sounds present  EXTREMITIES:  2+ Peripheral Pulses, No clubbing, cyanosis, or edema  LYMPH: No lymphadenopathy noted  SKIN: No rashes or lesions    Labs                          10.9   7.79  )-----------( 256      ( 2022 07:18 )             34.7     -19    137  |  106  |  55<H>  ----------------------------<  291<H>  4.2   |  23  |  1.62<H>    Ca    9.6      2022 07:18  Phos  3.2     01-17  Mg     3.1         TPro  8.0  /  Alb  2.1<L>  /  TBili  0.3  /  DBili  x   /  AST  37  /  ALT  67  /  AlkPhos  59

## 2022-02-04 DIAGNOSIS — E78.5 HYPERLIPIDEMIA, UNSPECIFIED: ICD-10-CM

## 2022-02-04 DIAGNOSIS — I25.10 ATHEROSCLEROTIC HEART DISEASE OF NATIVE CORONARY ARTERY WITHOUT ANGINA PECTORIS: ICD-10-CM

## 2022-02-04 DIAGNOSIS — K92.2 GASTROINTESTINAL HEMORRHAGE, UNSPECIFIED: ICD-10-CM

## 2022-02-04 DIAGNOSIS — B96.20 UNSPECIFIED ESCHERICHIA COLI [E. COLI] AS THE CAUSE OF DISEASES CLASSIFIED ELSEWHERE: ICD-10-CM

## 2022-02-04 DIAGNOSIS — Z79.82 LONG TERM (CURRENT) USE OF ASPIRIN: ICD-10-CM

## 2022-02-04 DIAGNOSIS — I24.8 OTHER FORMS OF ACUTE ISCHEMIC HEART DISEASE: ICD-10-CM

## 2022-02-04 DIAGNOSIS — E11.22 TYPE 2 DIABETES MELLITUS WITH DIABETIC CHRONIC KIDNEY DISEASE: ICD-10-CM

## 2022-02-04 DIAGNOSIS — I48.20 CHRONIC ATRIAL FIBRILLATION, UNSPECIFIED: ICD-10-CM

## 2022-02-04 DIAGNOSIS — U07.1 COVID-19: ICD-10-CM

## 2022-02-04 DIAGNOSIS — E03.9 HYPOTHYROIDISM, UNSPECIFIED: ICD-10-CM

## 2022-02-04 DIAGNOSIS — Z79.84 LONG TERM (CURRENT) USE OF ORAL HYPOGLYCEMIC DRUGS: ICD-10-CM

## 2022-02-04 DIAGNOSIS — J12.82 PNEUMONIA DUE TO CORONAVIRUS DISEASE 2019: ICD-10-CM

## 2022-02-04 DIAGNOSIS — Z79.02 LONG TERM (CURRENT) USE OF ANTITHROMBOTICS/ANTIPLATELETS: ICD-10-CM

## 2022-02-04 DIAGNOSIS — N39.0 URINARY TRACT INFECTION, SITE NOT SPECIFIED: ICD-10-CM

## 2022-02-04 DIAGNOSIS — J96.01 ACUTE RESPIRATORY FAILURE WITH HYPOXIA: ICD-10-CM

## 2022-02-04 DIAGNOSIS — A41.89 OTHER SPECIFIED SEPSIS: ICD-10-CM

## 2022-02-04 DIAGNOSIS — E87.2 ACIDOSIS: ICD-10-CM

## 2022-02-04 DIAGNOSIS — G93.41 METABOLIC ENCEPHALOPATHY: ICD-10-CM

## 2022-02-04 DIAGNOSIS — F03.90 UNSPECIFIED DEMENTIA WITHOUT BEHAVIORAL DISTURBANCE: ICD-10-CM

## 2022-02-04 DIAGNOSIS — N18.30 CHRONIC KIDNEY DISEASE, STAGE 3 UNSPECIFIED: ICD-10-CM

## 2022-02-04 DIAGNOSIS — E11.65 TYPE 2 DIABETES MELLITUS WITH HYPERGLYCEMIA: ICD-10-CM

## 2022-02-04 DIAGNOSIS — D63.1 ANEMIA IN CHRONIC KIDNEY DISEASE: ICD-10-CM

## 2022-02-04 DIAGNOSIS — N17.9 ACUTE KIDNEY FAILURE, UNSPECIFIED: ICD-10-CM

## 2022-02-04 DIAGNOSIS — I25.2 OLD MYOCARDIAL INFARCTION: ICD-10-CM

## 2022-02-04 DIAGNOSIS — I13.0 HYPERTENSIVE HEART AND CHRONIC KIDNEY DISEASE WITH HEART FAILURE AND STAGE 1 THROUGH STAGE 4 CHRONIC KIDNEY DISEASE, OR UNSPECIFIED CHRONIC KIDNEY DISEASE: ICD-10-CM

## 2022-02-04 DIAGNOSIS — I50.23 ACUTE ON CHRONIC SYSTOLIC (CONGESTIVE) HEART FAILURE: ICD-10-CM

## 2022-02-04 DIAGNOSIS — J69.0 PNEUMONITIS DUE TO INHALATION OF FOOD AND VOMIT: ICD-10-CM

## 2022-02-08 ENCOUNTER — NON-APPOINTMENT (OUTPATIENT)
Age: 87
End: 2022-02-08

## 2022-02-08 RX ORDER — CARVEDILOL 6.25 MG/1
6.25 TABLET, FILM COATED ORAL TWICE DAILY
Refills: 0 | Status: ACTIVE | COMMUNITY

## 2022-02-08 RX ORDER — LEVOTHYROXINE SODIUM 0.09 MG/1
88 TABLET ORAL DAILY
Refills: 0 | Status: ACTIVE | COMMUNITY

## 2022-02-08 RX ORDER — INSULIN GLARGINE 100 [IU]/ML
100 INJECTION, SOLUTION SUBCUTANEOUS
Qty: 1 | Refills: 0 | Status: ACTIVE | COMMUNITY

## 2022-02-08 RX ORDER — ATORVASTATIN CALCIUM 40 MG/1
40 TABLET, FILM COATED ORAL
Refills: 0 | Status: ACTIVE | COMMUNITY

## 2022-02-08 RX ORDER — FOLIC ACID 1 MG/1
1 TABLET ORAL DAILY
Refills: 0 | Status: ACTIVE | COMMUNITY

## 2022-02-08 RX ORDER — DONEPEZIL HYDROCHLORIDE 10 MG/1
10 TABLET ORAL DAILY
Refills: 0 | Status: ACTIVE | COMMUNITY

## 2022-02-08 RX ORDER — CLOPIDOGREL 75 MG/1
75 TABLET, FILM COATED ORAL
Refills: 0 | Status: ACTIVE | COMMUNITY

## 2022-02-08 RX ORDER — MEMANTINE HYDROCHLORIDE 5 MG/1
5 TABLET, FILM COATED ORAL DAILY
Refills: 0 | Status: ACTIVE | COMMUNITY

## 2022-02-08 RX ORDER — ERGOCALCIFEROL 1.25 MG/1
1.25 MG CAPSULE, LIQUID FILLED ORAL
Refills: 0 | Status: ACTIVE | COMMUNITY

## 2022-02-08 RX ORDER — HYDRALAZINE HYDROCHLORIDE 50 MG/1
50 TABLET ORAL 3 TIMES DAILY
Refills: 0 | Status: ACTIVE | COMMUNITY

## 2022-02-08 RX ORDER — BISACODYL 10 MG/1
10 SUPPOSITORY RECTAL
Refills: 0 | Status: ACTIVE | COMMUNITY

## 2022-02-08 RX ORDER — AMLODIPINE BESYLATE 10 MG/1
10 TABLET ORAL DAILY
Refills: 0 | Status: ACTIVE | COMMUNITY

## 2022-02-08 RX ORDER — DIGOXIN 125 UG/1
125 TABLET ORAL DAILY
Refills: 0 | Status: ACTIVE | COMMUNITY

## 2022-02-08 RX ORDER — PANTOPRAZOLE 40 MG/1
40 TABLET, DELAYED RELEASE ORAL
Refills: 0 | Status: ACTIVE | COMMUNITY

## 2022-02-10 NOTE — ED PROVIDER NOTE - PROGRESS NOTE DETAILS
PATIENT NAME:  Deidra Henry  ACCOUNT NUMBER:  [id number]  YOB: 1984    DATE OF SERVICE:  February 9, 2022    SURGEON:  Salo Mendoza M.D.        PREOPERATIVE DIAGNOSIS:   Left radial head fracture with large loose body in the elbow joint    POSTOPERATIVE DIAGNOSIS:   1.  Left elbow radial head fracture with large loose body the elbow joint   2.  Left elbow synovitis     PROCEDURE:    Left elbow arthroscopy with extensive debridement and loose body removal    ANESTHESIA:   General    ANESTHESIOLOGIST:   Cuong Morrissey MD    DESCRIPTION OF PROCEDURE:   The patient was brought to the operating room and placed supine on the operating table.  After satisfactory administration of a general anesthetic he was position in the right lateral decubitus position on a beanbag.  The right elbow and upper extremity were carefully position to avoid any pressure.  An axillary roll was placed in his right axilla.  The patient's splint was removed from his wrist and his dressings were removed from his wrist.  The patient had a lack of 45° of extension at the left elbow.  The left upper extremity was prepped and draped in usual sterile manner.  He had a sterile tourniquet placed.  The extremity was exsanguinated and the tourniquet inflated to 250 mmHg.  The joint was injected with 15 cc of sterile saline.  A mid lateral portal was created after localization at with a spinal needle.  The 11 blade knife was used to cut just through the skin.  A small hemostat was used to dissect down to the capsule.  The small joint cannula and trocar were utilized to enter the elbow joint.  This was the blunt trocar.  The small joint arthroscope was then inserted and a spinal needle was used to localize the posterolateral portal.  A 2.9 mm shaver was inserted.  The fracture hematoma was evacuated with the shaving and suction.  The very large loose body was noted posterior to the capitellum.  The remaining portion of the radial head was  visualized and the fracture fragment represented about 1/3 of the surface area of the radial head.  The remaining radial head was stable to forearm rotation.  The joint had a moderate amount of synovitis in addition to the blood and the loose body.  The shaver was inserted and a limited synovectomy was carried out.  The grasper was utilized try to grasp the loose body.  The piece was too large and was also some soft tissue attachments that kept it from being able to be easily were withdrawn from the joint.  Therefore the 2.9 mm bur was inserted our correction at 3 mm bur was inserted and a large portion of the loose body was removed with the debridement of both the osteo and chondral portions of the loose body.  This required extensive amount of time.  Once the loose body was small enough and freed from its surrounding soft tissues either grasper was used to remove this.  The shaver was then inserted the joint inspected there was no further evidence of loose body noted.  The instruments were withdrawn.  The arthroscopic portals were closed with interrupted 4-0 nylon suture.  0.5% Marcaine was used to infiltrate the soft tissues around the wound.  The wounds were dressed with Xeroform 4x4s cast padding was applied.  The tourniquet was let down during application of soft dressing to allow for some swelling.  A volar splint was applied across the palm and forearm but not including the elbow.  An Ace bandage was used to secure the soft dressings at the from the hand to above the elbow.  The patient was then converted back to the supine position and the patient was awakened and extubated in the operating room and taken to the recovery room in good condition      SPECIMENS:  None.    COMPLICATIONS:  None.      ESTIMATED BLOOD LOSS:  minimal     s/p intubation for GI bleed/ulcer, pulmonary edema? vs COVID (+) recent pna, vaccinated x 1 moderna, on propofol/fentanyl for sedation. hemodynamically stable. spoke with Shasha Sosa (pt's daughter), discussed about advanced directives. She says "do everything you can to save her". Patient remains a full code and s/p intubation for GI bleed/ulcer, pulmonary edema? vs COVID (+) recent pna, vaccinated x 1 moderna, on propofol/fentanyl for sedation. hemodynamically stable. s/p protonix push and drip for gi bleed. Trop (+), (+) lactate likely demand ischemia, Hb 10, CXR showing RLL infiltrate, ?aspiration of GI bleed leading to respiratory distress? aspiration pneumonitis. Also COVID (+); remains hemodynamically stable, intubated, pending CT chest and ap. (+) fever, tx w/ vanco/zosyn/tylenol for sepsis secondary to pna?

## 2022-02-16 ENCOUNTER — NON-APPOINTMENT (OUTPATIENT)
Age: 87
End: 2022-02-16

## 2023-03-01 NOTE — DISCHARGE NOTE PROVIDER - CARE PROVIDER_API CALL
----- Message from Deborah Walters sent at 3/1/2023  9:24 AM CST -----  Contact: self [phone]  Pt requesting a call in regards to needing colonoscopy stated been requested since last month.    Please call and advise     pcp,   Phone: (   )    -  Fax: (   )    -  Follow Up Time:

## 2023-09-06 NOTE — ED PROVIDER NOTE - INTERPRETATION
[normal sinus rhythm

## 2023-10-29 NOTE — DISCHARGE NOTE PROVIDER - HOSPITAL COURSE
Universal Safety Interventions Patient is a 82 y/o female with history of CAD s/p stents 2013, hypothyroidism, HLD, HTN, HLD, dementia (baseline AOx1) but will follow commands presents to the ED via family members for worsening urinary frequency as well as more frequent falls over the last 2 weeks. Found in the ED to have a positive UA, acute kidney injury, mild hypercalcemia to be admitted for further work up        +E.coli UTI - s/p  CTX, ID following    +GILBERTO on CKD - s/p IVF - Renal Dr Nichols following     +Hypercalcemia - s/p IVF  Trying not to add HCTZ given the hypercalcemia    +Elevated trops, worsened LV systolic dysfunction (EF 32%) - house Cards following, likely will not benefit from ischemic eval        Acute cystitis without hematuria.      -pt with 2 weeks of urinary frequency, unclear how long took macrobid for    -UA with nitrites, leuks and WBCs. No CVA tenderness    -No white count or fever. Will start ceftriaxone x 3 days    -f/u urine cultures.     -ID following        Frequent falls.      unclear, multiple unwitnessed falls increasing in frequency, trauma evaluation in ED with pan CT unremarkable overall aside from marked osteoarthritis    -per history probably related to UTI/weakness, however need to consider ?cardiogenic syncope (has known LBBB and CAD)    -CTH with no bleed, no seizure history per family. Pt unable to give any history and forgets she falls. No chest pain reported    -EKG appears at baseline. Checking trops and repeat echo. monitor on telemetry for now    -treating UTI as above    -PT/OT evaluation.         Weakness.      as above, possibly metabolic/infectious related to UTI. Treating as above. Will check TSH and confirm what synthroid dosing is    -pt also losing more weight recently, presenting with GILBERTO, new normocytic anemia, elevated calcium and protein/Albumin gap. Will check UPEP/SPEP to work up multiple myeloma    -PT/OT eval as above.         Hypertension, unspecified type.      will need to confirm home medications. Labetalol IV prn.         Coronary artery disease involving native heart, angina presence unspecified, unspecified vessel or lesion type.      -reportedly had stents in the past and currently on aspirin    -will continue aspirin for now. will start low dose BB if HR tolerates and start statin until home meds are clarified    -monitor on tele as above. EKG with LBBB known from before.     -Trops 52, CK/CKMB nml.         Acute kidney injury.     -probably from poor PO intake, however will need to consider MM given protein/albumin gap, high calcium, anemia. Will hydrate with saline.        F/u UPEP/SPEP-->INCREASED POLYCLONAL GAMMA FRACTION SUGGESTIVE OF CHRONIC INFLAMMATION OR IMMUNE REACTION        8/6/19- Renal Dr Nichols -  CKD stage 3    Failure to thrive and hypercalcemia in admission    New CHF    Hypertensive urgency             1 CVS- Goal BP for her is 150mmHg(we do NOT need tight control in her.  Will change Cozaar to Diovan which is a more potent ARB then the former with respect to BP.    Will also start Norvasc 5mg po qd at night and then assess response in am     2 Renal -Bladder mass is to be eval as outpt.  Bladder scan per protocol;  Trying not to add HCTZ given the hypercalcemia        8/6/19- Urinary Retention - Coelho ordered - Per Urology can discharge patient on coelho if needed. Patient found to have bladder mass which can be worked up outpatient. Oxybutynin held.             8/1/19- Echo LVEF 32%     1. Mitral annular calcification, otherwise normal mitral valve. Mild-moderate mitral regurgitation.    2. Calcified trileaflet aortic valve with normal opening. Mild aortic regurgitation.    3. Normal left ventricular internal dimensions and wall thicknesses.    4. Severe global left ventricular systolic dysfunction.    5. Normal right ventricular size and function.    6. Estimated right ventricular systolic pressure equals 41 mm Hg, assuming right atrial pressure equals 10 mm Hg, consistent with mild pulmonary hypertension.    *** Compared with echocardiogram of 10/23/2013, left ventricular systolic function has deteriorated significantly. 84 y/o female, with a PmHx of CAD s/p stents 2013, hypothyroidism, HLD, HTN, HLD, dementia (baseline AOx1) but will follow commands, presents to the [facility] ED via family members for worsening urinary frequency as well as more frequent falls over the last 2 weeks. Pt at baseline is intermittently confused and is completely dependent on ADLs from family. History obtained mostly from family. She is essentially nonambulatory now for a few months and requires assistance with walking to the bathroom. She has been complaining of urinary frequency for 2 weeks, was put on Macrobid by her PCP but symptoms have not improved. No fevers, chills reported, but she does have suprapubic pain. Per family she has been more lethargic since these symptoms, however over the course of a few months now she has also been losing weight with poor appetite. She has been falling intermittently while unsupervised as she gets confused and forgets that she has difficulty walking. In the past two weeks she has fallen multiple times all unwitnessed The last episode happened two nights ago when she was found in the bathroom on the floor. She reportedly was awake, did not remember falling, but denied having chest pain, shortness of breath, headache or bowel incontinence. She was noted to have ecchymosis around her eye and so was brought to her PCP today who referred her to ED. In the ED she was pan scanned for trauma eval, found to have UTI on UA and abnormal electrolyte.        On Admission:        1. Acute cystitis without hematuria.      - pt with 2 weeks of urinary frequency, unclear how long took macrobid for    - UA - positive nitrites, leuks and WBCs. No CVA tenderness    - No white count or fever. Will start ceftriaxone x 3 days    - UCX > 100,000cfu    - ID c/s Dr. Maggie Woody following        2. Frequent falls -    hsTrop: 52    - unclear, multiple unwitnessed falls increasing in frequency; known LBBB history    7/31 Pelvic Xray - no acute fracture of dislocation    7/31 CXR - clear lungs    7/31 CT Head/C-Spine/Maxofacial -  no acute findings    7/31 CT Thoracic/Lumbar Spine - no acute fracture or traumatic malalignment    8/1 Echo - LVEF 32%, Mitral annular calcification, otherwise normal mitral valve. Mild-moderate mitral regurgitation. Calcified trileaflet aortic valve with normal opening. Mild aortic regurgitation. Normal left ventricular internal dimensions and wall thicknesses. Severe global left ventricular systolic dysfunction. Normal right ventricular size and function. Estimated right ventricular systolic pressure equals 41 mm Hg, assuming right atrial pressure equals 10 mm Hg, consistent with mild pulmonary hypertension.    *** Compared with echocardiogram of 10/23/2013, left ventricular systolic function has deteriorated significantly.        3. Weakness -    - PT/OT eval as above.     UPEP/SPEP - increased polyclonal gamma fraction suggestive of chronic inflammation or immune reaction        4. Hypertension - unspecified type         5. Acute kidney injury -     Bun/Cr: 23/1.51    - probably from poor PO intake, however will need to consider MM given protein/albumin gap, high calcium, anemia. Will hydrate with saline.    8/6 Renal c/s Dr Nichols -  CKD stage 3    - Failure to thrive and hypercalcemia in admission        6. Urinary Retention -    - coelho catheter placed    - House Urology following - can discharge patient on coelho if needed. Patient found to have bladder mass which can be worked up outpatient. Oxybutynin held.        7. Hypercalcemia - Ca: 10.6    - try not to add HCTZ        Pt comfortable at this time and is now medically cleared for discharge home as per Dr. Talbert. She is being discharged home with the coelho catheter in placed due to urinary retention. Outpatient follow up with Urology. Continue medications as prescribed. Follow up with your primary care doctor and Endocrinologist.         Reviewed discharge medications with patient; All new medications requiring new prescription sent to pharmacy of patients choice. Reviewed need for prescription from previous home medications and new prescriptions sent if requested. Patient in agreement and understands.

## 2023-12-18 NOTE — PHYSICAL THERAPY INITIAL EVALUATION ADULT - TRANSFER SKILLS, REHAB EVAL
Becky Hernandez arrived at infusion clinic for daily invanz Infusion ordered for 40 doses. Today is dose 6    Diagnosis:   1. Wound infection         MD: Dr. Cale Schmitz ONC OP Encounter Vitals  BP: (!) 140/93  Heart Rate: 91  Temp: 98.5 °F (36.9 °C)  Height: 5' 6\" (1.676 m)        Allergies Reviewed: Yes  NOTE CHG allergy    Home Medications Reviewed: Yes    Labs reviewed: Yes    Line Access: PICC: Line  Flushed when treatment completed. Dressing changed today    Pre-medications: None    Treatment: Invanz infused over 30 min patient tolerated well         Supportive Care: None                      Patient confirmed RTC tomorrow     Patient Discharged: patient discharged to home per self, ambulatory    needs device and assist

## 2024-04-08 NOTE — ED ADULT NURSE NOTE - NS ED NURSE DC INFO COMPLEXITY
Spoke to patient and confirmed appointment with Tonie on 4/11/2024.     
Moderate: Comprehensive teaching

## 2024-09-03 NOTE — PHYSICAL THERAPY INITIAL EVALUATION ADULT - DIAGNOSIS, PT EVAL
9/3/2024 called pt to cancel her appt for 9/4/24 but was not able to speak with pt so I left a vm to call clinic back to reschedule. Also so that a PSR can get the exact date of when pt symptoms started.   decreased functional mobility

## 2024-10-04 NOTE — PHYSICAL THERAPY INITIAL EVALUATION ADULT - PERTINENT HX OF CURRENT PROBLEM, REHAB EVAL
83 yo F with PMH of dementia, CAD, T2DM, HLD, HTN, hypothryoid, AF who presented for cath 2/2 abnormal stress test w/ LAD perf s/p stents and IABP, now removed.

## 2025-07-10 NOTE — PROVIDER CONTACT NOTE (CRITICAL VALUE NOTIFICATION) - DATE AND TIME:
01-Aug-2019 17:24 Osteoporosis    Recommend daily allowance of calcium is 1657-6992 mg daily, preferably from food. See below  Recommend daily allowance of vitamin D is 3338-3373 IU daily    Weight bearing exercise as tolerated  https://www.bonehealthandosteoporosis.org/preventing-fractures/exercise-to-stay-healthy/    Avoid falls and fractures  https://www.bonehealthandosteoporosis.org/patients/fracturesfall-prevention/    Avoid alcohol and tobacco      Estimated Calcium Content of Foods:  Produce  Serving Size Estimated Calcium*    [place] greens, frozen 8 oz 360 mg   Broccoli patel 8 oz 200 mg   Kale, frozen 8 oz 180 mg   Soy Beans, green, boiled 8 oz 175 mg   Bok Mago, cooked, boiled 8 oz 160 mg   Figs, dried 2 figs 65 mg   Broccoli, fresh, cooked 8 oz 60 mg   Oranges 1 whole 55 mg   Seafood Serving Size Estimated Calcium*    Sardines, canned with bones 3 oz 325 mg   [place], canned with bones 3 oz 180 mg   Shrimp, canned 3 oz 125 mg   Dairy Serving Size Estimated Calcium*    Ricotta, part-skim 4 oz 335 mg   Yogurt, plain, low-fat 6 oz 310 mg   Milk, skim, low-fat, whole 8 oz 300 mg   Yogurt with fruit, low-fat 6 oz 260 mg   Mozzarella, part-skim 1 oz 210 mg   Cheddar 1 oz 205 mg   Yogurt, Greek 6 oz 200 mg   American Cheese 1 oz 195 mg   Feta Cheese 4 oz 140 mg   Cottage Cheese, 2% 4 oz 105 mg   Frozen yogurt, vanilla 8 oz 105 mg   Ice Cream, vanilla 8 oz 85 mg   Parmesan 1 tbsp 55 mg   Fortified Food Serving Size Estimated Calcium*   Montreal milk, rice milk or soy milk, fortified 8 oz 300 mg   Orange juice and other fruit juices, fortified 8 oz 300 mg   Tofu, prepared with calcium 4 oz 205 mg   Waffle, frozen, fortified 2 pieces 200 mg   Oatmeal, fortified 1 packet 140 mg   English muffin, fortified 1 muffin 100 mg   Cereal, fortified 8 oz 100-1,000 mg   Other Serving Size Estimated Calcium*   Mac & cheese, frozen 1 package 325 mg   Pizza, cheese, frozen 1 serving 115 mg   Pudding, chocolate, prepared with 2% milk 4 oz 160 mg    Beans, baked, canned 4 oz 160 mg   *The calcium content listed for most foods is estimated and can vary due to multiple factors. Check the food label to determine how much calcium is in a particular product.  If you read the nutrition label for a food source, it lists the % calcium in that food.  For an 8 oz glass of milk, for example, the label states calcium 30%.  This is equivalent to 300 mg of calcium (multiply the listed number by 10).   **Table from the National Osteoporosis Foundation     01-Aug-2019 17:25